# Patient Record
Sex: FEMALE | Race: WHITE | Employment: OTHER | URBAN - NONMETROPOLITAN AREA
[De-identification: names, ages, dates, MRNs, and addresses within clinical notes are randomized per-mention and may not be internally consistent; named-entity substitution may affect disease eponyms.]

---

## 2017-07-11 ENCOUNTER — TELEPHONE (OUTPATIENT)
Dept: INTERNAL MEDICINE | Age: 82
End: 2017-07-11

## 2017-07-13 ENCOUNTER — OFFICE VISIT (OUTPATIENT)
Dept: INTERNAL MEDICINE | Age: 82
End: 2017-07-13
Payer: MEDICARE

## 2017-07-13 VITALS
SYSTOLIC BLOOD PRESSURE: 130 MMHG | DIASTOLIC BLOOD PRESSURE: 80 MMHG | HEART RATE: 90 BPM | OXYGEN SATURATION: 96 % | HEIGHT: 66 IN | TEMPERATURE: 98.1 F | BODY MASS INDEX: 29.89 KG/M2 | WEIGHT: 186 LBS

## 2017-07-13 DIAGNOSIS — R94.31 ABNORMAL ELECTROCARDIOGRAM (ECG) (EKG): ICD-10-CM

## 2017-07-13 DIAGNOSIS — R53.83 FATIGUE, UNSPECIFIED TYPE: Primary | ICD-10-CM

## 2017-07-13 DIAGNOSIS — I49.8 OTHER SPECIFIED CARDIAC ARRHYTHMIAS: ICD-10-CM

## 2017-07-13 DIAGNOSIS — R06.02 SHORTNESS OF BREATH: ICD-10-CM

## 2017-07-13 DIAGNOSIS — I49.8 SINUS ARRHYTHMIA: ICD-10-CM

## 2017-07-13 PROCEDURE — G8427 DOCREV CUR MEDS BY ELIG CLIN: HCPCS | Performed by: PHYSICIAN ASSISTANT

## 2017-07-13 PROCEDURE — 4040F PNEUMOC VAC/ADMIN/RCVD: CPT | Performed by: PHYSICIAN ASSISTANT

## 2017-07-13 PROCEDURE — 99214 OFFICE O/P EST MOD 30 MIN: CPT | Performed by: PHYSICIAN ASSISTANT

## 2017-07-13 PROCEDURE — 1090F PRES/ABSN URINE INCON ASSESS: CPT | Performed by: PHYSICIAN ASSISTANT

## 2017-07-13 PROCEDURE — G8417 CALC BMI ABV UP PARAM F/U: HCPCS | Performed by: PHYSICIAN ASSISTANT

## 2017-07-13 PROCEDURE — 1036F TOBACCO NON-USER: CPT | Performed by: PHYSICIAN ASSISTANT

## 2017-07-13 PROCEDURE — 1123F ACP DISCUSS/DSCN MKR DOCD: CPT | Performed by: PHYSICIAN ASSISTANT

## 2017-07-13 PROCEDURE — G8400 PT W/DXA NO RESULTS DOC: HCPCS | Performed by: PHYSICIAN ASSISTANT

## 2017-07-13 ASSESSMENT — ENCOUNTER SYMPTOMS
NAUSEA: 0
EYE PAIN: 0
CONSTIPATION: 0
ABDOMINAL PAIN: 0
SINUS PRESSURE: 0
SHORTNESS OF BREATH: 1
BACK PAIN: 0
CHEST TIGHTNESS: 0
SORE THROAT: 0
COUGH: 0
WHEEZING: 0
PHOTOPHOBIA: 0
VOMITING: 0
COLOR CHANGE: 0
EYE REDNESS: 0
RHINORRHEA: 0
DIARRHEA: 0

## 2017-07-13 ASSESSMENT — PATIENT HEALTH QUESTIONNAIRE - PHQ9
SUM OF ALL RESPONSES TO PHQ9 QUESTIONS 1 & 2: 2
2. FEELING DOWN, DEPRESSED OR HOPELESS: 1
SUM OF ALL RESPONSES TO PHQ QUESTIONS 1-9: 2
1. LITTLE INTEREST OR PLEASURE IN DOING THINGS: 1

## 2017-07-14 DIAGNOSIS — R53.83 FATIGUE, UNSPECIFIED TYPE: ICD-10-CM

## 2017-07-14 DIAGNOSIS — R06.02 SHORTNESS OF BREATH: ICD-10-CM

## 2017-07-14 LAB
ALBUMIN SERPL-MCNC: 3.9 G/DL (ref 3.5–5.2)
ALP BLD-CCNC: 87 U/L (ref 35–104)
ALT SERPL-CCNC: 11 U/L (ref 5–33)
ANION GAP SERPL CALCULATED.3IONS-SCNC: 13 MMOL/L (ref 7–19)
AST SERPL-CCNC: 20 U/L (ref 5–32)
BASOPHILS ABSOLUTE: 0.1 K/UL (ref 0–0.2)
BASOPHILS RELATIVE PERCENT: 1.4 % (ref 0–1)
BILIRUB SERPL-MCNC: 0.4 MG/DL (ref 0.2–1.2)
BUN BLDV-MCNC: 14 MG/DL (ref 8–23)
CALCIUM SERPL-MCNC: 9.2 MG/DL (ref 8.8–10.2)
CHLORIDE BLD-SCNC: 98 MMOL/L (ref 98–111)
CO2: 28 MMOL/L (ref 22–29)
CREAT SERPL-MCNC: 0.7 MG/DL (ref 0.5–0.9)
EOSINOPHILS ABSOLUTE: 0.3 K/UL (ref 0–0.6)
EOSINOPHILS RELATIVE PERCENT: 5.5 % (ref 0–5)
GFR NON-AFRICAN AMERICAN: >60
GLUCOSE BLD-MCNC: 93 MG/DL (ref 74–109)
HCT VFR BLD CALC: 41.1 % (ref 37–47)
HEMOGLOBIN: 13.1 G/DL (ref 12–16)
LYMPHOCYTES ABSOLUTE: 1.9 K/UL (ref 1.1–4.5)
LYMPHOCYTES RELATIVE PERCENT: 34.3 % (ref 20–40)
MCH RBC QN AUTO: 30.8 PG (ref 27–31)
MCHC RBC AUTO-ENTMCNC: 31.9 G/DL (ref 33–37)
MCV RBC AUTO: 96.5 FL (ref 81–99)
MONOCYTES ABSOLUTE: 0.6 K/UL (ref 0–0.9)
MONOCYTES RELATIVE PERCENT: 10.6 % (ref 0–10)
NEUTROPHILS ABSOLUTE: 2.7 K/UL (ref 1.5–7.5)
NEUTROPHILS RELATIVE PERCENT: 47.8 % (ref 50–65)
PDW BLD-RTO: 13 % (ref 11.5–14.5)
PLATELET # BLD: 256 K/UL (ref 130–400)
PMV BLD AUTO: 9.9 FL (ref 9.4–12.3)
POTASSIUM SERPL-SCNC: 4.8 MMOL/L (ref 3.5–5)
RBC # BLD: 4.26 M/UL (ref 4.2–5.4)
SODIUM BLD-SCNC: 139 MMOL/L (ref 136–145)
TOTAL PROTEIN: 7.5 G/DL (ref 6.6–8.7)
TSH SERPL DL<=0.05 MIU/L-ACNC: 3.35 UIU/ML (ref 0.27–4.2)
VITAMIN B-12: 256 PG/ML (ref 211–946)
WBC # BLD: 5.6 K/UL (ref 4.8–10.8)

## 2017-07-18 DIAGNOSIS — I49.9 IRREGULAR HEART BEAT: Primary | ICD-10-CM

## 2017-07-26 ENCOUNTER — HOSPITAL ENCOUNTER (OUTPATIENT)
Dept: NON INVASIVE DIAGNOSTICS | Age: 82
Discharge: HOME | End: 2017-07-26
Payer: MEDICARE

## 2017-07-26 DIAGNOSIS — R06.02 SHORTNESS OF BREATH: ICD-10-CM

## 2017-07-26 DIAGNOSIS — I49.9 IRREGULAR HEART BEAT: ICD-10-CM

## 2017-07-26 DIAGNOSIS — R94.31 ABNORMAL ELECTROCARDIOGRAM (ECG) (EKG): ICD-10-CM

## 2017-07-26 DIAGNOSIS — I49.8 OTHER SPECIFIED CARDIAC ARRHYTHMIAS: ICD-10-CM

## 2017-07-26 LAB
LV EF: 58 %
LVEF MODALITY: NORMAL

## 2017-07-26 PROCEDURE — 93226 XTRNL ECG REC<48 HR SCAN A/R: CPT

## 2017-07-26 PROCEDURE — 93225 XTRNL ECG REC<48 HRS REC: CPT

## 2017-07-26 PROCEDURE — 93227 XTRNL ECG REC<48 HR R&I: CPT | Performed by: INTERNAL MEDICINE

## 2017-07-26 PROCEDURE — 93306 TTE W/DOPPLER COMPLETE: CPT

## 2017-07-28 ENCOUNTER — OFFICE VISIT (OUTPATIENT)
Dept: INTERNAL MEDICINE | Age: 82
End: 2017-07-28
Payer: MEDICARE

## 2017-07-28 VITALS
TEMPERATURE: 98.3 F | SYSTOLIC BLOOD PRESSURE: 130 MMHG | DIASTOLIC BLOOD PRESSURE: 82 MMHG | BODY MASS INDEX: 29.33 KG/M2 | OXYGEN SATURATION: 96 % | HEIGHT: 66 IN | WEIGHT: 182.5 LBS | HEART RATE: 73 BPM

## 2017-07-28 DIAGNOSIS — R42 DIZZINESS: ICD-10-CM

## 2017-07-28 DIAGNOSIS — F32.89 OTHER DEPRESSION: Primary | ICD-10-CM

## 2017-07-28 DIAGNOSIS — R06.02 SHORTNESS OF BREATH: ICD-10-CM

## 2017-07-28 DIAGNOSIS — F32.A FATIGUE DUE TO DEPRESSION: ICD-10-CM

## 2017-07-28 DIAGNOSIS — R53.83 FATIGUE DUE TO DEPRESSION: ICD-10-CM

## 2017-07-28 DIAGNOSIS — R53.1 WEAKNESS: ICD-10-CM

## 2017-07-28 PROCEDURE — 99214 OFFICE O/P EST MOD 30 MIN: CPT | Performed by: PHYSICIAN ASSISTANT

## 2017-07-28 PROCEDURE — 1090F PRES/ABSN URINE INCON ASSESS: CPT | Performed by: PHYSICIAN ASSISTANT

## 2017-07-28 PROCEDURE — G8427 DOCREV CUR MEDS BY ELIG CLIN: HCPCS | Performed by: PHYSICIAN ASSISTANT

## 2017-07-28 PROCEDURE — G8400 PT W/DXA NO RESULTS DOC: HCPCS | Performed by: PHYSICIAN ASSISTANT

## 2017-07-28 PROCEDURE — 1123F ACP DISCUSS/DSCN MKR DOCD: CPT | Performed by: PHYSICIAN ASSISTANT

## 2017-07-28 PROCEDURE — 4040F PNEUMOC VAC/ADMIN/RCVD: CPT | Performed by: PHYSICIAN ASSISTANT

## 2017-07-28 PROCEDURE — 1036F TOBACCO NON-USER: CPT | Performed by: PHYSICIAN ASSISTANT

## 2017-07-28 PROCEDURE — G8417 CALC BMI ABV UP PARAM F/U: HCPCS | Performed by: PHYSICIAN ASSISTANT

## 2017-07-28 ASSESSMENT — ENCOUNTER SYMPTOMS
WHEEZING: 0
COLOR CHANGE: 0
VOMITING: 0
DIARRHEA: 0
NAUSEA: 0
COUGH: 0
CHEST TIGHTNESS: 0
PHOTOPHOBIA: 0
EYE REDNESS: 0
SHORTNESS OF BREATH: 1
EYE PAIN: 0
SINUS PRESSURE: 0
SORE THROAT: 0
RHINORRHEA: 0
ABDOMINAL PAIN: 0
CONSTIPATION: 0

## 2017-08-25 ENCOUNTER — OFFICE VISIT (OUTPATIENT)
Dept: INTERNAL MEDICINE | Age: 82
End: 2017-08-25
Payer: MEDICARE

## 2017-08-25 VITALS
HEIGHT: 66 IN | OXYGEN SATURATION: 95 % | TEMPERATURE: 98.2 F | SYSTOLIC BLOOD PRESSURE: 132 MMHG | HEART RATE: 75 BPM | WEIGHT: 180.5 LBS | BODY MASS INDEX: 29.01 KG/M2 | DIASTOLIC BLOOD PRESSURE: 80 MMHG

## 2017-08-25 DIAGNOSIS — F32.A DEPRESSION, UNSPECIFIED DEPRESSION TYPE: Primary | ICD-10-CM

## 2017-08-25 DIAGNOSIS — R06.02 SHORTNESS OF BREATH: ICD-10-CM

## 2017-08-25 DIAGNOSIS — K21.9 GASTROESOPHAGEAL REFLUX DISEASE WITHOUT ESOPHAGITIS: ICD-10-CM

## 2017-08-25 DIAGNOSIS — R25.1 TREMORS OF NERVOUS SYSTEM: ICD-10-CM

## 2017-08-25 DIAGNOSIS — E78.5 HYPERLIPIDEMIA, UNSPECIFIED HYPERLIPIDEMIA TYPE: ICD-10-CM

## 2017-08-25 DIAGNOSIS — I10 ESSENTIAL HYPERTENSION: ICD-10-CM

## 2017-08-25 PROCEDURE — 1036F TOBACCO NON-USER: CPT | Performed by: PHYSICIAN ASSISTANT

## 2017-08-25 PROCEDURE — 99213 OFFICE O/P EST LOW 20 MIN: CPT | Performed by: PHYSICIAN ASSISTANT

## 2017-08-25 PROCEDURE — 4040F PNEUMOC VAC/ADMIN/RCVD: CPT | Performed by: PHYSICIAN ASSISTANT

## 2017-08-25 PROCEDURE — 1090F PRES/ABSN URINE INCON ASSESS: CPT | Performed by: PHYSICIAN ASSISTANT

## 2017-08-25 PROCEDURE — G8427 DOCREV CUR MEDS BY ELIG CLIN: HCPCS | Performed by: PHYSICIAN ASSISTANT

## 2017-08-25 PROCEDURE — G8400 PT W/DXA NO RESULTS DOC: HCPCS | Performed by: PHYSICIAN ASSISTANT

## 2017-08-25 PROCEDURE — 1123F ACP DISCUSS/DSCN MKR DOCD: CPT | Performed by: PHYSICIAN ASSISTANT

## 2017-08-25 PROCEDURE — G8417 CALC BMI ABV UP PARAM F/U: HCPCS | Performed by: PHYSICIAN ASSISTANT

## 2017-08-25 RX ORDER — ATENOLOL 100 MG/1
100 TABLET ORAL DAILY
Qty: 90 TABLET | Refills: 3 | Status: SHIPPED | OUTPATIENT
Start: 2017-08-25 | End: 2017-09-29 | Stop reason: SDUPTHER

## 2017-08-25 RX ORDER — LOVASTATIN 40 MG/1
40 TABLET ORAL NIGHTLY
Qty: 90 TABLET | Refills: 3 | Status: SHIPPED | OUTPATIENT
Start: 2017-08-25 | End: 2018-05-18 | Stop reason: SDUPTHER

## 2017-08-25 RX ORDER — PROPRANOLOL HYDROCHLORIDE 60 MG/1
60 TABLET ORAL DAILY
COMMUNITY
End: 2017-08-25 | Stop reason: CLARIF

## 2017-08-25 RX ORDER — PROPRANOLOL HYDROCHLORIDE 60 MG/1
60 TABLET ORAL DAILY
Qty: 30 TABLET | Refills: 0 | Status: CANCELLED | OUTPATIENT
Start: 2017-08-25

## 2017-08-25 RX ORDER — OMEPRAZOLE 40 MG/1
40 CAPSULE, DELAYED RELEASE ORAL DAILY
Qty: 90 CAPSULE | Refills: 3 | Status: SHIPPED | OUTPATIENT
Start: 2017-08-25 | End: 2017-11-07 | Stop reason: SDUPTHER

## 2017-08-25 RX ORDER — VALSARTAN 160 MG/1
80 TABLET ORAL DAILY
Qty: 45 TABLET | Refills: 3 | Status: SHIPPED | OUTPATIENT
Start: 2017-08-25 | End: 2018-05-18 | Stop reason: SDUPTHER

## 2017-08-25 ASSESSMENT — ENCOUNTER SYMPTOMS
VOMITING: 0
PHOTOPHOBIA: 0
COUGH: 0
ABDOMINAL PAIN: 0
DIARRHEA: 0
EYE REDNESS: 0
NAUSEA: 0
CHEST TIGHTNESS: 0
SINUS PRESSURE: 0
SORE THROAT: 0
SHORTNESS OF BREATH: 1
RHINORRHEA: 0
WHEEZING: 0
EYE PAIN: 0
CONSTIPATION: 0
COLOR CHANGE: 0

## 2017-09-29 ENCOUNTER — OFFICE VISIT (OUTPATIENT)
Dept: INTERNAL MEDICINE | Age: 82
End: 2017-09-29
Payer: MEDICARE

## 2017-09-29 VITALS
HEART RATE: 78 BPM | BODY MASS INDEX: 28.93 KG/M2 | TEMPERATURE: 98.3 F | SYSTOLIC BLOOD PRESSURE: 132 MMHG | HEIGHT: 66 IN | WEIGHT: 180 LBS | OXYGEN SATURATION: 96 % | DIASTOLIC BLOOD PRESSURE: 80 MMHG

## 2017-09-29 DIAGNOSIS — F32.A DEPRESSION, UNSPECIFIED DEPRESSION TYPE: Primary | ICD-10-CM

## 2017-09-29 DIAGNOSIS — E78.5 HYPERLIPIDEMIA, UNSPECIFIED HYPERLIPIDEMIA TYPE: ICD-10-CM

## 2017-09-29 DIAGNOSIS — I10 ESSENTIAL HYPERTENSION: ICD-10-CM

## 2017-09-29 DIAGNOSIS — R25.1 TREMORS OF NERVOUS SYSTEM: ICD-10-CM

## 2017-09-29 PROCEDURE — G8427 DOCREV CUR MEDS BY ELIG CLIN: HCPCS | Performed by: PHYSICIAN ASSISTANT

## 2017-09-29 PROCEDURE — G8417 CALC BMI ABV UP PARAM F/U: HCPCS | Performed by: PHYSICIAN ASSISTANT

## 2017-09-29 PROCEDURE — 1123F ACP DISCUSS/DSCN MKR DOCD: CPT | Performed by: PHYSICIAN ASSISTANT

## 2017-09-29 PROCEDURE — 1036F TOBACCO NON-USER: CPT | Performed by: PHYSICIAN ASSISTANT

## 2017-09-29 PROCEDURE — 99213 OFFICE O/P EST LOW 20 MIN: CPT | Performed by: PHYSICIAN ASSISTANT

## 2017-09-29 PROCEDURE — G8400 PT W/DXA NO RESULTS DOC: HCPCS | Performed by: PHYSICIAN ASSISTANT

## 2017-09-29 PROCEDURE — 4040F PNEUMOC VAC/ADMIN/RCVD: CPT | Performed by: PHYSICIAN ASSISTANT

## 2017-09-29 PROCEDURE — 1090F PRES/ABSN URINE INCON ASSESS: CPT | Performed by: PHYSICIAN ASSISTANT

## 2017-09-29 RX ORDER — ATENOLOL 100 MG/1
100 TABLET ORAL DAILY
Qty: 90 TABLET | Refills: 3 | Status: SHIPPED | OUTPATIENT
Start: 2017-09-29 | End: 2018-05-18 | Stop reason: DRUGHIGH

## 2017-09-29 ASSESSMENT — ENCOUNTER SYMPTOMS
SORE THROAT: 0
EYE PAIN: 0
DIARRHEA: 0
NAUSEA: 0
EYE REDNESS: 0
COLOR CHANGE: 0
ABDOMINAL PAIN: 0
SHORTNESS OF BREATH: 1
CONSTIPATION: 0
RHINORRHEA: 0
WHEEZING: 0
CHEST TIGHTNESS: 0
PHOTOPHOBIA: 0
COUGH: 0
SINUS PRESSURE: 0
VOMITING: 0

## 2017-11-07 DIAGNOSIS — K21.9 GASTROESOPHAGEAL REFLUX DISEASE WITHOUT ESOPHAGITIS: ICD-10-CM

## 2017-11-07 RX ORDER — OMEPRAZOLE 40 MG/1
CAPSULE, DELAYED RELEASE ORAL
Qty: 90 CAPSULE | Refills: 1 | Status: SHIPPED | OUTPATIENT
Start: 2017-11-07 | End: 2018-02-09 | Stop reason: SDUPTHER

## 2017-12-22 DIAGNOSIS — E78.5 HYPERLIPIDEMIA, UNSPECIFIED HYPERLIPIDEMIA TYPE: ICD-10-CM

## 2017-12-22 DIAGNOSIS — R25.1 TREMORS OF NERVOUS SYSTEM: ICD-10-CM

## 2017-12-22 DIAGNOSIS — I10 ESSENTIAL HYPERTENSION: ICD-10-CM

## 2017-12-22 LAB
ALBUMIN SERPL-MCNC: 4 G/DL (ref 3.5–5.2)
ALP BLD-CCNC: 87 U/L (ref 35–104)
ALT SERPL-CCNC: 11 U/L (ref 5–33)
ANION GAP SERPL CALCULATED.3IONS-SCNC: 9 MMOL/L (ref 7–19)
AST SERPL-CCNC: 19 U/L (ref 5–32)
BASOPHILS ABSOLUTE: 0.1 K/UL (ref 0–0.2)
BASOPHILS RELATIVE PERCENT: 1.6 % (ref 0–1)
BILIRUB SERPL-MCNC: 0.4 MG/DL (ref 0.2–1.2)
BUN BLDV-MCNC: 16 MG/DL (ref 8–23)
CALCIUM SERPL-MCNC: 9.4 MG/DL (ref 8.8–10.2)
CHLORIDE BLD-SCNC: 102 MMOL/L (ref 98–111)
CHOLESTEROL, TOTAL: 166 MG/DL (ref 160–199)
CO2: 30 MMOL/L (ref 22–29)
CREAT SERPL-MCNC: 0.7 MG/DL (ref 0.5–0.9)
EOSINOPHILS ABSOLUTE: 0.2 K/UL (ref 0–0.6)
EOSINOPHILS RELATIVE PERCENT: 3.9 % (ref 0–5)
GFR NON-AFRICAN AMERICAN: >60
GLUCOSE BLD-MCNC: 99 MG/DL (ref 74–109)
HCT VFR BLD CALC: 43.6 % (ref 37–47)
HDLC SERPL-MCNC: 58 MG/DL (ref 65–121)
HEMOGLOBIN: 13.9 G/DL (ref 12–16)
LDL CHOLESTEROL CALCULATED: 86 MG/DL
LYMPHOCYTES ABSOLUTE: 1.6 K/UL (ref 1.1–4.5)
LYMPHOCYTES RELATIVE PERCENT: 31.8 % (ref 20–40)
MCH RBC QN AUTO: 30.5 PG (ref 27–31)
MCHC RBC AUTO-ENTMCNC: 31.9 G/DL (ref 33–37)
MCV RBC AUTO: 95.8 FL (ref 81–99)
MONOCYTES ABSOLUTE: 0.5 K/UL (ref 0–0.9)
MONOCYTES RELATIVE PERCENT: 10.4 % (ref 0–10)
NEUTROPHILS ABSOLUTE: 2.7 K/UL (ref 1.5–7.5)
NEUTROPHILS RELATIVE PERCENT: 52.1 % (ref 50–65)
PDW BLD-RTO: 12.8 % (ref 11.5–14.5)
PLATELET # BLD: 205 K/UL (ref 130–400)
PMV BLD AUTO: 10 FL (ref 9.4–12.3)
POTASSIUM SERPL-SCNC: 4.5 MMOL/L (ref 3.5–5)
RBC # BLD: 4.55 M/UL (ref 4.2–5.4)
SODIUM BLD-SCNC: 141 MMOL/L (ref 136–145)
TOTAL PROTEIN: 7.2 G/DL (ref 6.6–8.7)
TRIGL SERPL-MCNC: 108 MG/DL (ref 0–149)
WBC # BLD: 5.1 K/UL (ref 4.8–10.8)

## 2018-02-09 ENCOUNTER — OFFICE VISIT (OUTPATIENT)
Dept: INTERNAL MEDICINE | Age: 83
End: 2018-02-09
Payer: MEDICARE

## 2018-02-09 VITALS
OXYGEN SATURATION: 98 % | HEIGHT: 66 IN | DIASTOLIC BLOOD PRESSURE: 84 MMHG | WEIGHT: 178.4 LBS | SYSTOLIC BLOOD PRESSURE: 130 MMHG | BODY MASS INDEX: 28.67 KG/M2 | HEART RATE: 79 BPM | TEMPERATURE: 97.3 F

## 2018-02-09 DIAGNOSIS — F41.9 ANXIETY AND DEPRESSION: ICD-10-CM

## 2018-02-09 DIAGNOSIS — Z00.00 ROUTINE GENERAL MEDICAL EXAMINATION AT A HEALTH CARE FACILITY: Primary | ICD-10-CM

## 2018-02-09 DIAGNOSIS — K21.9 GASTROESOPHAGEAL REFLUX DISEASE WITHOUT ESOPHAGITIS: ICD-10-CM

## 2018-02-09 DIAGNOSIS — F32.A ANXIETY AND DEPRESSION: ICD-10-CM

## 2018-02-09 DIAGNOSIS — G89.29 CHRONIC RIGHT SHOULDER PAIN: ICD-10-CM

## 2018-02-09 DIAGNOSIS — Z23 NEED FOR PROPHYLACTIC VACCINATION AGAINST STREPTOCOCCUS PNEUMONIAE (PNEUMOCOCCUS): ICD-10-CM

## 2018-02-09 DIAGNOSIS — Z78.0 POST-MENOPAUSAL: ICD-10-CM

## 2018-02-09 DIAGNOSIS — M25.511 CHRONIC RIGHT SHOULDER PAIN: ICD-10-CM

## 2018-02-09 DIAGNOSIS — R20.0 NUMBNESS OF FINGER: ICD-10-CM

## 2018-02-09 DIAGNOSIS — E78.2 MIXED HYPERLIPIDEMIA: ICD-10-CM

## 2018-02-09 DIAGNOSIS — I10 ESSENTIAL HYPERTENSION: ICD-10-CM

## 2018-02-09 DIAGNOSIS — G25.0 TREMOR, ESSENTIAL: ICD-10-CM

## 2018-02-09 PROCEDURE — 1090F PRES/ABSN URINE INCON ASSESS: CPT | Performed by: PHYSICIAN ASSISTANT

## 2018-02-09 PROCEDURE — G0009 ADMIN PNEUMOCOCCAL VACCINE: HCPCS | Performed by: PHYSICIAN ASSISTANT

## 2018-02-09 PROCEDURE — G8484 FLU IMMUNIZE NO ADMIN: HCPCS | Performed by: PHYSICIAN ASSISTANT

## 2018-02-09 PROCEDURE — G8417 CALC BMI ABV UP PARAM F/U: HCPCS | Performed by: PHYSICIAN ASSISTANT

## 2018-02-09 PROCEDURE — 90670 PCV13 VACCINE IM: CPT | Performed by: PHYSICIAN ASSISTANT

## 2018-02-09 PROCEDURE — 1123F ACP DISCUSS/DSCN MKR DOCD: CPT | Performed by: PHYSICIAN ASSISTANT

## 2018-02-09 PROCEDURE — 1036F TOBACCO NON-USER: CPT | Performed by: PHYSICIAN ASSISTANT

## 2018-02-09 PROCEDURE — G8400 PT W/DXA NO RESULTS DOC: HCPCS | Performed by: PHYSICIAN ASSISTANT

## 2018-02-09 PROCEDURE — 4040F PNEUMOC VAC/ADMIN/RCVD: CPT | Performed by: PHYSICIAN ASSISTANT

## 2018-02-09 PROCEDURE — G8427 DOCREV CUR MEDS BY ELIG CLIN: HCPCS | Performed by: PHYSICIAN ASSISTANT

## 2018-02-09 PROCEDURE — 99214 OFFICE O/P EST MOD 30 MIN: CPT | Performed by: PHYSICIAN ASSISTANT

## 2018-02-09 RX ORDER — ESCITALOPRAM OXALATE 10 MG/1
10 TABLET ORAL DAILY
Qty: 90 TABLET | Refills: 3 | Status: SHIPPED | OUTPATIENT
Start: 2018-02-09 | End: 2018-03-09 | Stop reason: SDUPTHER

## 2018-02-09 RX ORDER — OMEPRAZOLE 40 MG/1
CAPSULE, DELAYED RELEASE ORAL
Qty: 90 CAPSULE | Refills: 3 | Status: SHIPPED | OUTPATIENT
Start: 2018-02-09 | End: 2018-05-10 | Stop reason: SDUPTHER

## 2018-02-09 ASSESSMENT — ENCOUNTER SYMPTOMS
PHOTOPHOBIA: 0
COUGH: 0
SINUS PRESSURE: 0
VOMITING: 0
EYE PAIN: 0
RHINORRHEA: 0
DIARRHEA: 0
COLOR CHANGE: 0
WHEEZING: 0
SHORTNESS OF BREATH: 1
CONSTIPATION: 0
ABDOMINAL PAIN: 0
EYE REDNESS: 0
NAUSEA: 0
CHEST TIGHTNESS: 0
SORE THROAT: 0

## 2018-02-09 NOTE — PROGRESS NOTES
[facility]  [address] [facility]  [address] [place] [address]  Dept: [phone]  Dept Fax: [phone]: [phone]      [facility] INTERNAL MEDICINE  OFFICE NOTE      Chief Complaint   Patient presents with    Other     3 month f/u , tremors seem to be worse, b/p been running high       Nash Nadir is a [de-identified]y.o. year old female who is seen for 3 month follow-up for hypertension, GERD, hyperlipidemia, anxiety and depression, tremors. Patient states she has a wrist blood pressure cuff and sometimes her blood pressure seems like it may be running a little high at home. Patient blood pressure was today fairly well controlled with current medication. Patient's currently taking losartan 80 mg daily. Patient states she is compliant with taking medication. Patient denies any chest pain, shortness breath, dizziness. Patient states her GERD is fairly well controlled on 40 mg of Prilosec daily. Patient's cholesterol stays further well-controlled on Mevacor 40 mg at night. Patient denies any side effects from lipid-lowering medication. Patient's compliant with taking medication. Patient's anxiety and depression seems to be fairly well controlled on Lexapro 10 mg daily. Patient states has been feeling better. Patient states due to the weather she has not been able to get up and get out much. Patient states she still goes to [employer] every Sunday and goes with her daughter's on Friday and goes to her meetings throughout the week. Patient states she does plan on starting to work with her arms and legs inside. Patient states her tremors seem a little bit worse but she's been able to tolerate it. Patient states overall she's been doing pretty good.   Patient states has some periodic right shoulder pain and some periodic tingling in her right ring finger but she doesn't feel like it anything needs to be done at this point unless it becomes directed. Patient's anxiety and depression fairly well controlled on current medication regimen continue as directed. Patient's right shoulder pain is periodic and not limiting at this time. We will hold off on any intervention at this time if it consistently is getting worse can consider physical therapy. Patient's numbness in the right pinky finger is periodic and resolves with massage we'll hold off on any studies at this time unless it gets worse. Patient's tremor seems stable do not really want to increase patient's medication unless we have to. 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 Patient was given pneumonia shot today. Will schedule patient for a DEXA scan to screen for osteoporosis. Patient will follow back up with me in 3 months with repeat CBC, CMP, lipids. Patient follow-up sooner as needed. Orders Placed This Encounter   Procedures    DEXA Bone Density Axial Skeleton    Pneumococcal conjugate vaccine 13-valent PCV13    CBC Auto Differential    Comprehensive Metabolic Panel    Lipid Panel        Return in about 3 months (around 5/9/2018), or if symptoms worsen or fail to improve, for physical.       Patient given educational handouts and has had all questions answered. Patient voices understanding and agrees to plans along with risks and benefits of plan. Patient is instructed to continue prior medications, diet, and exercise plans as instructed. Patient agrees to follow up as instructions, sooner if needed, or to go to ER if condition becomes emergent. Additional Instructions: As always, patient is advised to bring in medication bottles in order to correctly reconcile with our current list.      Luis Fernando Jo PA-C    EMR Dragon/transcription disclaimer: Much of this encounter note is electronic transcription/translation of spoken language to printed text.  The electronic translation of spoken language may be erroneous, or at times,

## 2018-03-09 DIAGNOSIS — F32.A ANXIETY AND DEPRESSION: ICD-10-CM

## 2018-03-09 DIAGNOSIS — F41.9 ANXIETY AND DEPRESSION: ICD-10-CM

## 2018-03-09 RX ORDER — ESCITALOPRAM OXALATE 10 MG/1
TABLET ORAL
Qty: 90 TABLET | Refills: 3 | Status: SHIPPED | OUTPATIENT
Start: 2018-03-09 | End: 2018-05-18 | Stop reason: SDUPTHER

## 2018-05-03 DIAGNOSIS — E78.2 MIXED HYPERLIPIDEMIA: ICD-10-CM

## 2018-05-03 DIAGNOSIS — I10 ESSENTIAL HYPERTENSION: ICD-10-CM

## 2018-05-03 LAB
ALBUMIN SERPL-MCNC: 4.2 G/DL (ref 3.5–5.2)
ALP BLD-CCNC: 88 U/L (ref 35–104)
ALT SERPL-CCNC: 9 U/L (ref 5–33)
ANION GAP SERPL CALCULATED.3IONS-SCNC: 13 MMOL/L (ref 7–19)
AST SERPL-CCNC: 19 U/L (ref 5–32)
BASOPHILS ABSOLUTE: 0.1 K/UL (ref 0–0.2)
BASOPHILS RELATIVE PERCENT: 1.4 % (ref 0–1)
BILIRUB SERPL-MCNC: 0.5 MG/DL (ref 0.2–1.2)
BUN BLDV-MCNC: 14 MG/DL (ref 8–23)
CALCIUM SERPL-MCNC: 9.1 MG/DL (ref 8.8–10.2)
CHLORIDE BLD-SCNC: 97 MMOL/L (ref 98–111)
CHOLESTEROL, TOTAL: 159 MG/DL (ref 160–199)
CO2: 28 MMOL/L (ref 22–29)
CREAT SERPL-MCNC: 0.6 MG/DL (ref 0.5–0.9)
EOSINOPHILS ABSOLUTE: 0.2 K/UL (ref 0–0.6)
EOSINOPHILS RELATIVE PERCENT: 3.6 % (ref 0–5)
GFR NON-AFRICAN AMERICAN: >60
GLUCOSE BLD-MCNC: 96 MG/DL (ref 74–109)
HCT VFR BLD CALC: 40.4 % (ref 37–47)
HDLC SERPL-MCNC: 60 MG/DL (ref 65–121)
HEMOGLOBIN: 12.7 G/DL (ref 12–16)
LDL CHOLESTEROL CALCULATED: 82 MG/DL
LYMPHOCYTES ABSOLUTE: 1.4 K/UL (ref 1.1–4.5)
LYMPHOCYTES RELATIVE PERCENT: 31.4 % (ref 20–40)
MCH RBC QN AUTO: 29.6 PG (ref 27–31)
MCHC RBC AUTO-ENTMCNC: 31.4 G/DL (ref 33–37)
MCV RBC AUTO: 94.2 FL (ref 81–99)
MONOCYTES ABSOLUTE: 0.5 K/UL (ref 0–0.9)
MONOCYTES RELATIVE PERCENT: 10.2 % (ref 0–10)
NEUTROPHILS ABSOLUTE: 2.4 K/UL (ref 1.5–7.5)
NEUTROPHILS RELATIVE PERCENT: 53.2 % (ref 50–65)
PDW BLD-RTO: 13.1 % (ref 11.5–14.5)
PLATELET # BLD: 183 K/UL (ref 130–400)
PMV BLD AUTO: 9.9 FL (ref 9.4–12.3)
POTASSIUM SERPL-SCNC: 3.9 MMOL/L (ref 3.5–5)
RBC # BLD: 4.29 M/UL (ref 4.2–5.4)
SODIUM BLD-SCNC: 138 MMOL/L (ref 136–145)
TOTAL PROTEIN: 7 G/DL (ref 6.6–8.7)
TRIGL SERPL-MCNC: 84 MG/DL (ref 0–149)
WBC # BLD: 4.4 K/UL (ref 4.8–10.8)

## 2018-05-10 DIAGNOSIS — K21.9 GASTROESOPHAGEAL REFLUX DISEASE WITHOUT ESOPHAGITIS: ICD-10-CM

## 2018-05-11 RX ORDER — OMEPRAZOLE 40 MG/1
CAPSULE, DELAYED RELEASE ORAL
Qty: 90 CAPSULE | Refills: 1 | Status: SHIPPED | OUTPATIENT
Start: 2018-05-11 | End: 2018-10-26 | Stop reason: SDUPTHER

## 2018-05-18 ENCOUNTER — OFFICE VISIT (OUTPATIENT)
Dept: INTERNAL MEDICINE | Age: 83
End: 2018-05-18
Payer: MEDICARE

## 2018-05-18 VITALS
BODY MASS INDEX: 28.66 KG/M2 | DIASTOLIC BLOOD PRESSURE: 74 MMHG | SYSTOLIC BLOOD PRESSURE: 132 MMHG | OXYGEN SATURATION: 97 % | HEIGHT: 65 IN | WEIGHT: 172 LBS | HEART RATE: 68 BPM

## 2018-05-18 DIAGNOSIS — Z00.00 ROUTINE GENERAL MEDICAL EXAMINATION AT A HEALTH CARE FACILITY: Primary | ICD-10-CM

## 2018-05-18 DIAGNOSIS — I10 ESSENTIAL HYPERTENSION: ICD-10-CM

## 2018-05-18 DIAGNOSIS — E78.5 HYPERLIPIDEMIA, UNSPECIFIED HYPERLIPIDEMIA TYPE: ICD-10-CM

## 2018-05-18 DIAGNOSIS — K21.9 GASTROESOPHAGEAL REFLUX DISEASE WITHOUT ESOPHAGITIS: ICD-10-CM

## 2018-05-18 DIAGNOSIS — F41.9 ANXIETY AND DEPRESSION: ICD-10-CM

## 2018-05-18 DIAGNOSIS — F32.A ANXIETY AND DEPRESSION: ICD-10-CM

## 2018-05-18 PROCEDURE — G0439 PPPS, SUBSEQ VISIT: HCPCS | Performed by: PHYSICIAN ASSISTANT

## 2018-05-18 PROCEDURE — 4040F PNEUMOC VAC/ADMIN/RCVD: CPT | Performed by: PHYSICIAN ASSISTANT

## 2018-05-18 RX ORDER — ESCITALOPRAM OXALATE 10 MG/1
TABLET ORAL
Qty: 90 TABLET | Refills: 3 | Status: SHIPPED | OUTPATIENT
Start: 2018-05-18 | End: 2018-06-06 | Stop reason: SDUPTHER

## 2018-05-18 RX ORDER — LATANOPROST 50 UG/ML
SOLUTION/ DROPS OPHTHALMIC
Refills: 3 | COMMUNITY
Start: 2018-04-20 | End: 2021-01-01

## 2018-05-18 RX ORDER — LOVASTATIN 40 MG/1
40 TABLET ORAL NIGHTLY
Qty: 90 TABLET | Refills: 3 | Status: SHIPPED | OUTPATIENT
Start: 2018-05-18 | End: 2018-08-24 | Stop reason: SDUPTHER

## 2018-05-18 RX ORDER — ATENOLOL 50 MG/1
TABLET ORAL
Refills: 3 | COMMUNITY
Start: 2018-02-16 | End: 2018-05-18 | Stop reason: SDUPTHER

## 2018-05-18 RX ORDER — VALSARTAN 160 MG/1
80 TABLET ORAL DAILY
Qty: 45 TABLET | Refills: 3 | Status: SHIPPED | OUTPATIENT
Start: 2018-05-18 | End: 2018-06-19 | Stop reason: SDUPTHER

## 2018-05-18 RX ORDER — DORZOLAMIDE HYDROCHLORIDE AND TIMOLOL MALEATE 20; 5 MG/ML; MG/ML
SOLUTION/ DROPS OPHTHALMIC
Refills: 3 | COMMUNITY
Start: 2018-04-11

## 2018-05-18 RX ORDER — ATENOLOL 50 MG/1
TABLET ORAL
Qty: 180 TABLET | Refills: 3 | Status: SHIPPED | OUTPATIENT
Start: 2018-05-18 | End: 2019-05-21 | Stop reason: SDUPTHER

## 2018-05-18 ASSESSMENT — LIFESTYLE VARIABLES: HOW OFTEN DO YOU HAVE A DRINK CONTAINING ALCOHOL: 0

## 2018-05-18 ASSESSMENT — ANXIETY QUESTIONNAIRES: GAD7 TOTAL SCORE: 0

## 2018-05-18 ASSESSMENT — PATIENT HEALTH QUESTIONNAIRE - PHQ9: SUM OF ALL RESPONSES TO PHQ QUESTIONS 1-9: 0

## 2018-06-06 DIAGNOSIS — F41.9 ANXIETY AND DEPRESSION: ICD-10-CM

## 2018-06-06 DIAGNOSIS — F32.A ANXIETY AND DEPRESSION: ICD-10-CM

## 2018-06-06 RX ORDER — ESCITALOPRAM OXALATE 10 MG/1
TABLET ORAL
Qty: 90 TABLET | Refills: 3 | Status: SHIPPED | OUTPATIENT
Start: 2018-06-06 | End: 2019-04-10

## 2018-06-19 DIAGNOSIS — I10 ESSENTIAL HYPERTENSION: ICD-10-CM

## 2018-06-19 RX ORDER — VALSARTAN 160 MG/1
80 TABLET ORAL DAILY
Qty: 45 TABLET | Refills: 3 | Status: SHIPPED | OUTPATIENT
Start: 2018-06-19 | End: 2018-07-31 | Stop reason: ALTCHOICE

## 2018-06-22 ENCOUNTER — HOSPITAL ENCOUNTER (OUTPATIENT)
Dept: WOMENS IMAGING | Age: 83
Discharge: HOME | End: 2018-06-22
Payer: MEDICARE

## 2018-06-22 DIAGNOSIS — Z78.0 POST-MENOPAUSAL: ICD-10-CM

## 2018-06-22 PROCEDURE — 77080 DXA BONE DENSITY AXIAL: CPT

## 2018-07-31 ENCOUNTER — TELEPHONE (OUTPATIENT)
Dept: INTERNAL MEDICINE | Age: 83
End: 2018-07-31

## 2018-07-31 RX ORDER — LOSARTAN POTASSIUM 50 MG/1
TABLET ORAL
Qty: 30 TABLET | Refills: 3 | Status: SHIPPED | OUTPATIENT
Start: 2018-07-31 | End: 2018-12-06 | Stop reason: SDUPTHER

## 2018-08-17 DIAGNOSIS — E78.5 HYPERLIPIDEMIA, UNSPECIFIED HYPERLIPIDEMIA TYPE: ICD-10-CM

## 2018-08-17 DIAGNOSIS — I10 ESSENTIAL HYPERTENSION: ICD-10-CM

## 2018-08-17 LAB
ALBUMIN SERPL-MCNC: 4.2 G/DL (ref 3.5–5.2)
ALP BLD-CCNC: 82 U/L (ref 35–104)
ALT SERPL-CCNC: 10 U/L (ref 5–33)
ANION GAP SERPL CALCULATED.3IONS-SCNC: 15 MMOL/L (ref 7–19)
AST SERPL-CCNC: 19 U/L (ref 5–32)
BASOPHILS ABSOLUTE: 0.1 K/UL (ref 0–0.2)
BASOPHILS RELATIVE PERCENT: 1.4 % (ref 0–1)
BILIRUB SERPL-MCNC: 0.5 MG/DL (ref 0.2–1.2)
BUN BLDV-MCNC: 13 MG/DL (ref 8–23)
CALCIUM SERPL-MCNC: 9.2 MG/DL (ref 8.8–10.2)
CHLORIDE BLD-SCNC: 100 MMOL/L (ref 98–111)
CHOLESTEROL, TOTAL: 171 MG/DL (ref 160–199)
CO2: 26 MMOL/L (ref 22–29)
CREAT SERPL-MCNC: 0.8 MG/DL (ref 0.5–0.9)
EOSINOPHILS ABSOLUTE: 0.1 K/UL (ref 0–0.6)
EOSINOPHILS RELATIVE PERCENT: 2.8 % (ref 0–5)
GFR NON-AFRICAN AMERICAN: >60
GLUCOSE BLD-MCNC: 94 MG/DL (ref 74–109)
HCT VFR BLD CALC: 40.1 % (ref 37–47)
HDLC SERPL-MCNC: 49 MG/DL (ref 65–121)
HEMOGLOBIN: 12.8 G/DL (ref 12–16)
LDL CHOLESTEROL CALCULATED: 98 MG/DL
LYMPHOCYTES ABSOLUTE: 1.5 K/UL (ref 1.1–4.5)
LYMPHOCYTES RELATIVE PERCENT: 34.4 % (ref 20–40)
MCH RBC QN AUTO: 29.8 PG (ref 27–31)
MCHC RBC AUTO-ENTMCNC: 31.9 G/DL (ref 33–37)
MCV RBC AUTO: 93.3 FL (ref 81–99)
MONOCYTES ABSOLUTE: 0.4 K/UL (ref 0–0.9)
MONOCYTES RELATIVE PERCENT: 9.9 % (ref 0–10)
NEUTROPHILS ABSOLUTE: 2.2 K/UL (ref 1.5–7.5)
NEUTROPHILS RELATIVE PERCENT: 51.3 % (ref 50–65)
PDW BLD-RTO: 13.3 % (ref 11.5–14.5)
PLATELET # BLD: 176 K/UL (ref 130–400)
PMV BLD AUTO: 10.9 FL (ref 9.4–12.3)
POTASSIUM SERPL-SCNC: 4.4 MMOL/L (ref 3.5–5)
RBC # BLD: 4.3 M/UL (ref 4.2–5.4)
SODIUM BLD-SCNC: 141 MMOL/L (ref 136–145)
TOTAL PROTEIN: 7.3 G/DL (ref 6.6–8.7)
TRIGL SERPL-MCNC: 118 MG/DL (ref 0–149)
VITAMIN B-12: 396 PG/ML (ref 211–946)
WBC # BLD: 4.3 K/UL (ref 4.8–10.8)

## 2018-08-24 ENCOUNTER — OFFICE VISIT (OUTPATIENT)
Dept: INTERNAL MEDICINE | Age: 83
End: 2018-08-24
Payer: MEDICARE

## 2018-08-24 VITALS
OXYGEN SATURATION: 96 % | WEIGHT: 170.6 LBS | HEART RATE: 73 BPM | SYSTOLIC BLOOD PRESSURE: 130 MMHG | BODY MASS INDEX: 27.42 KG/M2 | DIASTOLIC BLOOD PRESSURE: 70 MMHG | TEMPERATURE: 98.2 F | HEIGHT: 66 IN

## 2018-08-24 DIAGNOSIS — F41.9 ANXIETY AND DEPRESSION: ICD-10-CM

## 2018-08-24 DIAGNOSIS — R06.02 SHORTNESS OF BREATH: ICD-10-CM

## 2018-08-24 DIAGNOSIS — F32.A ANXIETY AND DEPRESSION: ICD-10-CM

## 2018-08-24 DIAGNOSIS — Z00.00 ROUTINE GENERAL MEDICAL EXAMINATION AT A HEALTH CARE FACILITY: Primary | ICD-10-CM

## 2018-08-24 DIAGNOSIS — E78.5 HYPERLIPIDEMIA, UNSPECIFIED HYPERLIPIDEMIA TYPE: ICD-10-CM

## 2018-08-24 DIAGNOSIS — K21.9 GASTROESOPHAGEAL REFLUX DISEASE WITHOUT ESOPHAGITIS: ICD-10-CM

## 2018-08-24 DIAGNOSIS — I10 ESSENTIAL HYPERTENSION: ICD-10-CM

## 2018-08-24 PROCEDURE — 1101F PT FALLS ASSESS-DOCD LE1/YR: CPT | Performed by: PHYSICIAN ASSISTANT

## 2018-08-24 PROCEDURE — 1090F PRES/ABSN URINE INCON ASSESS: CPT | Performed by: PHYSICIAN ASSISTANT

## 2018-08-24 PROCEDURE — 99214 OFFICE O/P EST MOD 30 MIN: CPT | Performed by: PHYSICIAN ASSISTANT

## 2018-08-24 PROCEDURE — 4040F PNEUMOC VAC/ADMIN/RCVD: CPT | Performed by: PHYSICIAN ASSISTANT

## 2018-08-24 PROCEDURE — G8399 PT W/DXA RESULTS DOCUMENT: HCPCS | Performed by: PHYSICIAN ASSISTANT

## 2018-08-24 PROCEDURE — 1036F TOBACCO NON-USER: CPT | Performed by: PHYSICIAN ASSISTANT

## 2018-08-24 PROCEDURE — 1123F ACP DISCUSS/DSCN MKR DOCD: CPT | Performed by: PHYSICIAN ASSISTANT

## 2018-08-24 PROCEDURE — G8427 DOCREV CUR MEDS BY ELIG CLIN: HCPCS | Performed by: PHYSICIAN ASSISTANT

## 2018-08-24 PROCEDURE — G8417 CALC BMI ABV UP PARAM F/U: HCPCS | Performed by: PHYSICIAN ASSISTANT

## 2018-08-24 RX ORDER — LOVASTATIN 40 MG/1
40 TABLET ORAL NIGHTLY
Qty: 90 TABLET | Refills: 3 | Status: SHIPPED | OUTPATIENT
Start: 2018-08-24 | End: 2019-04-24

## 2018-08-24 ASSESSMENT — ENCOUNTER SYMPTOMS
NAUSEA: 0
PHOTOPHOBIA: 0
VOMITING: 0
COLOR CHANGE: 0
RHINORRHEA: 0
SINUS PRESSURE: 0
EYE PAIN: 0
SORE THROAT: 0
WHEEZING: 0
CHEST TIGHTNESS: 0
SHORTNESS OF BREATH: 1
ABDOMINAL PAIN: 0
CONSTIPATION: 0
COUGH: 0
DIARRHEA: 0
EYE REDNESS: 0

## 2018-08-24 NOTE — PROGRESS NOTES
[facility]  [address] [facility] [address]  Dept: [phone]  Dept Fax: [phone]: [phone]      [facility] INTERNAL MEDICINE  OFFICE NOTE      Chief Complaint   Patient presents with    Other     3 month f/u        Amrik Russell is a [de-identified]y.o. year old female who is seen for 3 month follow up for chronic conditions hypertension, hyperlipidemia, gerd, fatigue, shortness of breath. Blood pressure seems be fairly stable current medication regimen continue as directed. Patient denies any chest pain. Patient states tries to exercise but she doesn't do it as much as she should. Patient has a bike that she can use the pedals to work her up body and lower body but she doesn't use them as she should. Patient states she is currently seeing Dr. Nash Huber for an elevated diaphragm so she does have some shortness of breath and gets tired pretty easy. Patient states not really driving much anymore. Patient states her depression and anxiety seems to be stable on the Lexapro. Patient's hyperlipidemia seem to be stable medication at this time. Patient is compliant medication. Patient denies any side effects from lipid lowering medication. Patient states she does visit with friends at times and on Fridays goes to the grocery. Patient states this doesn't really have much of an appetite. Patient states her GERD seems to be stable on current medication regimen continue as directed.     Active Ambulatory Problems     Diagnosis Date Noted    2-part displaced fracture of surgical neck of left humerus, initial encounter for closed fracture 07/08/2016    Hypertension 07/09/2016    Hyperlipemia 07/09/2016    GERD (gastroesophageal reflux disease) 07/09/2016    2-part displaced fracture of surgical neck of right humerus, initial encounter for closed fracture 07/09/2016    Nausea 07/09/2016    Glaucoma 07/10/2016    Shortness of breath 10:15 AM [facility]   Alb 4.2  3.5 - 5.2 g/dL Final 08/17/2018 10:15 AM [facility]   Total Bilirubin 0.5  0.2 - 1.2 mg/dL Final 08/17/2018 10:15 AM [facility]   Alkaline Phosphatase 82  35 - 104 U/L Final 08/17/2018 10:15 AM [facility]   ALT 10  5 - 33 U/L Final 08/17/2018 10:15 AM [facility]   AST 19  5 - 32 U/L Final 08/17/2018 10:15 AM [address] [facility]     8/17/2018 11:07 AM - [facility] Results From [facility]     Component Results     Component Value Ref Range & Units Status Collected Lab   WBC 4.3   4.8 - 10.8 K/uL Final 08/17/2018 10:15 AM [facility]   RBC 4.30  4.20 - 5.40 M/uL Final 08/17/2018 10:15 AM [facility]   Hemoglobin 12.8  12.0 - 16.0 g/dL Final 08/17/2018 10:15 AM [facility]   Hematocrit 40.1  37.0 - 47.0 % Final 08/17/2018 10:15 AM [facility]   MCV 93.3  81.0 - 99.0 fL Final 08/17/2018 10:15 AM [facility]   MCH 29.8  27.0 - 31.0 pg Final 08/17/2018 10:15 AM [facility]   MCHC 31.9   33.0 - 37.0 g/dL Final 08/17/2018 10:15 AM [facility]   RDW 13.3  11.5 - 14.5 % Final 08/17/2018 10:15 AM [facility]   Platelets 151  322 - 400 K/uL Final 08/17/2018 10:15 AM [facility]   MPV 10.9  9.4 - 12.3 fL Final 08/17/2018 10:15 AM [facility]   Neutrophils % 51.3  50.0 - 65.0 % Final 08/17/2018 10:15 AM [facility]   Lymphocytes % 34.4  20.0 - 40.0 % Final 08/17/2018 10:15 AM [facility]   Monocytes % 9.9  0.0 - 10.0 % Final 08/17/2018 10:15 AM [facility]   Eosinophils % 2.8  0.0 - 5.0 % Final 08/17/2018 10:15 AM [facility]   Basophils % 1.4   0.0 - 1.0 % Final 08/17/2018 10:15 AM [facility]   Neutrophils # 2.2  1.5 - 7.5 K/uL Final 08/17/2018 10:15 AM [facility]   Lymphocytes # 1.5  1.1

## 2018-10-26 DIAGNOSIS — K21.9 GASTROESOPHAGEAL REFLUX DISEASE WITHOUT ESOPHAGITIS: ICD-10-CM

## 2018-10-30 RX ORDER — OMEPRAZOLE 40 MG/1
CAPSULE, DELAYED RELEASE ORAL
Qty: 90 CAPSULE | Refills: 1 | Status: SHIPPED | OUTPATIENT
Start: 2018-10-30 | End: 2018-12-07 | Stop reason: SDUPTHER

## 2018-11-01 ENCOUNTER — TELEPHONE (OUTPATIENT)
Dept: INTERNAL MEDICINE | Age: 83
End: 2018-11-01

## 2018-11-01 DIAGNOSIS — J06.9 UPPER RESPIRATORY TRACT INFECTION, UNSPECIFIED TYPE: Primary | ICD-10-CM

## 2018-11-01 RX ORDER — AZITHROMYCIN 250 MG/1
250 TABLET, FILM COATED ORAL DAILY
Qty: 1 PACKET | Refills: 0 | Status: SHIPPED | OUTPATIENT
Start: 2018-11-01 | End: 2019-04-10

## 2018-11-02 ENCOUNTER — HOSPITAL ENCOUNTER (OUTPATIENT)
Dept: GENERAL RADIOLOGY | Age: 83
Discharge: HOME | End: 2018-11-02
Payer: MEDICARE

## 2018-11-02 ENCOUNTER — OFFICE VISIT (OUTPATIENT)
Dept: INTERNAL MEDICINE | Age: 83
End: 2018-11-02
Payer: MEDICARE

## 2018-11-02 VITALS
OXYGEN SATURATION: 97 % | TEMPERATURE: 100 F | RESPIRATION RATE: 16 BRPM | SYSTOLIC BLOOD PRESSURE: 138 MMHG | HEART RATE: 87 BPM | BODY MASS INDEX: 26.36 KG/M2 | DIASTOLIC BLOOD PRESSURE: 86 MMHG | WEIGHT: 164 LBS | HEIGHT: 66 IN

## 2018-11-02 DIAGNOSIS — R05.9 COUGH: Primary | ICD-10-CM

## 2018-11-02 DIAGNOSIS — R05.9 COUGH: ICD-10-CM

## 2018-11-02 DIAGNOSIS — J01.40 ACUTE NON-RECURRENT PANSINUSITIS: ICD-10-CM

## 2018-11-02 PROCEDURE — G8484 FLU IMMUNIZE NO ADMIN: HCPCS | Performed by: NURSE PRACTITIONER

## 2018-11-02 PROCEDURE — 4040F PNEUMOC VAC/ADMIN/RCVD: CPT | Performed by: NURSE PRACTITIONER

## 2018-11-02 PROCEDURE — G8427 DOCREV CUR MEDS BY ELIG CLIN: HCPCS | Performed by: NURSE PRACTITIONER

## 2018-11-02 PROCEDURE — 99214 OFFICE O/P EST MOD 30 MIN: CPT | Performed by: NURSE PRACTITIONER

## 2018-11-02 PROCEDURE — 1036F TOBACCO NON-USER: CPT | Performed by: NURSE PRACTITIONER

## 2018-11-02 PROCEDURE — 1090F PRES/ABSN URINE INCON ASSESS: CPT | Performed by: NURSE PRACTITIONER

## 2018-11-02 PROCEDURE — G8417 CALC BMI ABV UP PARAM F/U: HCPCS | Performed by: NURSE PRACTITIONER

## 2018-11-02 PROCEDURE — G8399 PT W/DXA RESULTS DOCUMENT: HCPCS | Performed by: NURSE PRACTITIONER

## 2018-11-02 PROCEDURE — 1101F PT FALLS ASSESS-DOCD LE1/YR: CPT | Performed by: NURSE PRACTITIONER

## 2018-11-02 PROCEDURE — 71046 X-RAY EXAM CHEST 2 VIEWS: CPT

## 2018-11-02 PROCEDURE — 1123F ACP DISCUSS/DSCN MKR DOCD: CPT | Performed by: NURSE PRACTITIONER

## 2018-11-02 RX ORDER — CEFDINIR 300 MG/1
300 CAPSULE ORAL 2 TIMES DAILY
Qty: 14 CAPSULE | Refills: 0 | Status: SHIPPED | OUTPATIENT
Start: 2018-11-02 | End: 2018-11-09

## 2018-11-02 RX ORDER — ALBUTEROL SULFATE 90 UG/1
2 AEROSOL, METERED RESPIRATORY (INHALATION) EVERY 6 HOURS PRN
Qty: 1 INHALER | Refills: 3 | Status: SHIPPED | OUTPATIENT
Start: 2018-11-02 | End: 2019-06-14

## 2018-11-02 ASSESSMENT — ENCOUNTER SYMPTOMS
EYE DISCHARGE: 0
SHORTNESS OF BREATH: 1
SINUS PRESSURE: 1
VOMITING: 0
DIARRHEA: 0
CONSTIPATION: 0
COLOR CHANGE: 0
CHOKING: 0
SORE THROAT: 0
COUGH: 1
SINUS PAIN: 1
ABDOMINAL DISTENTION: 0
BLOOD IN STOOL: 0
EYE ITCHING: 0
TROUBLE SWALLOWING: 0
ABDOMINAL PAIN: 0
WHEEZING: 0
STRIDOR: 0
NAUSEA: 0

## 2018-11-02 NOTE — PATIENT INSTRUCTIONS
1.  Acute bronchitis  2 Acute sinusitis   Cefdinir 300 mg twice daily get 2 doses in today   Saline nasal spray 4 times a day both nares for 7 days  Steroid spray, rhinocort, nasocort, nasonex, flonase twice daily about 5 minutes after the saline   mucinex 600 mg twice daily for 7 days with plenty of water  Delsym cough syrup as needed   proventil HFA;  Use 3 times daily fro at least 48 hours, then you can taper it down and off over the next few days;

## 2018-11-02 NOTE — PROGRESS NOTES
puffs into the lungs every 6 hours as needed for Wheezing 1 Inhaler 3    azithromycin (ZITHROMAX) 250 MG tablet Take 1 tablet by mouth daily Take 2 tablets the first day and then take 1 tablet daily til gone. 1 packet 0    omeprazole (PRILOSEC) 40 MG delayed release capsule TAKE 1 CAPSULE DAILY 90 capsule 1    lovastatin (MEVACOR) 40 MG tablet Take 1 tablet by mouth nightly 90 tablet 3    losartan (COZAAR) 50 MG tablet Take 1 tablet by mouth daily, if blood pressure starts to run low cut down to 1/2 tablet. 30 tablet 3    escitalopram (LEXAPRO) 10 MG tablet TAKE 1 TABLET DAILY 90 tablet 3    dorzolamide-timolol (COSOPT) 22.3-6.8 MG/ML ophthalmic solution INSTILL 1 DROP INTO BOTH EYES TWICE A DAY AS DIRECTED  3    latanoprost (XALATAN) 0.005 % ophthalmic solution INSTILL 1 DROP INTO BOTH EYES IN THE EVENING  3    atenolol (TENORMIN) 50 MG tablet TAKE 2 TABLETS BY MOUTH EVERY  tablet 3     No current facility-administered medications for this visit. Allergies   Allergen Reactions    Morphine Nausea And Vomiting       Health Maintenance   Topic Date Due    DTaP/Tdap/Td vaccine (1 - Tdap) 10/15/1954    Shingles Vaccine (1 of 2 - 2 Dose Series) 10/15/1987    Flu vaccine (1) 09/01/2018    Pneumococcal low/med risk (2 of 2 - PPSV23) 02/09/2019    Potassium monitoring  08/17/2019    Creatinine monitoring  08/17/2019    DEXA (modify frequency per FRAX score)  Completed       Subjective:      Review of Systems   Constitutional: Negative for fatigue, fever and unexpected weight change. HENT: Positive for postnasal drip, sinus pain, sinus pressure and sneezing. Negative for ear discharge, ear pain, mouth sores, sore throat and trouble swallowing. Eyes: Negative for discharge, itching and visual disturbance. Respiratory: Positive for cough and shortness of breath. Negative for choking, wheezing and stridor. Cardiovascular: Negative for chest pain, palpitations and leg swelling. exhibit a depressed mood. /86   Pulse 87   Temp 100 °F (37.8 °C)   Resp 16   Ht 5' 6\" (1.676 m)   Wt 164 lb (74.4 kg)   SpO2 97%   BMI 26.47 kg/m²     Assessment:       Diagnosis Orders   1. Cough  XR CHEST STANDARD (2 VW)   2. Acute non-recurrent pansinusitis         Plan:        Patient given educational materials - see patient instructions. Discussed use, benefit, and side effects of prescribed medications. Allpatient questions answered. Pt voiced understanding. Reviewed health maintenance. Instructed to continue current medications, diet and exercise. Patient agreed with treatment plan. Follow up as directed. MEDICATIONS:  Orders Placed This Encounter   Medications    cefdinir (OMNICEF) 300 MG capsule     Sig: Take 1 capsule by mouth 2 times daily for 7 days     Dispense:  14 capsule     Refill:  0    albuterol sulfate HFA (PROVENTIL HFA) 108 (90 Base) MCG/ACT inhaler     Sig: Inhale 2 puffs into the lungs every 6 hours as needed for Wheezing     Dispense:  1 Inhaler     Refill:  3         ORDERS:  Orders Placed This Encounter   Procedures    XR CHEST STANDARD (2 VW)       Follow-up:  No Follow-up on file. PATIENT INSTRUCTIONS:  Patient Instructions   1. Acute bronchitis  2 Acute sinusitis   Cefdinir 300 mg twice daily get 2 doses in today   Saline nasal spray 4 times a day both nares for 7 days  Steroid spray, rhinocort, nasocort, nasonex, flonase twice daily about 5 minutes after the saline   mucinex 600 mg twice daily for 7 days with plenty of water  Delsym cough syrup as needed   proventil HFA;  Use 3 times daily fro at least 48 hours, then you can taper it down and off over the next few days; Electronically signed by NURIS Bearden on 11/2/2018 at 10:21 AM    EMRDragon/transcription disclaimer:  Much of this encounter note is electronic transcription/translation of spoken language to printed texts.   The electronic translation of spoken language may be erroneous,

## 2018-11-16 DIAGNOSIS — E78.5 HYPERLIPIDEMIA, UNSPECIFIED HYPERLIPIDEMIA TYPE: ICD-10-CM

## 2018-11-16 DIAGNOSIS — I10 ESSENTIAL HYPERTENSION: ICD-10-CM

## 2018-11-16 LAB
ALBUMIN SERPL-MCNC: 3.8 G/DL (ref 3.5–5.2)
ALP BLD-CCNC: 92 U/L (ref 35–104)
ALT SERPL-CCNC: 8 U/L (ref 5–33)
ANION GAP SERPL CALCULATED.3IONS-SCNC: 12 MMOL/L (ref 7–19)
AST SERPL-CCNC: 17 U/L (ref 5–32)
BASOPHILS ABSOLUTE: 0.1 K/UL (ref 0–0.2)
BASOPHILS RELATIVE PERCENT: 1.3 % (ref 0–1)
BILIRUB SERPL-MCNC: 0.5 MG/DL (ref 0.2–1.2)
BUN BLDV-MCNC: 12 MG/DL (ref 8–23)
CALCIUM SERPL-MCNC: 9.8 MG/DL (ref 8.8–10.2)
CHLORIDE BLD-SCNC: 104 MMOL/L (ref 98–111)
CHOLESTEROL, TOTAL: 177 MG/DL (ref 160–199)
CO2: 29 MMOL/L (ref 22–29)
CREAT SERPL-MCNC: 0.8 MG/DL (ref 0.5–0.9)
EOSINOPHILS ABSOLUTE: 0.1 K/UL (ref 0–0.6)
EOSINOPHILS RELATIVE PERCENT: 2.6 % (ref 0–5)
GFR NON-AFRICAN AMERICAN: >60
GLUCOSE BLD-MCNC: 101 MG/DL (ref 74–109)
HCT VFR BLD CALC: 40.4 % (ref 37–47)
HDLC SERPL-MCNC: 54 MG/DL (ref 65–121)
HEMOGLOBIN: 12.8 G/DL (ref 12–16)
LDL CHOLESTEROL CALCULATED: 99 MG/DL
LYMPHOCYTES ABSOLUTE: 1.3 K/UL (ref 1.1–4.5)
LYMPHOCYTES RELATIVE PERCENT: 24 % (ref 20–40)
MCH RBC QN AUTO: 30.2 PG (ref 27–31)
MCHC RBC AUTO-ENTMCNC: 31.7 G/DL (ref 33–37)
MCV RBC AUTO: 95.3 FL (ref 81–99)
MONOCYTES ABSOLUTE: 0.6 K/UL (ref 0–0.9)
MONOCYTES RELATIVE PERCENT: 10.9 % (ref 0–10)
NEUTROPHILS ABSOLUTE: 3.3 K/UL (ref 1.5–7.5)
NEUTROPHILS RELATIVE PERCENT: 61 % (ref 50–65)
PDW BLD-RTO: 13.2 % (ref 11.5–14.5)
PLATELET # BLD: 173 K/UL (ref 130–400)
PMV BLD AUTO: 11 FL (ref 9.4–12.3)
POTASSIUM SERPL-SCNC: 4.7 MMOL/L (ref 3.5–5)
RBC # BLD: 4.24 M/UL (ref 4.2–5.4)
SODIUM BLD-SCNC: 145 MMOL/L (ref 136–145)
TOTAL PROTEIN: 7.5 G/DL (ref 6.6–8.7)
TRIGL SERPL-MCNC: 118 MG/DL (ref 0–149)
WBC # BLD: 5.4 K/UL (ref 4.8–10.8)

## 2018-12-06 RX ORDER — LOSARTAN POTASSIUM 50 MG/1
TABLET ORAL
Qty: 90 TABLET | Refills: 3 | Status: SHIPPED | OUTPATIENT
Start: 2018-12-06 | End: 2018-12-07 | Stop reason: SDUPTHER

## 2018-12-07 ENCOUNTER — OFFICE VISIT (OUTPATIENT)
Dept: INTERNAL MEDICINE | Age: 83
End: 2018-12-07
Payer: MEDICARE

## 2018-12-07 VITALS
HEART RATE: 110 BPM | BODY MASS INDEX: 26.43 KG/M2 | OXYGEN SATURATION: 95 % | SYSTOLIC BLOOD PRESSURE: 132 MMHG | TEMPERATURE: 97.7 F | WEIGHT: 168.4 LBS | DIASTOLIC BLOOD PRESSURE: 80 MMHG | HEIGHT: 67 IN

## 2018-12-07 DIAGNOSIS — Z23 NEED FOR VACCINATION: ICD-10-CM

## 2018-12-07 DIAGNOSIS — E78.2 MIXED HYPERLIPIDEMIA: ICD-10-CM

## 2018-12-07 DIAGNOSIS — G89.29 CHRONIC RIGHT SHOULDER PAIN: ICD-10-CM

## 2018-12-07 DIAGNOSIS — M25.511 CHRONIC RIGHT SHOULDER PAIN: ICD-10-CM

## 2018-12-07 DIAGNOSIS — F32.89 OTHER DEPRESSION: ICD-10-CM

## 2018-12-07 DIAGNOSIS — Z00.00 ROUTINE GENERAL MEDICAL EXAMINATION AT A HEALTH CARE FACILITY: Primary | ICD-10-CM

## 2018-12-07 DIAGNOSIS — F41.9 ANXIETY AND DEPRESSION: ICD-10-CM

## 2018-12-07 DIAGNOSIS — I10 ESSENTIAL HYPERTENSION: ICD-10-CM

## 2018-12-07 DIAGNOSIS — F32.A ANXIETY AND DEPRESSION: ICD-10-CM

## 2018-12-07 DIAGNOSIS — G25.0 TREMOR, ESSENTIAL: ICD-10-CM

## 2018-12-07 DIAGNOSIS — K21.9 GASTROESOPHAGEAL REFLUX DISEASE WITHOUT ESOPHAGITIS: ICD-10-CM

## 2018-12-07 PROCEDURE — 1090F PRES/ABSN URINE INCON ASSESS: CPT | Performed by: PHYSICIAN ASSISTANT

## 2018-12-07 PROCEDURE — 99214 OFFICE O/P EST MOD 30 MIN: CPT | Performed by: PHYSICIAN ASSISTANT

## 2018-12-07 PROCEDURE — 4040F PNEUMOC VAC/ADMIN/RCVD: CPT | Performed by: PHYSICIAN ASSISTANT

## 2018-12-07 PROCEDURE — 90662 IIV NO PRSV INCREASED AG IM: CPT | Performed by: PHYSICIAN ASSISTANT

## 2018-12-07 PROCEDURE — 1036F TOBACCO NON-USER: CPT | Performed by: PHYSICIAN ASSISTANT

## 2018-12-07 PROCEDURE — G0008 ADMIN INFLUENZA VIRUS VAC: HCPCS | Performed by: PHYSICIAN ASSISTANT

## 2018-12-07 PROCEDURE — 1123F ACP DISCUSS/DSCN MKR DOCD: CPT | Performed by: PHYSICIAN ASSISTANT

## 2018-12-07 PROCEDURE — G8482 FLU IMMUNIZE ORDER/ADMIN: HCPCS | Performed by: PHYSICIAN ASSISTANT

## 2018-12-07 PROCEDURE — 1101F PT FALLS ASSESS-DOCD LE1/YR: CPT | Performed by: PHYSICIAN ASSISTANT

## 2018-12-07 PROCEDURE — G8417 CALC BMI ABV UP PARAM F/U: HCPCS | Performed by: PHYSICIAN ASSISTANT

## 2018-12-07 PROCEDURE — G8427 DOCREV CUR MEDS BY ELIG CLIN: HCPCS | Performed by: PHYSICIAN ASSISTANT

## 2018-12-07 PROCEDURE — G8399 PT W/DXA RESULTS DOCUMENT: HCPCS | Performed by: PHYSICIAN ASSISTANT

## 2018-12-07 RX ORDER — OMEPRAZOLE 40 MG/1
40 CAPSULE, DELAYED RELEASE ORAL DAILY
Qty: 90 CAPSULE | Refills: 3 | Status: SHIPPED | OUTPATIENT
Start: 2018-12-07 | End: 2020-02-25 | Stop reason: SDUPTHER

## 2018-12-07 RX ORDER — LOSARTAN POTASSIUM 50 MG/1
TABLET ORAL
Qty: 90 TABLET | Refills: 3 | Status: SHIPPED | OUTPATIENT
Start: 2018-12-07 | End: 2020-01-07

## 2018-12-07 RX ORDER — ESCITALOPRAM OXALATE 20 MG/1
20 TABLET ORAL DAILY
Qty: 90 TABLET | Refills: 3 | Status: SHIPPED | OUTPATIENT
Start: 2018-12-07 | End: 2019-05-06 | Stop reason: SDUPTHER

## 2018-12-07 ASSESSMENT — ENCOUNTER SYMPTOMS
NAUSEA: 0
CHEST TIGHTNESS: 0
ABDOMINAL PAIN: 0
WHEEZING: 0
EYE REDNESS: 0
CONSTIPATION: 0
RHINORRHEA: 0
COLOR CHANGE: 0
EYE PAIN: 0
COUGH: 0
SINUS PRESSURE: 0
SHORTNESS OF BREATH: 1
PHOTOPHOBIA: 0
DIARRHEA: 0
VOMITING: 0
SORE THROAT: 0

## 2018-12-07 NOTE — PROGRESS NOTES
when she sitting down but sometimes her feet will, turn bluish but when she elevates her legs seems to improve. Patient denies any specific pain in the feet just does not like the way that turns blue. Patient has good pulses. No decrease in sensation. Patient denies any other issues at this time. Active Ambulatory Problems     Diagnosis Date Noted    2-part displaced fracture of surgical neck of left humerus, initial encounter for closed fracture 07/08/2016    Hypertension 07/09/2016    Hyperlipemia 07/09/2016    GERD (gastroesophageal reflux disease) 07/09/2016    2-part displaced fracture of surgical neck of right humerus, initial encounter for closed fracture 07/09/2016    Nausea 07/09/2016    Glaucoma 07/10/2016    Shortness of breath 08/24/2018    Anxiety and depression 08/24/2018     Resolved Ambulatory Problems     Diagnosis Date Noted    No Resolved Ambulatory Problems     Past Medical History:   Diagnosis Date    Arthritis     GERD (gastroesophageal reflux disease)     Hyperlipidemia     Hypertension     Tremor        Past Medical History:   Diagnosis Date    Arthritis     GERD (gastroesophageal reflux disease)     Hyperlipidemia     Hypertension     Tremor     to right hand       Prior to Visit Medications    Medication Sig Taking? Authorizing Provider   losartan (COZAAR) 50 MG tablet Take 1 tablet by mouth daily, if blood pressure starts to run low cut down to 1/2 tablet.  Yes SOLEDAD Ring   omeprazole (PRILOSEC) 40 MG delayed release capsule Take 1 capsule by mouth daily Yes SOLEDAD Ring   escitalopram (LEXAPRO) 20 MG tablet Take 1 tablet by mouth daily Yes SOLEDAD Ring   albuterol sulfate HFA (PROVENTIL HFA) 108 (90 Base) MCG/ACT inhaler Inhale 2 puffs into the lungs every 6 hours as needed for Wheezing Yes NURIS Garcia   lovastatin (MEVACOR) 40 MG tablet Take 1 tablet by mouth nightly Yes SOLEDAD Ring   escitalopram (LEXAPRO) 10 MG EXAM  Physical Exam   Constitutional: Vital signs are normal. She appears well-developed and well-nourished. HENT:   Head: Normocephalic and atraumatic. Right Ear: External ear normal.   Left Ear: External ear normal.   Nose: Nose normal.   Eyes: Pupils are equal, round, and reactive to light. Right eye exhibits no discharge. Left eye exhibits no discharge. Neck: Normal range of motion. Carotid bruit is not present. No tracheal deviation present. Cardiovascular: Normal rate, regular rhythm and normal heart sounds. Exam reveals no gallop and no friction rub. No murmur heard. Pulmonary/Chest: Effort normal and breath sounds normal. No respiratory distress. She has no wheezes. She has no rales. She exhibits no tenderness. Abdominal: Soft. There is no tenderness. There is no rebound and no guarding. Musculoskeletal: Normal range of motion. She exhibits no tenderness. Right foot: There is normal range of motion and no deformity. Left foot: There is normal range of motion and no deformity. Feet:   Right Foot:   Skin Integrity: Negative for erythema (she does havesome purplish  in both feet. Pulses are good bilaterally and with patient elevates her legs the color improves). Left Foot:   Skin Integrity: Negative for erythema. Lymphadenopathy:     She has no cervical adenopathy. Neurological: She is alert. She has normal reflexes. She does have a little bit of a tremor in right hand. Skin: Skin is warm, dry and intact. No lesion and no rash noted. No erythema. Psychiatric: She has a normal mood and affect. Her mood appears not anxious. She does not exhibit a depressed mood. Nursing note and vitals reviewed.       Lab Results   Component Value Date     11/16/2018    K 4.7 11/16/2018     11/16/2018    CO2 29 11/16/2018    BUN 12 11/16/2018    CREATININE 0.8 11/16/2018    GLUCOSE 101 11/16/2018    CALCIUM 9.8 11/16/2018    PROT 7.5 11/16/2018    LABALBU 3.8 11/16/2018

## 2019-03-08 ENCOUNTER — OFFICE VISIT (OUTPATIENT)
Dept: INTERNAL MEDICINE | Age: 84
End: 2019-03-08
Payer: MEDICARE

## 2019-03-08 VITALS
SYSTOLIC BLOOD PRESSURE: 138 MMHG | DIASTOLIC BLOOD PRESSURE: 86 MMHG | HEART RATE: 80 BPM | OXYGEN SATURATION: 95 % | RESPIRATION RATE: 16 BRPM

## 2019-03-08 DIAGNOSIS — M54.41 ACUTE RIGHT-SIDED LOW BACK PAIN WITH RIGHT-SIDED SCIATICA: Primary | ICD-10-CM

## 2019-03-08 DIAGNOSIS — E55.9 VITAMIN D DEFICIENCY: ICD-10-CM

## 2019-03-08 PROCEDURE — 1101F PT FALLS ASSESS-DOCD LE1/YR: CPT | Performed by: NURSE PRACTITIONER

## 2019-03-08 PROCEDURE — 99213 OFFICE O/P EST LOW 20 MIN: CPT | Performed by: NURSE PRACTITIONER

## 2019-03-08 PROCEDURE — G8427 DOCREV CUR MEDS BY ELIG CLIN: HCPCS | Performed by: NURSE PRACTITIONER

## 2019-03-08 PROCEDURE — G8482 FLU IMMUNIZE ORDER/ADMIN: HCPCS | Performed by: NURSE PRACTITIONER

## 2019-03-08 PROCEDURE — 1123F ACP DISCUSS/DSCN MKR DOCD: CPT | Performed by: NURSE PRACTITIONER

## 2019-03-08 PROCEDURE — 1090F PRES/ABSN URINE INCON ASSESS: CPT | Performed by: NURSE PRACTITIONER

## 2019-03-08 PROCEDURE — G8417 CALC BMI ABV UP PARAM F/U: HCPCS | Performed by: NURSE PRACTITIONER

## 2019-03-08 PROCEDURE — 1036F TOBACCO NON-USER: CPT | Performed by: NURSE PRACTITIONER

## 2019-03-08 PROCEDURE — G8399 PT W/DXA RESULTS DOCUMENT: HCPCS | Performed by: NURSE PRACTITIONER

## 2019-03-08 PROCEDURE — 4040F PNEUMOC VAC/ADMIN/RCVD: CPT | Performed by: NURSE PRACTITIONER

## 2019-03-08 RX ORDER — TIZANIDINE 2 MG/1
2 TABLET ORAL 3 TIMES DAILY PRN
Qty: 30 TABLET | Refills: 0 | Status: SHIPPED | OUTPATIENT
Start: 2019-03-08 | End: 2019-05-16

## 2019-03-08 ASSESSMENT — ENCOUNTER SYMPTOMS
ABDOMINAL DISTENTION: 0
ABDOMINAL PAIN: 0
COUGH: 0
BLOOD IN STOOL: 0
CONSTIPATION: 0
SORE THROAT: 0
EYE DISCHARGE: 0
VOMITING: 0
BACK PAIN: 1
WHEEZING: 0
CHOKING: 0
NAUSEA: 0
SHORTNESS OF BREATH: 0
DIARRHEA: 0
EYE ITCHING: 0
COLOR CHANGE: 0
TROUBLE SWALLOWING: 0
STRIDOR: 0

## 2019-03-11 LAB — VITAMIN D 1,25-DIHYDROXY: 61.3 PG/ML (ref 19.9–79.3)

## 2019-03-22 DIAGNOSIS — I10 ESSENTIAL HYPERTENSION: ICD-10-CM

## 2019-03-22 DIAGNOSIS — E78.2 MIXED HYPERLIPIDEMIA: ICD-10-CM

## 2019-03-22 LAB
ALBUMIN SERPL-MCNC: 4.3 G/DL (ref 3.5–5.2)
ALP BLD-CCNC: 93 U/L (ref 35–104)
ALT SERPL-CCNC: 12 U/L (ref 5–33)
ANION GAP SERPL CALCULATED.3IONS-SCNC: 8 MMOL/L (ref 7–19)
AST SERPL-CCNC: 19 U/L (ref 5–32)
BASOPHILS ABSOLUTE: 0.1 K/UL (ref 0–0.2)
BASOPHILS RELATIVE PERCENT: 1 % (ref 0–1)
BILIRUB SERPL-MCNC: 0.4 MG/DL (ref 0.2–1.2)
BUN BLDV-MCNC: 17 MG/DL (ref 8–23)
CALCIUM SERPL-MCNC: 9.7 MG/DL (ref 8.8–10.2)
CHLORIDE BLD-SCNC: 99 MMOL/L (ref 98–111)
CHOLESTEROL, TOTAL: 180 MG/DL (ref 160–199)
CO2: 31 MMOL/L (ref 22–29)
CREAT SERPL-MCNC: 0.8 MG/DL (ref 0.5–0.9)
EOSINOPHILS ABSOLUTE: 0.2 K/UL (ref 0–0.6)
EOSINOPHILS RELATIVE PERCENT: 2.4 % (ref 0–5)
GFR NON-AFRICAN AMERICAN: >60
GLUCOSE BLD-MCNC: 101 MG/DL (ref 74–109)
HCT VFR BLD CALC: 42.4 % (ref 37–47)
HDLC SERPL-MCNC: 58 MG/DL (ref 65–121)
HEMOGLOBIN: 13.7 G/DL (ref 12–16)
LDL CHOLESTEROL CALCULATED: 94 MG/DL
LYMPHOCYTES ABSOLUTE: 1.7 K/UL (ref 1.1–4.5)
LYMPHOCYTES RELATIVE PERCENT: 26.8 % (ref 20–40)
MCH RBC QN AUTO: 30.6 PG (ref 27–31)
MCHC RBC AUTO-ENTMCNC: 32.3 G/DL (ref 33–37)
MCV RBC AUTO: 94.6 FL (ref 81–99)
MONOCYTES ABSOLUTE: 0.8 K/UL (ref 0–0.9)
MONOCYTES RELATIVE PERCENT: 12 % (ref 0–10)
NEUTROPHILS ABSOLUTE: 3.6 K/UL (ref 1.5–7.5)
NEUTROPHILS RELATIVE PERCENT: 57.5 % (ref 50–65)
PDW BLD-RTO: 13.3 % (ref 11.5–14.5)
PLATELET # BLD: 165 K/UL (ref 130–400)
PMV BLD AUTO: 11.6 FL (ref 9.4–12.3)
POTASSIUM SERPL-SCNC: 5.7 MMOL/L (ref 3.5–5)
RBC # BLD: 4.48 M/UL (ref 4.2–5.4)
SODIUM BLD-SCNC: 138 MMOL/L (ref 136–145)
TOTAL PROTEIN: 7.9 G/DL (ref 6.6–8.7)
TRIGL SERPL-MCNC: 140 MG/DL (ref 0–149)
WBC # BLD: 6.3 K/UL (ref 4.8–10.8)

## 2019-04-10 ENCOUNTER — OFFICE VISIT (OUTPATIENT)
Dept: INTERNAL MEDICINE | Age: 84
End: 2019-04-10
Payer: MEDICARE

## 2019-04-10 VITALS
OXYGEN SATURATION: 97 % | SYSTOLIC BLOOD PRESSURE: 100 MMHG | DIASTOLIC BLOOD PRESSURE: 80 MMHG | WEIGHT: 162.6 LBS | TEMPERATURE: 98.6 F | BODY MASS INDEX: 25.52 KG/M2 | HEART RATE: 109 BPM | HEIGHT: 67 IN

## 2019-04-10 DIAGNOSIS — Z00.00 ROUTINE GENERAL MEDICAL EXAMINATION AT A HEALTH CARE FACILITY: Primary | ICD-10-CM

## 2019-04-10 DIAGNOSIS — G25.0 TREMOR, ESSENTIAL: ICD-10-CM

## 2019-04-10 DIAGNOSIS — F32.A ANXIETY AND DEPRESSION: ICD-10-CM

## 2019-04-10 DIAGNOSIS — I10 ESSENTIAL HYPERTENSION: ICD-10-CM

## 2019-04-10 DIAGNOSIS — E87.5 HYPERKALEMIA: ICD-10-CM

## 2019-04-10 DIAGNOSIS — K21.9 GASTROESOPHAGEAL REFLUX DISEASE WITHOUT ESOPHAGITIS: ICD-10-CM

## 2019-04-10 DIAGNOSIS — R53.1 WEAKNESS: ICD-10-CM

## 2019-04-10 DIAGNOSIS — F32.89 OTHER DEPRESSION: ICD-10-CM

## 2019-04-10 DIAGNOSIS — E78.2 MIXED HYPERLIPIDEMIA: ICD-10-CM

## 2019-04-10 DIAGNOSIS — E55.9 VITAMIN D DEFICIENCY: ICD-10-CM

## 2019-04-10 DIAGNOSIS — F41.9 ANXIETY AND DEPRESSION: ICD-10-CM

## 2019-04-10 DIAGNOSIS — R53.81 PHYSICAL DECONDITIONING: ICD-10-CM

## 2019-04-10 DIAGNOSIS — R26.81 UNSTEADINESS: ICD-10-CM

## 2019-04-10 DIAGNOSIS — R06.02 SHORTNESS OF BREATH: ICD-10-CM

## 2019-04-10 DIAGNOSIS — M54.41 ACUTE RIGHT-SIDED LOW BACK PAIN WITH RIGHT-SIDED SCIATICA: ICD-10-CM

## 2019-04-10 LAB
ALBUMIN SERPL-MCNC: 4.1 G/DL (ref 3.5–5.2)
ALP BLD-CCNC: 91 U/L (ref 35–104)
ALT SERPL-CCNC: 16 U/L (ref 5–33)
ANION GAP SERPL CALCULATED.3IONS-SCNC: 13 MMOL/L (ref 7–19)
AST SERPL-CCNC: 21 U/L (ref 5–32)
BILIRUB SERPL-MCNC: 0.7 MG/DL (ref 0.2–1.2)
BUN BLDV-MCNC: 19 MG/DL (ref 8–23)
CALCIUM SERPL-MCNC: 9.8 MG/DL (ref 8.8–10.2)
CHLORIDE BLD-SCNC: 99 MMOL/L (ref 98–111)
CO2: 28 MMOL/L (ref 22–29)
CREAT SERPL-MCNC: 0.9 MG/DL (ref 0.5–0.9)
GFR NON-AFRICAN AMERICAN: >60
GLUCOSE BLD-MCNC: 109 MG/DL (ref 74–109)
POTASSIUM SERPL-SCNC: 4.4 MMOL/L (ref 3.5–5)
SODIUM BLD-SCNC: 140 MMOL/L (ref 136–145)
TOTAL PROTEIN: 7.5 G/DL (ref 6.6–8.7)

## 2019-04-10 PROCEDURE — 1123F ACP DISCUSS/DSCN MKR DOCD: CPT | Performed by: PHYSICIAN ASSISTANT

## 2019-04-10 PROCEDURE — 99214 OFFICE O/P EST MOD 30 MIN: CPT | Performed by: PHYSICIAN ASSISTANT

## 2019-04-10 PROCEDURE — G8399 PT W/DXA RESULTS DOCUMENT: HCPCS | Performed by: PHYSICIAN ASSISTANT

## 2019-04-10 PROCEDURE — 4040F PNEUMOC VAC/ADMIN/RCVD: CPT | Performed by: PHYSICIAN ASSISTANT

## 2019-04-10 PROCEDURE — G8417 CALC BMI ABV UP PARAM F/U: HCPCS | Performed by: PHYSICIAN ASSISTANT

## 2019-04-10 PROCEDURE — G8427 DOCREV CUR MEDS BY ELIG CLIN: HCPCS | Performed by: PHYSICIAN ASSISTANT

## 2019-04-10 PROCEDURE — 1036F TOBACCO NON-USER: CPT | Performed by: PHYSICIAN ASSISTANT

## 2019-04-10 PROCEDURE — 1090F PRES/ABSN URINE INCON ASSESS: CPT | Performed by: PHYSICIAN ASSISTANT

## 2019-04-10 ASSESSMENT — ENCOUNTER SYMPTOMS
PHOTOPHOBIA: 0
NAUSEA: 0
SORE THROAT: 0
WHEEZING: 0
COUGH: 0
DIARRHEA: 0
VOMITING: 0
COLOR CHANGE: 0
EYE REDNESS: 0
BACK PAIN: 1
SINUS PRESSURE: 0
EYE PAIN: 0
ABDOMINAL PAIN: 0
CONSTIPATION: 0
CHEST TIGHTNESS: 0
SHORTNESS OF BREATH: 1
RHINORRHEA: 0

## 2019-04-10 NOTE — PROGRESS NOTES
[facility]  [address]  [address] [place] [address]  Dept: [phone]  Dept Fax: [phone]  Loc: [phone]      [facility] INTERNAL MEDICINE  OFFICE NOTE      Chief Complaint   Patient presents with    Other     pt states feel weak, shortness of breath , pt also states the tremors in her hands have gotten worse        Merlinda Johnson is a 82y.o. year old female who is seen for four-month follow-up for chronic conditions hypertension, hyperlipidemia, GERD, shortness breath, depression. Patient's blood pressure seems to be stable at this time. Patient's current medication regiment seems to be adequate. Patient denies any chest pain, palpitations. Patient states compliant with taking medication. Patient's currently taking atenolol 100 mg daily. And Cozaar  50 mg daily. Patient's hyperlipidemia seems to be fairly well controlled on current medication regimen continue as directed. Patient denies any side effects from lipid lowering medication. Patient states trying to properly eat and exercise as directed. Taking Mevacor 40 mg nightly. Patient's GERD seems fairly stable on Prilosec 40 mg daily. Patient has a history of shortness of breath. Patient states was seeing Dr. Nathen Mabry. Patient was diagnosed with an elevated diaphragm. Patient was not suggested to have this repaired so patient does have some shortness breath. Thinks some of patient's shortness breath is related to deconditioning also. Patient's daughter states that patient sleeps a lot and doesn't really eat very good food mainly just frozen dinners. Patient denies any chest pain. Patient denies any lower extremity edema. Patient also states she was moving a box and she hurt her back she had some sciatica and she has been taking some Zanaflex 2 mg up to 3 times a day as needed. Patient states to limit better but still bothers her at times.   I think patient would benefit from some physical therapy to help with strengthening and some balance. Patient is using a walker now which has helped some with her balance. Patient continues on Lexapro for depression and it seems like it's doing okay. Active Ambulatory Problems     Diagnosis Date Noted    2-part displaced fracture of surgical neck of left humerus, initial encounter for closed fracture 07/08/2016    Hypertension 07/09/2016    Hyperlipemia 07/09/2016    GERD (gastroesophageal reflux disease) 07/09/2016    2-part displaced fracture of surgical neck of right humerus, initial encounter for closed fracture 07/09/2016    Nausea 07/09/2016    Glaucoma 07/10/2016    Shortness of breath 08/24/2018    Anxiety and depression 08/24/2018     Resolved Ambulatory Problems     Diagnosis Date Noted    No Resolved Ambulatory Problems     Past Medical History:   Diagnosis Date    Arthritis     GERD (gastroesophageal reflux disease)     Hyperlipidemia     Hypertension     Tremor        Past Medical History:   Diagnosis Date    Arthritis     GERD (gastroesophageal reflux disease)     Hyperlipidemia     Hypertension     Tremor     to right hand       Prior to Visit Medications    Medication Sig Taking? Authorizing Provider   tiZANidine (ZANAFLEX) 2 MG tablet Take 1 tablet by mouth 3 times daily as needed (low back pain) Yes NURIS Fernandez   losartan (COZAAR) 50 MG tablet Take 1 tablet by mouth daily, if blood pressure starts to run low cut down to 1/2 tablet.  Yes SOLEDAD Sweeney   omeprazole (PRILOSEC) 40 MG delayed release capsule Take 1 capsule by mouth daily Yes SOLEDAD Sweeney   escitalopram (LEXAPRO) 20 MG tablet Take 1 tablet by mouth daily Yes SOLEDAD Sweeney   albuterol sulfate HFA (PROVENTIL HFA) 108 (90 Base) MCG/ACT inhaler Inhale 2 puffs into the lungs every 6 hours as needed for Wheezing Yes NURIS Fernandez   lovastatin (MEVACOR) 40 MG tablet Take 1 tablet by mouth nightly Yes SOLEDAD Chandler   dorzolamide-timolol (COSOPT) 22.3-6.8 MG/ML ophthalmic solution INSTILL 1 DROP INTO BOTH EYES TWICE A DAY AS DIRECTED Yes Historical Provider, MD   latanoprost (XALATAN) 0.005 % ophthalmic solution INSTILL 1 DROP INTO BOTH EYES IN THE EVENING Yes Historical Provider, MD   atenolol (TENORMIN) 50 MG tablet TAKE 2 TABLETS BY MOUTH EVERY DAY Yes SOLEDAD Chandler       Past Surgical History:   Procedure Laterality Date    CHOLECYSTECTOMY      HUMERUS FRACTURE SURGERY Right 7/9/2016    REVERSE TOTAL SHOULDER ARTHROPLASTY performed by Adriana Frank MD at [employer] [employer]       History reviewed. No pertinent family history. Allergies   Allergen Reactions    Morphine Nausea And Vomiting       Social History     Socioeconomic History    Marital status:       Spouse name: Not on file    Number of children: Not on file    Years of education: Not on file    Highest education level: Not on file   Occupational History    Not on file   Social Needs    Financial resource strain: Not on file    Food insecurity:     Worry: Not on file     Inability: Not on file    Transportation needs:     Medical: Not on file     Non-medical: Not on file   Tobacco Use    Smoking status: Never Smoker    Smokeless tobacco: Never Used   Substance and Sexual Activity    Alcohol use: No    Drug use: No    Sexual activity: Not on file   Lifestyle    Physical activity:     Days per week: Not on file     Minutes per session: Not on file    Stress: Not on file   Relationships    Social connections:     Talks on phone: Not on file     Gets together: Not on file     Attends Sikhism service: Not on file     Active member of club or organization: Not on file     Attends meetings of clubs or organizations: Not on file     Relationship status: Not on file    Intimate partner violence:     Fear of current or ex partner: Not on file     Emotionally abused: Not on file     Physically abused: Not on file     Forced sexual activity: Not on file   Other Topics Concern    Not on file   Social History Narrative    Not on file       Review of Systems  Review of Systems   Constitutional: Positive for fatigue. Negative for activity change, appetite change, chills and fever. HENT: Negative for congestion, ear pain, postnasal drip, rhinorrhea, sinus pressure, sneezing and sore throat. Eyes: Negative for photophobia, pain and redness. Respiratory: Positive for shortness of breath. Negative for cough, chest tightness and wheezing. Cardiovascular: Negative for chest pain, palpitations and leg swelling. Gastrointestinal: Negative for abdominal pain, constipation, diarrhea, nausea and vomiting. Genitourinary: Negative for dysuria, frequency, hematuria and urgency. Musculoskeletal: Positive for back pain and gait problem (uses a walker). Negative for arthralgias, joint swelling and myalgias. Skin: Negative for color change, pallor and wound. Neurological: Positive for tremors and weakness. Negative for dizziness, facial asymmetry, speech difficulty and light-headedness. Hematological: Negative for adenopathy. Does not bruise/bleed easily. Psychiatric/Behavioral: Negative for confusion. The patient is not nervous/anxious. Current Outpatient Medications   Medication Sig Dispense Refill    tiZANidine (ZANAFLEX) 2 MG tablet Take 1 tablet by mouth 3 times daily as needed (low back pain) 30 tablet 0    losartan (COZAAR) 50 MG tablet Take 1 tablet by mouth daily, if blood pressure starts to run low cut down to 1/2 tablet.  90 tablet 3    omeprazole (PRILOSEC) 40 MG delayed release capsule Take 1 capsule by mouth daily 90 capsule 3    escitalopram (LEXAPRO) 20 MG tablet Take 1 tablet by mouth daily 90 tablet 3    albuterol sulfate HFA (PROVENTIL HFA) 108 (90 Base) MCG/ACT inhaler Inhale 2 puffs into the lungs every 6 hours as needed for Wheezing 1 Inhaler 3    lovastatin (MEVACOR) 40 MG tablet Take 1 tablet by mouth nightly 90 tablet 3    dorzolamide-timolol (COSOPT) 22.3-6.8 MG/ML ophthalmic solution INSTILL 1 DROP INTO BOTH EYES TWICE A DAY AS DIRECTED  3    latanoprost (XALATAN) 0.005 % ophthalmic solution INSTILL 1 DROP INTO BOTH EYES IN THE EVENING  3    atenolol (TENORMIN) 50 MG tablet TAKE 2 TABLETS BY MOUTH EVERY  tablet 3     No current facility-administered medications for this visit. /80   Pulse 109   Temp 98.6 °F (37 °C)   Ht 5' 7\" (1.702 m)   Wt 162 lb 9.6 oz (73.8 kg)   SpO2 97%   BMI 25.47 kg/m²     PHYSICAL EXAM  Physical Exam   Constitutional: Vital signs are normal. She appears well-developed and well-nourished. HENT:   Head: Normocephalic and atraumatic. Right Ear: External ear normal.   Left Ear: External ear normal.   Nose: Nose normal.   Mouth/Throat: Oropharynx is clear and moist. No oropharyngeal exudate. Eyes: Pupils are equal, round, and reactive to light. Right eye exhibits no discharge. Left eye exhibits no discharge. Neck: Normal range of motion. No tracheal deviation present. Cardiovascular: Normal rate, regular rhythm and normal heart sounds. Exam reveals no gallop and no friction rub. No murmur heard. Pulmonary/Chest: Effort normal and breath sounds normal. No respiratory distress. She has no wheezes. She has no rales. She exhibits no tenderness. Abdominal: Soft. Bowel sounds are normal. There is no tenderness. There is no rebound and no guarding. Musculoskeletal: Normal range of motion. She exhibits no tenderness or deformity. Lymphadenopathy:     She has no cervical adenopathy. Neurological: She is alert. She has normal reflexes. Skin: Skin is warm, dry and intact. No lesion and no rash noted. No erythema. Psychiatric: She has a normal mood and affect. Her mood appears not anxious. She does not exhibit a depressed mood. Nursing note and vitals reviewed.         Lab Results   Component Value Date     04/10/2019    K 4.4 04/10/2019    CL 99 04/10/2019    CO2 28 04/10/2019    BUN 19 04/10/2019    CREATININE 0.9 04/10/2019    GLUCOSE 109 04/10/2019    CALCIUM 9.8 04/10/2019    PROT 7.5 04/10/2019    LABALBU 4.1 04/10/2019    BILITOT 0.7 04/10/2019    ALKPHOS 91 04/10/2019    AST 21 04/10/2019    ALT 16 04/10/2019    LABGLOM >60 04/10/2019    GLOB 2.8 07/10/2016       Lab Results   Component Value Date    WBC 6.3 03/22/2019    HGB 13.7 03/22/2019    HCT 42.4 03/22/2019    MCV 94.6 03/22/2019     03/22/2019     No results found for: LABA1C  No results found for: EAG  Lab Results   Component Value Date    CHOL 180 03/22/2019    CHOL 177 11/16/2018    CHOL 171 08/17/2018     Lab Results   Component Value Date    TRIG 140 03/22/2019    TRIG 118 11/16/2018    TRIG 118 08/17/2018     Lab Results   Component Value Date    HDL 58 (L) 03/22/2019    HDL 54 (L) 11/16/2018    HDL 49 (L) 08/17/2018     Lab Results   Component Value Date    LDLCALC 94 03/22/2019    LDLCALC 99 11/16/2018    LDLCALC 98 08/17/2018     No results found for: LABVLDL, VLDL  No results found for: [facility]  Lab Results   Component Value Date    TSH 3.35 07/14/2017           ASSESSMENT      ICD-10-CM    1. Routine general medical examination at a health care facility Z00.00    2. Essential hypertension I10 Internal Referral to Home Health     CBC Auto Differential   3. Acute right-sided low back pain with right-sided sciatica M54.41 Internal Referral to Home Health   4. Weakness R53.1 Internal Referral to Home Health   5. Physical deconditioning R53.81 Internal Referral to Home Health   6. Unsteadiness R26.81 Internal Referral to Home Health   7. Hyperkalemia E87.5 Comprehensive Metabolic Panel   8. Vitamin D deficiency E55.9    9. Gastroesophageal reflux disease without esophagitis K21.9    10. Anxiety and depression F41.9     F32.9    11. Tremor, essential G25.0    12. Shortness of breath R06.02    13.  Mixed hyperlipidemia E78.2 Comprehensive Metabolic Panel Lipid Panel   14. Other depression F32.89        PLAN  Patient here for 4 month follow-up. Patient blood pressure seems fairly well controlled on current medication regimen continue as directed. I think we are going to get home health physical therapy to come out and help with patient balance and strength training and safety training. Patient still having some lower back pain she'll continue the Zanaflex as directed. Patient's last lab had an elevated potassium but on recheck it was within normal limits. Patient's vitamin D within normal limits. Patient's GERD seems fairly stable. Patient's anxiety and depression seems to be stable on Lexapro. Patient still having the tremors and they seem to get a little bit worse patient is already on atenolol and primidone can cause some sedation and with her depression. If tremor get any worse may need to send patient to neurology for further workup. I believe some of her shortness of breath is related to deconditioning and the elevated diaphragm. Patient states that last time she saw Dr. Sheridan Reed he said there should not much that they can do for it. Patient's cholesterol seems fairly stable. We did have a discussion with the patient and her daughter about assisted living but patient is very resistant to it. We explained that they would have meals that she could eat more often and better food and she wouldn't have to take care of a house patient is very resistant. I discussed with patient that she's got a half to start eating better and making sure she is drinking plenty of water stand hydrated. And then went to try to get some physical therapy out to hopefully help her with her stamina and balance. Patient will continue using the walker as directed. Patient will follow-up in 4 months with repeat CBC, CMP, lipids. Patient follow-up sooner as needed.       Orders Placed This Encounter   Procedures    Comprehensive Metabolic Panel    CBC Auto Differential   

## 2019-04-12 ENCOUNTER — TELEPHONE (OUTPATIENT)
Dept: INTERNAL MEDICINE CLINIC | Age: 84
End: 2019-04-12

## 2019-04-12 NOTE — TELEPHONE ENCOUNTER
[facility] ([facility]) PT admit  / eval done and requests visits 1w1, 3w2, 2w2 for deep breathing, trunk/core, LE strengthening and muscular endurance; bed mobility, transfer, gait, stair training; home safety instruction  .  Also, please ask for an order for speech therapy for breath support please advise if approved

## 2019-04-15 ENCOUNTER — HOSPITAL ENCOUNTER (OUTPATIENT)
Dept: GENERAL RADIOLOGY | Age: 84
Discharge: HOME | End: 2019-04-15
Payer: MEDICARE

## 2019-04-15 ENCOUNTER — OFFICE VISIT (OUTPATIENT)
Dept: INTERNAL MEDICINE | Age: 84
End: 2019-04-15
Payer: MEDICARE

## 2019-04-15 ENCOUNTER — NURSE TRIAGE (OUTPATIENT)
Dept: OTHER | Facility: CLINIC | Age: 84
End: 2019-04-15

## 2019-04-15 VITALS
WEIGHT: 167 LBS | HEART RATE: 56 BPM | BODY MASS INDEX: 26.21 KG/M2 | DIASTOLIC BLOOD PRESSURE: 62 MMHG | SYSTOLIC BLOOD PRESSURE: 104 MMHG | OXYGEN SATURATION: 98 % | HEIGHT: 67 IN | TEMPERATURE: 98.4 F

## 2019-04-15 DIAGNOSIS — R06.02 SHORTNESS OF BREATH: ICD-10-CM

## 2019-04-15 DIAGNOSIS — R53.83 FATIGUE, UNSPECIFIED TYPE: ICD-10-CM

## 2019-04-15 DIAGNOSIS — R06.02 SHORTNESS OF BREATH: Primary | ICD-10-CM

## 2019-04-15 DIAGNOSIS — J30.9 ALLERGIC RHINITIS, UNSPECIFIED SEASONALITY, UNSPECIFIED TRIGGER: ICD-10-CM

## 2019-04-15 DIAGNOSIS — J98.6 ELEVATED DIAPHRAGM: ICD-10-CM

## 2019-04-15 LAB
ALBUMIN SERPL-MCNC: 4.2 G/DL (ref 3.5–5.2)
ALP BLD-CCNC: 100 U/L (ref 35–104)
ALT SERPL-CCNC: 32 U/L (ref 5–33)
ANION GAP SERPL CALCULATED.3IONS-SCNC: 14 MMOL/L (ref 7–19)
AST SERPL-CCNC: 34 U/L (ref 5–32)
BASOPHILS ABSOLUTE: 0.1 K/UL (ref 0–0.2)
BASOPHILS RELATIVE PERCENT: 0.8 % (ref 0–1)
BILIRUB SERPL-MCNC: 0.4 MG/DL (ref 0.2–1.2)
BUN BLDV-MCNC: 16 MG/DL (ref 8–23)
CALCIUM SERPL-MCNC: 9.9 MG/DL (ref 8.8–10.2)
CHLORIDE BLD-SCNC: 97 MMOL/L (ref 98–111)
CO2: 28 MMOL/L (ref 22–29)
CREAT SERPL-MCNC: 0.7 MG/DL (ref 0.5–0.9)
EOSINOPHILS ABSOLUTE: 0 K/UL (ref 0–0.6)
EOSINOPHILS RELATIVE PERCENT: 0.6 % (ref 0–5)
GFR NON-AFRICAN AMERICAN: >60
GLUCOSE BLD-MCNC: 102 MG/DL (ref 74–109)
HCT VFR BLD CALC: 39.5 % (ref 37–47)
HEMOGLOBIN: 12.3 G/DL (ref 12–16)
LYMPHOCYTES ABSOLUTE: 2 K/UL (ref 1.1–4.5)
LYMPHOCYTES RELATIVE PERCENT: 28 % (ref 20–40)
MCH RBC QN AUTO: 30.4 PG (ref 27–31)
MCHC RBC AUTO-ENTMCNC: 31.1 G/DL (ref 33–37)
MCV RBC AUTO: 97.5 FL (ref 81–99)
MONOCYTES ABSOLUTE: 0.7 K/UL (ref 0–0.9)
MONOCYTES RELATIVE PERCENT: 9.9 % (ref 0–10)
NEUTROPHILS ABSOLUTE: 4.4 K/UL (ref 1.5–7.5)
NEUTROPHILS RELATIVE PERCENT: 60.4 % (ref 50–65)
PDW BLD-RTO: 13.9 % (ref 11.5–14.5)
PLATELET # BLD: 225 K/UL (ref 130–400)
PMV BLD AUTO: 11.8 FL (ref 9.4–12.3)
POTASSIUM SERPL-SCNC: 5 MMOL/L (ref 3.5–5)
RBC # BLD: 4.05 M/UL (ref 4.2–5.4)
SODIUM BLD-SCNC: 139 MMOL/L (ref 136–145)
TOTAL PROTEIN: 7.8 G/DL (ref 6.6–8.7)
TSH SERPL DL<=0.05 MIU/L-ACNC: 4.88 UIU/ML (ref 0.27–4.2)
VITAMIN B-12: 327 PG/ML (ref 211–946)
WBC # BLD: 7.3 K/UL (ref 4.8–10.8)

## 2019-04-15 PROCEDURE — 1090F PRES/ABSN URINE INCON ASSESS: CPT | Performed by: NURSE PRACTITIONER

## 2019-04-15 PROCEDURE — G8427 DOCREV CUR MEDS BY ELIG CLIN: HCPCS | Performed by: NURSE PRACTITIONER

## 2019-04-15 PROCEDURE — 1123F ACP DISCUSS/DSCN MKR DOCD: CPT | Performed by: NURSE PRACTITIONER

## 2019-04-15 PROCEDURE — 93000 ELECTROCARDIOGRAM COMPLETE: CPT | Performed by: NURSE PRACTITIONER

## 2019-04-15 PROCEDURE — 4040F PNEUMOC VAC/ADMIN/RCVD: CPT | Performed by: NURSE PRACTITIONER

## 2019-04-15 PROCEDURE — 71046 X-RAY EXAM CHEST 2 VIEWS: CPT

## 2019-04-15 PROCEDURE — 99214 OFFICE O/P EST MOD 30 MIN: CPT | Performed by: NURSE PRACTITIONER

## 2019-04-15 PROCEDURE — G8399 PT W/DXA RESULTS DOCUMENT: HCPCS | Performed by: NURSE PRACTITIONER

## 2019-04-15 PROCEDURE — G8417 CALC BMI ABV UP PARAM F/U: HCPCS | Performed by: NURSE PRACTITIONER

## 2019-04-15 PROCEDURE — 1036F TOBACCO NON-USER: CPT | Performed by: NURSE PRACTITIONER

## 2019-04-15 ASSESSMENT — ENCOUNTER SYMPTOMS
BACK PAIN: 0
RHINORRHEA: 0
COLOR CHANGE: 0
NAUSEA: 0
VOMITING: 0
VOICE CHANGE: 0
SHORTNESS OF BREATH: 1
PHOTOPHOBIA: 0
COUGH: 0

## 2019-04-15 NOTE — PROGRESS NOTES
[facility]  [address] [facility]  [address] [place] [address]  Dept: [phone]  Dept Fax: [phone]  Loc: [phone]    Peña Oleary is a 82 y.o. female who presents today for her medical conditions/complaints as noted below. Peña Artisident is c/o of Shortness of Breath (SOA for almost a week, weakness is not getting better, not sleeping)        HPI:     HPI   Chief Complaint   Patient presents with    Shortness of Breath     SOA for almost a week, weakness is not getting better, not sleeping     Patient presents today for evaluation of shortness of breath that has gradually been worsening over last week. She often sits hunched over because she feels she can breathe better. She has a rescue inhaler she has been using with little relief. She denies cough, congestion, or fever. She has not had chest pain or dizziness or headache. 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 She states she typically has \"spring allergies\". She has not taken anything for her symptoms. She states she is having difficulty sleeping at night too.      Past Medical History:   Diagnosis Date    Arthritis     GERD (gastroesophageal reflux disease)     Hyperlipidemia     Hypertension     Tremor     to right hand      Past Surgical History:   Procedure Laterality Date    CHOLECYSTECTOMY      HUMERUS FRACTURE SURGERY Right 7/9/2016    REVERSE TOTAL SHOULDER ARTHROPLASTY performed by Gabe Chavez MD at [address] [place] [address] 4/15/2019 4/10/2019 3/8/2019 12/7/2018 11/2/2018 1/44/3836   SYSTOLIC 663 [phone]   DIASTOLIC 62 80 86 80 86 70   Site Left Upper Arm - - - - -   Position Sitting - - - - -   Pulse 56 109 80 110 87 73   Temp 98.4 98.6 - 97.7 100 98.2   Resp - - 16 - 16 -   Weight 167 lb 162 lb 9.6 oz - 168 lb 6.4 oz 164 lb 170 lb 9.6 oz   Height 5' 7\" 5' 7\" - 5' 7\" 5' 6\" 5' 6\"   BMI (wt*703/ht~2) 26.15 kg/m2 25.46 and voice change. Eyes: Negative for photophobia and visual disturbance. Respiratory: Positive for shortness of breath. Negative for cough. Cardiovascular: Negative for chest pain and palpitations. Gastrointestinal: Negative for nausea and vomiting. Endocrine: Negative. Negative for cold intolerance and heat intolerance. Genitourinary: Negative for difficulty urinating and flank pain. Musculoskeletal: Negative for back pain and neck pain. Skin: Negative for color change and rash. Allergic/Immunologic: Negative for environmental allergies and food allergies. Neurological: Positive for weakness. Negative for dizziness, speech difficulty and headaches. Hematological: Does not bruise/bleed easily. Psychiatric/Behavioral: Negative for sleep disturbance and suicidal ideas. Objective:     Physical Exam   Constitutional: She is oriented to person, place, and time. She appears well-developed and well-nourished. HENT:   Head: Atraumatic. Right Ear: External ear normal.   Left Ear: External ear normal.   Nose: Nose normal.   Mouth/Throat: Oropharynx is clear and moist.   Eyes: Pupils are equal, round, and reactive to light. Conjunctivae are normal.   Neck: Normal range of motion. Neck supple. Cardiovascular: Normal rate, regular rhythm, S1 normal, S2 normal and normal heart sounds. Pulmonary/Chest: Effort normal and breath sounds normal.   Clear lungs   Abdominal: Soft. Bowel sounds are normal.   Musculoskeletal: Normal range of motion. Neurological: She is alert and oriented to person, place, and time. Skin: Skin is warm and dry. Psychiatric: She has a normal mood and affect. Her behavior is normal.   Nursing note and vitals reviewed. /62 (Site: Left Upper Arm, Position: Sitting)   Pulse 56   Temp 98.4 °F (36.9 °C)   Ht 5' 7\" (1.702 m)   Wt 167 lb (75.8 kg)   SpO2 98%   BMI 26.16 kg/m²     Assessment:       Diagnosis Orders   1.  Shortness of breath  EKG 12 Lead XR CHEST STANDARD (2 VW)    CBC Auto Differential    Comprehensive Metabolic Panel    Vitamin B12    TSH without Reflex    EKG 12 Lead   2. Fatigue, unspecified type  EKG 12 Lead    XR CHEST STANDARD (2 VW)    CBC Auto Differential    Comprehensive Metabolic Panel    Vitamin B12    TSH without Reflex    EKG 12 Lead   3. Allergic rhinitis, unspecified seasonality, unspecified trigger     4. Elevated diaphragm       EKG: NSR 98 bpm    Plan:     1. EKG today- normal.  2. CXR today. 3. CBC/CMP/b12/TSH  4. Patient will follow-up for worsening symptoms or go to ER. Patient given educational materials -see patient instructions. Discussed use, benefit, and side effects of prescribed medications. All patient questions answered. Pt voiced understanding. Reviewed health maintenance. Instructed to continue currentmedications, diet and exercise. Patient agreed with treatment plan. Follow up as directed. MEDICATIONS:  No orders of the defined types were placed in this encounter. ORDERS:  Orders Placed This Encounter   Procedures    XR CHEST STANDARD (2 VW)    CBC Auto Differential    Comprehensive Metabolic Panel    Vitamin B12    TSH without Reflex    EKG 12 Lead       Follow-up:  Return if symptoms worsen or fail to improve. PATIENT INSTRUCTIONS:  There are no Patient Instructions on file for this visit. Electronically signed by NURIS Momin on 4/16/2019 at 6:04 PM    EMR Dragon/transcription disclaimer:  Much of thisencounter note is electronic transcription/translation of spoken language to printed texts. The electronic translation of spoken language may be erroneous, or at times, nonsensical words or phrases may be inadvertentlytranscribed.   Although I have reviewed the note for such errors, some may still exist.

## 2019-04-15 NOTE — TELEPHONE ENCOUNTER
Reason for Disposition   Longstanding difficulty breathing (e.g., CHF, COPD, emphysema) and worse than normal    Answer Assessment - Initial Assessment Questions  1. RESPIRATORY STATUS: \"Describe your breathing? \" (e.g., wheezing, shortness of breath, unable to speak, severe coughing)       Worsening sob  2. ONSET: \"When did this breathing problem begin? \"       Pt was seen for Sob last week, but it is worse now  3. PATTERN \"Does the difficult breathing come and go, or has it been constant since it started? \"       *No Answer*  4. SEVERITY: \"How bad is your breathing? \" (e.g., mild, moderate, severe)    caller states it is \"not terrible\" but worse than it was. 5. RECURRENT SYMPTOM: \"Have you had difficulty breathing before? \" If so, ask: \"When was the last time? \" and \"What happened that time? \"       Pt had symptom last week and was seen. 6. CARDIAC HISTORY: \"Do you have any history of heart disease? \" (e.g., heart attack, angina, bypass surgery, angioplasty)       High BP, high cholesterol   7. LUNG HISTORY: \"Do you have any history of lung disease? \"  (e.g., pulmonary embolus, asthma, emphysema)      Per caller, pt has left lung that \"does not close correctly \"  8. CAUSE: \"What do you think is causing the breathing problem? \"       unsure  9. OTHER SYMPTOMS: \"Do you have any other symptoms? (e.g., dizziness, runny nose, cough, chest pain, fever)      Weakness present, unable to do her physical therapy. Her O2 sat is 97%. 10. PREGNANCY: \"Is there any chance you are pregnant? \" \"When was your last menstrual period? \"        *No Answer*  11. TRAVEL: \"Have you traveled out of the country in the last month? \" (e.g., travel history, exposures)        *No Answer*    Protocols used: BREATHING DIFFICULTY-ADULT-[place]  Caller reports symptoms as documented above. Soft trx to psc to schedule apt as recommended.

## 2019-04-16 ENCOUNTER — TELEPHONE (OUTPATIENT)
Dept: INTERNAL MEDICINE | Age: 84
End: 2019-04-16

## 2019-04-16 NOTE — TELEPHONE ENCOUNTER
----- Message from NURIS Walker sent at 4/16/2019  2:50 PM CDT -----  Please inform patient that CXR is stable showing elevated/ diaphragm paralysis.

## 2019-04-17 DIAGNOSIS — I49.9 IRREGULAR HEART BEAT: Primary | ICD-10-CM

## 2019-04-17 NOTE — TELEPHONE ENCOUNTER
Received call from daughter Christine asking to call her at [phone] for results or to leave VM with results, this has been completed. LM to return my call with any questions or concerns.

## 2019-04-18 ENCOUNTER — HOSPITAL ENCOUNTER (INPATIENT)
Age: 84
LOS: 3 days | Discharge: HOME HEALTH | DRG: 310 | End: 2019-04-21
Attending: INTERNAL MEDICINE | Admitting: INTERNAL MEDICINE
Payer: MEDICARE

## 2019-04-18 ENCOUNTER — APPOINTMENT (OUTPATIENT)
Dept: CT IMAGING | Age: 84
DRG: 310 | End: 2019-04-18
Payer: MEDICARE

## 2019-04-18 DIAGNOSIS — I48.91 ATRIAL FIBRILLATION, UNSPECIFIED TYPE (HCC): Primary | ICD-10-CM

## 2019-04-18 DIAGNOSIS — R06.02 SHORTNESS OF BREATH: ICD-10-CM

## 2019-04-18 LAB
ALBUMIN SERPL-MCNC: 4.1 G/DL (ref 3.5–5.2)
ALP BLD-CCNC: 101 U/L (ref 35–104)
ALT SERPL-CCNC: 29 U/L (ref 5–33)
ANION GAP SERPL CALCULATED.3IONS-SCNC: 11 MMOL/L (ref 7–19)
AST SERPL-CCNC: 31 U/L (ref 5–32)
BASOPHILS ABSOLUTE: 0.1 K/UL (ref 0–0.2)
BASOPHILS RELATIVE PERCENT: 0.9 % (ref 0–1)
BILIRUB SERPL-MCNC: 0.5 MG/DL (ref 0.2–1.2)
BUN BLDV-MCNC: 14 MG/DL (ref 8–23)
CALCIUM SERPL-MCNC: 9.6 MG/DL (ref 8.8–10.2)
CHLORIDE BLD-SCNC: 98 MMOL/L (ref 98–111)
CO2: 29 MMOL/L (ref 22–29)
CREAT SERPL-MCNC: 0.7 MG/DL (ref 0.5–0.9)
D DIMER: 0.65 UG/ML FEU (ref 0–0.48)
EOSINOPHILS ABSOLUTE: 0.1 K/UL (ref 0–0.6)
EOSINOPHILS RELATIVE PERCENT: 1 % (ref 0–5)
GFR NON-AFRICAN AMERICAN: >60
GLUCOSE BLD-MCNC: 125 MG/DL (ref 74–109)
HCT VFR BLD CALC: 39 % (ref 37–47)
HEMOGLOBIN: 12.2 G/DL (ref 12–16)
LYMPHOCYTES ABSOLUTE: 1.9 K/UL (ref 1.1–4.5)
LYMPHOCYTES RELATIVE PERCENT: 28.6 % (ref 20–40)
MCH RBC QN AUTO: 30.5 PG (ref 27–31)
MCHC RBC AUTO-ENTMCNC: 31.3 G/DL (ref 33–37)
MCV RBC AUTO: 97.5 FL (ref 81–99)
MONOCYTES ABSOLUTE: 0.8 K/UL (ref 0–0.9)
MONOCYTES RELATIVE PERCENT: 11.7 % (ref 0–10)
NEUTROPHILS ABSOLUTE: 3.8 K/UL (ref 1.5–7.5)
NEUTROPHILS RELATIVE PERCENT: 57.5 % (ref 50–65)
PDW BLD-RTO: 14 % (ref 11.5–14.5)
PERFORMED ON: NORMAL
PERFORMED ON: NORMAL
PLATELET # BLD: 228 K/UL (ref 130–400)
PMV BLD AUTO: 11.2 FL (ref 9.4–12.3)
POC TROPONIN I: 0.02 NG/ML (ref 0–0.08)
POC TROPONIN I: 0.03 NG/ML (ref 0–0.08)
POTASSIUM REFLEX MAGNESIUM: 4.6 MMOL/L (ref 3.5–5)
PRO-BNP: ABNORMAL PG/ML (ref 0–1800)
RBC # BLD: 4 M/UL (ref 4.2–5.4)
SODIUM BLD-SCNC: 138 MMOL/L (ref 136–145)
TOTAL PROTEIN: 7.5 G/DL (ref 6.6–8.7)
TSH SERPL DL<=0.05 MIU/L-ACNC: 7.18 UIU/ML (ref 0.27–4.2)
WBC # BLD: 6.7 K/UL (ref 4.8–10.8)

## 2019-04-18 PROCEDURE — 2140000000 HC CCU INTERMEDIATE R&B

## 2019-04-18 PROCEDURE — 80053 COMPREHEN METABOLIC PANEL: CPT

## 2019-04-18 PROCEDURE — 85379 FIBRIN DEGRADATION QUANT: CPT

## 2019-04-18 PROCEDURE — 99285 EMERGENCY DEPT VISIT HI MDM: CPT | Performed by: NURSE PRACTITIONER

## 2019-04-18 PROCEDURE — 71275 CT ANGIOGRAPHY CHEST: CPT

## 2019-04-18 PROCEDURE — 85025 COMPLETE CBC W/AUTO DIFF WBC: CPT

## 2019-04-18 PROCEDURE — 93005 ELECTROCARDIOGRAM TRACING: CPT

## 2019-04-18 PROCEDURE — 2580000003 HC RX 258: Performed by: INTERNAL MEDICINE

## 2019-04-18 PROCEDURE — 84484 ASSAY OF TROPONIN QUANT: CPT

## 2019-04-18 PROCEDURE — 36415 COLL VENOUS BLD VENIPUNCTURE: CPT

## 2019-04-18 PROCEDURE — 99223 1ST HOSP IP/OBS HIGH 75: CPT | Performed by: INTERNAL MEDICINE

## 2019-04-18 PROCEDURE — 6360000004 HC RX CONTRAST MEDICATION: Performed by: NURSE PRACTITIONER

## 2019-04-18 PROCEDURE — 84443 ASSAY THYROID STIM HORMONE: CPT

## 2019-04-18 PROCEDURE — 83880 ASSAY OF NATRIURETIC PEPTIDE: CPT

## 2019-04-18 PROCEDURE — 99285 EMERGENCY DEPT VISIT HI MDM: CPT

## 2019-04-18 PROCEDURE — 2500000003 HC RX 250 WO HCPCS: Performed by: NURSE PRACTITIONER

## 2019-04-18 RX ORDER — DILTIAZEM HYDROCHLORIDE 5 MG/ML
10 INJECTION INTRAVENOUS ONCE
Status: COMPLETED | OUTPATIENT
Start: 2019-04-18 | End: 2019-04-18

## 2019-04-18 RX ORDER — ONDANSETRON 2 MG/ML
4 INJECTION INTRAMUSCULAR; INTRAVENOUS EVERY 6 HOURS PRN
Status: DISCONTINUED | OUTPATIENT
Start: 2019-04-18 | End: 2019-04-21 | Stop reason: HOSPADM

## 2019-04-18 RX ORDER — SODIUM CHLORIDE 0.9 % (FLUSH) 0.9 %
10 SYRINGE (ML) INJECTION EVERY 12 HOURS SCHEDULED
Status: DISCONTINUED | OUTPATIENT
Start: 2019-04-18 | End: 2019-04-21 | Stop reason: HOSPADM

## 2019-04-18 RX ORDER — ACETAMINOPHEN 325 MG/1
650 TABLET ORAL EVERY 8 HOURS PRN
Status: DISCONTINUED | OUTPATIENT
Start: 2019-04-18 | End: 2019-04-21 | Stop reason: HOSPADM

## 2019-04-18 RX ORDER — SODIUM CHLORIDE 0.9 % (FLUSH) 0.9 %
10 SYRINGE (ML) INJECTION PRN
Status: DISCONTINUED | OUTPATIENT
Start: 2019-04-18 | End: 2019-04-21 | Stop reason: HOSPADM

## 2019-04-18 RX ADMIN — DILTIAZEM HYDROCHLORIDE 10 MG: 5 INJECTION INTRAVENOUS at 20:11

## 2019-04-18 RX ADMIN — Medication 10 ML: at 23:14

## 2019-04-18 RX ADMIN — IOPAMIDOL 90 ML: 755 INJECTION, SOLUTION INTRAVENOUS at 19:52

## 2019-04-18 ASSESSMENT — ENCOUNTER SYMPTOMS
NAUSEA: 0
SHORTNESS OF BREATH: 1
ABDOMINAL PAIN: 0
FACIAL SWELLING: 0
VOMITING: 0
COUGH: 0
SPUTUM PRODUCTION: 0

## 2019-04-18 ASSESSMENT — PAIN SCALES - GENERAL: PAINLEVEL_OUTOF10: 0

## 2019-04-18 NOTE — ED PROVIDER NOTES
[facility]  eMERGENCY dEPARTMENT eNCOUnter      Pt Name: Shoshana Priest  MRN: [id number]  [place] 1935  Date of evaluation: 4/18/2019  Provider: NURIS Rondon    CHIEF COMPLAINT       Chief Complaint   Patient presents with    Shortness of Breath     pt states she has seen her doctor 2x this week for same symptoms         HISTORY OF PRESENT ILLNESS   (Location/Symptom, Timing/Onset,Context/Setting, Quality, Duration, Modifying Factors, Severity)  Note limiting factors. Shoshana Priest is a 82 y.o. female who presents to the emergency department with shortness of breath has been present for 2 weeks. No chest pain. The shortness of breath has been intermittent she feels like though it is getting worse. She states she could not lie down to sleep last night. No cardiac history. NO cardiac evals    The history is provided by the patient. Shortness of Breath   Severity:  Moderate  Onset quality:  Sudden  Duration:  2 weeks  Timing:  Intermittent  Progression:  Worsening  Chronicity:  New  Relieved by:  Nothing  Worsened by:  Nothing  Ineffective treatments:  None tried  Associated symptoms: no abdominal pain, no chest pain, no cough, no diaphoresis, no fever, no sputum production and no vomiting        NursingNotes were reviewed. REVIEW OF SYSTEMS    (2-9 systems for level 4, 10 or more for level 5)     Review of Systems   Constitutional: Negative for diaphoresis and fever. HENT: Negative for facial swelling. Respiratory: Positive for shortness of breath. Negative for cough and sputum production. Cardiovascular: Negative for chest pain. Gastrointestinal: Negative for abdominal pain, nausea and vomiting. Genitourinary: Negative for difficulty urinating. Except as noted above the remainder of the review of systems was reviewed and negative.        PAST MEDICAL HISTORY     Past Medical History:   Diagnosis Date    Arthritis     GERD (gastroesophageal reflux disease)     Hyperlipidemia     Hypertension     Tremor     to right hand         SURGICALHISTORY       Past Surgical History:   Procedure Laterality Date    CHOLECYSTECTOMY      HUMERUS FRACTURE SURGERY Right 7/9/2016    REVERSE TOTAL SHOULDER ARTHROPLASTY performed by Surendra Rg MD at [address] [facility]       Previous Medications    ALBUTEROL SULFATE HFA (PROVENTIL HFA) 108 (90 BASE) MCG/ACT INHALER    Inhale 2 puffs into the lungs every 6 hours as needed for Wheezing    ATENOLOL (TENORMIN) 50 MG TABLET    TAKE 2 TABLETS BY MOUTH EVERY DAY    DORZOLAMIDE-TIMOLOL (COSOPT) 22.3-6.8 MG/ML OPHTHALMIC SOLUTION    INSTILL 1 DROP INTO BOTH EYES TWICE A DAY AS DIRECTED    ESCITALOPRAM (LEXAPRO) 20 MG TABLET    Take 1 tablet by mouth daily    LATANOPROST (XALATAN) 0.005 % OPHTHALMIC SOLUTION    INSTILL 1 DROP INTO BOTH EYES IN THE EVENING    LOSARTAN (COZAAR) 50 MG TABLET    Take 1 tablet by mouth daily, if blood pressure starts to run low cut down to 1/2 tablet. LOVASTATIN (MEVACOR) 40 MG TABLET    Take 1 tablet by mouth nightly    OMEPRAZOLE (PRILOSEC) 40 MG DELAYED RELEASE CAPSULE    Take 1 capsule by mouth daily    TIZANIDINE (ZANAFLEX) 2 MG TABLET    Take 1 tablet by mouth 3 times daily as needed (low back pain)       ALLERGIES     Morphine    FAMILY HISTORY     No family history on file. SOCIAL HISTORY       Social History     Socioeconomic History    Marital status:       Spouse name: Not on file    Number of children: Not on file    Years of education: Not on file    Highest education level: Not on file   Occupational History    Not on file   Social Needs    Financial resource strain: Not on file    Food insecurity:     Worry: Not on file     Inability: Not on file    Transportation needs:     Medical: Not on file     Non-medical: Not on file   Tobacco Use    Smoking status: Never Smoker    Smokeless tobacco: Never Used   Substance and Sexual Activity    Alcohol use: No    Drug use: No    Sexual activity: Not on file   Lifestyle    Physical activity:     Days per week: Not on file     Minutes per session: Not on file    Stress: Not on file   Relationships    Social connections:     Talks on phone: Not on file     Gets together: Not on file     Attends [employer] service: Not on file     Active member of club or organization: Not on file     Attends meetings of clubs or organizations: Not on file     Relationship status: Not on file    Intimate partner violence:     Fear of current or ex partner: Not on file     Emotionally abused: Not on file     Physically abused: Not on file     Forced sexual activity: Not on file   Other Topics Concern    Not on file   Social History Narrative    Not on file       SCREENINGS    [place] Coma Scale  Eye Opening: Spontaneous  Best Verbal Response: Oriented  Best Motor Response: Obeys commands  [place] Coma Scale Score: 15 @FLOW(20007504)@      PHYSICAL EXAM    (up to 7 for level 4, 8 or more for level 5)     ED Triage Vitals   BP Temp Temp Source Pulse Resp SpO2 Height Weight   04/18/19 1847 04/18/19 1846 04/18/19 1846 04/18/19 1847 04/18/19 1846 04/18/19 1847 04/18/19 1846 04/18/19 1846   (!) 165/99 97.4 °F (36.3 °C) Temporal 110 16 95 % 5' 6\" (1.676 m) 167 lb (75.8 kg)       Physical Exam   Constitutional: She appears well-developed and well-nourished. Uses walker   HENT:   Head: Normocephalic and atraumatic. Mouth/Throat: Uvula is midline and oropharynx is clear and moist.   Eyes: Right eye exhibits no discharge. Left eye exhibits no discharge. No scleral icterus. Neck: Normal range of motion. Neck supple. Cardiovascular: S1 normal and S2 normal. An irregularly irregular rhythm present. Tachycardia present. Pulmonary/Chest: Effort normal and breath sounds normal. No respiratory distress. Neurological: She is alert. Psychiatric: She has a normal mood and affect.  Her behavior is normal.   Nursing note and vitals reviewed. DIAGNOSTIC RESULTS     EKG: All EKG's are interpreted by the Emergency Department Physician who either signs or Co-signsthis chart in the absence of a cardiologist.    With nonspecific changes read by Dr Francine Stevens. EKG #2  74 SR with prolonged qt at 506 nonspecific changes read by Dr Donya Cornelius:   Makayla Bough such as CT, Ultrasound and MRI are read by the radiologist. Plain radiographic images are visualized and preliminarily interpreted by the emergency physician with the below findings:      Interpretation per the Radiologist below, if available at the time of this note:    CTA PULMONARY W CONTRAST   Final Result   1. No evidence of pulmonary embolus or other acute cardiopulmonary   process. 2. 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 Bibasilar atelectasis is present. 3. Moderate cardiomegaly. There is significant atrial enlargement with   elevated right heart pressures suspected with reflux of contrast into   the intrahepatic IVC and branch vessels. 4. Coronary calcifications in the LAD distribution. 5. Suspected hemorrhagic cyst upper pole left kidney. Follow-up with   ultrasound would be recommended.    Signed by Dr Elina Jaramillo on 4/18/2019 9:04 PM            ED BEDSIDEULTRASOUND:   Performed by ED Physician -none    LABS:  Labs Reviewed   CBC WITH AUTO DIFFERENTIAL - Abnormal; Notable for the following components:       Result Value    RBC 4.00 (*)     MCHC 31.3 (*)     Monocytes % 11.7 (*)     All other components within normal limits   COMPREHENSIVE METABOLIC PANEL W/ REFLEX TO MG FOR LOW K - Abnormal; Notable for the following components:    Glucose 125 (*)     All other components within normal limits   BRAIN NATRIURETIC PEPTIDE - Abnormal; Notable for the following components:    Pro-BNP 11,921 (*)     All other components within normal limits   D-DIMER, QUANTITATIVE - Abnormal; Notable for the following components:    D-Dimer, Quant 0.65 (*)     All other components within normal limits   POCT TROPONIN   POCT VENOUS   POCT TROPONIN       All other labs were within normal range or not returned as of this dictation. EMERGENCY DEPARTMENT COURSE and DIFFERENTIALDIAGNOSIS/MDM:   Vitals:    Vitals:    04/18/19 1933 04/18/19 2013 04/18/19 2032 04/18/19 2102   BP: (!) 143/99 (!) 122/98 (!) 148/91 (!) 149/94   Pulse: 122 113 73 73   Resp: 16 17 16 17   Temp:       TempSrc:       SpO2: 94% 94% 93% 93%   Weight:       Height:               MDM  Pt converted back to SR with 10mg cardizem IV. Called Dr Jose E Hubbard who said to call the hospitalist.  Dr Riki Mcgowan accepted pt for admission      CONSULTS:  None    PROCEDURES:  Unless otherwise noted below, none     Procedures    FINAL IMPRESSION      1. Atrial fibrillation, unspecified type ([address]    2. Shortness of breath        DISPOSITION/PLAN   DISPOSITION Decision To Admit 04/18/2019 09:28:48 PM      PATIENT REFERRED TO:  No follow-up provider specified.     DISCHARGE MEDICATIONS:  New Prescriptions    No medications on file          (Please note that portions of this note were completed with a voice recognitionprogram.  Efforts were made to edit the dictations but occasionally words are mis-transcribed.)    NURIS De Leon (electronically signed)         NURIS De Leon  04/18/19 9664       NURIS De Leon  04/23/19 7739

## 2019-04-19 ENCOUNTER — APPOINTMENT (OUTPATIENT)
Dept: ULTRASOUND IMAGING | Age: 84
DRG: 310 | End: 2019-04-19
Payer: MEDICARE

## 2019-04-19 ENCOUNTER — APPOINTMENT (OUTPATIENT)
Dept: CT IMAGING | Age: 84
DRG: 310 | End: 2019-04-19
Payer: MEDICARE

## 2019-04-19 ENCOUNTER — TELEPHONE (OUTPATIENT)
Dept: INTERNAL MEDICINE CLINIC | Age: 84
End: 2019-04-19

## 2019-04-19 PROBLEM — N28.1 RENAL CYST: Status: ACTIVE | Noted: 2019-04-19

## 2019-04-19 PROBLEM — E03.9 HYPOTHYROIDISM: Status: ACTIVE | Noted: 2019-04-19

## 2019-04-19 PROBLEM — R79.89 ELEVATED TSH: Status: ACTIVE | Noted: 2019-04-19

## 2019-04-19 PROBLEM — R79.89 ELEVATED BRAIN NATRIURETIC PEPTIDE (BNP) LEVEL: Status: ACTIVE | Noted: 2019-04-19

## 2019-04-19 LAB
ANION GAP SERPL CALCULATED.3IONS-SCNC: 12 MMOL/L (ref 7–19)
BASOPHILS ABSOLUTE: 0 K/UL (ref 0–0.2)
BASOPHILS RELATIVE PERCENT: 0.7 % (ref 0–1)
BUN BLDV-MCNC: 12 MG/DL (ref 8–23)
CALCIUM SERPL-MCNC: 9.2 MG/DL (ref 8.8–10.2)
CHLORIDE BLD-SCNC: 101 MMOL/L (ref 98–111)
CO2: 26 MMOL/L (ref 22–29)
CREAT SERPL-MCNC: 0.6 MG/DL (ref 0.5–0.9)
EOSINOPHILS ABSOLUTE: 0.1 K/UL (ref 0–0.6)
EOSINOPHILS RELATIVE PERCENT: 1.1 % (ref 0–5)
GFR NON-AFRICAN AMERICAN: >60
GLUCOSE BLD-MCNC: 89 MG/DL (ref 74–109)
HCT VFR BLD CALC: 35.2 % (ref 37–47)
HEMOGLOBIN: 11 G/DL (ref 12–16)
LV EF: 48 %
LVEF MODALITY: NORMAL
LYMPHOCYTES ABSOLUTE: 1.7 K/UL (ref 1.1–4.5)
LYMPHOCYTES RELATIVE PERCENT: 31.7 % (ref 20–40)
MCH RBC QN AUTO: 30.1 PG (ref 27–31)
MCHC RBC AUTO-ENTMCNC: 31.3 G/DL (ref 33–37)
MCV RBC AUTO: 96.4 FL (ref 81–99)
MONOCYTES ABSOLUTE: 0.6 K/UL (ref 0–0.9)
MONOCYTES RELATIVE PERCENT: 10.6 % (ref 0–10)
NEUTROPHILS ABSOLUTE: 3 K/UL (ref 1.5–7.5)
NEUTROPHILS RELATIVE PERCENT: 55.7 % (ref 50–65)
PDW BLD-RTO: 13.9 % (ref 11.5–14.5)
PLATELET # BLD: 158 K/UL (ref 130–400)
PMV BLD AUTO: 11.2 FL (ref 9.4–12.3)
POTASSIUM REFLEX MAGNESIUM: 4.2 MMOL/L (ref 3.5–5)
RBC # BLD: 3.65 M/UL (ref 4.2–5.4)
SODIUM BLD-SCNC: 139 MMOL/L (ref 136–145)
T4 FREE: 1.4 NG/DL (ref 0.9–1.7)
T4 TOTAL: 7.7 UG/DL (ref 4.5–11.7)
TROPONIN: <0.01 NG/ML (ref 0–0.03)
WBC # BLD: 5.4 K/UL (ref 4.8–10.8)

## 2019-04-19 PROCEDURE — 36415 COLL VENOUS BLD VENIPUNCTURE: CPT

## 2019-04-19 PROCEDURE — 6370000000 HC RX 637 (ALT 250 FOR IP): Performed by: INTERNAL MEDICINE

## 2019-04-19 PROCEDURE — 99233 SBSQ HOSP IP/OBS HIGH 50: CPT | Performed by: INTERNAL MEDICINE

## 2019-04-19 PROCEDURE — 84484 ASSAY OF TROPONIN QUANT: CPT

## 2019-04-19 PROCEDURE — 93306 TTE W/DOPPLER COMPLETE: CPT

## 2019-04-19 PROCEDURE — 80048 BASIC METABOLIC PNL TOTAL CA: CPT

## 2019-04-19 PROCEDURE — 74178 CT ABD&PLV WO CNTR FLWD CNTR: CPT

## 2019-04-19 PROCEDURE — 2580000003 HC RX 258: Performed by: INTERNAL MEDICINE

## 2019-04-19 PROCEDURE — 6360000002 HC RX W HCPCS: Performed by: INTERNAL MEDICINE

## 2019-04-19 PROCEDURE — 84439 ASSAY OF FREE THYROXINE: CPT

## 2019-04-19 PROCEDURE — 85025 COMPLETE CBC W/AUTO DIFF WBC: CPT

## 2019-04-19 PROCEDURE — 99221 1ST HOSP IP/OBS SF/LOW 40: CPT | Performed by: INTERNAL MEDICINE

## 2019-04-19 PROCEDURE — 6360000004 HC RX CONTRAST MEDICATION: Performed by: INTERNAL MEDICINE

## 2019-04-19 PROCEDURE — 2140000000 HC CCU INTERMEDIATE R&B

## 2019-04-19 PROCEDURE — 76770 US EXAM ABDO BACK WALL COMP: CPT

## 2019-04-19 RX ORDER — LOSARTAN POTASSIUM 50 MG/1
50 TABLET ORAL DAILY
Status: DISCONTINUED | OUTPATIENT
Start: 2019-04-19 | End: 2019-04-21 | Stop reason: HOSPADM

## 2019-04-19 RX ORDER — FUROSEMIDE 10 MG/ML
20 INJECTION INTRAMUSCULAR; INTRAVENOUS ONCE
Status: COMPLETED | OUTPATIENT
Start: 2019-04-19 | End: 2019-04-19

## 2019-04-19 RX ORDER — TIMOLOL MALEATE 5 MG/ML
1 SOLUTION/ DROPS OPHTHALMIC 2 TIMES DAILY
Status: DISCONTINUED | OUTPATIENT
Start: 2019-04-19 | End: 2019-04-21 | Stop reason: HOSPADM

## 2019-04-19 RX ORDER — DORZOLAMIDE HYDROCHLORIDE AND TIMOLOL MALEATE 20; 5 MG/ML; MG/ML
1 SOLUTION/ DROPS OPHTHALMIC 2 TIMES DAILY
Status: DISCONTINUED | OUTPATIENT
Start: 2019-04-19 | End: 2019-04-19 | Stop reason: CLARIF

## 2019-04-19 RX ORDER — FUROSEMIDE 10 MG/ML
20 INJECTION INTRAMUSCULAR; INTRAVENOUS 2 TIMES DAILY
Status: DISCONTINUED | OUTPATIENT
Start: 2019-04-19 | End: 2019-04-21 | Stop reason: HOSPADM

## 2019-04-19 RX ORDER — ESCITALOPRAM OXALATE 10 MG/1
10 TABLET ORAL NIGHTLY
Status: DISCONTINUED | OUTPATIENT
Start: 2019-04-19 | End: 2019-04-21 | Stop reason: HOSPADM

## 2019-04-19 RX ORDER — PANTOPRAZOLE SODIUM 40 MG/1
40 TABLET, DELAYED RELEASE ORAL
Status: DISCONTINUED | OUTPATIENT
Start: 2019-04-19 | End: 2019-04-21 | Stop reason: HOSPADM

## 2019-04-19 RX ORDER — DORZOLAMIDE HCL 20 MG/ML
1 SOLUTION/ DROPS OPHTHALMIC 2 TIMES DAILY
Status: DISCONTINUED | OUTPATIENT
Start: 2019-04-19 | End: 2019-04-21 | Stop reason: HOSPADM

## 2019-04-19 RX ORDER — ATENOLOL 50 MG/1
50 TABLET ORAL DAILY
Status: DISCONTINUED | OUTPATIENT
Start: 2019-04-19 | End: 2019-04-21 | Stop reason: HOSPADM

## 2019-04-19 RX ADMIN — DORZOLAMIDE HYDROCHLORIDE 1 DROP: 20 SOLUTION/ DROPS OPHTHALMIC at 20:15

## 2019-04-19 RX ADMIN — ATENOLOL 50 MG: 50 TABLET ORAL at 08:28

## 2019-04-19 RX ADMIN — PANTOPRAZOLE SODIUM 40 MG: 40 TABLET, DELAYED RELEASE ORAL at 05:08

## 2019-04-19 RX ADMIN — TIMOLOL MALEATE 1 DROP: 5 SOLUTION OPHTHALMIC at 20:15

## 2019-04-19 RX ADMIN — ESCITALOPRAM OXALATE 10 MG: 10 TABLET ORAL at 20:14

## 2019-04-19 RX ADMIN — Medication 10 ML: at 20:19

## 2019-04-19 RX ADMIN — LOSARTAN POTASSIUM 50 MG: 50 TABLET ORAL at 05:08

## 2019-04-19 RX ADMIN — IOPAMIDOL 90 ML: 755 INJECTION, SOLUTION INTRAVENOUS at 18:29

## 2019-04-19 RX ADMIN — DORZOLAMIDE HYDROCHLORIDE 1 DROP: 20 SOLUTION/ DROPS OPHTHALMIC at 08:28

## 2019-04-19 RX ADMIN — Medication 10 ML: at 08:28

## 2019-04-19 RX ADMIN — TIMOLOL MALEATE 1 DROP: 5 SOLUTION OPHTHALMIC at 08:28

## 2019-04-19 RX ADMIN — APIXABAN 5 MG: 5 TABLET, FILM COATED ORAL at 20:14

## 2019-04-19 RX ADMIN — FUROSEMIDE 20 MG: 10 INJECTION, SOLUTION INTRAMUSCULAR; INTRAVENOUS at 17:25

## 2019-04-19 RX ADMIN — FUROSEMIDE 20 MG: 10 INJECTION, SOLUTION INTRAVENOUS at 08:28

## 2019-04-19 ASSESSMENT — ENCOUNTER SYMPTOMS
CONSTIPATION: 0
EYE DISCHARGE: 0
ABDOMINAL DISTENTION: 0
WHEEZING: 0
BLOOD IN STOOL: 0
DIARRHEA: 0
BACK PAIN: 0
SHORTNESS OF BREATH: 1
COUGH: 0
VOMITING: 0

## 2019-04-19 ASSESSMENT — PAIN SCALES - GENERAL
PAINLEVEL_OUTOF10: 0

## 2019-04-19 NOTE — H&P
Hospital Medicine    History & Physical      CC: SOB    History Obtained From:  patient, electronic medical record    HISTORY OF PRESENT ILLNESS:    The patient is a 82 y.o. female who presents to er (poor historian) 2 weeks history of progressive sob, no cough, no cp, not relief by any maneuver, worsening on exercise. On arrival to er was found to have a-fib with rvr. After cardizem given hr was controlled. Past Medical History:        Diagnosis Date    Arthritis     GERD (gastroesophageal reflux disease)     Hyperlipidemia     Hypertension     Tremor     to right hand       Past Surgical History:        Procedure Laterality Date    CHOLECYSTECTOMY      HUMERUS FRACTURE SURGERY Right 7/9/2016    REVERSE TOTAL SHOULDER ARTHROPLASTY performed by Fred Saavedra MD at [address] [facility] OR       Medications Prior to Admission:    Medications Prior to Admission: losartan (COZAAR) 50 MG tablet, Take 1 tablet by mouth daily, if blood pressure starts to run low cut down to 1/2 tablet. omeprazole (PRILOSEC) 40 MG delayed release capsule, Take 1 capsule by mouth daily  escitalopram (LEXAPRO) 20 MG tablet, Take 1 tablet by mouth daily (Patient taking differently: Take 10 mg by mouth daily )  lovastatin (MEVACOR) 40 MG tablet, Take 1 tablet by mouth nightly  dorzolamide-timolol (COSOPT) 22.3-6.8 MG/ML ophthalmic solution, INSTILL 1 DROP INTO BOTH EYES TWICE A DAY AS DIRECTED  latanoprost (XALATAN) 0.005 % ophthalmic solution, INSTILL 1 DROP INTO BOTH EYES IN THE EVENING  atenolol (TENORMIN) 50 MG tablet, TAKE 2 TABLETS BY MOUTH EVERY DAY  tiZANidine (ZANAFLEX) 2 MG tablet, Take 1 tablet by mouth 3 times daily as needed (low back pain)  albuterol sulfate HFA (PROVENTIL HFA) 108 (90 Base) MCG/ACT inhaler, Inhale 2 puffs into the lungs every 6 hours as needed for Wheezing    Allergies:  Morphine    Social History:   TOBACCO:   reports that she has never smoked.  She has never used smokeless tobacco.  ETOH:   reports that she does not drink alcohol. Patient currently lives with family     Family History:   No family history on file. I have personally reviewed above histories  REVIEW OF SYSTEMS:  Review of Systems   Unable to perform ROS: Mental acuity     PHYSICAL EXAM:  BP (!) 159/98   Pulse 78   Temp 97.7 °F (36.5 °C) (Temporal)   Resp 18   Ht 5' 6\" (1.676 m)   Wt 166 lb 9.6 oz (75.6 kg)   SpO2 95%   BMI 26.89 kg/m²   Physical Exam   Constitutional: She appears well-developed and well-nourished. Non-toxic appearance. She appears ill. No distress. HENT:   Head: Normocephalic and atraumatic. Mouth/Throat: Oropharynx is clear and moist.   Eyes: Pupils are equal, round, and reactive to light. Conjunctivae, EOM and lids are normal.   Neck: Trachea normal, normal range of motion and full passive range of motion without pain. Neck supple. No JVD present. Carotid bruit is not present. No thyroid mass and no thyromegaly present. Cardiovascular: An irregular rhythm present. Exam reveals no S3. Pulses:       Carotid pulses are 2+ on the right side, and 2+ on the left side. Radial pulses are 2+ on the right side, and 2+ on the left side. Dorsalis pedis pulses are 2+ on the right side, and 2+ on the left side. Pulmonary/Chest: No respiratory distress. She has no decreased breath sounds. She has no wheezes. She has no rhonchi. She has no rales. Abdominal: Soft. Bowel sounds are normal. She exhibits no distension and no ascites. There is no hepatosplenomegaly. There is no tenderness. Musculoskeletal:        Right knee: No tenderness found. Left knee: No tenderness found. Right lower leg: She exhibits no swelling and no edema. Left lower leg: She exhibits no swelling and no edema. Neurological: She is alert. She has normal strength. No cranial nerve deficit or sensory deficit. GCS eye subscore is 4. GCS verbal subscore is 5. GCS motor subscore is 6. Skin: Skin is warm, dry and intact.  No bruising and no rash noted. Psychiatric: She has a normal mood and affect. Her behavior is normal.     DATA:  EKG:  I have reviewed EKG with the following interpretation:  Imaging:    CTA PULMONARY W CONTRAST   Final Result   1. No evidence of pulmonary embolus or other acute cardiopulmonary   process. 2. 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 Bibasilar atelectasis is present. 3. Moderate cardiomegaly. There is significant atrial enlargement with   elevated right heart pressures suspected with reflux of contrast into   the intrahepatic IVC and branch vessels. 4. Coronary calcifications in the LAD distribution. 5. Suspected hemorrhagic cyst upper pole left kidney. Follow-up with   ultrasound would be recommended. Signed by Dr Everton Arellano on 4/18/2019 9:04 PM      US RENAL COMPLETE    (Results Pending)              Labs Reviewed   CBC WITH AUTO DIFFERENTIAL - Abnormal; Notable for the following components:       Result Value    RBC 4.00 (*)     MCHC 31.3 (*)     Monocytes % 11.7 (*)     All other components within normal limits   COMPREHENSIVE METABOLIC PANEL W/ REFLEX TO MG FOR LOW K - Abnormal; Notable for the following components:    Glucose 125 (*)     All other components within normal limits   BRAIN NATRIURETIC PEPTIDE - Abnormal; Notable for the following components:    Pro-BNP 11,921 (*)     All other components within normal limits   D-DIMER, QUANTITATIVE - Abnormal; Notable for the following components:    D-Dimer, Quant 0.65 (*)     All other components within normal limits   TSH WITHOUT REFLEX - Abnormal; Notable for the following components:    TSH 7.180 (*)     All other components within normal limits   CBC WITH AUTO DIFFERENTIAL   BASIC METABOLIC PANEL W/ REFLEX TO MG FOR LOW K   POCT TROPONIN   POCT VENOUS   POCT TROPONIN   POCT VENOUS          IMPRESSION:    1. SOB possible chf vs Pulmonary htn  2.  Atrial Fibrillation; undetermined; undetermined  3. Hypertension  4. Abnormal CT  5. Possible dementia. PLAN:     1. Admit to hospitalist  2. Monitor cvs status, and respiratory status. 3. Check tsh  4. Echocardiogram  5. Review home medications. 6. Serial troponin  7. o2 as needed   8.  Monitor I/o      Rama Conte MD    Internal Medicine Hospitalist

## 2019-04-19 NOTE — PLAN OF CARE
Problem: Falls - Risk of:  Goal: Will remain free from falls  Description  Will remain free from falls  4/19/2019 1056 by Kenisha Peralta RN  Outcome: Ongoing  4/19/2019 0041 by Alex Wagner RN  Outcome: Ongoing  Goal: Absence of physical injury  Description  Absence of physical injury  4/19/2019 1056 by Kenisha Peralta RN  Outcome: Ongoing  4/19/2019 0041 by Alex Wagner RN  Outcome: Ongoing

## 2019-04-19 NOTE — TELEPHONE ENCOUNTER
I think patient is in the hospital right now but when she is discharged she'll probably go home with home health and I think adding  Nursing would be fine.

## 2019-04-19 NOTE — CARE COORDINATION
Patient is current with [facility] for  PT/ST Services. Had order for SN visit today for shortness of breath and fast HR. Will follow. Please advise when patient discharges. [address] [place] [address] [phone]. [phone].   Electronically signed by India Cancino RN on 4/19/19 at 9:11 AM

## 2019-04-19 NOTE — TELEPHONE ENCOUNTER
[facility] PT requesting to add nursing visit / assessment due to pt having increased SOB (at rest, with activity, and with lying down), variation in BP and HR, generally feeling lousy over the last week ? Or would you want pt to come in to see you ?

## 2019-04-19 NOTE — PROGRESS NOTES
[facility] Hospitalists Progress Note    Patient:  Kiana Clement  YOB: 1935  Date of Service: 4/19/2019  MRN: [id number]   Acct: [de-identified]   Primary Care Physician: SOLEDAD Magana  Advance Directive: Full Code  Admit Date: 4/18/2019       Hospital Day: 1      CHIEF COMPLAINT:     Chief Complaint   Patient presents with    Shortness of Breath     pt states she has seen her doctor 2x this week for same symptoms       4/19/2019 3:35 PM  Subjective / Interval History:   No acute overnight event reported. Patient laying comfortably in bed. Family at bedside. Shortness of breath improved. Denies any acute distress or acute complaints at this time. Cumulative Hospital History:    As per Initial admission HPI 4/18/2019, quoted below; \"The patient is a 82 y.o. female who presents to er (poor historian) 2 weeks history of progressive sob, no cough, no cp, not relief by any maneuver, worsening on exercise. On arrival to er was found to have a-fib with rvr. After cardizem given hr was controlled. \"      Review of Systems:   Review of Systems  ROS: 14 point review of systems is negative except as specifically addressed above. DIET CARDIAC;     Intake/Output Summary (Last 24 hours) at 4/19/2019 1535  Last data filed at 4/19/2019 1309  Gross per 24 hour   Intake 360 ml   Output 2650 ml   Net -2290 ml       Medications:  Current Facility-Administered Medications   Medication Dose Route Frequency Provider Last Rate Last Dose    escitalopram (LEXAPRO) tablet 10 mg  10 mg Oral Nightly Blair Jaramillo MD        pantoprazole (PROTONIX) tablet 40 mg  40 mg Oral QAM AC Blair Jaramillo MD   40 mg at 04/19/19 0508    dorzolamide (TRUSOPT) 2 % ophthalmic solution 1 drop  1 drop Both Eyes BID Elise Jaramillo MD   1 drop at 04/19/19 0828    And    timolol (TIMOPTIC) 0.5 % ophthalmic solution 1 drop  1 drop Both Eyes BID Elise Jaramillo MD   1 drop at 04/19/19 0828    losartan (COZAAR) tablet 50 mg  50 04/18/19  2121 04/19/19  0215 04/19/19  1125   TROPONINI 0.03 <0.01 <0.01     Pro-BNP: No results for input(s): BNP in the last 72 hours. INR: No results for input(s): INR in the last 72 hours. ABGs: No results found for: PHART, PO2ART, FDF2MQH  UA:No results for input(s): NITRITE, COLORU, PHUR, LABCAST, WBCUA, RBCUA, MUCUS, TRICHOMONAS, YEAST, BACTERIA, CLARITYU, SPECGRAV, LEUKOCYTESUR, UROBILINOGEN, BILIRUBINUR, BLOODU, GLUCOSEU, AMORPHOUS in the last 72 hours. Invalid input(s): Kelsie Hooks      RAD:   Xr Chest Standard (2 Vw)    Result Date: 4/15/2019  EXAMINATION: Chest 2 views 4/15/2019 HISTORY: Shortness of breath and fatigue. FINDINGS: Today's exam is compared to previous study of 11/2/2018. There is chronic elevation of the left hemidiaphragm suggesting the possibility of diaphragmatic paralysis. Left basilar atelectasis is present. There is marked cardiomegaly. The right lung is fully expanded and clear. . Chronic elevation of the left hemidiaphragm with left basilar atelectasis. Lungs are otherwise clear. Signed by Dr Dayne Hannah on 4/15/2019 8:53 PM    Us Renal Complete  Result Date: 4/19/2019  EXAMINATION:  US RENAL COMPLETE  4/19/2019 7:53 AM HISTORY: Possible hyperdense cyst in the lower pole left kidney on recent CT chest. COMPARISON: No comparison study. TECHNIQUE: Grayscale and color flow imaging were performed. FINDINGS: The right kidney measures 8.7 cm and the left kidney measures 10.4 cm. The cortical thickness and echogenicity are normal. There is no hydronephrosis. There is a 0.7 x 0.8 x 0.6 cm cyst in the mid pole region left kidney. There is an 8 mm hypoechoic area seen on image 24 that is likely a small cyst that accounts for the hyperdense lesion in the lower pole of the left kidney. This was dictated in the CT chest report as an upper pole lesion but is actually in the lower pole.  There is an echogenic area in the mid to lower pole left kidney measuring 1.1 cm that is somewhat in the 10-15 mmHg range   No pericardial effusion and aortic root dimensions are within normal   limits      Signature   ----------------------------------------------------------------   Electronically signed by Martha Dela Cruz MD(Interpreting physician)  Isabel Hearn 04/19/2019 12:46 PM   ----------------------------------------------------------------        Objective:   Vitals: /72   Pulse 79   Temp 98.7 °F (37.1 °C) (Temporal)   Resp 18   Ht 5' 6\" (1.676 m)   Wt 166 lb 9 oz (75.6 kg)   SpO2 90%   BMI 26.88 kg/m²   24HR INTAKE/OUTPUT:      Intake/Output Summary (Last 24 hours) at 4/19/2019 1535  Last data filed at 4/19/2019 1309  Gross per 24 hour   Intake 360 ml   Output 2650 ml   Net -2290 ml       Physical Exam  General appearance: alert and cooperative with exam  HEENT: atraumatic, eyes with clear conjunctiva and normal lids, pupils and irises normal, external ears and nose are normal, lips normal.  Neck without masses, lympadenopathy, bruit, thyroid normal  Lungs: Patient in no acute respiratory distress, no increased work of breathing, \"clear to auscultation bilaterally\" without rales, rhonchi or wheezes  Heart: regular rate and rhythm, S1, S2 normal, no murmur, click, rub or gallop  Abdomen: soft, non-tender; non-distended normal bowel sounds no masses, no organomegaly  Extremities: extremities normal, atraumatic, no cyanosis or edema  Neurologic: No focal neurologic deficits, normal sensation, alert and oriented, affect and mood appropriate. CN II-XII Intact  Skin: Skin color, texture, turgor normal. No rashes or lesions      Assessment/plan:         Principal Problem:    Atrial fibrillation with RVR ([facility])  Active Problems:    Shortness of breath    Hypertension    Hyperlipemia    Elevated brain natriuretic peptide (BNP) level    Elevated TSH    Hypothyroidism    Renal cyst  Resolved Problems:    * No resolved hospital problems.  *    A. fib RVR  Diltiazem 10 mg IV ×1 given on admission  Continue patient's 4.88  Free T4 level pending        Continue management of other chronic medical conditions - see above and orders.         Advance Directive: Full Code    DIET CARDIAC;     DVT prophylaxis: Enoxaparin    Consults Made:   IP CONSULT TO CARDIOLOGY    Discharge planning: tbd       Electronically signed by   Val Cortez MD, MPH,   Internal Medicine Hospitalist   4/19/2019 3:35 PM

## 2019-04-20 PROCEDURE — 93005 ELECTROCARDIOGRAM TRACING: CPT

## 2019-04-20 PROCEDURE — 2580000003 HC RX 258: Performed by: INTERNAL MEDICINE

## 2019-04-20 PROCEDURE — 6370000000 HC RX 637 (ALT 250 FOR IP): Performed by: INTERNAL MEDICINE

## 2019-04-20 PROCEDURE — 2140000000 HC CCU INTERMEDIATE R&B

## 2019-04-20 PROCEDURE — 2500000003 HC RX 250 WO HCPCS: Performed by: INTERNAL MEDICINE

## 2019-04-20 PROCEDURE — G0378 HOSPITAL OBSERVATION PER HR: HCPCS

## 2019-04-20 PROCEDURE — 99233 SBSQ HOSP IP/OBS HIGH 50: CPT | Performed by: INTERNAL MEDICINE

## 2019-04-20 PROCEDURE — 6360000002 HC RX W HCPCS: Performed by: INTERNAL MEDICINE

## 2019-04-20 RX ORDER — DILTIAZEM HYDROCHLORIDE 5 MG/ML
10 INJECTION INTRAVENOUS ONCE
Status: COMPLETED | OUTPATIENT
Start: 2019-04-20 | End: 2019-04-20

## 2019-04-20 RX ORDER — DILTIAZEM HYDROCHLORIDE 120 MG/1
120 CAPSULE, COATED, EXTENDED RELEASE ORAL 2 TIMES DAILY
Status: DISCONTINUED | OUTPATIENT
Start: 2019-04-20 | End: 2019-04-21 | Stop reason: HOSPADM

## 2019-04-20 RX ADMIN — ESCITALOPRAM OXALATE 10 MG: 10 TABLET ORAL at 20:14

## 2019-04-20 RX ADMIN — DILTIAZEM HYDROCHLORIDE 5 MG/HR: 5 INJECTION INTRAVENOUS at 01:46

## 2019-04-20 RX ADMIN — APIXABAN 5 MG: 5 TABLET, FILM COATED ORAL at 20:14

## 2019-04-20 RX ADMIN — DILTIAZEM HYDROCHLORIDE 120 MG: 120 CAPSULE, COATED, EXTENDED RELEASE ORAL at 20:14

## 2019-04-20 RX ADMIN — APIXABAN 5 MG: 5 TABLET, FILM COATED ORAL at 08:29

## 2019-04-20 RX ADMIN — FUROSEMIDE 20 MG: 10 INJECTION, SOLUTION INTRAMUSCULAR; INTRAVENOUS at 09:56

## 2019-04-20 RX ADMIN — ATENOLOL 50 MG: 50 TABLET ORAL at 08:29

## 2019-04-20 RX ADMIN — PANTOPRAZOLE SODIUM 40 MG: 40 TABLET, DELAYED RELEASE ORAL at 05:39

## 2019-04-20 RX ADMIN — Medication 10 ML: at 08:30

## 2019-04-20 RX ADMIN — DILTIAZEM HYDROCHLORIDE 120 MG: 120 CAPSULE, COATED, EXTENDED RELEASE ORAL at 10:51

## 2019-04-20 RX ADMIN — LOSARTAN POTASSIUM 50 MG: 50 TABLET ORAL at 08:29

## 2019-04-20 RX ADMIN — TIMOLOL MALEATE 1 DROP: 5 SOLUTION OPHTHALMIC at 08:29

## 2019-04-20 RX ADMIN — DILTIAZEM HYDROCHLORIDE 10 MG: 5 INJECTION INTRAVENOUS at 01:30

## 2019-04-20 RX ADMIN — FUROSEMIDE 20 MG: 10 INJECTION, SOLUTION INTRAMUSCULAR; INTRAVENOUS at 17:26

## 2019-04-20 RX ADMIN — DILTIAZEM HYDROCHLORIDE 5 MG/HR: 5 INJECTION INTRAVENOUS at 09:55

## 2019-04-20 RX ADMIN — DORZOLAMIDE HYDROCHLORIDE 1 DROP: 20 SOLUTION/ DROPS OPHTHALMIC at 08:29

## 2019-04-20 NOTE — PROGRESS NOTES
Pt went into afib rate 120s-140s around 0020. EKG completed. Vital signs stable. Dr. Kay Dawn notified. Cardizem bolus and gtt ordered. Bolus administered, HR continuing to stay in afib 120s. Cardizem gtt started.

## 2019-04-20 NOTE — PROGRESS NOTES
[facility] Hospitalists Progress Note    Patient:  Hawa Anderson  YOB: 1935  Date of Service: 4/20/2019  MRN: [id number]   Acct: [de-identified]   Primary Care Physician: SOLEDAD Murrieta  Advance Directive: Full Code  Admit Date: 4/18/2019       Hospital Day: 2      CHIEF COMPLAINT:     Chief Complaint   Patient presents with    Shortness of Breath     pt states she has seen her doctor 2x this week for same symptoms       4/20/2019 10:21 AM  Subjective / Interval History:   04/20/2019  Patient reportedly went into A. fib with a rate of 120s to 140s overnight. Diltiazem bolus given, cardiology contacted, diltiazem ggt started. Patient currently laying comfortably in bed, denies any acute complaints or distress at this time. Shortness of breath improved. Family at bedside. 04/19/2019  No acute overnight event reported. Patient laying comfortably in bed. Family at bedside. Shortness of breath improved. Denies any acute distress or acute complaints at this time. Cumulative Hospital History:    As per Initial admission HPI 4/18/2019, quoted below; \"The patient is a 82 y.o. female who presents to er (poor historian) 2 weeks history of progressive sob, no cough, no cp, not relief by any maneuver, worsening on exercise. On arrival to er was found to have a-fib with rvr. After cardizem given hr was controlled. \"      Review of Systems:   Review of Systems  ROS: 14 point review of systems is negative except as specifically addressed above. DIET CARDIAC;     Intake/Output Summary (Last 24 hours) at 4/20/2019 1021  Last data filed at 4/20/2019 0511  Gross per 24 hour   Intake 970 ml   Output 1900 ml   Net -930 ml       Medications:   diltiazem (CARDIZEM) 125 mg in dextrose 5% 125 mL infusion 5 mg/hr (04/20/19 0977)     Current Facility-Administered Medications   Medication Dose Route Frequency Provider Last Rate Last Dose    diltiazem 125 mg in dextrose 5 % 125 mL infusion  5 mg/hr Intravenous Continuous Jarocho Pena MD 5 mL/hr at 04/20/19 0955 5 mg/hr at 04/20/19 0955    diltiazem (CARDIZEM CD) extended release capsule 120 mg  120 mg Oral BID Jarocho Pena MD        escitalopram (LEXAPRO) tablet 10 mg  10 mg Oral Nightly Valerio Mahoney MD   10 mg at 04/19/19 2014    pantoprazole (PROTONIX) tablet 40 mg  40 mg Oral QAM AC Blair Jaramillo MD   40 mg at 04/20/19 0539    dorzolamide (TRUSOPT) 2 % ophthalmic solution 1 drop  1 drop Both Eyes BID Valerio Mahoney MD   1 drop at 04/20/19 0829    And    timolol (TIMOPTIC) 0.5 % ophthalmic solution 1 drop  1 drop Both Eyes BID Valerio Mahoney MD   1 drop at 04/20/19 0829    losartan (COZAAR) tablet 50 mg  50 mg Oral Daily Blair Jaramillo MD   50 mg at 04/20/19 0829    atenolol (TENORMIN) tablet 50 mg  50 mg Oral Daily Blair Jaramillo MD   50 mg at 04/20/19 0829    furosemide (LASIX) injection 20 mg  20 mg Intravenous BID Sunil Verduzco MD   20 mg at 04/20/19 0366    apixaban (ELIQUIS) tablet 5 mg  5 mg Oral BID Jarocho Pena MD   5 mg at 04/20/19 0829    sodium chloride flush 0.9 % injection 10 mL  10 mL Intravenous 2 times per day Valerio Mahoney MD   10 mL at 04/20/19 0830    sodium chloride flush 0.9 % injection 10 mL  10 mL Intravenous PRN Valerio Mahoney MD        ondansetron (ZOFRAN) injection 4 mg  4 mg Intravenous Q6H PRN Valerio Mahoney MD        acetaminophen (TYLENOL) tablet 650 mg  650 mg Oral Q8H PRN Blair Jaramillo MD             diltiazem (CARDIZEM) 125 mg in dextrose 5% 125 mL infusion 5 mg/hr (04/20/19 0955)      diltiazem  120 mg Oral BID    escitalopram  10 mg Oral Nightly    pantoprazole  40 mg Oral QAM AC    dorzolamide  1 drop Both Eyes BID    And    timolol  1 drop Both Eyes BID    losartan  50 mg Oral Daily    atenolol  50 mg Oral Daily    furosemide  20 mg Intravenous BID    apixaban  5 mg Oral BID    sodium chloride flush  10 mL Intravenous 2 times per day     sodium chloride flush, ondansetron, acetaminophen  DIET CARDIAC;       Labs:     Recent Labs     04/18/19 1900 04/19/19 0215   WBC 6.7 5.4   RBC 4.00* 3.65*   HGB 12.2 11.0*   HCT 39.0 35.2*   MCV 97.5 96.4   MCH 30.5 30.1   MCHC 31.3* 31.3*    158     Recent Labs     04/18/19 1900 04/19/19 0215    139   K 4.6 4.2   ANIONGAP 11 12   CL 98 101   CO2 29 26   BUN 14 12   CREATININE 0.7 0.6   GLUCOSE 125* 89   CALCIUM 9.6 9.2     No results for input(s): MG, PHOS in the last 72 hours. Recent Labs     04/18/19 1900   AST 31   ALT 29   BILITOT 0.5   ALKPHOS 101     HgBA1c:  No components found for: HGBA1C  FLP:    Lab Results   Component Value Date    TRIG 140 03/22/2019    HDL 58 03/22/2019    LDLCALC 94 03/22/2019     TSH:    Lab Results   Component Value Date    TSH 7.180 04/18/2019     Troponin T:   Recent Labs     04/19/19  0215 04/19/19  1125 04/19/19  1551   TROPONINI <0.01 <0.01 <0.01     Pro-BNP: No results for input(s): BNP in the last 72 hours. INR: No results for input(s): INR in the last 72 hours. ABGs: No results found for: PHART, PO2ART, CXU6NLJ  UA:No results for input(s): NITRITE, COLORU, PHUR, LABCAST, WBCUA, RBCUA, MUCUS, TRICHOMONAS, YEAST, BACTERIA, CLARITYU, SPECGRAV, LEUKOCYTESUR, UROBILINOGEN, BILIRUBINUR, BLOODU, GLUCOSEU, AMORPHOUS in the last 72 hours. Invalid input(s): Kin Sale      RAD:   Xr Chest Standard (2 Vw)    Result Date: 4/15/2019  EXAMINATION: Chest 2 views 4/15/2019 HISTORY: Shortness of breath and fatigue. FINDINGS: Today's exam is compared to previous study of 11/2/2018. There is chronic elevation of the left hemidiaphragm suggesting the possibility of diaphragmatic paralysis. Left basilar atelectasis is present. There is marked cardiomegaly. The right lung is fully expanded and clear. . Chronic elevation of the left hemidiaphragm with left basilar atelectasis. Lungs are otherwise clear.  Signed by Dr Donavan Bae on 4/15/2019 8:53 PM    Us Renal Complete  Result Date: Additionally, 3D MIP reconstructions in the coronal and sagittal planes were provided. FINDINGS:  Pulmonary arteries: There is adequate enhancement of the pulmonary arteries to evaluate for central and segmental pulmonary emboli. There are no filling defects within the main, lobar, segmental or visualized subsegmental pulmonary arteries. The pulmonary vessels are within normal limits. Aorta and great vessels: The aorta is well opacified and demonstrates no aneurysm, stenosis or dissection. There is tortuosity of the proximal great vessels. No evidence of focal steno-occlusive disease. . Coronary calcifications are noted in the LAD distribution. . Neck base: The imaged portion of the base of the neck appears unremarkable. Lungs: There is marked elevation of the left hemidiaphragm. Bibasilar atelectasis is present. . The trachea and bronchial tree are patent. Heart: There is marked cardiomegaly. There is significant right atrial enlargement. Reflux of contrast into the intrahepatic IVC and branch vessels suggest elevated right heart pressure. . There is no pericardial effusion. Lymph nodes: No pathologically enlarged mediastinal, hilar, or axillary lymph nodes are present. Bones and soft tissues: The patient is status post right reverse total shoulder arthroplasty. There is an accentuated thoracic kyphosis. No acute bony abnormality are present. Jesusita Harinder Upper abdomen: There is a 8 mm hyperdense lesion involving the upper pole of the left kidney likely representing a hemorrhagic cyst. The patient is status post cholecystectomy. .     1. No evidence of pulmonary embolus or other acute cardiopulmonary process. 2. 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 Bibasilar atelectasis is present. 3. Moderate cardiomegaly. There is significant atrial enlargement with elevated right heart pressures suspected with reflux of contrast into the intrahepatic IVC and branch vessels.    4. Coronary calcifications in the LAD distribution. 5. Suspected hemorrhagic cyst upper pole left kidney. Follow-up with ultrasound would be recommended.    Signed by Dr Justin Gilman on 4/18/2019 9:04 PM      Echo:   Summary   Normal left ventricular size with mildly reduced systolic function EF   26-26%   Mild concentric left ventricular hypertrophy with grade 1 diastolic   dysfunction   Mildly dilated left atrium   Mildly thickened tricuspid aortic valve with adequate cusp separation and   no significant stenosis or insufficiency   Mildly thickened but mobile mitral leaflets with mild-to-moderate   regurgitation   Poor visualization of a right-sided chambers   Moderate tricuspid regurgitation with moderate pulmonary hypertension RVSP   estimate 64 mmHg   IVC is mildly dilated consistent with elevated right atrial filling   pressures in the 10-15 mmHg range   No pericardial effusion and aortic root dimensions are within normal   limits      Signature   ----------------------------------------------------------------   Electronically signed by Sean Collins MD(Interpreting physician)  Bruce Ramirez 04/19/2019 12:46 PM   ----------------------------------------------------------------        Objective:   Vitals: BP (!) 122/91   Pulse 115   Temp 98 °F (36.7 °C) (Temporal)   Resp 16   Ht 5' 6\" (1.676 m)   Wt 158 lb 3.2 oz (71.8 kg)   SpO2 92%   BMI 25.53 kg/m²   24HR INTAKE/OUTPUT:      Intake/Output Summary (Last 24 hours) at 4/20/2019 1021  Last data filed at 4/20/2019 0511  Gross per 24 hour   Intake 970 ml   Output 1900 ml   Net -930 ml       Physical Exam  General appearance: alert and cooperative with exam  HEENT: atraumatic, eyes with clear conjunctiva and normal lids, pupils and irises normal, external ears and nose are normal, lips normal.  Neck without masses, lympadenopathy, bruit, thyroid normal  Lungs: Patient in no acute respiratory distress, no increased work of breathing, \"clear to auscultation bilaterally\" without rales, rhonchi or wheezes  Heart: regular rate and rhythm, S1, S2 normal, no murmur, click, rub or gallop  Abdomen: soft, non-tender; non-distended normal bowel sounds no masses, no organomegaly  Extremities: extremities normal, atraumatic, no cyanosis or edema  Neurologic: No focal neurologic deficits, normal sensation, alert and oriented, affect and mood appropriate. CN II-XII Intact  Skin: Skin color, texture, turgor normal. No rashes or lesions      Assessment/plan:         Principal Problem:    Atrial fibrillation (HCC)  Active Problems:    Shortness of breath    Hypertension    Hyperlipemia    Elevated brain natriuretic peptide (BNP) level    Elevated TSH    Hypothyroidism    Renal cyst  Resolved Problems:    * No resolved hospital problems. *    A. fib RVR  · Continue diltiazem ggt as needed  · Continue patient's home dose atenolol  · Cardiology following - appreciate recommendations  · Continue Apixaban 5 mg by mouth twice a day,         HFrEF  And HFpEF,  with acute exacerbation (Acute on chronic)  - 2D Echo:  Normal left ventricular size with mildly reduced systolic function EF   13-25% and  grade 1 diastolic dysfunction  - Presented with acute shortness of breath  - Elevated ProBNP level 11,921   - Troponin level 0.02 --> 0.03  - Furosemide 20 mg IV ×1 dose given in the ED  - Continue diureses;     --> Continue furosemide 20 mg IV twice a day  - Fluid goal of -1.5-2 L  - Consider rapid fluid redistribution with nitroglycerin, and BiPAP if worsening shortness of breath  - Strict I's and O  - Fluid restriction  - Continue optimized medical management. - Elevated d-dimer    CTA  1. No evidence of pulmonary embolus or other acute cardiopulmonary process. 2. 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 Bibasilar atelectasis is present. 3. Moderate cardiomegaly.  There is significant atrial enlargement with elevated right heart pressures suspected with reflux of contrast into the intrahepatic IVC and branch vessels. 4. Coronary calcifications in the LAD distribution. 5. Suspected hemorrhagic cyst upper pole left kidney. Follow-up with ultrasound would be recommended. 1. There is an 8 mm hypoechoic area in the lower pole left kidney on image 24 that is probably a small cyst and is in the approximate location of the hyperdense lesion seen on CT. This was dictated on the CT as being in the upper pole but it is actually in the lower pole on CT. 2. There is another simple appearing cyst in the left mid kidney. 3. There is an echogenic irregular area in the mid to lower pole left kidney that is indeterminate. This area would likely be better evaluated with renal mass protocol CT or MRI. 4. The right kidney is sonographically normal.       Subsequent Follow-up CT abdomen pelvis, with and without contrast renal mass protocol (04/19/2019)  Impression:  1. Bilateral renal cysts. No solid enhancing renal mass identified. 2. Atherosclerotic disease. 3. Elevation of the left hemidiaphragm. 4. Hypodense lesions in the pancreatic tail for which nonemergent MRI  of the abdomen with and without contrast including MRCP is recommended. 5. Possible small right ovarian cyst for which nonemergent follow-up  pelvic ultrasound is recommended. Hypothyroidism, likely subclinical  · Elevated TSH: 04/18/2019 --> 7.18  · Of note: Mildly elevated TSH level on 04/15/19 - 4.88  · Total and Free T4 level within reference range        Continue management of other chronic medical conditions - see above and orders.         Advance Directive: Full Code    DIET CARDIAC;     DVT prophylaxis: Enoxaparin    Consults Made:   IP CONSULT TO CARDIOLOGY    Discharge planning: tbd       Electronically signed by   Awa Kim MD, MPH,   Internal Medicine Hospitalist   4/20/2019 10:21 AM

## 2019-04-20 NOTE — FLOWSHEET NOTE
04/20/19 1705   Encounter Summary   Services provided to: Patient and family together   Referral/Consult From: [facility] Family members   Complexity of Encounter Low   Length of Encounter 15 minutes   Spiritual/Jainism   Type Spiritual support   Assessment Approachable   Intervention Active listening;Explored coping resources; Discussed belief system/Christian practices/aaliyah   Outcome Expressed gratitude;Engaged in conversation;Receptive

## 2019-04-20 NOTE — CONSULTS
[facility]  Cardiology Consult      Requesting MD:  Luz Marina Tubbs MD   Admit Status:  Inpatient [101]       History obtained from:   ? Patient  ? Other (specify):     PROBLEM LIST:    Patient Active Problem List    Diagnosis Date Noted    Elevated brain natriuretic peptide (BNP) level 04/19/2019     Priority: Low    Elevated TSH 04/19/2019     Priority: Low    Hypothyroidism 04/19/2019     Priority: Low    Renal cyst 04/19/2019     Priority: Low    Atrial fibrillation with RVR ([address] 04/18/2019     Priority: Low    Shortness of breath 08/24/2018     Priority: Low    Anxiety and depression 08/24/2018     Priority: Low    Glaucoma 07/10/2016     Priority: Low    Hypertension 07/09/2016     Priority: Low    Hyperlipemia 07/09/2016     Priority: Low    GERD (gastroesophageal reflux disease) 07/09/2016     Priority: Low    2-part displaced fracture of surgical neck of right humerus, initial encounter for closed fracture 07/09/2016     Priority: Low    Nausea 07/09/2016     Priority: Low    2-part displaced fracture of surgical neck of left humerus, initial encounter for closed fracture 07/08/2016     Priority: Low     1. New onset atrial fibrillation, currently in sinus rhythm. Ejection fraction 45-50% mildly reduced (was normal in 2017), moderate to severe pulmonary hypertension, mild left atrial enlargement. 2. Possible early dementia with memory loss. PRESENTATION: Yaa Little is a 82y.o. year old female who presents with 2 weeks of increasing shortness of breath. She was noted in the emergency room to be in atrial fibrillation with rapid ventricular response. Her rate was well controlled with Cardizem single dose. Her troponins have been negative. Her BNP was elevated at 11,900. She was diuresed with improvement in symptoms. REVIEW OF SYSTEMS:  Review of Systems   Constitutional: Positive for activity change. Negative for fatigue and fever.    HENT: Negative for ear pain, hearing loss and tinnitus. Eyes: Negative for discharge and visual disturbance. Respiratory: Positive for shortness of breath. Negative for cough and wheezing. Cardiovascular: Negative for chest pain, palpitations and leg swelling. Gastrointestinal: Negative for abdominal distention, blood in stool, constipation, diarrhea and vomiting. Endocrine: Negative for cold intolerance, heat intolerance, polydipsia and polyuria. Genitourinary: Negative for dysuria and hematuria. Musculoskeletal: Negative for arthralgias, back pain and myalgias. Skin: Negative for pallor and rash. Neurological: Negative for seizures, syncope, weakness and headaches. Psychiatric/Behavioral: Negative for behavioral problems and dysphoric mood. Past Medical History:      Diagnosis Date    Arthritis     GERD (gastroesophageal reflux disease)     Hyperlipidemia     Hypertension     Tremor     to right hand       Past Surgical History:      Procedure Laterality Date    CHOLECYSTECTOMY      HUMERUS FRACTURE SURGERY Right 7/9/2016    REVERSE TOTAL SHOULDER ARTHROPLASTY performed by Walker Stephenson MD at [address] [place] [address] OR       Allergies:  Morphine    Past Social History:  Social History     Socioeconomic History    Marital status:       Spouse name: Not on file    Number of children: Not on file    Years of education: Not on file    Highest education level: Not on file   Occupational History    Not on file   Social Needs    Financial resource strain: Not on file    Food insecurity:     Worry: Not on file     Inability: Not on file    Transportation needs:     Medical: Not on file     Non-medical: Not on file   Tobacco Use    Smoking status: Never Smoker    Smokeless tobacco: Never Used   Substance and Sexual Activity    Alcohol use: No    Drug use: No    Sexual activity: Not on file   Lifestyle    Physical activity:     Days per week: Not on file     Minutes per session: Not on file  Stress: Not on file   Relationships    Social connections:     Talks on phone: Not on file     Gets together: Not on file     Attends Judaism service: Not on file     Active member of club or organization: Not on file     Attends meetings of clubs or organizations: Not on file     Relationship status: Not on file    Intimate partner violence:     Fear of current or ex partner: Not on file     Emotionally abused: Not on file     Physically abused: Not on file     Forced sexual activity: Not on file   Other Topics Concern    Not on file   Social History Narrative    Not on file       Family History:   No family history on file. Home Meds:  Prior to Admission medications    Medication Sig Start Date End Date Taking? Authorizing Provider   losartan (COZAAR) 50 MG tablet Take 1 tablet by mouth daily, if blood pressure starts to run low cut down to 1/2 tablet.  12/7/18  Yes SOLEDAD Billingsley   omeprazole (PRILOSEC) 40 MG delayed release capsule Take 1 capsule by mouth daily 12/7/18  Yes SOLEDAD Billingsley   escitalopram (LEXAPRO) 20 MG tablet Take 1 tablet by mouth daily  Patient taking differently: Take 10 mg by mouth daily  12/7/18  Yes SOLEDAD Billingsley   lovastatin (MEVACOR) 40 MG tablet Take 1 tablet by mouth nightly 8/24/18  Yes SOLEDAD Billingsley   dorzolamide-timolol (COSOPT) 22.3-6.8 MG/ML ophthalmic solution INSTILL 1 DROP INTO BOTH EYES TWICE A DAY AS DIRECTED 4/11/18  Yes Historical Provider, MD   latanoprost (XALATAN) 0.005 % ophthalmic solution INSTILL 1 DROP INTO BOTH EYES IN THE EVENING 4/20/18  Yes Historical Provider, MD   atenolol (TENORMIN) 50 MG tablet TAKE 2 TABLETS BY MOUTH EVERY DAY 5/18/18  Yes SOLEDAD Billingsley   tiZANidine (ZANAFLEX) 2 MG tablet Take 1 tablet by mouth 3 times daily as needed (low back pain) 3/8/19   NURIS Alvarez   albuterol sulfate HFA (PROVENTIL HFA) 108 (90 Base) MCG/ACT inhaler Inhale 2 puffs into the lungs every 6 hours as needed for Wheezing basilar atelectasis is present. There is marked cardiomegaly. The right lung is fully expanded and clear. . Chronic elevation of the left hemidiaphragm with left basilar atelectasis. Lungs are otherwise clear. Signed by Dr Vilma Adair on 4/15/2019 8:53 PM    Us Renal Complete    Result Date: 4/19/2019  EXAMINATION:  US RENAL COMPLETE  4/19/2019 7:53 AM HISTORY: Possible hyperdense cyst in the lower pole left kidney on recent CT chest. COMPARISON: No comparison study. TECHNIQUE: Grayscale and color flow imaging were performed. FINDINGS: The right kidney measures 8.7 cm and the left kidney measures 10.4 cm. The cortical thickness and echogenicity are normal. There is no hydronephrosis. There is a 0.7 x 0.8 x 0.6 cm cyst in the mid pole region left kidney. There is an 8 mm hypoechoic area seen on image 24 that is likely a small cyst that accounts for the hyperdense lesion in the lower pole of the left kidney. This was dictated in the CT chest report as an upper pole lesion but is actually in the lower pole. There is an echogenic area in the mid to lower pole left kidney measuring 1.1 cm that is somewhat linear and does not have the appearance of a typical mass. The urinary bladder is not very well distended. 1. There is an 8 mm hypoechoic area in the lower pole left kidney on image 24 that is probably a small cyst and is in the approximate location of the hyperdense lesion seen on CT. This was dictated on the CT as being in the upper pole but it is actually in the lower pole on CT. 2. There is another simple appearing cyst in the left mid kidney. 3. There is an echogenic irregular area in the mid to lower pole left kidney that is indeterminate. This area would likely be better evaluated with renal mass protocol CT or MRI.  4. The right kidney is sonographically normal. Signed by Dr Margarette Jenkins on 4/19/2019 7:58 AM    Cta Pulmonary W Contrast    Result Date: 4/18/2019  CTA PULMONARY W CONTRAST 4/18/2019 6:45 PM HISTORY: Shortness of breath COMPARISON: None. DLP: 672 mGy cm. Automated exposure control was utilized to diminish patient radiation dose. TECHNIQUE: Helical tomographic images of the chest were obtained after the administration of intravenous contrast following angiogram protocol. Additionally, 3D MIP reconstructions in the coronal and sagittal planes were provided. FINDINGS:  Pulmonary arteries: There is adequate enhancement of the pulmonary arteries to evaluate for central and segmental pulmonary emboli. There are no filling defects within the main, lobar, segmental or visualized subsegmental pulmonary arteries. The pulmonary vessels are within normal limits. Aorta and great vessels: The aorta is well opacified and demonstrates no aneurysm, stenosis or dissection. There is tortuosity of the proximal great vessels. No evidence of focal steno-occlusive disease. . Coronary calcifications are noted in the LAD distribution. . Neck base: The imaged portion of the base of the neck appears unremarkable. Lungs: There is marked elevation of the left hemidiaphragm. Bibasilar atelectasis is present. . The trachea and bronchial tree are patent. Heart: There is marked cardiomegaly. There is significant right atrial enlargement. Reflux of contrast into the intrahepatic IVC and branch vessels suggest elevated right heart pressure. . There is no pericardial effusion. Lymph nodes: No pathologically enlarged mediastinal, hilar, or axillary lymph nodes are present. Bones and soft tissues: The patient is status post right reverse total shoulder arthroplasty. There is an accentuated thoracic kyphosis. No acute bony abnormality are present. Su Challenger Upper abdomen: There is a 8 mm hyperdense lesion involving the upper pole of the left kidney likely representing a hemorrhagic cyst. The patient is status post cholecystectomy. .     1. No evidence of pulmonary embolus or other acute cardiopulmonary process.  2. 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 Bibasilar atelectasis is present. 3. Moderate cardiomegaly. There is significant atrial enlargement with elevated right heart pressures suspected with reflux of contrast into the intrahepatic IVC and branch vessels. 4. Coronary calcifications in the LAD distribution. 5. Suspected hemorrhagic cyst upper pole left kidney. Follow-up with ultrasound would be recommended. Signed by Dr Dalia Brasher on 4/18/2019 9:04 PM          Assessment and Plan: This is a 82y.o. year old female with past medical history of possible early dementia with memory deficits presenting with shortness of breath and found to be in atrial fibrillation with rapid ventricular response, symptoms of congestive heart failure with elevated DNP and mildly reduced ejection fraction 45-50% (previously normal), moderate to severe pulmonary hypertension. 1. She has reverted back to sinus rhythm. There is no evidence of injury based on troponins. At this time she is on atenolol 50 mg and Cozaar 50 mg. Would initiate anticoagulation with Eliquis 5 mg by mouth twice a day. Can reevaluate echo in 3 months. 2. Have had extensive discussion with patient and family.     Electronically signed by Leonardo Tiwari MD on 4/19/2019 at 9:01 PM

## 2019-04-21 VITALS
HEART RATE: 70 BPM | RESPIRATION RATE: 12 BRPM | BODY MASS INDEX: 26.69 KG/M2 | SYSTOLIC BLOOD PRESSURE: 97 MMHG | TEMPERATURE: 98 F | OXYGEN SATURATION: 96 % | HEIGHT: 66 IN | DIASTOLIC BLOOD PRESSURE: 62 MMHG | WEIGHT: 166.1 LBS

## 2019-04-21 LAB
ANION GAP SERPL CALCULATED.3IONS-SCNC: 12 MMOL/L (ref 7–19)
BASOPHILS ABSOLUTE: 0.1 K/UL (ref 0–0.2)
BASOPHILS RELATIVE PERCENT: 1 % (ref 0–1)
BUN BLDV-MCNC: 20 MG/DL (ref 8–23)
CALCIUM SERPL-MCNC: 9.3 MG/DL (ref 8.8–10.2)
CHLORIDE BLD-SCNC: 99 MMOL/L (ref 98–111)
CO2: 26 MMOL/L (ref 22–29)
CREAT SERPL-MCNC: 0.9 MG/DL (ref 0.5–0.9)
EKG P AXIS: 46 DEGREES
EKG P AXIS: NORMAL DEGREES
EKG P-R INTERVAL: 210 MS
EKG P-R INTERVAL: NORMAL MS
EKG Q-T INTERVAL: 334 MS
EKG Q-T INTERVAL: 446 MS
EKG QRS DURATION: 70 MS
EKG QRS DURATION: 72 MS
EKG QTC CALCULATION (BAZETT): 437 MS
EKG QTC CALCULATION (BAZETT): 470 MS
EKG T AXIS: 47 DEGREES
EKG T AXIS: 66 DEGREES
EOSINOPHILS ABSOLUTE: 0.1 K/UL (ref 0–0.6)
EOSINOPHILS RELATIVE PERCENT: 1.3 % (ref 0–5)
GFR NON-AFRICAN AMERICAN: >60
GLUCOSE BLD-MCNC: 152 MG/DL (ref 74–109)
HCT VFR BLD CALC: 40.1 % (ref 37–47)
HEMOGLOBIN: 12.7 G/DL (ref 12–16)
LYMPHOCYTES ABSOLUTE: 1.5 K/UL (ref 1.1–4.5)
LYMPHOCYTES RELATIVE PERCENT: 24.9 % (ref 20–40)
MAGNESIUM: 1.9 MG/DL (ref 1.6–2.4)
MCH RBC QN AUTO: 29.8 PG (ref 27–31)
MCHC RBC AUTO-ENTMCNC: 31.7 G/DL (ref 33–37)
MCV RBC AUTO: 94.1 FL (ref 81–99)
MONOCYTES ABSOLUTE: 0.6 K/UL (ref 0–0.9)
MONOCYTES RELATIVE PERCENT: 9.7 % (ref 0–10)
NEUTROPHILS ABSOLUTE: 3.9 K/UL (ref 1.5–7.5)
NEUTROPHILS RELATIVE PERCENT: 62.9 % (ref 50–65)
PDW BLD-RTO: 13.9 % (ref 11.5–14.5)
PHOSPHORUS: 3.5 MG/DL (ref 2.5–4.5)
PLATELET # BLD: 256 K/UL (ref 130–400)
PMV BLD AUTO: 10.9 FL (ref 9.4–12.3)
POTASSIUM SERPL-SCNC: 3.6 MMOL/L (ref 3.5–5)
RBC # BLD: 4.26 M/UL (ref 4.2–5.4)
SODIUM BLD-SCNC: 137 MMOL/L (ref 136–145)
WBC # BLD: 6.1 K/UL (ref 4.8–10.8)

## 2019-04-21 PROCEDURE — 84100 ASSAY OF PHOSPHORUS: CPT

## 2019-04-21 PROCEDURE — G0378 HOSPITAL OBSERVATION PER HR: HCPCS

## 2019-04-21 PROCEDURE — 6360000002 HC RX W HCPCS: Performed by: INTERNAL MEDICINE

## 2019-04-21 PROCEDURE — 2580000003 HC RX 258: Performed by: INTERNAL MEDICINE

## 2019-04-21 PROCEDURE — 6370000000 HC RX 637 (ALT 250 FOR IP): Performed by: INTERNAL MEDICINE

## 2019-04-21 PROCEDURE — 99232 SBSQ HOSP IP/OBS MODERATE 35: CPT | Performed by: INTERNAL MEDICINE

## 2019-04-21 PROCEDURE — 85025 COMPLETE CBC W/AUTO DIFF WBC: CPT

## 2019-04-21 PROCEDURE — 83735 ASSAY OF MAGNESIUM: CPT

## 2019-04-21 PROCEDURE — 36415 COLL VENOUS BLD VENIPUNCTURE: CPT

## 2019-04-21 PROCEDURE — 99239 HOSP IP/OBS DSCHRG MGMT >30: CPT | Performed by: INTERNAL MEDICINE

## 2019-04-21 PROCEDURE — 80048 BASIC METABOLIC PNL TOTAL CA: CPT

## 2019-04-21 RX ORDER — DILTIAZEM HYDROCHLORIDE 120 MG/1
120 CAPSULE, COATED, EXTENDED RELEASE ORAL 2 TIMES DAILY
Qty: 30 CAPSULE | Refills: 3 | Status: SHIPPED | OUTPATIENT
Start: 2019-04-21 | End: 2019-04-24

## 2019-04-21 RX ORDER — AMOXICILLIN AND CLAVULANATE POTASSIUM 875; 125 MG/1; MG/1
1 TABLET, FILM COATED ORAL EVERY 12 HOURS SCHEDULED
Qty: 10 TABLET | Refills: 0 | Status: SHIPPED | OUTPATIENT
Start: 2019-04-21 | End: 2019-04-26

## 2019-04-21 RX ORDER — AMOXICILLIN AND CLAVULANATE POTASSIUM 875; 125 MG/1; MG/1
1 TABLET, FILM COATED ORAL EVERY 12 HOURS SCHEDULED
Status: DISCONTINUED | OUTPATIENT
Start: 2019-04-21 | End: 2019-04-21 | Stop reason: HOSPADM

## 2019-04-21 RX ORDER — AMOXICILLIN AND CLAVULANATE POTASSIUM 875; 125 MG/1; MG/1
1 TABLET, FILM COATED ORAL EVERY 12 HOURS SCHEDULED
Status: DISCONTINUED | OUTPATIENT
Start: 2019-04-21 | End: 2019-04-21

## 2019-04-21 RX ADMIN — DILTIAZEM HYDROCHLORIDE 120 MG: 120 CAPSULE, COATED, EXTENDED RELEASE ORAL at 08:45

## 2019-04-21 RX ADMIN — DORZOLAMIDE HYDROCHLORIDE 1 DROP: 20 SOLUTION/ DROPS OPHTHALMIC at 08:45

## 2019-04-21 RX ADMIN — LOSARTAN POTASSIUM 50 MG: 50 TABLET ORAL at 08:45

## 2019-04-21 RX ADMIN — AMOXICILLIN AND CLAVULANATE POTASSIUM 1 TABLET: 875; 125 TABLET, FILM COATED ORAL at 13:20

## 2019-04-21 RX ADMIN — Medication 10 ML: at 08:45

## 2019-04-21 RX ADMIN — APIXABAN 5 MG: 5 TABLET, FILM COATED ORAL at 08:45

## 2019-04-21 RX ADMIN — PANTOPRAZOLE SODIUM 40 MG: 40 TABLET, DELAYED RELEASE ORAL at 06:05

## 2019-04-21 RX ADMIN — TIMOLOL MALEATE 1 DROP: 5 SOLUTION OPHTHALMIC at 08:45

## 2019-04-21 RX ADMIN — ATENOLOL 50 MG: 50 TABLET ORAL at 08:45

## 2019-04-21 RX ADMIN — FUROSEMIDE 20 MG: 10 INJECTION, SOLUTION INTRAMUSCULAR; INTRAVENOUS at 08:44

## 2019-04-21 NOTE — PROGRESS NOTES
Cardiology Progress Note Jose Miles MD      Patient:  Lashon Tobias  [id number]    Patient Active Problem List    Diagnosis Date Noted    Elevated brain natriuretic peptide (BNP) level 04/19/2019     Priority: Low    Elevated TSH 04/19/2019     Priority: Low    Hypothyroidism 04/19/2019     Priority: Low    Renal cyst 04/19/2019     Priority: Low    Atrial fibrillation ([facility] [address] 04/18/2019     Priority: Low    Shortness of breath 08/24/2018     Priority: Low    Anxiety and depression 08/24/2018     Priority: Low    Glaucoma 07/10/2016     Priority: Low    Hypertension 07/09/2016     Priority: Low    Hyperlipemia 07/09/2016     Priority: Low    GERD (gastroesophageal reflux disease) 07/09/2016     Priority: Low    2-part displaced fracture of surgical neck of right humerus, initial encounter for closed fracture 07/09/2016     Priority: Low    Nausea 07/09/2016     Priority: Low    2-part displaced fracture of surgical neck of left humerus, initial encounter for closed fracture 07/08/2016     Priority: Low       Admit Date:  4/18/2019    Admission Problem List: Present on Admission:   Atrial fibrillation (HCC)   Hypertension   Hyperlipemia   Shortness of breath   Elevated brain natriuretic peptide (BNP) level   Elevated TSH   Hypothyroidism   Renal cyst      Cardiac Specific Data:  Specialty Problems        Cardiology Problems    Hypertension        * (Principal) Atrial fibrillation ([facility] [address]              1. New onset atrial fibrillation, currently in sinus rhythm. Ejection fraction 45-50% mildly reduced (was normal in 2017), moderate to severe pulmonary hypertension, mild left atrial enlargement. 2. Possible early dementia with memory loss. Subjective:  Ms. Cecilia Rhodes reports no chest pain or shortness of breath. She is back in sinus rhythm.     Objective:   /72   Pulse 83   Temp 97.5 °F (36.4 °C) (Temporal)   Resp 18   Ht 5' 6\" (1.676 m)   Wt 166 lb 1.6 oz (75.3 kg)   SpO2 91%   BMI 26.81 kg/m²       Intake/Output Summary (Last 24 hours) at 4/21/2019 1059  Last data filed at 4/21/2019 0446  Gross per 24 hour   Intake 576.9 ml   Output 900 ml   Net -323.1 ml       Prior to Admission medications    Medication Sig Start Date End Date Taking? Authorizing Provider   losartan (COZAAR) 50 MG tablet Take 1 tablet by mouth daily, if blood pressure starts to run low cut down to 1/2 tablet.  12/7/18  Yes SOLEDAD Waters   omeprazole (PRILOSEC) 40 MG delayed release capsule Take 1 capsule by mouth daily 12/7/18  Yes SOLEDAD Waters   escitalopram (LEXAPRO) 20 MG tablet Take 1 tablet by mouth daily  Patient taking differently: Take 10 mg by mouth daily  12/7/18  Yes SOLEDAD Waters   lovastatin (MEVACOR) 40 MG tablet Take 1 tablet by mouth nightly 8/24/18  Yes SOLEDAD Waters   dorzolamide-timolol (COSOPT) 22.3-6.8 MG/ML ophthalmic solution INSTILL 1 DROP INTO BOTH EYES TWICE A DAY AS DIRECTED 4/11/18  Yes Historical Provider, MD   latanoprost (XALATAN) 0.005 % ophthalmic solution INSTILL 1 DROP INTO BOTH EYES IN THE EVENING 4/20/18  Yes Historical Provider, MD   atenolol (TENORMIN) 50 MG tablet TAKE 2 TABLETS BY MOUTH EVERY DAY 5/18/18  Yes SOLEDAD Waters   tiZANidine (ZANAFLEX) 2 MG tablet Take 1 tablet by mouth 3 times daily as needed (low back pain) 3/8/19   NURIS Hernandez   albuterol sulfate HFA (PROVENTIL HFA) 108 (90 Base) MCG/ACT inhaler Inhale 2 puffs into the lungs every 6 hours as needed for Wheezing 11/2/18   NURIS Hernandez        diltiazem  120 mg Oral BID    escitalopram  10 mg Oral Nightly    pantoprazole  40 mg Oral QAM AC    dorzolamide  1 drop Both Eyes BID    And    timolol  1 drop Both Eyes BID    losartan  50 mg Oral Daily    atenolol  50 mg Oral Daily    furosemide  20 mg Intravenous BID    apixaban  5 mg Oral BID    sodium chloride flush  10 mL Intravenous 2 times per day       TELEMETRY: Sinus and Atrial fibrillation     Physical Exam:      Physical Exam      General:  Awake, alert, NAD  Skin:  Warm and dry  Neck:  no jvd , no carotid bruits  Chest:  Clear to auscultation, no wheezing or rales  Cardiovascular:  RRR W2M2 no murmurs, clicks, gallups, or rubs  Abdomen:  Soft nontender, nondistended, bowel sounds present  Extremities:  Edema:   Neuro: Intact neurovascular function   Right-handed IV site indurated red at prior IV insertion site consistent with cellulitis    Lab Data:  CBC:   Recent Labs     04/18/19 1900 04/19/19 0215   WBC 6.7 5.4   HGB 12.2 11.0*   HCT 39.0 35.2*   MCV 97.5 96.4    158     BMP:   Recent Labs     04/18/19 1900 04/19/19 0215    139   K 4.6 4.2   CL 98 101   CO2 29 26   BUN 14 12   CREATININE 0.7 0.6     LIVER PROFILE:   Recent Labs     04/18/19 1900   AST 31   ALT 29   BILITOT 0.5   ALKPHOS 101     PT/INR: No results for input(s): PROTIME, INR in the last 72 hours. APTT: No results for input(s): APTT in the last 72 hours. BNP:  No results for input(s): BNP in the last 72 hours. CK, CKMB, Troponin: @LABRCNT (CKTOTAL:3, CKMB:3, TROPONINI:3)@    IMAGING:  Ct Abdomen Pelvis W Wo Contrast Additional Contrast? Radiologist Recommendation    Result Date: 4/19/2019  EXAMINATION: CT ABDOMEN PELVIS W WO CONTRAST 4/19/2019 8:57 PM HISTORY: Renal mass Comparison: Renal ultrasound 4/19/2019 Technique: Sequential imaging was performed from the lung bases through the pubic symphysis after the administration of IV contrast. Sagittal and coronal reformations were made from the original source data and reviewed. Automated exposure control was utilized for radiation dose reduction. Radiation dose: DLP 4140 mGy-cm Findings: The visualized heart appears normal in size. Coronary artery calcifications are evident. There is elevation of the left hemidiaphragm. The visualized lung bases appear clear. Precontrast images of the kidneys demonstrate hyperdense lesion extending from the lower pole the left kidney.  Multiple additional hypodense lesions are noted. On the postcontrast images, a cyst is seen in the mid left kidney and likely anterior to this is a second cyst. A cyst is also noted in the inferior left kidney. A small cyst is seen in the inferior right kidney. Additional tiny hypodensities are seen on the right which are too small to characterize. No solid enhancing renal mass is identified. The ureters appear decompressed bilaterally. The liver, spleen, and adrenal glands are unremarkable. The gallbladder is surgically absent. Tiny hypodensities are seen in the pancreatic tail measuring 5-6 mm in size. The pancreas is otherwise unremarkable. The main portal and splenic veins and IVC appear grossly normal. The abdominal aorta appears normal in caliber. There are scattered atherosclerotic calcifications of the aorta and its branch vessels. The origins of the celiac axis, superior mesenteric artery, and inferior mesenteric artery enhance normally. No pathologically enlarged central mesenteric or retroperitoneal lymph nodes are appreciated. The stomach is distended with food and appears grossly normal. The small bowel appears normal in caliber. There are scattered colonic diverticula without evidence of acute diverticulitis. The appendix is not identified with certainty, but there is no right lower quadrant inflammatory stranding to suggest acute appendicitis. The urinary bladder is grossly normal in appearance. The uterus and ovaries are visualized. There is a questionable small cyst in the right ovary. No free fluid is seen in the pelvis. No pelvic or inguinal lymphadenopathy is identified. Review of the visualized osseous structures demonstrates no acute or aggressive lesions. Degenerative spurring is noted in the spine. Impression: 1. Bilateral renal cysts. No solid enhancing renal mass identified. 2. Atherosclerotic disease. 3. Elevation of the left hemidiaphragm.  4. Hypodense lesions in the pancreatic tail for which nonemergent MRI of the abdomen with and without contrast including MRCP is recommended. 5. Possible small right ovarian cyst for which nonemergent follow-up pelvic ultrasound is recommended. Signed by Dr Sofya Umaña on 4/19/2019 9:04 PM    Xr Chest Standard (2 Vw)    Result Date: 4/15/2019  EXAMINATION: Chest 2 views 4/15/2019 HISTORY: Shortness of breath and fatigue. FINDINGS: Today's exam is compared to previous study of 11/2/2018. There is chronic elevation of the left hemidiaphragm suggesting the possibility of diaphragmatic paralysis. Left basilar atelectasis is present. There is marked cardiomegaly. The right lung is fully expanded and clear. . Chronic elevation of the left hemidiaphragm with left basilar atelectasis. Lungs are otherwise clear. Signed by Dr Sen Meredith on 4/15/2019 8:53 PM    Us Renal Complete    Result Date: 4/19/2019  EXAMINATION:  US RENAL COMPLETE  4/19/2019 7:53 AM HISTORY: Possible hyperdense cyst in the lower pole left kidney on recent CT chest. COMPARISON: No comparison study. TECHNIQUE: Grayscale and color flow imaging were performed. FINDINGS: The right kidney measures 8.7 cm and the left kidney measures 10.4 cm. The cortical thickness and echogenicity are normal. There is no hydronephrosis. There is a 0.7 x 0.8 x 0.6 cm cyst in the mid pole region left kidney. There is an 8 mm hypoechoic area seen on image 24 that is likely a small cyst that accounts for the hyperdense lesion in the lower pole of the left kidney. This was dictated in the CT chest report as an upper pole lesion but is actually in the lower pole. There is an echogenic area in the mid to lower pole left kidney measuring 1.1 cm that is somewhat linear and does not have the appearance of a typical mass. The urinary bladder is not very well distended.     1. There is an 8 mm hypoechoic area in the lower pole left kidney on image 24 that is probably a small cyst and is in the approximate location of the hyperdense lesion seen on CT. This was dictated on the CT as being in the upper pole but it is actually in the lower pole on CT. 2. There is another simple appearing cyst in the left mid kidney. 3. There is an echogenic irregular area in the mid to lower pole left kidney that is indeterminate. This area would likely be better evaluated with renal mass protocol CT or MRI. 4. The right kidney is sonographically normal. Signed by Dr Ángel Nicholas on 4/19/2019 7:58 AM    Cta Pulmonary W Contrast    Result Date: 4/18/2019  CTA PULMONARY W CONTRAST 4/18/2019 6:45 PM HISTORY: Shortness of breath COMPARISON: None. DLP: 672 mGy cm. Automated exposure control was utilized to diminish patient radiation dose. TECHNIQUE: Helical tomographic images of the chest were obtained after the administration of intravenous contrast following angiogram protocol. Additionally, 3D MIP reconstructions in the coronal and sagittal planes were provided. FINDINGS:  Pulmonary arteries: There is adequate enhancement of the pulmonary arteries to evaluate for central and segmental pulmonary emboli. There are no filling defects within the main, lobar, segmental or visualized subsegmental pulmonary arteries. The pulmonary vessels are within normal limits. Aorta and great vessels: The aorta is well opacified and demonstrates no aneurysm, stenosis or dissection. There is tortuosity of the proximal great vessels. No evidence of focal steno-occlusive disease. . Coronary calcifications are noted in the LAD distribution. . Neck base: The imaged portion of the base of the neck appears unremarkable. Lungs: There is marked elevation of the left hemidiaphragm. Bibasilar atelectasis is present. . The trachea and bronchial tree are patent. Heart: There is marked cardiomegaly. There is significant right atrial enlargement. Reflux of contrast into the intrahepatic IVC and branch vessels suggest elevated right heart pressure. . There is no pericardial effusion.  Lymph nodes: No pathologically enlarged mediastinal, hilar, or axillary lymph nodes are present. Bones and soft tissues: The patient is status post right reverse total shoulder arthroplasty. There is an accentuated thoracic kyphosis. No acute bony abnormality are present. Magali Gill Upper abdomen: There is a 8 mm hyperdense lesion involving the upper pole of the left kidney likely representing a hemorrhagic cyst. The patient is status post cholecystectomy. .     1. No evidence of pulmonary embolus or other acute cardiopulmonary process. 2. 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 Bibasilar atelectasis is present. 3. Moderate cardiomegaly. There is significant atrial enlargement with elevated right heart pressures suspected with reflux of contrast into the intrahepatic IVC and branch vessels. 4. Coronary calcifications in the LAD distribution. 5. Suspected hemorrhagic cyst upper pole left kidney. Follow-up with ultrasound would be recommended. Signed by Dr Renea Tucker on 4/18/2019 9:04 PM        Assessment and Plan: This is a 82y.o. year old female with past medical history of possible early dementia with memory deficits presenting with shortness of breath and found to be in atrial fibrillation with rapid ventricular response, symptoms of congestive heart failure with elevated DNP and mildly reduced ejection fraction 45-50% (previously normal), moderate to severe pulmonary hypertension. 1. She is in sinus rhythm but is oscillating between A. fib and sinus in the hospital. She is on anticoagulation. She is fairly well rate controlled on current regimen. Patient may be discharged with follow-up. 2. IV site evidence for cellulitis with induration and redness.  Start Augmentin 875/125 mg twice a day      Brad Langley MD 4/21/2019 10:59 AM

## 2019-04-21 NOTE — PLAN OF CARE
Problem: Falls - Risk of:  Goal: Will remain free from falls  Description  Will remain free from falls  Outcome: Ongoing  Goal: Absence of physical injury  Description  Absence of physical injury  Outcome: Ongoing     Problem:  Activity:  Goal: Ability to tolerate increased activity will improve  Description  Ability to tolerate increased activity will improve  Outcome: Ongoing  Goal: Expression of feelings of increased energy will increase  Description  Expression of feelings of increased energy will increase  Outcome: Ongoing     Problem: Cardiac:  Goal: Ability to maintain an adequate cardiac output will improve  Description  Ability to maintain an adequate cardiac output will improve  Outcome: Ongoing  Goal: Complications related to the disease process, condition or treatment will be avoided or minimized  Description  Complications related to the disease process, condition or treatment will be avoided or minimized  Outcome: Ongoing

## 2019-04-21 NOTE — DISCHARGE SUMMARY
Machelle Vazquez  :  1935  MRN:  [id number]    Admit date:  2019  Discharge date:   2019       Admitting Physician:  Radha Cornelius MD    Advance Directive: Full Code    Consults Made:   IP CONSULT TO CARDIOLOGY      Primary Care Physician:  SOLEDAD Hitchcock    Discharge Diagnoses:  Principal Problem:    Atrial fibrillation [facility])  Active Problems:    Shortness of breath    Hypertension    Hyperlipemia    Elevated brain natriuretic peptide (BNP) level    Elevated TSH    Hypothyroidism    Renal cyst  Resolved Problems:    * No resolved hospital problems. *      Significant Diagnostic Studies:   Ct Abdomen Pelvis W Wo Contrast Additional Contrast? Radiologist Recommendation    Result Date: 2019  EXAMINATION: CT ABDOMEN PELVIS W WO CONTRAST 2019 8:57 PM HISTORY: Renal mass Comparison: Renal ultrasound 2019 Technique: Sequential imaging was performed from the lung bases through the pubic symphysis after the administration of IV contrast. Sagittal and coronal reformations were made from the original source data and reviewed. Automated exposure control was utilized for radiation dose reduction. Radiation dose: DLP 4140 mGy-cm Findings: The visualized heart appears normal in size. Coronary artery calcifications are evident. There is elevation of the left hemidiaphragm. The visualized lung bases appear clear. Precontrast images of the kidneys demonstrate hyperdense lesion extending from the lower pole the left kidney. Multiple additional hypodense lesions are noted. On the postcontrast images, a cyst is seen in the mid left kidney and likely anterior to this is a second cyst. A cyst is also noted in the inferior left kidney. A small cyst is seen in the inferior right kidney. Additional tiny hypodensities are seen on the right which are too small to characterize. No solid enhancing renal mass is identified. The ureters appear decompressed bilaterally.  The liver, spleen, and adrenal glands are unremarkable. The gallbladder is surgically absent. Tiny hypodensities are seen in the pancreatic tail measuring 5-6 mm in size. The pancreas is otherwise unremarkable. The main portal and splenic veins and IVC appear grossly normal. The abdominal aorta appears normal in caliber. There are scattered atherosclerotic calcifications of the aorta and its branch vessels. The origins of the celiac axis, superior mesenteric artery, and inferior mesenteric artery enhance normally. No pathologically enlarged central mesenteric or retroperitoneal lymph nodes are appreciated. The stomach is distended with food and appears grossly normal. The small bowel appears normal in caliber. There are scattered colonic diverticula without evidence of acute diverticulitis. The appendix is not identified with certainty, but there is no right lower quadrant inflammatory stranding to suggest acute appendicitis. The urinary bladder is grossly normal in appearance. The uterus and ovaries are visualized. There is a questionable small cyst in the right ovary. No free fluid is seen in the pelvis. No pelvic or inguinal lymphadenopathy is identified. Review of the visualized osseous structures demonstrates no acute or aggressive lesions. Degenerative spurring is noted in the spine. Impression: 1. Bilateral renal cysts. No solid enhancing renal mass identified. 2. Atherosclerotic disease. 3. Elevation of the left hemidiaphragm. 4. Hypodense lesions in the pancreatic tail for which nonemergent MRI of the abdomen with and without contrast including MRCP is recommended. 5. Possible small right ovarian cyst for which nonemergent follow-up pelvic ultrasound is recommended. Signed by Dr Severa Oz on 4/19/2019 9:04 PM    Us Renal Complete    Result Date: 4/19/2019  EXAMINATION:  US RENAL COMPLETE  4/19/2019 7:53 AM HISTORY: Possible hyperdense cyst in the lower pole left kidney on recent CT chest. COMPARISON: No comparison study.  TECHNIQUE: Grayscale and color flow imaging were performed. FINDINGS: The right kidney measures 8.7 cm and the left kidney measures 10.4 cm. The cortical thickness and echogenicity are normal. There is no hydronephrosis. There is a 0.7 x 0.8 x 0.6 cm cyst in the mid pole region left kidney. There is an 8 mm hypoechoic area seen on image 24 that is likely a small cyst that accounts for the hyperdense lesion in the lower pole of the left kidney. This was dictated in the CT chest report as an upper pole lesion but is actually in the lower pole. There is an echogenic area in the mid to lower pole left kidney measuring 1.1 cm that is somewhat linear and does not have the appearance of a typical mass. The urinary bladder is not very well distended. 1. There is an 8 mm hypoechoic area in the lower pole left kidney on image 24 that is probably a small cyst and is in the approximate location of the hyperdense lesion seen on CT. This was dictated on the CT as being in the upper pole but it is actually in the lower pole on CT. 2. There is another simple appearing cyst in the left mid kidney. 3. There is an echogenic irregular area in the mid to lower pole left kidney that is indeterminate. This area would likely be better evaluated with renal mass protocol CT or MRI. 4. The right kidney is sonographically normal. Signed by Dr Becky Lujan on 4/19/2019 7:58 AM    Cta Pulmonary W Contrast    Result Date: 4/18/2019  CTA PULMONARY W CONTRAST 4/18/2019 6:45 PM HISTORY: Shortness of breath COMPARISON: None. DLP: 672 mGy cm. Automated exposure control was utilized to diminish patient radiation dose. TECHNIQUE: Helical tomographic images of the chest were obtained after the administration of intravenous contrast following angiogram protocol. Additionally, 3D MIP reconstructions in the coronal and sagittal planes were provided. FINDINGS:  Pulmonary arteries:  There is adequate enhancement of the pulmonary arteries to evaluate for central and segmental pulmonary emboli. There are no filling defects within the main, lobar, segmental or visualized subsegmental pulmonary arteries. The pulmonary vessels are within normal limits. Aorta and great vessels: The aorta is well opacified and demonstrates no aneurysm, stenosis or dissection. There is tortuosity of the proximal great vessels. No evidence of focal steno-occlusive disease. . Coronary calcifications are noted in the LAD distribution. . Neck base: The imaged portion of the base of the neck appears unremarkable. Lungs: There is marked elevation of the left hemidiaphragm. Bibasilar atelectasis is present. . The trachea and bronchial tree are patent. Heart: There is marked cardiomegaly. There is significant right atrial enlargement. Reflux of contrast into the intrahepatic IVC and branch vessels suggest elevated right heart pressure. . There is no pericardial effusion. Lymph nodes: No pathologically enlarged mediastinal, hilar, or axillary lymph nodes are present. Bones and soft tissues: The patient is status post right reverse total shoulder arthroplasty. There is an accentuated thoracic kyphosis. No acute bony abnormality are present. Tequila Salgado Upper abdomen: There is a 8 mm hyperdense lesion involving the upper pole of the left kidney likely representing a hemorrhagic cyst. The patient is status post cholecystectomy. .     1. No evidence of pulmonary embolus or other acute cardiopulmonary process. 2. 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 Bibasilar atelectasis is present. 3. Moderate cardiomegaly. There is significant atrial enlargement with elevated right heart pressures suspected with reflux of contrast into the intrahepatic IVC and branch vessels. 4. Coronary calcifications in the LAD distribution. 5. Suspected hemorrhagic cyst upper pole left kidney. Follow-up with ultrasound would be recommended.  Signed by Dr Johnson Dobbins on 4/18/2019 9:04 PM      Pertinent Labs:   CBC: Recent Labs     04/18/19  1900 04/19/19  0215 04/21/19  1022   WBC 6.7 5.4 6.1   HGB 12.2 11.0* 12.7    158 256     BMP:    Recent Labs     04/18/19  1900 04/19/19  0215 04/21/19  1022    139 137   K 4.6 4.2 3.6   CL 98 101 99   CO2 29 26 26   BUN 14 12 20   CREATININE 0.7 0.6 0.9   GLUCOSE 125* 89 152*     INR: No results for input(s): INR in the last 72 hours. Lipids: No results for input(s): CHOL, HDL in the last 72 hours. Invalid input(s): LDLCALCU  ABGs:No results for input(s): PHART, CKP3QBI, PO2ART, IDI6MYQ, BEART, HGBAE, A0IHQCWN, CARBOXHGBART, 02THERAPY in the last 72 hours. HgBA1c:  No components found for: HEMOGLOBIN A1C      Hospital Course: As per Initial admission HPI 4/18/2019, quoted below; \"The patient is a 82 y.o. female who presents to er (poor historian) 2 weeks history of progressive sob, no cough, no cp, not relief by any maneuver, worsening on exercise. On arrival to er was found to have a-fib with rvr. After cardizem given hr was controlled\"    A. fib RVR  · Initially started on diltiazem ggt as needed  · Continued patient's home dose atenolol  · Cardiology following - appreciate recommendations  · Continue Apixaban 5 mg by mouth twice a day,   · Diltiazem extended release 120 mg by mouth twice a day, added by cardiology           HFrEF  And HFpEF,  with acute exacerbation (Acute on chronic)  - 2D Echo:  Normal left ventricular size with mildly reduced systolic function EF   31-33% and  grade 1 diastolic dysfunction  - Presented with acute shortness of breath  - Elevated ProBNP level 11,921   - Troponin level 0.02 --> 0.03  - Furosemide 20 mg IV ×1 dose given in the ED  - Continued diureses;      -->furosemide 20 mg IV twice a day  - Fluid goal of -1.5-2 L  - Fluid restriction  - Continued optimized medical management. Elevated d-dimer     CTA  1. No evidence of pulmonary embolus or other acute cardiopulmonary process.    2. 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 Bibasilar atelectasis is present. 3. Moderate cardiomegaly. There is significant atrial enlargement with elevated right heart pressures suspected with reflux of contrast into the intrahepatic IVC and branch vessels. 4. Coronary calcifications in the LAD distribution. 5. Suspected hemorrhagic cyst upper pole left kidney. Follow-up with ultrasound would be recommended.            1. There is an 8 mm hypoechoic area in the lower pole left kidney on image 24 that is probably a small cyst and is in the approximate location of the hyperdense lesion seen on CT. This was dictated on the CT as being in the upper pole but it is actually in the lower pole on CT. 2. There is another simple appearing cyst in the left mid kidney. 3. There is an echogenic irregular area in the mid to lower pole left kidney that is indeterminate. This area would likely be better evaluated with renal mass protocol CT or MRI. 4. The right kidney is sonographically normal.         Subsequent Follow-up CT abdomen pelvis, with and without contrast renal mass protocol (04/19/2019)  Impression:  1. Bilateral renal cysts. No solid enhancing renal mass identified. 2. Atherosclerotic disease. 3. Elevation of the left hemidiaphragm. 4. Hypodense lesions in the pancreatic tail for which nonemergent MRI of the abdomen with and without contrast including MRCP is recommended.   5. Possible small right ovarian cyst for which nonemergent follow-up  pelvic ultrasound is recommended.              Hypothyroidism, likely subclinical  · Elevated TSH: 04/18/2019 --> 7.18  · Of note: Mildly elevated TSH level on 04/15/19 - 4.88  · Total and Free T4 level within reference range  · Follow-up TSH, as per PCP, upon discharge.           Continue management of other chronic medical conditions - see above and orders.                   Physical Exam:  Vital Signs: /72   Pulse 68   Temp 97.5 °F (36.4 °C) (Temporal)   Resp 18   Ht 5' 6\" (1.676 m)   Wt 166 lb 1.6 oz (75.3 kg)   SpO2 91%   BMI 26.81 kg/m²   Physical Exam  General appearance: alert, appears stated age and cooperative  HEENT: Head: Normocephalic, no lesions, without obvious abnormality. Neck: no carotid bruit, no JVD and supple, symmetrical, trachea midline  Lungs: Patient in no acute respiratory distress, No increased work of breathing clear to auscultation bilaterally  Heart: regular rate and rhythm, S1, S2 normal, no murmur, click, rub or gallop  Abdomen: soft, non-tender; bowel sounds normal; no masses,  no organomegaly  Extremities: extremities normal, atraumatic, no cyanosis or edema   2+ pulses in bilateral lower extremities  Neurologic: Mental status: Alert, oriented, thought content appropriate  Skin: Skin Warm, dry with , color, texture, turgor normal. No rashes or lesions or nodules.      Discharge Medications:       [facility] Medication Instructions PIK:[id number]    Printed on:04/21/19 1202   Medication Information                      albuterol sulfate HFA (PROVENTIL HFA) 108 (90 Base) MCG/ACT inhaler  Inhale 2 puffs into the lungs every 6 hours as needed for Wheezing             amoxicillin-clavulanate (AUGMENTIN) 875-125 MG per tablet  Take 1 tablet by mouth every 12 hours for 5 days             apixaban (ELIQUIS) 5 MG TABS tablet  Take 1 tablet by mouth 2 times daily             atenolol (TENORMIN) 50 MG tablet  TAKE 2 TABLETS BY MOUTH EVERY DAY             diltiazem (CARDIZEM CD) 120 MG extended release capsule  Take 1 capsule by mouth 2 times daily             dorzolamide-timolol (COSOPT) 22.3-6.8 MG/ML ophthalmic solution  INSTILL 1 DROP INTO BOTH EYES TWICE A DAY AS DIRECTED             escitalopram (LEXAPRO) 20 MG tablet  Take 1 tablet by mouth daily             latanoprost (XALATAN) 0.005 % ophthalmic solution  INSTILL 1 DROP INTO BOTH EYES IN THE EVENING             losartan (COZAAR) 50 MG tablet  Take 1

## 2019-04-22 ENCOUNTER — TELEPHONE (OUTPATIENT)
Dept: INTERNAL MEDICINE CLINIC | Age: 84
End: 2019-04-22

## 2019-04-22 NOTE — TELEPHONE ENCOUNTER
Pt was DC from [facility] yesterday with no resumption orders for [place]   Is it ok to resume [address] and   ?      Please advise

## 2019-04-22 NOTE — CARE COORDINATION
[address] [id number] notified of patient discharge yesterday. JUAN ALBERTO orders for [facility] received from Dr Danielito Boyle today. DC Summary and DC med list faxed.   Electronically signed by Angeline Balderas RN on 4/22/19 at 11:25 AM

## 2019-04-23 ENCOUNTER — TELEPHONE (OUTPATIENT)
Dept: INTERNAL MEDICINE | Age: 84
End: 2019-04-23

## 2019-04-24 ENCOUNTER — TELEPHONE (OUTPATIENT)
Dept: INTERNAL MEDICINE CLINIC | Age: 84
End: 2019-04-24

## 2019-04-24 ENCOUNTER — TELEPHONE (OUTPATIENT)
Dept: CARDIOLOGY | Age: 84
End: 2019-04-24

## 2019-04-24 ENCOUNTER — OFFICE VISIT (OUTPATIENT)
Dept: INTERNAL MEDICINE | Age: 84
End: 2019-04-24
Payer: MEDICARE

## 2019-04-24 VITALS
SYSTOLIC BLOOD PRESSURE: 124 MMHG | HEART RATE: 72 BPM | WEIGHT: 164.3 LBS | BODY MASS INDEX: 26.41 KG/M2 | TEMPERATURE: 98.6 F | DIASTOLIC BLOOD PRESSURE: 80 MMHG | OXYGEN SATURATION: 97 % | HEIGHT: 66 IN

## 2019-04-24 DIAGNOSIS — F32.89 OTHER DEPRESSION: ICD-10-CM

## 2019-04-24 DIAGNOSIS — Q45.3 PANCREATIC ABNORMALITY: ICD-10-CM

## 2019-04-24 DIAGNOSIS — N83.201 RIGHT OVARIAN CYST: ICD-10-CM

## 2019-04-24 DIAGNOSIS — I49.9 IRREGULAR HEART BEAT: ICD-10-CM

## 2019-04-24 DIAGNOSIS — R06.02 SHORTNESS OF BREATH: ICD-10-CM

## 2019-04-24 DIAGNOSIS — R79.89 ELEVATED TSH: ICD-10-CM

## 2019-04-24 DIAGNOSIS — J98.6 ELEVATED DIAPHRAGM: ICD-10-CM

## 2019-04-24 DIAGNOSIS — E78.2 MIXED HYPERLIPIDEMIA: ICD-10-CM

## 2019-04-24 DIAGNOSIS — I10 ESSENTIAL HYPERTENSION: ICD-10-CM

## 2019-04-24 DIAGNOSIS — I48.0 PAROXYSMAL ATRIAL FIBRILLATION (HCC): Primary | ICD-10-CM

## 2019-04-24 PROCEDURE — 99495 TRANSJ CARE MGMT MOD F2F 14D: CPT | Performed by: PHYSICIAN ASSISTANT

## 2019-04-24 PROCEDURE — 1111F DSCHRG MED/CURRENT MED MERGE: CPT | Performed by: PHYSICIAN ASSISTANT

## 2019-04-24 RX ORDER — ATORVASTATIN CALCIUM 40 MG/1
40 TABLET, FILM COATED ORAL DAILY
COMMUNITY
End: 2019-04-24 | Stop reason: SDUPTHER

## 2019-04-24 RX ORDER — DILTIAZEM HYDROCHLORIDE 120 MG/1
120 CAPSULE, COATED, EXTENDED RELEASE ORAL 2 TIMES DAILY
Qty: 180 CAPSULE | Refills: 3 | Status: SHIPPED | OUTPATIENT
Start: 2019-04-24 | End: 2019-05-08

## 2019-04-24 RX ORDER — ATORVASTATIN CALCIUM 40 MG/1
40 TABLET, FILM COATED ORAL DAILY
Qty: 90 TABLET | Refills: 3 | Status: SHIPPED | OUTPATIENT
Start: 2019-04-24 | End: 2020-06-02

## 2019-04-24 NOTE — TELEPHONE ENCOUNTER
Pt daughter calling regarding HFU with Dr Joni Trinidad. Nothing in the chart regarding a HFU. Please call Yvonne @ [phone] regarding this matter.

## 2019-04-24 NOTE — TELEPHONE ENCOUNTER
Appointment made with Dr Cole Gaviria on May 3, 2019. Patient daughter notified. Voiced understanding to all.

## 2019-04-24 NOTE — PROGRESS NOTES
Post-Discharge Transitional Care Management Services or Hospital Follow Up      Hilario Kc   YOB: 1935    Date of Office Visit:  4/24/2019  Date of Hospital Admission: 4/18/19  Date of Hospital Discharge: 4/21/19  Risk of hospital readmission (high >=14%.  Medium >=10%) :Readmission Risk Score: 10      Care management risk score Rising risk (score 2-5) and Complex Care (Scores >=6): 1     Non face to face  following discharge, date last encounter closed (first attempt may have been earlier): 4/23/2019 10:37 AM    Call initiated 2 business days of discharge: Yes    Patient Active Problem List   Diagnosis    2-part displaced fracture of surgical neck of left humerus, initial encounter for closed fracture    Hypertension    Hyperlipemia    GERD (gastroesophageal reflux disease)    2-part displaced fracture of surgical neck of right humerus, initial encounter for closed fracture    Nausea    Glaucoma    Shortness of breath    Anxiety and depression    Atrial fibrillation with RVR (HCC)    Elevated brain natriuretic peptide (BNP) level    Elevated TSH    Hypothyroidism    Renal cyst       Allergies   Allergen Reactions    Morphine Nausea And Vomiting       Medications listed as ordered at the time of discharge from hospital   Lupe Tamayo Medication Instructions NILAY:    Printed on:04/25/19 4379   Medication Information                      albuterol sulfate HFA (PROVENTIL HFA) 108 (90 Base) MCG/ACT inhaler  Inhale 2 puffs into the lungs every 6 hours as needed for Wheezing             amoxicillin-clavulanate (AUGMENTIN) 875-125 MG per tablet  Take 1 tablet by mouth every 12 hours for 5 days             apixaban (ELIQUIS) 5 MG TABS tablet  Take 1 tablet by mouth 2 times daily             atenolol (TENORMIN) 50 MG tablet  TAKE 2 TABLETS BY MOUTH EVERY DAY             atorvastatin (LIPITOR) 40 MG tablet  Take 1 tablet by mouth daily             diltiazem (CARDIZEM CD) 120 Wheezing 1 Inhaler 3    dorzolamide-timolol (COSOPT) 22.3-6.8 MG/ML ophthalmic solution INSTILL 1 DROP INTO BOTH EYES TWICE A DAY AS DIRECTED  3    latanoprost (XALATAN) 0.005 % ophthalmic solution INSTILL 1 DROP INTO BOTH EYES IN THE EVENING  3    atenolol (TENORMIN) 50 MG tablet TAKE 2 TABLETS BY MOUTH EVERY  tablet 3        Medications patient taking as of now reconciled against medications ordered at time of hospital discharge: Yes    Chief Complaint   Patient presents with    Follow-Up from Hospital       History of Present illness - Follow up of Hospital diagnosis(es): Atrial fibrillation, shortness of breath, hypertension, hyperlipidemia, elevated BNP, elevated TSH, renal cyst, subclinical hypothyroidism. Inpatient course: Discharge summary reviewed- see chart. Interval history/Current status: Patient was admitted to [facility] on 18 April 2019 and discharged on 21 April 2019. Patient presented to the emergency room at [facility] for worsening shortness of breath which had been progressing for the last 2 weeks. Patient reported that she was having more problems with exertion. Patient had home health for physical therapy and they had noticed that patient was getting shorter of breath. Patient denied any specific chest pain . While patient was in the emergency room it was noted that patient had A. fib with RVR and was started on a Cardizem and heart rate was controlled. Patient was admitted to the hospital and was noted to have elevated BNP with slightly reduced ejection fraction 62-90% grade 1 diastolic dysfunction. Patient was diuresed while in the hospital and continued to improve during hospital stay. Patient's A. fib was treated with diltiazem on 20 mg twice a day and patient would continue the atenolol and was started on Eliquis 5 mg twice a day for clot prevention.   On hospital patient had CTA of the noted that patient had a elevation of the left hemidiaphragm which was known by the patient and has been seeing Dr. Denise Peña but nothing could be done. Patient had a CT of the abdomen and pelvis that was noted the patient had some hypodense lesions in the pancreatic tail and a MRI was suggested for that was ordered. He was also noted that patient had a probable ovarian cyst and was recommended ultrasound which was today. It was also noted the patient had an elevated TSH but free and total T4 was within normal limits. We will continue to monitor this. Patient states feeling much better since being out of the hospital.  Patient denies any significant shortness of breath currently she is back to baseline patient is now getting Meals on Wheels and patient has home health with physical therapy and occupational therapy and speech therapy. Patient overall fairly stable. A comprehensive review of systems was negative except for what was noted in the HPI. Vitals:    04/24/19 1529   BP: 124/80   Pulse: 72   Temp: 98.6 °F (37 °C)   SpO2: 97%   Weight: 164 lb 4.8 oz (74.5 kg)   Height: 5' 6\" (1.676 m)     Body mass index is 26.52 kg/m².    Wt Readings from Last 3 Encounters:   04/24/19 164 lb 4.8 oz (74.5 kg)   04/21/19 166 lb 1.6 oz (75.3 kg)   04/15/19 167 lb (75.8 kg)     BP Readings from Last 3 Encounters:   04/24/19 124/80   04/21/19 97/62   04/15/19 104/62        Physical Exam:  General Appearance: alert and oriented to person, place and time, well developed and well- nourished, in no acute distress  Skin: warm and dry, no rash or erythema  Head: normocephalic and atraumatic  Eyes: pupils equal, round, and reactive to light, extraocular eye movements intact, conjunctivae normal  ENT: tympanic membrane, external ear and ear canal normal bilaterally, nose without deformity, nasal mucosa and turbinates normal without polyps  Neck: supple and non-tender without mass, no thyromegaly or thyroid nodules, no cervical lymphadenopathy  Pulmonary/Chest: clear to auscultation bilaterally- no the chest were obtained after   the administration of intravenous contrast following angiogram   protocol. Additionally, 3D MIP reconstructions in the coronal and   sagittal planes were provided.      FINDINGS:     Pulmonary arteries: There is adequate enhancement of the pulmonary   arteries to evaluate for central and segmental pulmonary emboli. There   are no filling defects within the main, lobar, segmental or visualized   subsegmental pulmonary arteries. The pulmonary vessels are within   normal limits. Aorta and great vessels: The aorta is well opacified and demonstrates   no aneurysm, stenosis or dissection. There is tortuosity of the   proximal great vessels. No evidence of focal steno-occlusive disease. .   Coronary calcifications are noted in the LAD distribution. .   Neck base: The imaged portion of the base of the neck appears   unremarkable. Lungs: There is marked elevation of the left hemidiaphragm. Bibasilar   atelectasis is present. . The trachea and bronchial tree are patent. Heart: There is marked cardiomegaly. There is significant right atrial   enlargement. Reflux of contrast into the intrahepatic IVC and branch   vessels suggest elevated right heart pressure. . There is no   pericardial effusion. Lymph nodes: No pathologically enlarged mediastinal, hilar, or   axillary lymph nodes are present. Bones and soft tissues: The patient is status post right reverse total   shoulder arthroplasty. There is an accentuated thoracic kyphosis. No   acute bony abnormality are present. Sarah Cedeno Upper abdomen: There is a 8 mm hyperdense lesion involving the upper   pole of the left kidney likely representing a hemorrhagic cyst. The   patient is status post cholecystectomy. .        Impression   1. No evidence of pulmonary embolus or other acute cardiopulmonary   process. 2. 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 Bibasilar atelectasis is present.    3. Moderate sonographically normal.   Signed by Dr Vasquez Valentin on 4/19/2019 7:58 AM     Narrative   EXAMINATION: CT ABDOMEN PELVIS W WO CONTRAST 4/19/2019 8:57 PM   HISTORY: Renal mass   Comparison: Renal ultrasound 4/19/2019   Technique: Sequential imaging was performed from the lung bases   through the pubic symphysis after the administration of IV contrast.   Sagittal and coronal reformations were made from the original source   data and reviewed. Automated exposure control was utilized for   radiation dose reduction. Radiation dose: DLP 4140 mGy-cm   Findings:   The visualized heart appears normal in size. Coronary artery   calcifications are evident. There is elevation of the left   hemidiaphragm. The visualized lung bases appear clear. Precontrast images of the kidneys demonstrate hyperdense lesion   extending from the lower pole the left kidney. Multiple additional   hypodense lesions are noted. On the postcontrast images, a cyst is   seen in the mid left kidney and likely anterior to this is a second   cyst. A cyst is also noted in the inferior left kidney. A small cyst   is seen in the inferior right kidney. Additional tiny hypodensities   are seen on the right which are too small to characterize. No solid   enhancing renal mass is identified. The ureters appear decompressed   bilaterally. The liver, spleen, and adrenal glands are unremarkable. The   gallbladder is surgically absent. Tiny hypodensities are seen in the   pancreatic tail measuring 5-6 mm in size. The pancreas is otherwise   unremarkable. The main portal and splenic veins and IVC appear grossly normal. The   abdominal aorta appears normal in caliber. There are scattered   atherosclerotic calcifications of the aorta and its branch vessels. The origins of the celiac axis, superior mesenteric artery, and   inferior mesenteric artery enhance normally.    No pathologically enlarged central mesenteric or retroperitoneal lymph   nodes are appreciated. The stomach is distended with food and appears grossly normal. The   small bowel appears normal in caliber. There are scattered colonic   diverticula without evidence of acute diverticulitis. The appendix is   not identified with certainty, but there is no right lower quadrant   inflammatory stranding to suggest acute appendicitis. The urinary bladder is grossly normal in appearance. The uterus and   ovaries are visualized. There is a questionable small cyst in the   right ovary. No free fluid is seen in the pelvis. No pelvic or   inguinal lymphadenopathy is identified. Review of the visualized osseous structures demonstrates no acute or   aggressive lesions. Degenerative spurring is noted in the spine.       Impression   Impression:   1. Bilateral renal cysts. No solid enhancing renal mass identified. 2. Atherosclerotic disease. 3. Elevation of the left hemidiaphragm. 4. Hypodense lesions in the pancreatic tail for which nonemergent MRI   of the abdomen with and without contrast including MRCP is   recommended. 5. Possible small right ovarian cyst for which nonemergent follow-up   pelvic ultrasound is recommended. Signed by Dr Nas Fabian on 4/19/2019 9:04 PM           Assessment/Plan:  1. Paroxysmal atrial fibrillation (HCC)  Seems to be stable on medication. Patient has follow-up with Dr. Verner Muskrat on 3 May. Patient will continue the Cardizem and Eliquis as directed. - [place] DISCHARGE MEDS RECONCILED W/ CURRENT OUTPATIENT MED LIST  - apixaban (ELIQUIS) 5 MG TABS tablet; Take 1 tablet by mouth 2 times daily  Dispense: 180 tablet; Refill: 3  - diltiazem (CARDIZEM CD) 120 MG extended release capsule; Take 1 capsule by mouth 2 times daily  Dispense: 180 capsule; Refill: 3    2. Right ovarian cyst  Will get ultrasound for further characterization.  - US PELVIS LIMITED; Future    3. Mixed hyperlipidemia  Seems to be stable on medication.  - atorvastatin (LIPITOR) 40 MG tablet;  Take 1 tablet by

## 2019-04-26 ENCOUNTER — HOSPITAL ENCOUNTER (OUTPATIENT)
Dept: NON INVASIVE DIAGNOSTICS | Age: 84
Discharge: HOME | End: 2019-04-26
Payer: MEDICARE

## 2019-04-26 DIAGNOSIS — I49.9 IRREGULAR HEART BEAT: ICD-10-CM

## 2019-04-26 PROCEDURE — 93226 XTRNL ECG REC<48 HR SCAN A/R: CPT

## 2019-04-26 PROCEDURE — 93225 XTRNL ECG REC<48 HRS REC: CPT

## 2019-04-30 ENCOUNTER — TELEPHONE (OUTPATIENT)
Dept: INTERNAL MEDICINE CLINIC | Age: 84
End: 2019-04-30

## 2019-04-30 NOTE — TELEPHONE ENCOUNTER
[address] re eval done post hospital stay and requests visits for pt to receive ST for breath support tx 1x/week for 2 weeks  Please advise if approved

## 2019-05-01 ENCOUNTER — APPOINTMENT (OUTPATIENT)
Dept: CT IMAGING | Age: 84
End: 2019-05-01
Payer: MEDICARE

## 2019-05-01 ENCOUNTER — APPOINTMENT (OUTPATIENT)
Dept: GENERAL RADIOLOGY | Age: 84
End: 2019-05-01
Payer: MEDICARE

## 2019-05-01 ENCOUNTER — HOSPITAL ENCOUNTER (OUTPATIENT)
Age: 84
Setting detail: OBSERVATION
Discharge: HOME | End: 2019-05-03
Attending: EMERGENCY MEDICINE | Admitting: INTERNAL MEDICINE
Payer: MEDICARE

## 2019-05-01 DIAGNOSIS — I48.91 ATRIAL FIBRILLATION, UNSPECIFIED TYPE (HCC): ICD-10-CM

## 2019-05-01 DIAGNOSIS — R07.89 CHEST DISCOMFORT: Primary | ICD-10-CM

## 2019-05-01 PROBLEM — R07.9 CHEST PAIN: Status: ACTIVE | Noted: 2019-05-01

## 2019-05-01 PROBLEM — I48.0 PAROXYSMAL A-FIB (HCC): Status: ACTIVE | Noted: 2019-05-01

## 2019-05-01 LAB
ALBUMIN SERPL-MCNC: 3.8 G/DL (ref 3.5–5.2)
ALP BLD-CCNC: 85 U/L (ref 35–104)
ALT SERPL-CCNC: 11 U/L (ref 5–33)
ANION GAP SERPL CALCULATED.3IONS-SCNC: 14 MMOL/L (ref 7–19)
APTT: 30.2 SEC (ref 26–36.2)
AST SERPL-CCNC: 18 U/L (ref 5–32)
BASOPHILS ABSOLUTE: 0.1 K/UL (ref 0–0.2)
BASOPHILS RELATIVE PERCENT: 1.6 % (ref 0–1)
BILIRUB SERPL-MCNC: 0.6 MG/DL (ref 0.2–1.2)
BILIRUBIN URINE: ABNORMAL
BLOOD, URINE: NEGATIVE
BUN BLDV-MCNC: 18 MG/DL (ref 8–23)
CALCIUM SERPL-MCNC: 9.2 MG/DL (ref 8.8–10.2)
CHLORIDE BLD-SCNC: 101 MMOL/L (ref 98–111)
CLARITY: CLEAR
CO2: 20 MMOL/L (ref 22–29)
COLOR: YELLOW
CREAT SERPL-MCNC: 0.8 MG/DL (ref 0.5–0.9)
EOSINOPHILS ABSOLUTE: 0.1 K/UL (ref 0–0.6)
EOSINOPHILS RELATIVE PERCENT: 1.4 % (ref 0–5)
GFR NON-AFRICAN AMERICAN: >60
GLUCOSE BLD-MCNC: 189 MG/DL (ref 74–109)
GLUCOSE URINE: NEGATIVE MG/DL
HCT VFR BLD CALC: 41.3 % (ref 37–47)
HEMOGLOBIN: 13.7 G/DL (ref 12–16)
INR BLD: 1.4 (ref 0.88–1.18)
KETONES, URINE: NEGATIVE MG/DL
LEUKOCYTE ESTERASE, URINE: NEGATIVE
LYMPHOCYTES ABSOLUTE: 1.4 K/UL (ref 1.1–4.5)
LYMPHOCYTES RELATIVE PERCENT: 19 % (ref 20–40)
MCH RBC QN AUTO: 31.1 PG (ref 27–31)
MCHC RBC AUTO-ENTMCNC: 33.2 G/DL (ref 33–37)
MCV RBC AUTO: 93.9 FL (ref 81–99)
MONOCYTES ABSOLUTE: 0.5 K/UL (ref 0–0.9)
MONOCYTES RELATIVE PERCENT: 6.6 % (ref 0–10)
NEUTROPHILS ABSOLUTE: 5.3 K/UL (ref 1.5–7.5)
NEUTROPHILS RELATIVE PERCENT: 71.3 % (ref 50–65)
NITRITE, URINE: NEGATIVE
PDW BLD-RTO: 13.8 % (ref 11.5–14.5)
PH UA: 6 (ref 5–8)
PLATELET # BLD: 203 K/UL (ref 130–400)
PMV BLD AUTO: 11.8 FL (ref 9.4–12.3)
POTASSIUM SERPL-SCNC: 4.4 MMOL/L (ref 3.5–5)
PRO-BNP: 3503 PG/ML (ref 0–1800)
PROTEIN UA: NEGATIVE MG/DL
PROTHROMBIN TIME: 16.5 SEC (ref 12–14.6)
RBC # BLD: 4.4 M/UL (ref 4.2–5.4)
SODIUM BLD-SCNC: 135 MMOL/L (ref 136–145)
SPECIFIC GRAVITY UA: 1.02 (ref 1–1.03)
TOTAL PROTEIN: 7.3 G/DL (ref 6.6–8.7)
TROPONIN: <0.01 NG/ML (ref 0–0.03)
URINE REFLEX TO CULTURE: ABNORMAL
UROBILINOGEN, URINE: 0.2 E.U./DL
WBC # BLD: 7.4 K/UL (ref 4.8–10.8)

## 2019-05-01 PROCEDURE — 36415 COLL VENOUS BLD VENIPUNCTURE: CPT

## 2019-05-01 PROCEDURE — 71045 X-RAY EXAM CHEST 1 VIEW: CPT

## 2019-05-01 PROCEDURE — 93005 ELECTROCARDIOGRAM TRACING: CPT

## 2019-05-01 PROCEDURE — 84484 ASSAY OF TROPONIN QUANT: CPT

## 2019-05-01 PROCEDURE — 2580000003 HC RX 258: Performed by: INTERNAL MEDICINE

## 2019-05-01 PROCEDURE — 6370000000 HC RX 637 (ALT 250 FOR IP): Performed by: EMERGENCY MEDICINE

## 2019-05-01 PROCEDURE — 80053 COMPREHEN METABOLIC PANEL: CPT

## 2019-05-01 PROCEDURE — 99285 EMERGENCY DEPT VISIT HI MDM: CPT | Performed by: EMERGENCY MEDICINE

## 2019-05-01 PROCEDURE — 6370000000 HC RX 637 (ALT 250 FOR IP): Performed by: INTERNAL MEDICINE

## 2019-05-01 PROCEDURE — G0378 HOSPITAL OBSERVATION PER HR: HCPCS

## 2019-05-01 PROCEDURE — 83880 ASSAY OF NATRIURETIC PEPTIDE: CPT

## 2019-05-01 PROCEDURE — 99285 EMERGENCY DEPT VISIT HI MDM: CPT

## 2019-05-01 PROCEDURE — 85025 COMPLETE CBC W/AUTO DIFF WBC: CPT

## 2019-05-01 PROCEDURE — 70450 CT HEAD/BRAIN W/O DYE: CPT

## 2019-05-01 PROCEDURE — 85610 PROTHROMBIN TIME: CPT

## 2019-05-01 PROCEDURE — 81003 URINALYSIS AUTO W/O SCOPE: CPT

## 2019-05-01 PROCEDURE — 99219 PR INITIAL OBSERVATION CARE/DAY 50 MINUTES: CPT | Performed by: INTERNAL MEDICINE

## 2019-05-01 PROCEDURE — 85730 THROMBOPLASTIN TIME PARTIAL: CPT

## 2019-05-01 RX ORDER — ESCITALOPRAM OXALATE 10 MG/1
20 TABLET ORAL DAILY
Status: DISCONTINUED | OUTPATIENT
Start: 2019-05-01 | End: 2019-05-03 | Stop reason: HOSPADM

## 2019-05-01 RX ORDER — PANTOPRAZOLE SODIUM 40 MG/1
40 TABLET, DELAYED RELEASE ORAL
Status: DISCONTINUED | OUTPATIENT
Start: 2019-05-02 | End: 2019-05-03 | Stop reason: HOSPADM

## 2019-05-01 RX ORDER — ATORVASTATIN CALCIUM 40 MG/1
40 TABLET, FILM COATED ORAL NIGHTLY
Status: DISCONTINUED | OUTPATIENT
Start: 2019-05-01 | End: 2019-05-03 | Stop reason: HOSPADM

## 2019-05-01 RX ORDER — DORZOLAMIDE HYDROCHLORIDE AND TIMOLOL MALEATE 20; 5 MG/ML; MG/ML
1 SOLUTION/ DROPS OPHTHALMIC 2 TIMES DAILY
Status: DISCONTINUED | OUTPATIENT
Start: 2019-05-01 | End: 2019-05-01 | Stop reason: CLARIF

## 2019-05-01 RX ORDER — ASPIRIN 81 MG/1
81 TABLET, CHEWABLE ORAL DAILY
Status: DISCONTINUED | OUTPATIENT
Start: 2019-05-02 | End: 2019-05-03 | Stop reason: HOSPADM

## 2019-05-01 RX ORDER — SODIUM CHLORIDE 0.9 % (FLUSH) 0.9 %
10 SYRINGE (ML) INJECTION PRN
Status: DISCONTINUED | OUTPATIENT
Start: 2019-05-01 | End: 2019-05-03 | Stop reason: HOSPADM

## 2019-05-01 RX ORDER — DORZOLAMIDE HCL 20 MG/ML
1 SOLUTION/ DROPS OPHTHALMIC 2 TIMES DAILY
Status: DISCONTINUED | OUTPATIENT
Start: 2019-05-01 | End: 2019-05-03 | Stop reason: HOSPADM

## 2019-05-01 RX ORDER — LATANOPROST 50 UG/ML
1 SOLUTION/ DROPS OPHTHALMIC NIGHTLY
Status: DISCONTINUED | OUTPATIENT
Start: 2019-05-01 | End: 2019-05-03 | Stop reason: HOSPADM

## 2019-05-01 RX ORDER — SODIUM CHLORIDE 0.9 % (FLUSH) 0.9 %
10 SYRINGE (ML) INJECTION EVERY 12 HOURS SCHEDULED
Status: DISCONTINUED | OUTPATIENT
Start: 2019-05-01 | End: 2019-05-03 | Stop reason: HOSPADM

## 2019-05-01 RX ORDER — ATENOLOL 50 MG/1
50 TABLET ORAL DAILY
Status: DISCONTINUED | OUTPATIENT
Start: 2019-05-01 | End: 2019-05-03 | Stop reason: HOSPADM

## 2019-05-01 RX ORDER — ASPIRIN 325 MG
325 TABLET ORAL ONCE
Status: COMPLETED | OUTPATIENT
Start: 2019-05-01 | End: 2019-05-01

## 2019-05-01 RX ORDER — DILTIAZEM HYDROCHLORIDE 120 MG/1
120 CAPSULE, COATED, EXTENDED RELEASE ORAL 2 TIMES DAILY
Status: DISCONTINUED | OUTPATIENT
Start: 2019-05-01 | End: 2019-05-03 | Stop reason: HOSPADM

## 2019-05-01 RX ORDER — TIMOLOL MALEATE 5 MG/ML
1 SOLUTION/ DROPS OPHTHALMIC 2 TIMES DAILY
Status: DISCONTINUED | OUTPATIENT
Start: 2019-05-01 | End: 2019-05-03 | Stop reason: HOSPADM

## 2019-05-01 RX ORDER — LOSARTAN POTASSIUM 50 MG/1
50 TABLET ORAL DAILY
Status: DISCONTINUED | OUTPATIENT
Start: 2019-05-01 | End: 2019-05-03 | Stop reason: HOSPADM

## 2019-05-01 RX ORDER — TIZANIDINE 2 MG/1
2 TABLET ORAL 3 TIMES DAILY PRN
Status: DISCONTINUED | OUTPATIENT
Start: 2019-05-01 | End: 2019-05-03 | Stop reason: HOSPADM

## 2019-05-01 RX ADMIN — TIMOLOL MALEATE 1 DROP: 5 SOLUTION OPHTHALMIC at 20:13

## 2019-05-01 RX ADMIN — ATORVASTATIN CALCIUM 40 MG: 40 TABLET, FILM COATED ORAL at 20:11

## 2019-05-01 RX ADMIN — LOSARTAN POTASSIUM 50 MG: 50 TABLET ORAL at 17:26

## 2019-05-01 RX ADMIN — DORZOLAMIDE HYDROCHLORIDE 1 DROP: 20 SOLUTION/ DROPS OPHTHALMIC at 20:13

## 2019-05-01 RX ADMIN — DILTIAZEM HYDROCHLORIDE 120 MG: 120 CAPSULE, COATED, EXTENDED RELEASE ORAL at 17:26

## 2019-05-01 RX ADMIN — Medication 10 ML: at 20:11

## 2019-05-01 RX ADMIN — ASPIRIN 325 MG ORAL TABLET 325 MG: 325 PILL ORAL at 10:58

## 2019-05-01 RX ADMIN — ATENOLOL 50 MG: 50 TABLET ORAL at 17:26

## 2019-05-01 RX ADMIN — ESCITALOPRAM OXALATE 20 MG: 10 TABLET ORAL at 17:26

## 2019-05-01 RX ADMIN — LATANOPROST 1 DROP: 50 SOLUTION OPHTHALMIC at 20:13

## 2019-05-01 RX ADMIN — APIXABAN 5 MG: 5 TABLET, FILM COATED ORAL at 20:10

## 2019-05-01 ASSESSMENT — ENCOUNTER SYMPTOMS
WHEEZING: 0
CHEST TIGHTNESS: 1
COUGH: 0
BACK PAIN: 0
VOMITING: 0
ABDOMINAL PAIN: 0
RHINORRHEA: 0
NAUSEA: 1
COLOR CHANGE: 0
SORE THROAT: 0
SHORTNESS OF BREATH: 1
DIARRHEA: 0

## 2019-05-01 ASSESSMENT — PAIN SCALES - GENERAL: PAINLEVEL_OUTOF10: 0

## 2019-05-01 NOTE — ED NOTES
ASSESSMENT:  Pt presents with SOB and fatigue/weakness throughout her arms, onset this am. Pt denies CP. SKIN:  Warm, dry, pink. Cap refill < 2 sec  CARDIAC:  S1 S2 noted. A fib 56  LUNGS: clear upper and lower lobes. Respirations even and unlabored. ABDOMEN: bowel sounds noted upper and lower quadrants. Soft and tender. EXTREMITIES: bilateral DP and PT. No edema noted. Pt alert and oriented x4. Pupils equal and reactive. No distress noted. Side rails up and call light within reach.        Juvencio Marion RN  05/01/19 1026

## 2019-05-01 NOTE — ED NOTES
Call placed to Leigh Ann Serra with Cardiology @ [phone].  returned call to [address] [place] [address] Str..  Electronically signed by Shelia Smith on 5/1/2019 at 1:26 PM       Shelia Smith  05/01/19 4175

## 2019-05-01 NOTE — ED PROVIDER NOTES
[facility] EMERGENCY DEPT  eMERGENCY dEPARTMENT eNCOUnter      Pt Name: Lul Lwa  MRN: [id number]  [place] 1935  Date of evaluation: 5/1/2019  Provider: Maddy Alcantar MD    [address] [place] [address]       Chief Complaint   Patient presents with    Fatigue     onset this am, throughout arms. HISTORY OF PRESENT ILLNESS   (Location/Symptom, Timing/Onset,Context/Setting, Quality, Duration, Modifying Factors, Severity)  Note limiting factors. Lul Law is a 82 y.o. female who presents to the emergency department with fatigue. Patient had the sudden onset of weakness that was located all the way across her chest and shoulders bilaterally and associated with shortness of breath and nausea. The patient had some slight blurry vision which is now resolved. She denied any focal numbness or weakness, the  weakness was all away across her chest. She denied any real chest pain. Started around 7:30 this AM. Patient was admitted to hospital on April 18 with new onset atrial fibrillation. She has never had a stress test or cath in the past. anticoagulated on eliquis    HPI    NursingNotes were reviewed. REVIEW OF SYSTEMS    (2-9 systems for level 4, 10 or more for level 5)     Review of Systems   Constitutional: Negative for chills and fever. HENT: Negative for rhinorrhea and sore throat. Respiratory: Positive for shortness of breath. Cardiovascular: Negative for chest pain and leg swelling. Gastrointestinal: Positive for nausea. Negative for abdominal pain, diarrhea and vomiting. Genitourinary: Negative for difficulty urinating. Musculoskeletal: Negative for back pain and neck pain. Skin: Negative for rash. Neurological: Positive for weakness. Negative for headaches. Psychiatric/Behavioral: Negative for confusion. A complete review of systems was performed and is negative except as noted above in the HPI.        PAST MEDICAL HISTORY     Past Medical History:   Diagnosis Date    Arthritis  GERD (gastroesophageal reflux disease)     Hyperlipidemia     Hypertension     Tremor     to right hand         SURGICAL HISTORY       Past Surgical History:   Procedure Laterality Date    CHOLECYSTECTOMY      HUMERUS FRACTURE SURGERY Right 7/9/2016    REVERSE TOTAL SHOULDER ARTHROPLASTY performed by Shawn Arias MD at [facility]       Previous Medications    ALBUTEROL SULFATE HFA (PROVENTIL HFA) 108 (90 BASE) MCG/ACT INHALER    Inhale 2 puffs into the lungs every 6 hours as needed for Wheezing    APIXABAN (ELIQUIS) 5 MG TABS TABLET    Take 1 tablet by mouth 2 times daily    ATENOLOL (TENORMIN) 50 MG TABLET    TAKE 2 TABLETS BY MOUTH EVERY DAY    ATORVASTATIN (LIPITOR) 40 MG TABLET    Take 1 tablet by mouth daily    DILTIAZEM (CARDIZEM CD) 120 MG EXTENDED RELEASE CAPSULE    Take 1 capsule by mouth 2 times daily    DORZOLAMIDE-TIMOLOL (COSOPT) 22.3-6.8 MG/ML OPHTHALMIC SOLUTION    INSTILL 1 DROP INTO BOTH EYES TWICE A DAY AS DIRECTED    ESCITALOPRAM (LEXAPRO) 20 MG TABLET    Take 1 tablet by mouth daily    LATANOPROST (XALATAN) 0.005 % OPHTHALMIC SOLUTION    INSTILL 1 DROP INTO BOTH EYES IN THE EVENING    LOSARTAN (COZAAR) 50 MG TABLET    Take 1 tablet by mouth daily, if blood pressure starts to run low cut down to 1/2 tablet. OMEPRAZOLE (PRILOSEC) 40 MG DELAYED RELEASE CAPSULE    Take 1 capsule by mouth daily    TIZANIDINE (ZANAFLEX) 2 MG TABLET    Take 1 tablet by mouth 3 times daily as needed (low back pain)       ALLERGIES     Morphine    FAMILY HISTORY     No family history on file. SOCIAL HISTORY       Social History     Socioeconomic History    Marital status:       Spouse name: Not on file    Number of children: Not on file    Years of education: Not on file    Highest education level: Not on file   Occupational History    Not on file   Social Needs    Financial resource strain: Not on file    Food insecurity:     Worry: Not on file     Inability: Not on file    Transportation needs:     Medical: Not on file     Non-medical: Not on file   Tobacco Use    Smoking status: Never Smoker    Smokeless tobacco: Never Used   Substance and Sexual Activity    Alcohol use: No    Drug use: No    Sexual activity: Not on file   Lifestyle    Physical activity:     Days per week: Not on file     Minutes per session: Not on file    Stress: Not on file   Relationships    Social connections:     Talks on phone: Not on file     Gets together: Not on file     Attends Congregational service: Not on file     Active member of club or organization: Not on file     Attends meetings of clubs or organizations: Not on file     Relationship status: Not on file    Intimate partner violence:     Fear of current or ex partner: Not on file     Emotionally abused: Not on file     Physically abused: Not on file     Forced sexual activity: Not on file   Other Topics Concern    Not on file   Social History Narrative    Not on file       SCREENINGS             PHYSICAL EXAM    (up to 7 for level 4, 8 or more for level 5)     ED Triage Vitals [05/01/19 0959]   BP Temp Temp Source Pulse Resp SpO2 Height Weight   118/68 98.9 °F (37.2 °C) Oral 57 20 96 % -- 165 lb (74.8 kg)       Physical Exam   Constitutional: She is oriented to person, place, and time. She appears well-developed and well-nourished. No distress. HENT:   Head: Normocephalic and atraumatic. Eyes: Pupils are equal, round, and reactive to light. Neck: Normal range of motion. Neck supple. Cardiovascular: Normal rate, normal heart sounds and intact distal pulses. An irregularly irregular rhythm present. Pulmonary/Chest: Effort normal and breath sounds normal. No respiratory distress. Abdominal: Soft. Bowel sounds are normal. She exhibits no distension. There is no tenderness. Musculoskeletal: Normal range of motion. She exhibits no edema. Neurological: She is alert and oriented to person, place, and time.  No cranial nerve deficit. She exhibits normal muscle tone. Coordination normal.   Skin: Skin is warm and dry. No rash noted. She is not diaphoretic. Psychiatric: She has a normal mood and affect. Her behavior is normal.   Nursing note and vitals reviewed. DIAGNOSTIC RESULTS     EKG: All EKG's are interpreted by the Emergency Department Physician who either signs or Co-signs this chart in the absence of a cardiologist.    A. fib rate 74 with up to 1 second pauses  Repeat EKG afib, nonspecific  RADIOLOGY:   Non-plain film images such as CT, Ultrasound and MRI are read by the radiologist. Plainradiographic images are visualized and preliminarily interpreted by the emergency physician with the below findings:      Interpretation per the Radiologist below, if available at the time of this note:    XR CHEST PORTABLE   Final Result   1. Stable chronic change. Signed by Dr Mike Guerrero on 5/1/2019 10:54 AM      CT Head WO Contrast   Final Result   Impression:    1. No CT evidence of an acute intracranial process. 2. Atrophy and chronic ischemic changes.                     Signed by Dr Shanice Chavez on 5/1/2019 10:51 AM            ED BEDSIDE ULTRASOUND:   Performed by ED Physician - none    LABS:  Labs Reviewed   BRAIN NATRIURETIC PEPTIDE - Abnormal; Notable for the following components:       Result Value    Pro-BNP 3,503 (*)     All other components within normal limits   CBC WITH AUTO DIFFERENTIAL - Abnormal; Notable for the following components:    MCH 31.1 (*)     Neutrophils % 71.3 (*)     Lymphocytes % 19.0 (*)     Basophils % 1.6 (*)     All other components within normal limits   COMPREHENSIVE METABOLIC PANEL - Abnormal; Notable for the following components:    Sodium 135 (*)     CO2 20 (*)     Glucose 189 (*)     All other components within normal limits   PROTIME-INR - Abnormal; Notable for the following components:    Protime 16.5 (*)     INR 1.40 (*)     All other components within normal limits   APTT TROPONIN   TROPONIN   URINE RT REFLEX TO CULTURE       All other labs were within normal range or not returned as of this dictation. EMERGENCY DEPARTMENT COURSE and DIFFERENTIALDIAGNOSIS/MDM:   Vitals:    Vitals:    05/01/19 0959 05/01/19 1214 05/01/19 1336   BP: 118/68 120/76 121/76   Pulse: 57 65 66   Resp: 20 20 20   Temp: 98.9 °F (37.2 °C)     TempSrc: Oral     SpO2: 96% 98% 97%   Weight: 165 lb (74.8 kg)         MDM      CONSULTS:  [place]  D/w Dr Last Mail with cardiology and recommended admission to hospitalist and stress test  D/w Dr Lizette Oseguera for admission  PROCEDURES:  Unless otherwise notedbelow, none     Procedures    FINAL IMPRESSION     1. Chest discomfort    2.  Atrial fibrillation, unspecified type [facility])          DISPOSITION/PLAN   DISPOSITION Decision To Admit 05/01/2019 01:32:42 PM      PATIENT REFERRED TO:  @FUP@    DISCHARGE MEDICATIONS:  New Prescriptions    No medications on file          (Please note that portions of this note were completed with a voice recognition program.  Efforts were made to edit the dictations butoccasionally words are mis-transcribed.)    Gino Laureano MD (electronically signed)  AttendingEmergency Physician         Gino Laureano MD  05/01/19 8023

## 2019-05-01 NOTE — ED NOTES
Pt aware urine sample is needed, but unable to void at this time. Pt refusing cath.         Ozzie Bloch, RN  05/01/19 0017

## 2019-05-01 NOTE — PROGRESS NOTES
4 Eyes Skin Assessment    Hawa Anderson is being assessed upon: Admission    I agree that Teressa Tovar, along with Vel RN (either 2 RN's or 1 LPN and 1 RN) have performed a thorough Head to Toe Skin Assessment on the patient. ALL assessment sites listed below have been assessed. Areas assessed by both nurses:     [x]   Head, Face, and Ears   [x]   Shoulders, Back, and Chest  [x]   Arms, Elbows, and Hands   [x]   Coccyx, Sacrum, and Ischium  [x]   Legs, Feet, and Heels    Does the Patient have Skin Breakdown?  No    Jef Prevention initiated: NA  Wound Care Orders initiated: NA    [facility] nurse consulted for Pressure Injury (Stage 3,4, Unstageable, DTI, NWPT, and Complex wounds) and New or Established Ostomies: NA        Primary Nurse eSignature: Nelida Kenyon RN on 5/1/2019 at 4:29 PM      Co-Signer eSignature: Electronically signed by Gris Catalan RN on 5/1/19 at 4:47 PM

## 2019-05-01 NOTE — H&P
[facility]      Hospitalist - History & Physical    [id number]  PCP: SOLEDAD Marin  Date of Admission: 5/1/2019   Date of Service: Pt seen/examined on5/1/2019 and  Placed in Observation. Chief Complaint:  Chest tightness    History Of Present Illness: The patient is a 82 y.o. female who presented complaining of chest tightness and b/l upper ext weakness. Symptoms started this am reported as sudden weakness across chest and both arms. Felt as if she could barely move. Associated with generalized feeling of unwell, dizziness and SOB. Denied feeling palpitations. Symptoms lasted for several hours. Unable to identify any alleviating or aggravating factors. Currently symptoms are improved. Review of Systems   Constitutional: Positive for activity change and fatigue. Negative for chills, diaphoresis and fever. HENT: Negative for congestion. Eyes: Negative for visual disturbance. Respiratory: Positive for chest tightness and shortness of breath. Negative for cough and wheezing. Cardiovascular: Negative for palpitations. Gastrointestinal: Negative for abdominal pain. Genitourinary: Negative for difficulty urinating. Musculoskeletal: Negative for neck stiffness. Skin: Negative for color change. Neurological: Positive for dizziness and weakness. Negative for tremors, seizures, facial asymmetry, light-headedness, numbness and headaches. Psychiatric/Behavioral: Negative for confusion. Past Medical History:        Diagnosis Date    Arthritis     GERD (gastroesophageal reflux disease)     Hyperlipidemia     Hypertension     Tremor     to right hand       Past Surgical History:        Procedure Laterality Date    CHOLECYSTECTOMY      HUMERUS FRACTURE SURGERY Right 7/9/2016    REVERSE TOTAL SHOULDER ARTHROPLASTY performed by Clif Templeton MD at [address] [place] [address] Medications:  Prior to Admission medications    Medication Sig Start Date End Date Taking?  Authorizing 81   Temp 97.2 °F (36.2 °C) (Temporal)   Resp 16   Ht 5' 6\" (1.676 m)   Wt 158 lb 6 oz (71.8 kg)   LMP  (Approximate)   SpO2 96%   Breastfeeding? No   BMI 25.56 kg/m²   General appearance: alert, cooperative and no distress  Head: Normocephalic, without obvious abnormality, atraumatic  Eyes: conjunctivae/corneas clear. PERRL, EOM's intact. Ears:normal external ears  Neck:  supple, symmetrical, trachea midline  Lungs:  clear to auscultation bilaterally  Heart: regular rate and rhythm, S1, S2   Abdomen:soft, non-tender; non-distended normal bowel sounds   Extremities:Pedal edema Trace    Skin: Skin color, texture, turgor normal. Norashes or lesions  Lymphatic: No palpable lymph node enlargment  Neurologic: Awake and alert, some generalized weakness, but no focal deficits appreciated   Psychiatric:  Normal mood      Diagnostic Data:    CBC:  Recent Labs     05/01/19  1006   WBC 7.4   HGB 13.7   HCT 41.3        BMP:  Recent Labs     05/01/19  1006   *   K 4.4      CO2 20*   BUN 18   CREATININE 0.8   CALCIUM 9.2     Recent Labs     05/01/19  1006   AST 18   ALT 11   BILITOT 0.6   ALKPHOS 85     Coag Panel:   Recent Labs     05/01/19  1006   INR 1.40*   PROTIME 16.5*   APTT 30.2     Cardiac Enzymes:   Recent Labs     05/01/19  1006 05/01/19  1250   TROPONINI <0.01 <0.01     ABGs:No results found for: PHART, PO2ART, DSY4BFS  Urinalysis:No results found for: NITRU, WBCUA, BACTERIA, RBCUA, BLOODU, SPECGRAV, GLUCOSEU  CXR  Impression:     1. Stable chronic change. Signed by Dr Leelee Mac on 5/1/2019 10:54 AM       CT head  Impression:     Impression:   1. No CT evidence of an acute intracranial process.    2.  Atrophy and chronic ischemic changes.                   Signed by Dr Daria Tay on 5/1/2019 10:51 AM         Active Hospital Problems    Diagnosis Date Noted    Chest pain [R07.9] 05/01/2019    Paroxysmal A-fib ([address] [I48.0] 05/01/2019    Elevated brain natriuretic peptide (BNP) level [R79.89] 04/19/2019    Shortness of breath [R06.02] 08/24/2018    Hypertension [I10] 07/09/2016    Hyperlipemia [E78.5] 07/09/2016    GERD (gastroesophageal reflux disease) [K21.9] 07/09/2016       IMPRESSION   Principal Problem:    Chest pain  Active Problems:    Hypertension    Hyperlipemia    GERD (gastroesophageal reflux disease)    Shortness of breath    Elevated brain natriuretic peptide (BNP) level    Paroxysmal A-fib (HCC)  Resolved Problems:    * No resolved hospital problems. *      PLAN:  1) Unclear etiology, Cardiology consulted in ED and recommended stress test for further evaluation. Cont trending trops. Telemetry monitoring. ASA, statin  2) Currently in NSR, rate controlled, cont home AC  3) Elevated BNP, lungs clear, trace LE edema. Cont diuresis.      Gena Goss MD  5/1/2019

## 2019-05-02 ENCOUNTER — APPOINTMENT (OUTPATIENT)
Dept: NUCLEAR MEDICINE | Age: 84
End: 2019-05-02
Payer: MEDICARE

## 2019-05-02 LAB
ANION GAP SERPL CALCULATED.3IONS-SCNC: 10 MMOL/L (ref 7–19)
BUN BLDV-MCNC: 22 MG/DL (ref 8–23)
CALCIUM SERPL-MCNC: 9.1 MG/DL (ref 8.8–10.2)
CHLORIDE BLD-SCNC: 103 MMOL/L (ref 98–111)
CO2: 26 MMOL/L (ref 22–29)
CREAT SERPL-MCNC: 0.7 MG/DL (ref 0.5–0.9)
EKG P AXIS: 75 DEGREES
EKG P AXIS: NORMAL DEGREES
EKG P AXIS: NORMAL DEGREES
EKG P-R INTERVAL: 220 MS
EKG P-R INTERVAL: NORMAL MS
EKG P-R INTERVAL: NORMAL MS
EKG Q-T INTERVAL: 382 MS
EKG Q-T INTERVAL: 430 MS
EKG Q-T INTERVAL: 434 MS
EKG QRS DURATION: 70 MS
EKG QRS DURATION: 72 MS
EKG QRS DURATION: 76 MS
EKG QTC CALCULATION (BAZETT): 436 MS
EKG QTC CALCULATION (BAZETT): 437 MS
EKG QTC CALCULATION (BAZETT): 453 MS
EKG T AXIS: 28 DEGREES
EKG T AXIS: 34 DEGREES
EKG T AXIS: 46 DEGREES
GFR NON-AFRICAN AMERICAN: >60
GLUCOSE BLD-MCNC: 114 MG/DL (ref 74–109)
HCT VFR BLD CALC: 39.1 % (ref 37–47)
HEMOGLOBIN: 12.4 G/DL (ref 12–16)
MCH RBC QN AUTO: 30.6 PG (ref 27–31)
MCHC RBC AUTO-ENTMCNC: 31.7 G/DL (ref 33–37)
MCV RBC AUTO: 96.5 FL (ref 81–99)
PDW BLD-RTO: 13.8 % (ref 11.5–14.5)
PLATELET # BLD: 157 K/UL (ref 130–400)
PMV BLD AUTO: 11.5 FL (ref 9.4–12.3)
POTASSIUM REFLEX MAGNESIUM: 3.8 MMOL/L (ref 3.5–5)
RBC # BLD: 4.05 M/UL (ref 4.2–5.4)
SODIUM BLD-SCNC: 139 MMOL/L (ref 136–145)
TROPONIN: <0.01 NG/ML (ref 0–0.03)
TSH REFLEX FT4: 3.13 UIU/ML (ref 0.35–5.5)
WBC # BLD: 6.3 K/UL (ref 4.8–10.8)

## 2019-05-02 PROCEDURE — A9500 TC99M SESTAMIBI: HCPCS | Performed by: INTERNAL MEDICINE

## 2019-05-02 PROCEDURE — 99221 1ST HOSP IP/OBS SF/LOW 40: CPT | Performed by: INTERNAL MEDICINE

## 2019-05-02 PROCEDURE — 84443 ASSAY THYROID STIM HORMONE: CPT

## 2019-05-02 PROCEDURE — 2580000003 HC RX 258: Performed by: INTERNAL MEDICINE

## 2019-05-02 PROCEDURE — 84484 ASSAY OF TROPONIN QUANT: CPT

## 2019-05-02 PROCEDURE — 78452 HT MUSCLE IMAGE SPECT MULT: CPT

## 2019-05-02 PROCEDURE — 93005 ELECTROCARDIOGRAM TRACING: CPT

## 2019-05-02 PROCEDURE — G0378 HOSPITAL OBSERVATION PER HR: HCPCS

## 2019-05-02 PROCEDURE — 93017 CV STRESS TEST TRACING ONLY: CPT

## 2019-05-02 PROCEDURE — 3430000000 HC RX DIAGNOSTIC RADIOPHARMACEUTICAL: Performed by: INTERNAL MEDICINE

## 2019-05-02 PROCEDURE — 80048 BASIC METABOLIC PNL TOTAL CA: CPT

## 2019-05-02 PROCEDURE — 6360000002 HC RX W HCPCS: Performed by: INTERNAL MEDICINE

## 2019-05-02 PROCEDURE — 99225 PR SBSQ OBSERVATION CARE/DAY 25 MINUTES: CPT | Performed by: INTERNAL MEDICINE

## 2019-05-02 PROCEDURE — 85027 COMPLETE CBC AUTOMATED: CPT

## 2019-05-02 PROCEDURE — 6370000000 HC RX 637 (ALT 250 FOR IP): Performed by: INTERNAL MEDICINE

## 2019-05-02 PROCEDURE — 36415 COLL VENOUS BLD VENIPUNCTURE: CPT

## 2019-05-02 RX ADMIN — DILTIAZEM HYDROCHLORIDE 120 MG: 120 CAPSULE, COATED, EXTENDED RELEASE ORAL at 16:26

## 2019-05-02 RX ADMIN — LATANOPROST 1 DROP: 50 SOLUTION OPHTHALMIC at 22:12

## 2019-05-02 RX ADMIN — LOSARTAN POTASSIUM 50 MG: 50 TABLET ORAL at 08:29

## 2019-05-02 RX ADMIN — ATENOLOL 50 MG: 50 TABLET ORAL at 08:29

## 2019-05-02 RX ADMIN — TETRAKIS(2-METHOXYISOBUTYLISOCYANIDE)COPPER(I) TETRAFLUOROBORATE 10 MILLICURIE: 1 INJECTION, POWDER, LYOPHILIZED, FOR SOLUTION INTRAVENOUS at 11:11

## 2019-05-02 RX ADMIN — TIMOLOL MALEATE 1 DROP: 5 SOLUTION OPHTHALMIC at 08:30

## 2019-05-02 RX ADMIN — Medication 10 ML: at 08:31

## 2019-05-02 RX ADMIN — APIXABAN 5 MG: 5 TABLET, FILM COATED ORAL at 22:12

## 2019-05-02 RX ADMIN — DORZOLAMIDE HYDROCHLORIDE 1 DROP: 20 SOLUTION/ DROPS OPHTHALMIC at 22:12

## 2019-05-02 RX ADMIN — ASPIRIN 81 MG 81 MG: 81 TABLET ORAL at 08:29

## 2019-05-02 RX ADMIN — TIMOLOL MALEATE 1 DROP: 5 SOLUTION OPHTHALMIC at 22:12

## 2019-05-02 RX ADMIN — REGADENOSON 0.4 MG: 0.08 INJECTION, SOLUTION INTRAVENOUS at 10:40

## 2019-05-02 RX ADMIN — Medication 10 ML: at 22:12

## 2019-05-02 RX ADMIN — TETRAKIS(2-METHOXYISOBUTYLISOCYANIDE)COPPER(I) TETRAFLUOROBORATE 30 MILLICURIE: 1 INJECTION, POWDER, LYOPHILIZED, FOR SOLUTION INTRAVENOUS at 11:11

## 2019-05-02 RX ADMIN — APIXABAN 5 MG: 5 TABLET, FILM COATED ORAL at 08:29

## 2019-05-02 RX ADMIN — DORZOLAMIDE HYDROCHLORIDE 1 DROP: 20 SOLUTION/ DROPS OPHTHALMIC at 08:30

## 2019-05-02 RX ADMIN — ESCITALOPRAM OXALATE 20 MG: 10 TABLET ORAL at 08:29

## 2019-05-02 RX ADMIN — DILTIAZEM HYDROCHLORIDE 120 MG: 120 CAPSULE, COATED, EXTENDED RELEASE ORAL at 08:29

## 2019-05-02 RX ADMIN — ATORVASTATIN CALCIUM 40 MG: 40 TABLET, FILM COATED ORAL at 22:12

## 2019-05-02 ASSESSMENT — ENCOUNTER SYMPTOMS
COUGH: 0
CHEST TIGHTNESS: 1
BLOOD IN STOOL: 0
SHORTNESS OF BREATH: 0
VOMITING: 0
WHEEZING: 0
ABDOMINAL DISTENTION: 0
CONSTIPATION: 0
DIARRHEA: 0
EYE DISCHARGE: 0
BACK PAIN: 0

## 2019-05-02 ASSESSMENT — PAIN SCALES - GENERAL
PAINLEVEL_OUTOF10: 0
PAINLEVEL_OUTOF10: 0

## 2019-05-02 NOTE — CONSULTS
troponins were negative. She remains in sinus rhythm. REVIEW OF SYSTEMS:  Review of Systems   Constitutional: Negative for activity change, fatigue and fever. HENT: Negative for ear pain, hearing loss and tinnitus. Eyes: Negative for discharge and visual disturbance. Respiratory: Positive for chest tightness. Negative for cough, shortness of breath and wheezing. Cardiovascular: Negative for chest pain, palpitations and leg swelling. Gastrointestinal: Negative for abdominal distention, blood in stool, constipation, diarrhea and vomiting. Endocrine: Negative for cold intolerance, heat intolerance, polydipsia and polyuria. Genitourinary: Negative for dysuria and hematuria. Musculoskeletal: Negative for arthralgias, back pain and myalgias. Skin: Negative for pallor and rash. Neurological: Negative for seizures, syncope, weakness and headaches. Psychiatric/Behavioral: Negative for behavioral problems and dysphoric mood. Past Medical History:      Diagnosis Date    Arthritis     GERD (gastroesophageal reflux disease)     Hyperlipidemia     Hypertension     Tremor     to right hand       Past Surgical History:      Procedure Laterality Date    CHOLECYSTECTOMY      HUMERUS FRACTURE SURGERY Right 7/9/2016    REVERSE TOTAL SHOULDER ARTHROPLASTY performed by Cristin Donato MD at [facility]       Allergies:  Morphine    Past Social History:  Social History     Socioeconomic History    Marital status:       Spouse name: Not on file    Number of children: Not on file    Years of education: Not on file    Highest education level: Not on file   Occupational History    Not on file   Social Needs    Financial resource strain: Not on file    Food insecurity:     Worry: Not on file     Inability: Not on file    Transportation needs:     Medical: Not on file     Non-medical: Not on file   Tobacco Use    Smoking status: Never Smoker    Smokeless tobacco: Never Used   Substance and 12/7/18  Yes SOLEDAD Gr   albuterol sulfate HFA (PROVENTIL HFA) 108 (90 Base) MCG/ACT inhaler Inhale 2 puffs into the lungs every 6 hours as needed for Wheezing 11/2/18  Yes NURIS Johnson   dorzolamide-timolol (COSOPT) 22.3-6.8 MG/ML ophthalmic solution INSTILL 1 DROP INTO BOTH EYES TWICE A DAY AS DIRECTED 4/11/18  Yes Historical Provider, MD   latanoprost (XALATAN) 0.005 % ophthalmic solution INSTILL 1 DROP INTO BOTH EYES IN THE EVENING 4/20/18  Yes Historical Provider, MD   atenolol (TENORMIN) 50 MG tablet TAKE 2 TABLETS BY MOUTH EVERY DAY 5/18/18  Yes SOLEDAD Gr       Current Meds:   apixaban  5 mg Oral BID    latanoprost  1 drop Both Eyes Nightly    atenolol  50 mg Oral Daily    losartan  50 mg Oral Daily    pantoprazole  40 mg Oral QAM AC    escitalopram  20 mg Oral Daily    diltiazem  120 mg Oral BID    atorvastatin  40 mg Oral Nightly    sodium chloride flush  10 mL Intravenous 2 times per day    aspirin  81 mg Oral Daily    dorzolamide  1 drop Both Eyes BID    timolol  1 drop Both Eyes BID       Current Infused Meds:      Physical Exam:  Vitals:    05/02/19 1420   BP: 123/72   Pulse: 76   Resp: 18   Temp: 97.5 °F (36.4 °C)   SpO2: 94%       Intake/Output Summary (Last 24 hours) at 5/2/2019 1506  Last data filed at 5/2/2019 1420  Gross per 24 hour   Intake 360 ml   Output 200 ml   Net 160 ml     Estimated body mass index is 25.86 kg/m² as calculated from the following:    Height as of this encounter: 5' 6\" (1.676 m). Weight as of this encounter: 160 lb 3.2 oz (72.7 kg).         Physical Exam    Blood pressure right arm sitting 120/70 mmHg, pulse 76 bpm regular  No JVD  No edema  No carotid bruits  S1 and S2 are of normal intensity, no significant systolic or diastolic murmurs appreciated  Lungs are clear bilaterally  No significant chest wall tenderness  Abdomen soft, nontender, no significant organomegaly  Bowel sounds are normally appreciated  Neurologically intact except for memory deficit  No deformities    Labs:  Recent Labs     05/01/19  1006 05/02/19  0114   WBC 7.4 6.3   HGB 13.7 12.4    157       Recent Labs     05/01/19  1006 05/02/19  0114   * 139   K 4.4 3.8    103   CO2 20* 26   BUN 18 22   CREATININE 0.8 0.7   LABGLOM >60 >60   CALCIUM 9.2 9.1       CK, CKMB, Troponin: @LABRCNT (CKTOTAL:3, CKMB:3, TROPONINI:3)@    Last 3 BNP:          IMAGING:  Ct Abdomen Pelvis W Wo Contrast Additional Contrast? Radiologist Recommendation    Result Date: 4/19/2019  EXAMINATION: CT ABDOMEN PELVIS W WO CONTRAST 4/19/2019 8:57 PM HISTORY: Renal mass Comparison: Renal ultrasound 4/19/2019 Technique: Sequential imaging was performed from the lung bases through the pubic symphysis after the administration of IV contrast. Sagittal and coronal reformations were made from the original source data and reviewed. Automated exposure control was utilized for radiation dose reduction. Radiation dose: DLP 4140 mGy-cm Findings: The visualized heart appears normal in size. Coronary artery calcifications are evident. There is elevation of the left hemidiaphragm. The visualized lung bases appear clear. Precontrast images of the kidneys demonstrate hyperdense lesion extending from the lower pole the left kidney. Multiple additional hypodense lesions are noted. On the postcontrast images, a cyst is seen in the mid left kidney and likely anterior to this is a second cyst. A cyst is also noted in the inferior left kidney. A small cyst is seen in the inferior right kidney. Additional tiny hypodensities are seen on the right which are too small to characterize. No solid enhancing renal mass is identified. The ureters appear decompressed bilaterally. The liver, spleen, and adrenal glands are unremarkable. The gallbladder is surgically absent. Tiny hypodensities are seen in the pancreatic tail measuring 5-6 mm in size. The pancreas is otherwise unremarkable.  The main portal and splenic veins and IVC appear grossly normal. The abdominal aorta appears normal in caliber. There are scattered atherosclerotic calcifications of the aorta and its branch vessels. The origins of the celiac axis, superior mesenteric artery, and inferior mesenteric artery enhance normally. No pathologically enlarged central mesenteric or retroperitoneal lymph nodes are appreciated. The stomach is distended with food and appears grossly normal. The small bowel appears normal in caliber. There are scattered colonic diverticula without evidence of acute diverticulitis. The appendix is not identified with certainty, but there is no right lower quadrant inflammatory stranding to suggest acute appendicitis. The urinary bladder is grossly normal in appearance. The uterus and ovaries are visualized. There is a questionable small cyst in the right ovary. No free fluid is seen in the pelvis. No pelvic or inguinal lymphadenopathy is identified. Review of the visualized osseous structures demonstrates no acute or aggressive lesions. Degenerative spurring is noted in the spine. Impression: 1. Bilateral renal cysts. No solid enhancing renal mass identified. 2. Atherosclerotic disease. 3. Elevation of the left hemidiaphragm. 4. Hypodense lesions in the pancreatic tail for which nonemergent MRI of the abdomen with and without contrast including MRCP is recommended. 5. Possible small right ovarian cyst for which nonemergent follow-up pelvic ultrasound is recommended. Signed by Dr Severa Oz on 4/19/2019 9:04 PM    Xr Chest Standard (2 Vw)    Result Date: 4/15/2019  EXAMINATION: Chest 2 views 4/15/2019 HISTORY: Shortness of breath and fatigue. FINDINGS: Today's exam is compared to previous study of 11/2/2018. There is chronic elevation of the left hemidiaphragm suggesting the possibility of diaphragmatic paralysis. Left basilar atelectasis is present. There is marked cardiomegaly. The right lung is fully expanded and clear.     . Chronic for the hyperdense lesion in the lower pole of the left kidney. This was dictated in the CT chest report as an upper pole lesion but is actually in the lower pole. There is an echogenic area in the mid to lower pole left kidney measuring 1.1 cm that is somewhat linear and does not have the appearance of a typical mass. The urinary bladder is not very well distended. 1. There is an 8 mm hypoechoic area in the lower pole left kidney on image 24 that is probably a small cyst and is in the approximate location of the hyperdense lesion seen on CT. This was dictated on the CT as being in the upper pole but it is actually in the lower pole on CT. 2. There is another simple appearing cyst in the left mid kidney. 3. There is an echogenic irregular area in the mid to lower pole left kidney that is indeterminate. This area would likely be better evaluated with renal mass protocol CT or MRI. 4. The right kidney is sonographically normal. Signed by Dr Chinchilla Seen on 4/19/2019 7:58 AM    Xr Chest Portable    Result Date: 5/1/2019  XR CHEST PORTABLE 5/1/2019 10:53 AM History: Chest pain. One view chest x-ray compared with 4/15/2019. Chronic left diaphragm elevation with rightward displacement of the heart and mediastinum. Interstitial lung disease with no pneumothorax, heart failure, or focal infiltrate. 1. Stable chronic change. Signed by Dr Susan Paniagua on 5/1/2019 10:54 AM    Cta Pulmonary W Contrast    Result Date: 4/18/2019  CTA PULMONARY W CONTRAST 4/18/2019 6:45 PM HISTORY: Shortness of breath COMPARISON: None. DLP: 672 mGy cm. Automated exposure control was utilized to diminish patient radiation dose. TECHNIQUE: Helical tomographic images of the chest were obtained after the administration of intravenous contrast following angiogram protocol. Additionally, 3D MIP reconstructions in the coronal and sagittal planes were provided. FINDINGS:  Pulmonary arteries:  There is adequate enhancement of the pulmonary arteries to evaluate for central and segmental pulmonary emboli. There are no filling defects within the main, lobar, segmental or visualized subsegmental pulmonary arteries. The pulmonary vessels are within normal limits. Aorta and great vessels: The aorta is well opacified and demonstrates no aneurysm, stenosis or dissection. There is tortuosity of the proximal great vessels. No evidence of focal steno-occlusive disease. . Coronary calcifications are noted in the LAD distribution. . Neck base: The imaged portion of the base of the neck appears unremarkable. Lungs: There is marked elevation of the left hemidiaphragm. Bibasilar atelectasis is present. . The trachea and bronchial tree are patent. Heart: There is marked cardiomegaly. There is significant right atrial enlargement. Reflux of contrast into the intrahepatic IVC and branch vessels suggest elevated right heart pressure. . There is no pericardial effusion. Lymph nodes: No pathologically enlarged mediastinal, hilar, or axillary lymph nodes are present. Bones and soft tissues: The patient is status post right reverse total shoulder arthroplasty. There is an accentuated thoracic kyphosis. No acute bony abnormality are present. [facility] abdomen: There is a 8 mm hyperdense lesion involving the upper pole of the left kidney likely representing a hemorrhagic cyst. The patient is status post cholecystectomy. .     1. No evidence of pulmonary embolus or other acute cardiopulmonary process. 2. 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 Bibasilar atelectasis is present. 3. Moderate cardiomegaly. There is significant atrial enlargement with elevated right heart pressures suspected with reflux of contrast into the intrahepatic IVC and branch vessels. 4. Coronary calcifications in the LAD distribution. 5. Suspected hemorrhagic cyst upper pole left kidney. Follow-up with ultrasound would be recommended.  Signed by Dr Cristy Riley on 4/18/2019 EKG changes. 1. Her Giacomo Inoue study was otherwise unremarkable with a normal LV function. Given her age she does have a high risk of CAD. In the absence of injury, negative Lexiscan study, atypical nature of her pain and a normal LV function I would continue to manage him medically at this time. If there are recurrent symptoms are concerning findings this will need to be reevaluated. 2. Patient may be discharged with follow-up.       Electronically signed by Damaris Villagomez MD on 5/2/2019 at 3:06 PM

## 2019-05-02 NOTE — PROGRESS NOTES
[facility] Hospitalists Progress Note    Patient:  Bayron Moreno  YOB: 1935  Date of Service: 5/2/2019  MRN: [id number]   Acct: [de-identified]   Primary Care Physician: SOLEDAD Aldana  Advance Directive: Full Code  Admit Date: 5/1/2019       Hospital Day: 0      CHIEF COMPLAINT:  Chest Tightness and upper extremity weakness   Chief Complaint   Patient presents with    Fatigue     onset this am, throughout arms. 5/2/2019 7:50 AM  Subjective / Interval History:   Denies any active chest pain or tightness. No acute overnight events reported. Family at bedside. Patient denies any other acute complaints or distress at this time. Cumulative Hospital History:    As per Initial admission HPI 5/1/2019, quoted below; \"The patient is a 82 y.o. female who presented complaining of chest tightness and b/l upper ext weakness. Symptoms started this am reported as sudden weakness across chest and both arms. Felt as if she could barely move. Associated with generalized feeling of unwell, dizziness and SOB. Denied feeling palpitations. Symptoms lasted for several hours. Unable to identify any alleviating or aggravating factors. Currently symptoms are improved. \"        Review of Systems:   Review of Systems  ROS: 14 point review of systems is negative except as specifically addressed above.     Diet NPO Time Specified  No intake or output data in the 24 hours ending 05/02/19 0750    Medications:  Current Facility-Administered Medications   Medication Dose Route Frequency Provider Last Rate Last Dose    apixaban (ELIQUIS) tablet 5 mg  5 mg Oral BID Janice Radford MD   5 mg at 05/01/19 2010    latanoprost (XALATAN) 0.005 % ophthalmic solution 1 drop  1 drop Both Eyes Nightly Janice Radford MD   1 drop at 05/01/19 2013    atenolol (TENORMIN) tablet 50 mg  50 mg Oral Daily Janice Radford MD   50 mg at 05/01/19 1726    losartan (COZAAR) tablet 50 mg  50 mg Oral Daily Janice Radford MD   50 mg at 05/01/19 1726    pantoprazole (PROTONIX) tablet 40 mg  40 mg Oral QAM AC Thiago Somers MD        escitalopram (LEXAPRO) tablet 20 mg  20 mg Oral Daily Thiago Somers MD   20 mg at 05/01/19 1726    tiZANidine (ZANAFLEX) tablet 2 mg  2 mg Oral TID PRN Thiago Somers MD        diltiazem (CARDIZEM CD) extended release capsule 120 mg  120 mg Oral BID Thiago Somers MD   120 mg at 05/01/19 1726    atorvastatin (LIPITOR) tablet 40 mg  40 mg Oral Nightly Thiago Somers MD   40 mg at 05/01/19 2011    sodium chloride flush 0.9 % injection 10 mL  10 mL Intravenous 2 times per day Thiago Somers MD   10 mL at 05/01/19 2011    sodium chloride flush 0.9 % injection 10 mL  10 mL Intravenous PRN Thiago Somers MD        magnesium hydroxide (MILK OF MAGNESIA) 400 MG/5ML suspension 30 mL  30 mL Oral Daily PRN Thiago Somers MD        aspirin chewable tablet 81 mg  81 mg Oral Daily Thiago Somers MD        dorzolamide (TRUSOPT) 2 % ophthalmic solution 1 drop  1 drop Both Eyes BID Thiago Somers MD   1 drop at 05/01/19 2013    timolol (TIMOPTIC) 0.5 % ophthalmic solution 1 drop  1 drop Both Eyes BID Thiago Somers MD   1 drop at 05/01/19 2013           apixaban  5 mg Oral BID    latanoprost  1 drop Both Eyes Nightly    atenolol  50 mg Oral Daily    losartan  50 mg Oral Daily    pantoprazole  40 mg Oral QAM AC    escitalopram  20 mg Oral Daily    diltiazem  120 mg Oral BID    atorvastatin  40 mg Oral Nightly    sodium chloride flush  10 mL Intravenous 2 times per day    aspirin  81 mg Oral Daily    dorzolamide  1 drop Both Eyes BID    timolol  1 drop Both Eyes BID     tiZANidine, sodium chloride flush, magnesium hydroxide  Diet NPO Time Specified       Labs:     Recent Labs     05/01/19  1006 05/02/19  0114   WBC 7.4 6.3   RBC 4.40 4.05*   HGB 13.7 12.4   HCT 41.3 39.1   MCV 93.9 96.5   MCH 31.1* 30.6   MCHC 33.2 31.7*    157     Recent Labs 05/01/19  1006 05/02/19  0114   * 139   K 4.4 3.8   ANIONGAP 14 10    103   CO2 20* 26   BUN 18 22   CREATININE 0.8 0.7   GLUCOSE 189* 114*   CALCIUM 9.2 9.1     No results for input(s): MG, PHOS in the last 72 hours. Recent Labs     05/01/19  1006   AST 18   ALT 11   BILITOT 0.6   ALKPHOS 85     HgBA1c:  No components found for: HGBA1C  FLP:    Lab Results   Component Value Date    TRIG 140 03/22/2019    HDL 58 03/22/2019    LDLCALC 94 03/22/2019     TSH:    Lab Results   Component Value Date    TSH 7.180 04/18/2019     Troponin T:   Recent Labs     05/01/19  1250 05/01/19  1701 05/01/19  2343   TROPONINI <0.01 <0.01 <0.01     Pro-BNP: No results for input(s): BNP in the last 72 hours. INR:   Recent Labs     05/01/19  1006   INR 1.40*     ABGs: No results found for: PHART, PO2ART, HPK4WOM  UA:  Recent Labs     05/01/19  2200   COLORU YELLOW   PHUR 6.0   CLARITYU Clear   SPECGRAV 1.024   LEUKOCYTESUR Negative   UROBILINOGEN 0.2   BILIRUBINUR SMALL*   BLOODU Negative   GLUCOSEU Negative         RAD:   Ct Abdomen Pelvis W Wo Contrast Additional Contrast? Radiologist Recommendation    Result Date: 4/19/2019  EXAMINATION: CT ABDOMEN PELVIS W WO CONTRAST 4/19/2019 8:57 PM HISTORY: Renal mass Comparison: Renal ultrasound 4/19/2019 Technique: Sequential imaging was performed from the lung bases through the pubic symphysis after the administration of IV contrast. Sagittal and coronal reformations were made from the original source data and reviewed. Automated exposure control was utilized for radiation dose reduction. Radiation dose: DLP 4140 mGy-cm Findings: The visualized heart appears normal in size. Coronary artery calcifications are evident. There is elevation of the left hemidiaphragm. The visualized lung bases appear clear. Precontrast images of the kidneys demonstrate hyperdense lesion extending from the lower pole the left kidney. Multiple additional hypodense lesions are noted.  On the postcontrast including MRCP is recommended. 5. Possible small right ovarian cyst for which nonemergent follow-up pelvic ultrasound is recommended. Signed by Dr Yolanda Fernández on 4/19/2019 9:04 PM    Xr Chest Standard (2 Vw)    Result Date: 4/15/2019  EXAMINATION: Chest 2 views 4/15/2019 HISTORY: Shortness of breath and fatigue. FINDINGS: Today's exam is compared to previous study of 11/2/2018. There is chronic elevation of the left hemidiaphragm suggesting the possibility of diaphragmatic paralysis. Left basilar atelectasis is present. There is marked cardiomegaly. The right lung is fully expanded and clear. . Chronic elevation of the left hemidiaphragm with left basilar atelectasis. Lungs are otherwise clear. Signed by Dr Vilma Adair on 4/15/2019 8:53 PM    Ct Head Wo Contrast    Result Date: 5/1/2019  CT HEAD WO CONTRAST 5/1/2019 9:30 AM History: Blurred vision Comparisons: None Technique: Radiation dose equals DLP 1008 mGy cm. AUTOMATED EXPOSURE CONTROL dose reduction technique was implemented CT evaluation of the head without intravenous contrast. 5 mm transaxial images were obtained. 2-D sagittal and coronal reconstruction images were generated. Findings: There is central and cortical atrophy. There is low attenuation changes in the periventricular and subcortical white matter as well as in the basal ganglia, internal and external capsule regions compatible with chronic ischemic change. There is no intra or extra-axial hemorrhage. There is no mass effect or midline shift. There is no hydrocephalus. A retrocerebellar CSF collection identified, superiorly near the falx. Small arachnoid cyst versus beryl cisterna magna considered. Hyperostosis frontalis interna appreciated. No acute calvarial abnormality observed. Mastoid and paranasal sinuses are aerated and clear. Impression: 1. No CT evidence of an acute intracranial process. 2. Atrophy and chronic ischemic changes.                  Signed by Dr Mary Phillips on 5/1/2019 10:51 AM    Us Renal Complete    Result Date: 4/19/2019  EXAMINATION:  US RENAL COMPLETE  4/19/2019 7:53 AM HISTORY: Possible hyperdense cyst in the lower pole left kidney on recent CT chest. COMPARISON: No comparison study. TECHNIQUE: Grayscale and color flow imaging were performed. FINDINGS: The right kidney measures 8.7 cm and the left kidney measures 10.4 cm. The cortical thickness and echogenicity are normal. There is no hydronephrosis. There is a 0.7 x 0.8 x 0.6 cm cyst in the mid pole region left kidney. There is an 8 mm hypoechoic area seen on image 24 that is likely a small cyst that accounts for the hyperdense lesion in the lower pole of the left kidney. This was dictated in the CT chest report as an upper pole lesion but is actually in the lower pole. There is an echogenic area in the mid to lower pole left kidney measuring 1.1 cm that is somewhat linear and does not have the appearance of a typical mass. The urinary bladder is not very well distended. 1. There is an 8 mm hypoechoic area in the lower pole left kidney on image 24 that is probably a small cyst and is in the approximate location of the hyperdense lesion seen on CT. This was dictated on the CT as being in the upper pole but it is actually in the lower pole on CT. 2. There is another simple appearing cyst in the left mid kidney. 3. There is an echogenic irregular area in the mid to lower pole left kidney that is indeterminate. This area would likely be better evaluated with renal mass protocol CT or MRI. 4. The right kidney is sonographically normal. Signed by Dr Casandra Price on 4/19/2019 7:58 AM    Xr Chest Portable    Result Date: 5/1/2019  XR CHEST PORTABLE 5/1/2019 10:53 AM History: Chest pain. One view chest x-ray compared with 4/15/2019. Chronic left diaphragm elevation with rightward displacement of the heart and mediastinum. Interstitial lung disease with no pneumothorax, heart failure, or focal infiltrate.     1. Stable chronic change. Signed by Dr Vashti Phillips on 5/1/2019 10:54 AM    Cta Pulmonary W Contrast    Result Date: 4/18/2019  CTA PULMONARY W CONTRAST 4/18/2019 6:45 PM HISTORY: Shortness of breath COMPARISON: None. DLP: 672 mGy cm. Automated exposure control was utilized to diminish patient radiation dose. TECHNIQUE: Helical tomographic images of the chest were obtained after the administration of intravenous contrast following angiogram protocol. Additionally, 3D MIP reconstructions in the coronal and sagittal planes were provided. FINDINGS:  Pulmonary arteries: There is adequate enhancement of the pulmonary arteries to evaluate for central and segmental pulmonary emboli. There are no filling defects within the main, lobar, segmental or visualized subsegmental pulmonary arteries. The pulmonary vessels are within normal limits. Aorta and great vessels: The aorta is well opacified and demonstrates no aneurysm, stenosis or dissection. There is tortuosity of the proximal great vessels. No evidence of focal steno-occlusive disease. . Coronary calcifications are noted in the LAD distribution. . Neck base: The imaged portion of the base of the neck appears unremarkable. Lungs: There is marked elevation of the left hemidiaphragm. Bibasilar atelectasis is present. . The trachea and bronchial tree are patent. Heart: There is marked cardiomegaly. There is significant right atrial enlargement. Reflux of contrast into the intrahepatic IVC and branch vessels suggest elevated right heart pressure. . There is no pericardial effusion. Lymph nodes: No pathologically enlarged mediastinal, hilar, or axillary lymph nodes are present. Bones and soft tissues: The patient is status post right reverse total shoulder arthroplasty. There is an accentuated thoracic kyphosis. No acute bony abnormality are present. Simone Bell Upper abdomen:  There is a 8 mm hyperdense lesion involving the upper pole of the left kidney likely representing a hemorrhagic 04/18/2019 --> 7.18  · Of note: Mildly elevated TSH level on 04/15/19 - 4.88  · Total and Free T4 level within reference range    · Repeat TSH (05/02/2019): 3.13 [0.35 - 5.50 uIU/mL]            CT abdomen pelvis, with and without contrast renal mass protocol (04/19/2019)  Impression:  1. Bilateral renal cysts. No solid enhancing renal mass identified. 2. Atherosclerotic disease. 3. Elevation of the left hemidiaphragm. 4. Hypodense lesions in the pancreatic tail for which nonemergent MRI of the abdomen with and without contrast including MRCP is recommended. 5. Possible small right ovarian cyst for which nonemergent follow-up  pelvic ultrasound is recommended. Continue management of other chronic medical conditions - see above and orders.             Advance Directive: Full Code    Diet NPO Time Specified     DVT prophylaxis: Apixaban    Consults Made:   IP CONSULT TO CARDIOLOGY    Discharge planning: tbd       Electronically signed by   Nitin Mayer MD, MPH,   Internal Medicine Hospitalist   5/2/2019 7:50 AM

## 2019-05-02 NOTE — CARE COORDINATION
Patient is current with [address] [address] for SN/PT/ST  Services. Will follow. Please advise when patient discharges. [address] [place] [address] [phone]. [phone].   Electronically signed by Jose Stewart RN on 5/2/19 at 9:09 AM

## 2019-05-03 VITALS
HEIGHT: 66 IN | BODY MASS INDEX: 25.62 KG/M2 | WEIGHT: 159.4 LBS | TEMPERATURE: 97.2 F | OXYGEN SATURATION: 97 % | SYSTOLIC BLOOD PRESSURE: 109 MMHG | HEART RATE: 65 BPM | RESPIRATION RATE: 16 BRPM | DIASTOLIC BLOOD PRESSURE: 70 MMHG

## 2019-05-03 LAB
LV EF: 60 %
LVEF MODALITY: NORMAL

## 2019-05-03 PROCEDURE — 6370000000 HC RX 637 (ALT 250 FOR IP): Performed by: INTERNAL MEDICINE

## 2019-05-03 PROCEDURE — 2580000003 HC RX 258: Performed by: INTERNAL MEDICINE

## 2019-05-03 PROCEDURE — G0378 HOSPITAL OBSERVATION PER HR: HCPCS

## 2019-05-03 PROCEDURE — 99217 PR OBSERVATION CARE DISCHARGE MANAGEMENT: CPT | Performed by: INTERNAL MEDICINE

## 2019-05-03 RX ORDER — ASPIRIN 81 MG/1
81 TABLET, CHEWABLE ORAL DAILY
Qty: 30 TABLET | Refills: 3 | Status: SHIPPED | OUTPATIENT
Start: 2019-05-04

## 2019-05-03 RX ADMIN — ESCITALOPRAM OXALATE 20 MG: 10 TABLET ORAL at 08:08

## 2019-05-03 RX ADMIN — DILTIAZEM HYDROCHLORIDE 120 MG: 120 CAPSULE, COATED, EXTENDED RELEASE ORAL at 08:09

## 2019-05-03 RX ADMIN — APIXABAN 5 MG: 5 TABLET, FILM COATED ORAL at 08:08

## 2019-05-03 RX ADMIN — Medication 10 ML: at 08:08

## 2019-05-03 RX ADMIN — DORZOLAMIDE HYDROCHLORIDE 1 DROP: 20 SOLUTION/ DROPS OPHTHALMIC at 08:09

## 2019-05-03 RX ADMIN — ASPIRIN 81 MG 81 MG: 81 TABLET ORAL at 08:09

## 2019-05-03 RX ADMIN — LOSARTAN POTASSIUM 50 MG: 50 TABLET ORAL at 08:09

## 2019-05-03 RX ADMIN — TIMOLOL MALEATE 1 DROP: 5 SOLUTION OPHTHALMIC at 08:09

## 2019-05-03 RX ADMIN — ATENOLOL 50 MG: 50 TABLET ORAL at 08:09

## 2019-05-03 RX ADMIN — PANTOPRAZOLE SODIUM 40 MG: 40 TABLET, DELAYED RELEASE ORAL at 07:36

## 2019-05-03 ASSESSMENT — PAIN SCALES - GENERAL: PAINLEVEL_OUTOF10: 0

## 2019-05-03 NOTE — DISCHARGE SUMMARY
Kong Montelongo  :  1935  MRN:  [id number]    Admit date:  2019  Discharge date:   5/3/2019       Admitting Physician:  Lady Clayton MD    Advance Directive: Full Code    Consults Made:   IP CONSULT TO CARDIOLOGY      Primary Care Physician:  SOLEDAD Wilks    Discharge Diagnoses:  Principal Problem:    Chest pain  Active Problems:    Shortness of breath    Atrial fibrillation (HCC)    Hypertension    Hyperlipemia    GERD (gastroesophageal reflux disease)    Elevated brain natriuretic peptide (BNP) level    Paroxysmal A-fib (HCC)    Chest discomfort  Resolved Problems:    * No resolved hospital problems. *      Significant Diagnostic Studies:   Ct Head Wo Contrast    Result Date: 2019  CT HEAD WO CONTRAST 2019 9:30 AM History: Blurred vision Comparisons: None Technique: Radiation dose equals DLP 1008 mGy cm. AUTOMATED EXPOSURE CONTROL dose reduction technique was implemented CT evaluation of the head without intravenous contrast. 5 mm transaxial images were obtained. 2-D sagittal and coronal reconstruction images were generated. Findings: There is central and cortical atrophy. There is low attenuation changes in the periventricular and subcortical white matter as well as in the basal ganglia, internal and external capsule regions compatible with chronic ischemic change. There is no intra or extra-axial hemorrhage. There is no mass effect or midline shift. There is no hydrocephalus. A retrocerebellar CSF collection identified, superiorly near the falx. Small arachnoid cyst versus beryl cisterna magna considered. Hyperostosis frontalis interna appreciated. No acute calvarial abnormality observed. Mastoid and paranasal sinuses are aerated and clear. Impression: 1. No CT evidence of an acute intracranial process. 2. Atrophy and chronic ischemic changes.                  Signed by Dr Shanice Chavez on 2019 10:51 AM    Xr Chest Portable    Result Date: 2019  XR CHEST PORTABLE Nallely Manzo on 5/2/2019 2:55 PM      Pertinent Labs:   CBC:   Recent Labs     05/01/19  1006 05/02/19  0114   WBC 7.4 6.3   HGB 13.7 12.4    157     BMP:    Recent Labs     05/01/19  1006 05/02/19  0114   * 139   K 4.4 3.8    103   CO2 20* 26   BUN 18 22   CREATININE 0.8 0.7   GLUCOSE 189* 114*     INR:   Recent Labs     05/01/19  1006   INR 1.40*     Lipids: No results for input(s): CHOL, HDL in the last 72 hours. Invalid input(s): LDLCALCU  ABGs:No results for input(s): PHART, IEQ4GBG, PO2ART, OQK8EDT, BEART, HGBAE, M1BZKQHG, CARBOXHGBART, 02THERAPY in the last 72 hours. HgBA1c:  No components found for: HEMOGLOBIN A1C      Hospital Course: As per Initial admission HPI 5/1/2019, quoted below; \"The patient is E 88 y.o. female who presented complaining of chest tightness and b/l upper ext weakness. Symptoms started this am reported as sudden weakness across chest and both arms. Felt as if she could barely move. Associated with generalized feeling of unwell, dizziness and SOB. Denied feeling palpitations. Symptoms lasted for several hours. Unable to identify any alleviating or aggravating factors. Currently symptoms are improved\"     # Chest pain / tightness  - Initial troponin negative X4 sets  - Serial EKGs for chest pain  - Optimized medical management  -->  Aspirin, Statin and Beta Blocker. --> Nitro glycerin sublingual when necessary for chest pain  - Continue to monitor on telemetry  - Cardiology consulted on admission    - S/p  Stress test for better risk stratification - 05/02/2019   Findings:  1. Analysis of the stress and rest images reveals mild basal septal defect on rest and stress images which is likely artifactual.  2. Analysis of the gated images reveals grossly normal left ventricular function with a calculated ejection fraction of 60 %.   Conclusions:  Grossly negative Cardiolyte for the presence of ischemia or infarction with normal wall motion and ejection fraction at rest.          History of HFrEF  and HFpEF,  not in acute exacerbation   - 2D Echo:  Normal left ventricular size with mildly reduced systolic function EF   29-26% and  grade 1 diastolic dysfunction  - Mildly Elevated ProBNP level 3,503  - Compared to Elevated ProBNP level 11,921 previous admission (04/18/2019)  Continue diuresis     History of A. Fib  Continue diltiazem  Continue apixaban     Hypothyroidism, likely subclinical  · Elevated TSH: 04/18/2019 --> 7.18  · Of note: Mildly elevated TSH level on 04/15/19 - 4.88  · Total and Free T4 level within reference range     · Repeat TSH (05/02/2019): 3.13 [0.35 - 5.50 uIU/mL]                 CT abdomen pelvis, with and without contrast renal mass protocol (04/19/2019)  Impression:  1. Bilateral renal cysts. No solid enhancing renal mass identified. 2. Atherosclerotic disease. 3. Elevation of the left hemidiaphragm. 4. Hypodense lesions in the pancreatic tail for which nonemergent MRI of the abdomen with and without contrast including MRCP is recommended. 5. Possible small right ovarian cyst for which nonemergent follow-up  pelvic ultrasound is recommended.           Continue management of other chronic medical conditions - see above and orders.                 Physical Exam:  Vital Signs: /70   Pulse 65   Temp 97.2 °F (36.2 °C) (Temporal)   Resp 16   Ht 5' 6\" (1.676 m)   Wt 159 lb 6.4 oz (72.3 kg)   LMP  (Approximate)   SpO2 97%   Breastfeeding? No   BMI 25.73 kg/m²   Physical Exam  General appearance: alert, appears stated age and cooperative    HEENT: Head: Normocephalic, no lesions, without obvious abnormality.   Neck: no carotid bruit, no JVD and supple, symmetrical, trachea midline  Lungs: Patient in no acute respiratory distress, No increased work of breathing clear to auscultation bilaterally  Heart: regular rate and rhythm, S1, S2 normal, no murmur, click, rub or gallop  Abdomen: soft, non-tender; bowel sounds normal; no masses,  no organomegaly  Extremities: extremities normal, atraumatic, no cyanosis or edema   2+ pulses in bilateral lower extremities  Neurologic: Mental status: Alert, oriented, thought content appropriate  Skin: Skin Warm, dry with , color, texture, turgor normal. No rashes or lesions or nodules. Discharge Medications:       Iline Else   Home Medication Instructions [id number]    Printed on:05/03/19 9243   Medication Information                      albuterol sulfate HFA (PROVENTIL HFA) 108 (90 Base) MCG/ACT inhaler  Inhale 2 puffs into the lungs every 6 hours as needed for Wheezing             apixaban (ELIQUIS) 5 MG TABS tablet  Take 1 tablet by mouth 2 times daily             aspirin 81 MG chewable tablet  Take 1 tablet by mouth daily             atenolol (TENORMIN) 50 MG tablet  TAKE 2 TABLETS BY MOUTH EVERY DAY             atorvastatin (LIPITOR) 40 MG tablet  Take 1 tablet by mouth daily             diltiazem (CARDIZEM CD) 120 MG extended release capsule  Take 1 capsule by mouth 2 times daily             dorzolamide-timolol (COSOPT) 22.3-6.8 MG/ML ophthalmic solution  INSTILL 1 DROP INTO BOTH EYES TWICE A DAY AS DIRECTED             escitalopram (LEXAPRO) 20 MG tablet  Take 1 tablet by mouth daily             latanoprost (XALATAN) 0.005 % ophthalmic solution  INSTILL 1 DROP INTO BOTH EYES IN THE EVENING             losartan (COZAAR) 50 MG tablet  Take 1 tablet by mouth daily, if blood pressure starts to run low cut down to 1/2 tablet. omeprazole (PRILOSEC) 40 MG delayed release capsule  Take 1 capsule by mouth daily             tiZANidine (ZANAFLEX) 2 MG tablet  Take 1 tablet by mouth 3 times daily as needed (low back pain)                 Discharge Instructions: Follow up with SOLEDAD Galaviz in 7 days. Take medications as directed. Resume activity as tolerated.     Diet: DIET CARDIAC;        DISCHARGE STATUS:    Condition: Good  Disposition: Patient is medically stable and will be discharged Home    Extended Emergency Contact Information  Primary Emergency Contact: [address] [place] [address] of [address] Phone: [phone]  Relation: Child       Time Spent on discharge is more than 32 in the examination, evaluation, counseling and review of medications and discharge plan. Electronically signed by   Radha Tapia MD, MPH,   Internal Medicine Hospitalist   5/3/2019 11:46 AM      Thank you SOLEDAD Zhang for the opportunity to be involved in this patient's care. If you have any questions or concerns please feel free to contact me at [phone]        EMR Dragon/Transcription disclaimer:   Much of this encounter note is an electronic transcription/translation of spoken language to printed text.  The electronic translation of spoken language may permit erroneous, or at times, nonsensical words or phrases to be inadvertently transcribed; although attempts have made to review the note for such errors, some may still exist.

## 2019-05-03 NOTE — CARE COORDINATION
[address] [address] notified of patient discharge today. DC Summary and DC med list faxed.   Electronically signed by Sarbjit Madrid RN on 5/3/19 at 3:18 PM

## 2019-05-06 ENCOUNTER — TELEPHONE (OUTPATIENT)
Dept: CARDIOLOGY | Age: 84
End: 2019-05-06

## 2019-05-06 DIAGNOSIS — F32.A ANXIETY AND DEPRESSION: ICD-10-CM

## 2019-05-06 DIAGNOSIS — F41.9 ANXIETY AND DEPRESSION: ICD-10-CM

## 2019-05-06 RX ORDER — DABIGATRAN ETEXILATE 150 MG/1
150 CAPSULE, COATED PELLETS ORAL 2 TIMES DAILY
COMMUNITY
End: 2019-05-16

## 2019-05-06 NOTE — TELEPHONE ENCOUNTER
Called and spoke with patient's daughter, advised to take allergy medication in AM and Benadryl at night. Advised to give a few days for the rash and itching to subside and then we will try pradaxa 150 mg BID. Advised she could  some samples to see if she will be able to tolerate this. She will stop by later this week.

## 2019-05-06 NOTE — TELEPHONE ENCOUNTER
Received a call from patient's daughter, she advised she was d/c'd from hospital on Friday and on Saturday started a horrible rash from head to toe, she is having fatigue and extreme itching. She states her mom just started Eliquis and on reading the insert this could be a symptom of a reaction. Advised her to stop taking the Eliquis and to take Benadryl. She is not having any swelling just rash and itching. Patient was in NSR when she was discharged from hospital on Friday and patient does not know when she goes in and out of rhythm.

## 2019-05-06 NOTE — TELEPHONE ENCOUNTER
[place],  Is Cozaar new? Could take a Zyrtec in AM and Benadry at bedtime for itching. Could try switching to Pradaxa 150 mg BID - start on Thursday morning to allow time for rash and itching to subside. I would recommend giving her samples first to make sure can tolerate.   Thanks, [employer]

## 2019-05-07 ENCOUNTER — TELEPHONE (OUTPATIENT)
Dept: INTERNAL MEDICINE | Age: 84
End: 2019-05-07

## 2019-05-07 RX ORDER — ESCITALOPRAM OXALATE 20 MG/1
20 TABLET ORAL DAILY
Qty: 90 TABLET | Refills: 3 | Status: SHIPPED | OUTPATIENT
Start: 2019-05-07 | End: 2020-05-05

## 2019-05-07 NOTE — TELEPHONE ENCOUNTER
Marco Antonio [facility] Transitions Initial Follow Up Call    Outreach made within 2 business days of discharge: Yes    Patient: Machelle Vazquez Patient : 1935   MRN: [id number]  Reason for Admission: Patient was admitted on May 1, 2019 due to chest pain. Discharge Diagnoses:  Principal Problem:    Chest pain  Active Problems:    Shortness of breath    Atrial fibrillation (HCC)    Hypertension    Hyperlipemia    GERD (gastroesophageal reflux disease)    Elevated brain natriuretic peptide (BNP) level    Paroxysmal A-fib (HCC)    Chest discomfort  Resolved Problems:    * No resolved hospital problems. *       Discharge Date: 5/3/19       Spoke with: patient. Discharge department/facility: [facility], [place], [address] [place] [address] Interactive Patient Contact:  Was patient able to fill all prescriptions: No:  No new rx given. Was patient instructed to bring all medications to the follow-up visit: Yes  Is patient taking all medications as directed in the discharge summary? No-  Pt has developed rash. Cardiology has instructed patient to discontinue Eliquis. Does patient understand their discharge instructions: Yes  Does patient have questions or concerns that need addressed prior to 7-14 day follow up office visit: yes - patient states that she feels a little weaker today. She states her legs just aren't stable. Therapy has been in home today. Patient has also developed rash all over her body. She has been advised to discontinue Eliquis, take antihistimine during day and Benadryl at night time. Patient is to  replacement medication later this week. Bowels and bladder working OK. Pt gets Meals on Wheels and is eating OK. Slept well. Patient to bring all medication to HFU and will contact office with any concerns.     Scheduled appointment with PCP within 7-14 days    Follow Up  Future Appointments   Date Time Provider Mary Burk   5/10/2019  8:30 AM [facility] RM 2 [facility]   5/10/2019 1:00 PM SOLEDAD Aponte [facility]   6/14/2019 11:00 AM MD GWENDOLYN Kennedy [facility]   8/16/2019  1:00 PM SOLEDAD [facility]onte [facility] Arana LPN

## 2019-05-08 ENCOUNTER — HOSPITAL ENCOUNTER (EMERGENCY)
Age: 84
Discharge: HOME | End: 2019-05-08
Attending: EMERGENCY MEDICINE
Payer: MEDICARE

## 2019-05-08 ENCOUNTER — APPOINTMENT (OUTPATIENT)
Dept: GENERAL RADIOLOGY | Age: 84
End: 2019-05-08
Payer: MEDICARE

## 2019-05-08 VITALS
TEMPERATURE: 98 F | OXYGEN SATURATION: 91 % | DIASTOLIC BLOOD PRESSURE: 68 MMHG | HEART RATE: 79 BPM | RESPIRATION RATE: 17 BRPM | SYSTOLIC BLOOD PRESSURE: 123 MMHG

## 2019-05-08 DIAGNOSIS — R21 RASH AND OTHER NONSPECIFIC SKIN ERUPTION: Primary | ICD-10-CM

## 2019-05-08 DIAGNOSIS — T78.40XA ALLERGIC REACTION, INITIAL ENCOUNTER: ICD-10-CM

## 2019-05-08 LAB
ALBUMIN SERPL-MCNC: 3.9 G/DL (ref 3.5–5.2)
ALP BLD-CCNC: 109 U/L (ref 35–104)
ALT SERPL-CCNC: 13 U/L (ref 5–33)
ANION GAP SERPL CALCULATED.3IONS-SCNC: 13 MMOL/L (ref 7–19)
AST SERPL-CCNC: 22 U/L (ref 5–32)
BILIRUB SERPL-MCNC: 0.4 MG/DL (ref 0.2–1.2)
BUN BLDV-MCNC: 13 MG/DL (ref 8–23)
CALCIUM SERPL-MCNC: 9.5 MG/DL (ref 8.8–10.2)
CHLORIDE BLD-SCNC: 98 MMOL/L (ref 98–111)
CO2: 26 MMOL/L (ref 22–29)
CREAT SERPL-MCNC: 0.7 MG/DL (ref 0.5–0.9)
GFR NON-AFRICAN AMERICAN: >60
GLUCOSE BLD-MCNC: 119 MG/DL (ref 74–109)
HCT VFR BLD CALC: 40 % (ref 37–47)
HEMOGLOBIN: 12.8 G/DL (ref 12–16)
INR BLD: 1.08 (ref 0.88–1.18)
MCH RBC QN AUTO: 30.6 PG (ref 27–31)
MCHC RBC AUTO-ENTMCNC: 32 G/DL (ref 33–37)
MCV RBC AUTO: 95.7 FL (ref 81–99)
PDW BLD-RTO: 14.1 % (ref 11.5–14.5)
PLATELET # BLD: 182 K/UL (ref 130–400)
PMV BLD AUTO: 11.8 FL (ref 9.4–12.3)
POTASSIUM SERPL-SCNC: 4.6 MMOL/L (ref 3.5–5)
PRO-BNP: 616 PG/ML (ref 0–1800)
PROTHROMBIN TIME: 13.4 SEC (ref 12–14.6)
RBC # BLD: 4.18 M/UL (ref 4.2–5.4)
SODIUM BLD-SCNC: 137 MMOL/L (ref 136–145)
TOTAL PROTEIN: 7.4 G/DL (ref 6.6–8.7)
TROPONIN: <0.01 NG/ML (ref 0–0.03)
WBC # BLD: 6.1 K/UL (ref 4.8–10.8)

## 2019-05-08 PROCEDURE — 99284 EMERGENCY DEPT VISIT MOD MDM: CPT | Performed by: EMERGENCY MEDICINE

## 2019-05-08 PROCEDURE — 80053 COMPREHEN METABOLIC PANEL: CPT

## 2019-05-08 PROCEDURE — 99283 EMERGENCY DEPT VISIT LOW MDM: CPT

## 2019-05-08 PROCEDURE — 2500000003 HC RX 250 WO HCPCS: Performed by: EMERGENCY MEDICINE

## 2019-05-08 PROCEDURE — 6360000002 HC RX W HCPCS: Performed by: EMERGENCY MEDICINE

## 2019-05-08 PROCEDURE — 71046 X-RAY EXAM CHEST 2 VIEWS: CPT

## 2019-05-08 PROCEDURE — 84484 ASSAY OF TROPONIN QUANT: CPT

## 2019-05-08 PROCEDURE — 85027 COMPLETE CBC AUTOMATED: CPT

## 2019-05-08 PROCEDURE — 96375 TX/PRO/DX INJ NEW DRUG ADDON: CPT

## 2019-05-08 PROCEDURE — 85610 PROTHROMBIN TIME: CPT

## 2019-05-08 PROCEDURE — 93005 ELECTROCARDIOGRAM TRACING: CPT

## 2019-05-08 PROCEDURE — 36415 COLL VENOUS BLD VENIPUNCTURE: CPT

## 2019-05-08 PROCEDURE — 83880 ASSAY OF NATRIURETIC PEPTIDE: CPT

## 2019-05-08 PROCEDURE — 96374 THER/PROPH/DIAG INJ IV PUSH: CPT

## 2019-05-08 RX ORDER — METHYLPREDNISOLONE SODIUM SUCCINATE 125 MG/2ML
125 INJECTION, POWDER, LYOPHILIZED, FOR SOLUTION INTRAMUSCULAR; INTRAVENOUS ONCE
Status: COMPLETED | OUTPATIENT
Start: 2019-05-08 | End: 2019-05-08

## 2019-05-08 RX ORDER — DIPHENHYDRAMINE HYDROCHLORIDE 50 MG/ML
25 INJECTION INTRAMUSCULAR; INTRAVENOUS ONCE
Status: COMPLETED | OUTPATIENT
Start: 2019-05-08 | End: 2019-05-08

## 2019-05-08 RX ORDER — PREDNISONE 20 MG/1
40 TABLET ORAL DAILY
Qty: 10 TABLET | Refills: 0 | Status: SHIPPED | OUTPATIENT
Start: 2019-05-08 | End: 2019-05-13

## 2019-05-08 RX ORDER — FAMOTIDINE 20 MG/1
20 TABLET, FILM COATED ORAL 2 TIMES DAILY
Qty: 20 TABLET | Refills: 0 | Status: SHIPPED | OUTPATIENT
Start: 2019-05-08 | End: 2019-05-16

## 2019-05-08 RX ADMIN — METHYLPREDNISOLONE SODIUM SUCCINATE 125 MG: 125 INJECTION, POWDER, FOR SOLUTION INTRAMUSCULAR; INTRAVENOUS at 22:15

## 2019-05-08 RX ADMIN — FAMOTIDINE 20 MG: 10 INJECTION, SOLUTION INTRAVENOUS at 22:15

## 2019-05-08 RX ADMIN — DIPHENHYDRAMINE HYDROCHLORIDE 25 MG: 50 INJECTION, SOLUTION INTRAMUSCULAR; INTRAVENOUS at 22:15

## 2019-05-08 ASSESSMENT — ENCOUNTER SYMPTOMS
DIARRHEA: 0
SHORTNESS OF BREATH: 1
EYE PAIN: 0
VOMITING: 0
ABDOMINAL PAIN: 0

## 2019-05-09 NOTE — ED PROVIDER NOTES
[address] [facility] [facility] EMERGENCY DEPT  eMERGENCY dEPARTMENT eNCOUnter      Pt Name: Enedelia Parham  MRN: [id number]  [place] 1935  Date of evaluation: 5/8/2019  Provider: Ebony Doe MD    [address] [place] [address]       Chief Complaint   Patient presents with    Allergic Reaction     Pt started taking ELiquis and Cardizem at the same time she started to break out in rash Friday. Pt was told to stop taking Eliquis but rash continues to spread and is over entire body pt states it eaches as well. HISTORY OF PRESENT ILLNESS   (Location/Symptom, Timing/Onset,Context/Setting, Quality, Duration, Modifying Factors, Severity)  Note limiting factors. Enedelia Parham is a 82 y.o. female who presents to the emergency department due to generalized rash. She was recently hospitalized for chest pain and generalized weakness. Had negative stress test. Diagnosed with A. fib. Discharged on Cardizem and Eliquis 5 days ago. Developed itchy rash the day she was discharged. Called cardiology clinic on Monday and they had her discontinue the Eliquis. Rash continues. Has generalized rash that she describes as itchy. No blistering. Doesn't involve the palms or soles. No mucous membrane involvement. Has been taking Benadryl without much relief. Still on Cardizem which is a new medication for her. No other new meds. No chest pain. No unilateral leg swelling or pain. No history of DVT or PE. Has had some mild dyspnea but she tells me this is similar to her chronic dyspnea. Very clear that she's not more short of breath than usual. No new detergent soaps exposures etc. No other new medications other than Cardizem and Eliquis. HPI    NursingNotes were reviewed. REVIEW OF SYSTEMS    (2-9 systems for level 4, 10 or more for level 5)     Review of Systems   Constitutional: Negative for fever. Eyes: Negative for pain. Respiratory: Positive for shortness of breath (chronic and similar to baseline).     Cardiovascular: Negative for chest pain and palpitations. Gastrointestinal: Negative for abdominal pain, diarrhea and vomiting. Genitourinary: Negative for dysuria. Skin: Positive for rash. Neurological: Negative for weakness and headaches. All other systems reviewed and are negative. A complete review of systems was performed and is negative except as noted above in the HPI. PAST MEDICAL HISTORY     Past Medical History:   Diagnosis Date    Arthritis     Atrial fibrillation ([address]     GERD (gastroesophageal reflux disease)     Hyperlipidemia     Hypertension     Tremor     to right hand         SURGICAL HISTORY       Past Surgical History:   Procedure Laterality Date    CHOLECYSTECTOMY      HUMERUS FRACTURE SURGERY Right 7/9/2016    REVERSE TOTAL SHOULDER ARTHROPLASTY performed by Herlinda Stewart MD at [address] [facility]       Previous Medications    ALBUTEROL SULFATE HFA (PROVENTIL HFA) 108 (90 BASE) MCG/ACT INHALER    Inhale 2 puffs into the lungs every 6 hours as needed for Wheezing    ASPIRIN 81 MG CHEWABLE TABLET    Take 1 tablet by mouth daily    ATENOLOL (TENORMIN) 50 MG TABLET    TAKE 2 TABLETS BY MOUTH EVERY DAY    ATORVASTATIN (LIPITOR) 40 MG TABLET    Take 1 tablet by mouth daily    DABIGATRAN (PRADAXA) 150 MG CAPSULE    Take 150 mg by mouth 2 times daily    DORZOLAMIDE-TIMOLOL (COSOPT) 22.3-6.8 MG/ML OPHTHALMIC SOLUTION    INSTILL 1 DROP INTO BOTH EYES TWICE A DAY AS DIRECTED    ESCITALOPRAM (LEXAPRO) 20 MG TABLET    Take 1 tablet by mouth daily    LATANOPROST (XALATAN) 0.005 % OPHTHALMIC SOLUTION    INSTILL 1 DROP INTO BOTH EYES IN THE EVENING    LOSARTAN (COZAAR) 50 MG TABLET    Take 1 tablet by mouth daily, if blood pressure starts to run low cut down to 1/2 tablet.     OMEPRAZOLE (PRILOSEC) 40 MG DELAYED RELEASE CAPSULE    Take 1 capsule by mouth daily    TIZANIDINE (ZANAFLEX) 2 MG TABLET    Take 1 tablet by mouth 3 times daily as needed (low back pain)       ALLERGIES     Eliquis [apixaban] and Morphine    FAMILY HISTORY       Family History   Problem Relation Age of Onset    Heart Attack Mother     Stroke Father     Heart Attack Father     Heart Disease Sister     Atrial Fibrillation Sister           SOCIAL HISTORY       Social History     Socioeconomic History    Marital status:      Spouse name: None    Number of children: None    Years of education: None    Highest education level: None   Occupational History    None   Social Needs    Financial resource strain: None    Food insecurity:     Worry: None     Inability: None    Transportation needs:     Medical: None     Non-medical: None   Tobacco Use    Smoking status: Never Smoker    Smokeless tobacco: Never Used   Substance and Sexual Activity    Alcohol use: No    Drug use: No    Sexual activity: Not Currently   Lifestyle    Physical activity:     Days per week: None     Minutes per session: None    Stress: None   Relationships    Social connections:     Talks on phone: None     Gets together: None     Attends [employer] service: None     Active member of club or organization: None     Attends meetings of clubs or organizations: None     Relationship status: None    Intimate partner violence:     Fear of current or ex partner: None     Emotionally abused: None     Physically abused: None     Forced sexual activity: None   Other Topics Concern    None   Social History Narrative    None       SCREENINGS    Drew Coma Scale  Eye Opening: Spontaneous  Best Verbal Response: Oriented  Best Motor Response: Obeys commands  Drew Coma Scale Score: 15        PHYSICAL EXAM    (up to 7 for level 4, 8 or more for level 5)     ED Triage Vitals [05/08/19 2157]   BP Temp Temp Source Pulse Resp SpO2 Height Weight   123/68 98 °F (36.7 °C) Oral 79 17 91 % -- --       Physical Exam   Constitutional: She is oriented to person, place, and time. She appears well-developed. No distress.    HENT:   Head: Normocephalic and atraumatic. Mouth/Throat: No oropharyngeal exudate. Eyes: Pupils are equal, round, and reactive to light. Conjunctivae are normal. No scleral icterus. Neck: Normal range of motion. Neck supple. No JVD present. Cardiovascular: Normal rate, regular rhythm, normal heart sounds and intact distal pulses. Pulmonary/Chest: Effort normal and breath sounds normal. No respiratory distress. Abdominal: Soft. She exhibits no distension. There is no tenderness. Musculoskeletal: She exhibits no edema or tenderness. Neurological: She is alert and oriented to person, place, and time. Skin: Skin is warm and dry. Rash (no mucous membrane involvement) noted. Rash is urticarial (generalized). Psychiatric: She has a normal mood and affect. Her behavior is normal.   Vitals reviewed. DIAGNOSTIC RESULTS     EKG: All EKG's are interpreted by the Emergency Department Physician who either signs or Co-signs this chart in the absence of a cardiologist.    Sinus rhythm. Normal QT interval. Prolonged CA. RADIOLOGY:   Non-plain film images such as CT, Ultrasound and MRI are read by the radiologist. [place] Never images are visualized and preliminarily interpreted by the emergency physician with the below findings:    Chest: Stable chronic change. Interpretation per the Radiologist below, if available at the time of this note:    XR CHEST STANDARD (2 VW)    (Results Pending)         LABS:  Labs Reviewed   CBC - Abnormal; Notable for the following components:       Result Value    RBC 4.18 (*)     MCHC 32.0 (*)     All other components within normal limits   COMPREHENSIVE METABOLIC PANEL - Abnormal; Notable for the following components:    Glucose 119 (*)     Alkaline Phosphatase 109 (*)     All other components within normal limits   PROTIME-INR   TROPONIN   BRAIN NATRIURETIC PEPTIDE       All other labs were within normal range or not returned as of this dictation.     EMERGENCY DEPARTMENT COURSE and

## 2019-05-10 ENCOUNTER — OFFICE VISIT (OUTPATIENT)
Dept: INTERNAL MEDICINE | Age: 84
End: 2019-05-10
Payer: MEDICARE

## 2019-05-10 ENCOUNTER — HOSPITAL ENCOUNTER (OUTPATIENT)
Dept: MRI IMAGING | Age: 84
Discharge: HOME | End: 2019-05-10
Payer: MEDICARE

## 2019-05-10 ENCOUNTER — TELEPHONE (OUTPATIENT)
Dept: INTERNAL MEDICINE | Age: 84
End: 2019-05-10

## 2019-05-10 VITALS
OXYGEN SATURATION: 97 % | BODY MASS INDEX: 26.45 KG/M2 | SYSTOLIC BLOOD PRESSURE: 130 MMHG | HEART RATE: 76 BPM | WEIGHT: 164.6 LBS | HEIGHT: 66 IN | DIASTOLIC BLOOD PRESSURE: 80 MMHG | TEMPERATURE: 97.6 F

## 2019-05-10 DIAGNOSIS — Q45.3 PANCREATIC ABNORMALITY: ICD-10-CM

## 2019-05-10 DIAGNOSIS — I10 ESSENTIAL HYPERTENSION: ICD-10-CM

## 2019-05-10 DIAGNOSIS — J98.6 ELEVATED DIAPHRAGM: ICD-10-CM

## 2019-05-10 DIAGNOSIS — I48.0 PAROXYSMAL A-FIB (HCC): ICD-10-CM

## 2019-05-10 DIAGNOSIS — K21.9 GASTROESOPHAGEAL REFLUX DISEASE WITHOUT ESOPHAGITIS: ICD-10-CM

## 2019-05-10 DIAGNOSIS — R07.9 CHEST PAIN, UNSPECIFIED TYPE: Primary | ICD-10-CM

## 2019-05-10 DIAGNOSIS — T78.40XD ALLERGIC REACTION TO DRUG, SUBSEQUENT ENCOUNTER: ICD-10-CM

## 2019-05-10 DIAGNOSIS — R07.89 CHEST DISCOMFORT: ICD-10-CM

## 2019-05-10 DIAGNOSIS — E78.2 MIXED HYPERLIPIDEMIA: ICD-10-CM

## 2019-05-10 DIAGNOSIS — R06.02 SHORTNESS OF BREATH: ICD-10-CM

## 2019-05-10 LAB
EKG P AXIS: 55 DEGREES
EKG P-R INTERVAL: 222 MS
EKG Q-T INTERVAL: 418 MS
EKG QRS DURATION: 74 MS
EKG QTC CALCULATION (BAZETT): 444 MS
EKG T AXIS: 33 DEGREES

## 2019-05-10 PROCEDURE — 99214 OFFICE O/P EST MOD 30 MIN: CPT | Performed by: PHYSICIAN ASSISTANT

## 2019-05-10 PROCEDURE — 1111F DSCHRG MED/CURRENT MED MERGE: CPT | Performed by: PHYSICIAN ASSISTANT

## 2019-05-10 PROCEDURE — G8399 PT W/DXA RESULTS DOCUMENT: HCPCS | Performed by: PHYSICIAN ASSISTANT

## 2019-05-10 PROCEDURE — 1036F TOBACCO NON-USER: CPT | Performed by: PHYSICIAN ASSISTANT

## 2019-05-10 PROCEDURE — G8427 DOCREV CUR MEDS BY ELIG CLIN: HCPCS | Performed by: PHYSICIAN ASSISTANT

## 2019-05-10 PROCEDURE — 1123F ACP DISCUSS/DSCN MKR DOCD: CPT | Performed by: PHYSICIAN ASSISTANT

## 2019-05-10 PROCEDURE — 1090F PRES/ABSN URINE INCON ASSESS: CPT | Performed by: PHYSICIAN ASSISTANT

## 2019-05-10 PROCEDURE — 4040F PNEUMOC VAC/ADMIN/RCVD: CPT | Performed by: PHYSICIAN ASSISTANT

## 2019-05-10 PROCEDURE — G8417 CALC BMI ABV UP PARAM F/U: HCPCS | Performed by: PHYSICIAN ASSISTANT

## 2019-05-10 NOTE — PROGRESS NOTES
Agree with PA assessement and plan of care    Electronically signed by Flores Ybarra MD on 5/10/2019 at 4:42 PM

## 2019-05-10 NOTE — PROGRESS NOTES
Post-Discharge Transitional Care Management Services or Hospital Follow Up      Mando Boothe   YOB: 1935    Date of Office Visit:  5/10/2019  Date of Hospital Admission: 05/01/19  Date of Hospital Discharge: 05/03/19    Care management risk score Rising risk (score 2-5) and Complex Care (Scores >=6): 1     Non face to face  following discharge, date last encounter closed (first attempt may have been earlier): 5/7/2019  2:36 PM 5/7/2019  2:36 PM    Call initiated 2 business days of discharge: Yes    Patient Active Problem List   Diagnosis    2-part displaced fracture of surgical neck of left humerus, initial encounter for closed fracture    Hypertension    Hyperlipemia    GERD (gastroesophageal reflux disease)    2-part displaced fracture of surgical neck of right humerus, initial encounter for closed fracture    Nausea    Glaucoma    Shortness of breath    Anxiety and depression    Atrial fibrillation (HCC)    Elevated brain natriuretic peptide (BNP) level    Elevated TSH    Hypothyroidism    Renal cyst    Chest pain    Paroxysmal A-fib (HCC)    Chest discomfort       Allergies   Allergen Reactions    Eliquis [Apixaban] Rash    Morphine Nausea And Vomiting       Medications listed as ordered at the time of discharge from hospital  yes    Medications marked \"taking\" at this time  Outpatient Medications Marked as Taking for the 5/10/19 encounter (Office Visit) with SOLEDAD [facility]   Medication Sig Dispense Refill    predniSONE (DELTASONE) 20 MG tablet Take 2 tablets by mouth daily for 5 days 10 tablet 0    famotidine (PEPCID) 20 MG tablet Take 1 tablet by mouth 2 times daily for 10 days 20 tablet 0    escitalopram (LEXAPRO) 20 MG tablet Take 1 tablet by mouth daily 90 tablet 3    dabigatran (PRADAXA) 150 MG capsule Take 150 mg by mouth 2 times daily      aspirin 81 MG chewable tablet Take 1 tablet by mouth daily 30 tablet 3    atorvastatin (LIPITOR) 40 MG tablet Take 1 tablet by mouth daily 90 tablet 3    tiZANidine (ZANAFLEX) 2 MG tablet Take 1 tablet by mouth 3 times daily as needed (low back pain) 30 tablet 0    losartan (COZAAR) 50 MG tablet Take 1 tablet by mouth daily, if blood pressure starts to run low cut down to 1/2 tablet. 90 tablet 3    omeprazole (PRILOSEC) 40 MG delayed release capsule Take 1 capsule by mouth daily 90 capsule 3    albuterol sulfate HFA (PROVENTIL HFA) 108 (90 Base) MCG/ACT inhaler Inhale 2 puffs into the lungs every 6 hours as needed for Wheezing 1 Inhaler 3    dorzolamide-timolol (COSOPT) 22.3-6.8 MG/ML ophthalmic solution INSTILL 1 DROP INTO BOTH EYES TWICE A DAY AS DIRECTED  3    latanoprost (XALATAN) 0.005 % ophthalmic solution INSTILL 1 DROP INTO BOTH EYES IN THE EVENING  3    atenolol (TENORMIN) 50 MG tablet TAKE 2 TABLETS BY MOUTH EVERY  tablet 3        Medications patient taking as of now reconciled against medications ordered at time of hospital discharge: Yes    Chief Complaint   Patient presents with    Follow-Up from Hospital     pt went back to ER wed. night fro allergic reaction        History of Present illness - Follow up of Hospital diagnosis(es): Chest pain, shortness breath, nature fibrillations, hypertension, hyperlipidemia, GERD, CHF, weakness. Inpatient course: Discharge summary reviewed- see chart. Interval history/Current status: Patient presented to [facility] emergency room with a complaint of weakness in the upper extremities and tightness across the chest.  Patient states she felt like she could barely move her upper extremities due to weakness. Patient was also has shortness of breath. Patient underwent a stress test which was fairly unremarkable and did not show any ischemia or infarction patient's ejection fraction on echo was 45-50% with some grade 1 diastolic dysfunction. Patient was started on diltiazem and Eliquis.   Her last admission it was noted that patient may have a lesion in the pancreas. An MRI has been ordered. Patient was discharged in stable condition. Patient then had to return to the emergency room on 8 May 2018 for a generalized itchy rash all over her body. It was felt that either Cardizem or the Eliquis was causing the allergic reaction and it was discussed with cardiology that she could discontinue both medications and was going to follow-up in cardiology in a week. She was given IV steroids and Benadryl and Pepcid and was discharged with Pepcid for 5 days and steroids for 5 days. Patient states the itching and rash has improved. Patient states seems like she's got a lot more strength nail and she was feeling better. Patient has Meals on Wheels, by and she is eating normally. Patient's daughter comes by and checks on the patient and make sure she is eating. Patient has home health coming by and physical therapy seems to be helping. Patient also had speech therapy to help with her breathing and it seems to be helping patient still uses the spirometer that was given to her and it seems to be helping. Speech therapy thought that she had maximized therapy so they're no longer going to be coming out. Patient denies any chest pain or shortness breaths were some baseline. Patient has elevated diaphragm so she always has some shortness of breath. Patient denies any swelling in lower extremities. Patient states seems to be doing fairly well overall. Patient denies any other complaints at this time. Vitals:    05/10/19 1245   BP: 130/80   Pulse: 76   Temp: 97.6 °F (36.4 °C)   SpO2: 97%   Weight: 164 lb 9.6 oz (74.7 kg)   Height: 5' 6\" (1.676 m)     Body mass index is 26.57 kg/m².    Wt Readings from Last 3 Encounters:   05/10/19 164 lb 9.6 oz (74.7 kg)   05/03/19 159 lb 6.4 oz (72.3 kg)   04/24/19 164 lb 4.8 oz (74.5 kg)     BP Readings from Last 3 Encounters:   05/10/19 130/80   05/08/19 123/68   05/03/19 109/70       A comprehensive review of systems was negative except for what was noted in the HPI. Physical Exam:  General Appearance: alert and oriented to person, place and time, well developed and well- nourished, in no acute distress  Skin: warm and dry, no rash or erythema  Head: normocephalic and atraumatic  Eyes: pupils equal, round, and reactive to light, extraocular eye movements intact, conjunctivae normal  ENT: tympanic membrane, external ear and ear canal normal bilaterally, nose without deformity, nasal mucosa and turbinates normal without polyps  Neck: supple and non-tender without mass, no thyromegaly or thyroid nodules, no cervical lymphadenopathy  Pulmonary/Chest: clear to auscultation bilaterally- no wheezes, rales or rhonchi, normal air movement, no respiratory distress  Cardiovascular: normal rate, regular rhythm, normal S1 and S2, no murmurs, rubs, clicks, or gallops, distal pulses intact, no carotid bruits  Abdomen: soft, non-tender, non-distended, normal bowel sounds, no masses or organomegaly  Extremities: no cyanosis, clubbing or edema  Musculoskeletal: normal range of motion, no joint swelling, deformity or tenderness  Neurologic:gait, coordination and speech normal.  Patient does walk with a walker. Narrative   Lexiscan Nuclear Stress Test Report   Procedure date: 5/2/2019      Indications: Chest pain   Procedure: Stress was performed with injection of 0.4 mg Lexiscan. Vital signs and EKG were monitored. Technetium-99 sestamibi was   injected in divided doses, approximately 10 mCi and 30 mCi   respectively for rest and stress imaging. The patient was discharged   in stable condition. Results: Patient had symptoms of dyspnea and nausea during infusion   that resolved in recovery. Baseline EKG showed normal sinus rhythm. During stress there were no significant EKG changes or rhythm changes.    Baseline and peak blood pressures were 110/64, and 118/72   respectively.  Baseline and peak heart rates were 73 and  94   respectively.   [facility] [address] [facility] Report   Date of Procedure: 5/2/2019   Date of Report: 5/2/2019   The patient was injected with 68.6 millicuries (mCi) of Technesium   (Tc99m). After an appropriate level of stress the patient was   re-injected with 31 millicuries (mCi) of Technesium (Tc99m). Repeat   gated images were then performed per standard protocol. Findings:   1. Analysis of the stress and rest images reveals mild basal septal   defect on rest and stress images which is likely artifactual.   2. Analysis of the gated images reveals grossly normal left   ventricular function with a calculated ejection fraction of 60 %. Conclusions:   Grossly negative Cardiolyte for the presence of ischemia or infarction   with normal wall motion and ejection fraction at rest.   Signed by Dr Patsy Fowler on 5/2/2019 2:55 PM     Narrative   Examination. XR CHEST (2 VW)   History: The patient complains of shortness of breath. The frontal and lateral views of the chest are compared with the   previous study dated 5/1/2019. There is persistent marked elevation of the left diaphragm with   underlying distended stomach with air-fluid level. There is   displacement of the cardiomediastinal structures towards the right. There are scarring and granulomas in the lungs bilaterally which are   unchanged in the. There is no pleural effusion, pulmonary congestion or pneumothorax. The heart size is in the normal range. The atheromatous changes of   thoracic aorta are noted. No significant bony abnormality. The right shoulder arthroplasty is incompletely visualized and   evaluated.       Impression   No active cardiopulmonary disease. The stable chronic changes. The above findings are recorded on a digital voice clip in PACS.    Signed by Dr Clifton Robbins on 5/9/2019 6:48 AM         Narrative   CT HEAD WO CONTRAST 5/1/2019 9:30 AM   History: Blurred vision   Comparisons: None 12.3 fL Final 05/08/2019 10:09 PM [facility]   Testing Performed By       Assessment/Plan:  1. Chest pain, unspecified type  Patient states she really never did have chest pain it was just tightness across her chest and a feeling of weakness where she couldn't lift her arms up. Patient has follow-up with cardiology on 17 May 2019.  - TX DISCHARGE MEDS RECONCILED W/ CURRENT OUTPATIENT MED LIST    2. Paroxysmal A-fib [facility])  Patient had Holter monitor and it looked like she was in A. fib about 2.1% of the time so that is very little. The longest run was 3 minutes and 39 seconds. Patient continue to follow-up cardiology as directed. 3. Shortness of breath  Shortness of breath back to baseline. Patient not really having any lower extremity swelling. Patient states feeling pretty good. Physical therapy and speech therapy has helped with her breathing. 4. Essential hypertension  Patient's blood pressure seems fairly well controlled on current medication regimen continue as directed. 5. Mixed hyperlipidemia  Patient's cholesterol stable on medication continue as directed. 6. Gastroesophageal reflux disease without esophagitis  Stable. 7. Chest discomfort  Stable. 8. Elevated diaphragm  Stable. 9. Pancreatic abnormality  Patient was supposed to have an MRI this morning of the abdomen and pelvis the patient had that allergic reaction so they put it off to 24 May. 10. Allergic reaction to drug, subsequent encounter  Patient had an allergic reaction to either Cardizem or Eliquis so patient was discontinued off the medication. Patient will continue the Pepcid and prednisone as directed. Patient is getting better. Patient follow-up as needed if rash does not resolve. Patient has follow-up appointment with me in August.  Patient follow-up sooner as needed. Medical Decision Making: Hospital follow-up/ER follow-up.

## 2019-05-16 ENCOUNTER — OFFICE VISIT (OUTPATIENT)
Dept: CARDIOLOGY | Age: 84
End: 2019-05-16
Payer: MEDICARE

## 2019-05-16 VITALS
HEART RATE: 85 BPM | SYSTOLIC BLOOD PRESSURE: 112 MMHG | HEIGHT: 65 IN | DIASTOLIC BLOOD PRESSURE: 80 MMHG | BODY MASS INDEX: 26.82 KG/M2 | WEIGHT: 161 LBS

## 2019-05-16 DIAGNOSIS — I10 ESSENTIAL HYPERTENSION: ICD-10-CM

## 2019-05-16 DIAGNOSIS — I48.0 PAROXYSMAL A-FIB (HCC): Primary | ICD-10-CM

## 2019-05-16 DIAGNOSIS — E78.2 MIXED HYPERLIPIDEMIA: ICD-10-CM

## 2019-05-16 DIAGNOSIS — I49.9 IRREGULAR HEART RATE: ICD-10-CM

## 2019-05-16 PROCEDURE — 1123F ACP DISCUSS/DSCN MKR DOCD: CPT | Performed by: NURSE PRACTITIONER

## 2019-05-16 PROCEDURE — G8427 DOCREV CUR MEDS BY ELIG CLIN: HCPCS | Performed by: NURSE PRACTITIONER

## 2019-05-16 PROCEDURE — 99214 OFFICE O/P EST MOD 30 MIN: CPT | Performed by: NURSE PRACTITIONER

## 2019-05-16 PROCEDURE — 1090F PRES/ABSN URINE INCON ASSESS: CPT | Performed by: NURSE PRACTITIONER

## 2019-05-16 PROCEDURE — 4040F PNEUMOC VAC/ADMIN/RCVD: CPT | Performed by: NURSE PRACTITIONER

## 2019-05-16 PROCEDURE — G8417 CALC BMI ABV UP PARAM F/U: HCPCS | Performed by: NURSE PRACTITIONER

## 2019-05-16 PROCEDURE — G8399 PT W/DXA RESULTS DOCUMENT: HCPCS | Performed by: NURSE PRACTITIONER

## 2019-05-16 PROCEDURE — 93000 ELECTROCARDIOGRAM COMPLETE: CPT | Performed by: NURSE PRACTITIONER

## 2019-05-16 PROCEDURE — 1111F DSCHRG MED/CURRENT MED MERGE: CPT | Performed by: NURSE PRACTITIONER

## 2019-05-16 PROCEDURE — 1036F TOBACCO NON-USER: CPT | Performed by: NURSE PRACTITIONER

## 2019-05-16 PROCEDURE — 0296T PR EXT ECG > 48HR TO 21 DAY RCRD W/CONECT INTL RCRD: CPT | Performed by: NURSE PRACTITIONER

## 2019-05-16 NOTE — PATIENT INSTRUCTIONS
Resume Eliquis twice daily. If rash worsens, stop the medication and notify the office. Place Lipitor (atorvastatin) on hold for now. Continue other current medications as prescribed. Continue to follow up with primary care provider for non cardiac medical problems. Call the office with any problems, questions or concerns at [phone]. Follow up as scheduled with your cardiologist. Follow up in 3 weeks. The following educational material has been included in this after visit summary for your review: Life simple 7. Atrial fibrillation. Eliquis.     Additional instructions:  Eliquis can increase your risk of bleeding. If you notice blood in urine or stool, bleeding gums, excessive bruising or cough productive of bloody sputum, notify the office. Information on this blood thinner has been included in your after visit summary. The adhesive cardiac monitor will need to stay on for one week. Use the symptom marker as instructed. Mail back monitor in box provided. Coronary artery disease risk factors you can control: Smoking, high blood pressure, high cholesterol, diabetes, being overweight, lack of exercise and stress. Continue heart healthy diet. Take medications as directed. Exercise as tolerated. Strive for 15 minutes of exercise most days of the week. If asked to keep a blood pressure log, do so for 2 weeks. Call the office to report readings at [phone]. Blood pressure goal  is less than 120/70. Elevated blood pressure at 120-129/80 or less. High blood pressure at 130-139/80-89. If you are taking cholesterol lowering medications, it is recommended that lab work be checked annually. Always keep a current medication list. Bring your medications to every office visit. Life simple 7  1) Manage blood pressure - high blood pressure is a major risk factor for heart disease and stroke. Keeping blood pressure in health range reduces strain on your heart, arteries and kidneys.   2) Control cholesterol - contributes to plaque, which can clog arteries and lead to heart disease and stroke. When you control your cholesterol you are giving your arteries their best chance to remain clear. 3) Reduce blood sugar - most of the food we eat is turning into glucose or blood sugar that our body uses for energy. Over time, high levels of blood sugar can damage your heart, kidneys, eyes and nerves. 4) Get active - living an active life is one of the most rewarding gifts you can give yourself and those you love. Simply put, daily physical activity increases your length and quality of life. 5)  Eat better - A healthy diet is one of your best weapons for fighting cardiovascular disease. When you eat a heart healthy diet, you improve your chances for feeling good and staying healthy for life. 6)  Lose weight - when you shed extra fat an unnecessary pounds, you reduce the burden on your hear, lungs, blood vessels and skeleton. You give yourself the gift of active living, you lower your blood pressure and help yourself feel better. 7) Stop smoking - cigarette smokers have a higher risk of developing cardiovascular disease. If  You smoke, quitting is the best thing you can do for your health. Check American Heart Association on line for more information on Life's Simple 7 and tips for healthy living.       Patient Education        Learning About Atrial Fibrillation  What is atrial fibrillation? Atrial fibrillation (say \"AY-tree-seferino lgy-rene-ZUT-shun\") is the most common type of irregular heartbeat (arrhythmia). Normally, the heart beats in a strong, steady rhythm. In atrial fibrillation, a problem with the heart's electrical system causes the two upper parts of the heart (the atria) to quiver, or fibrillate. Your heart rate also may be faster than normal.  Atrial fibrillation can be dangerous because if the heartbeat isn't strong and steady, blood can collect, or pool, in the atria.  And pooled blood is more likely to form clots. Clots can travel to the brain, block blood flow, and cause a stroke. Atrial fibrillation can also lead to heart failure. Treatment for atrial fibrillation helps prevent stroke and heart failure. It also helps relieve symptoms. Atrial fibrillation is often caused by another heart problem. It may happen after heart surgery. It may also be caused by other problems, such as an overactive thyroid gland or lung disease. Many people with atrial fibrillation are able to live full and active lives. What are the symptoms? Some people feel symptoms when they have episodes of atrial fibrillation. But other people don't notice any symptoms. If you have symptoms, you may feel:  · A fluttering, racing, or pounding feeling in your chest called palpitations. · Weak or tired. · Dizzy or lightheaded. · Short of breath. · Chest pain. · Confused. You may notice signs of atrial fibrillation when you check your pulse. Your pulse may seem uneven or fast.  What can you expect when you have atrial fibrillation? At first, spells of atrial fibrillation may come on suddenly and last a short time. It may go away on its own or it goes away after treatment. This is called paroxysmal atrial fibrillation. Over time, the spells may last longer and occur more often. They often don't go away on their own. How is it treated? Treatments can help you feel better and prevent future problems, especially stroke and heart failure. The main types of treatment slow the heart rate, control the heart rhythm, and help prevent stroke. Your treatment will depend on the cause of your atrial fibrillation, your symptoms, and your risk for stroke. · Heart rate treatment. Medicine may be used to slow your heart rate. Your heartbeat may still be irregular. But these medicines keep your heart from beating too fast. They may also help relieve your symptoms. · Heart rhythm treatment.  Different treatments may be used to try to stop atrial fibrillation and keep it from returning. They can also relieve symptoms. These treatments include medicine, electrical cardioversion to shock the heart back to a normal rhythm, a procedure called catheter ablation, and heart surgery. · Stroke prevention. You and your doctor can decide how to lower your risk. You may decide to take a blood-thinning medicine, such as aspirin or an anticoagulant. How can you live well with it? You can live well and help manage atrial fibrillation by having a heart-healthy lifestyle. This lifestyle may help reduce how often you have episodes of atrial fibrillation. If you are overweight, losing weight can help relieve symptoms. To have a heart-healthy lifestyle:  · Don't smoke. · Eat heart-healthy foods. · Be active. Talk to your doctor about what type and level of exercise is safe for you. · Stay at a healthy weight. Lose weight if you need to. · Avoid alcohol if it triggers symptoms. · Manage other health problems such as high blood pressure, high cholesterol, and diabetes. · Avoid getting sick from the flu. Get a flu shot every year. · Manage stress. Where can you learn more? Go to https://doUdeal.[employer]. org and sign in to your [employer] account. Enter P259 in the DyMynd box to learn more about \"Learning About Atrial Fibrillation. \"     If you do not have an account, please click on the \"Sign Up Now\" link. Current as of: July 22, 2018  Content Version: 12.0  © 9316-9990 [employer]. Care instructions adapted under license by [facility] ([facility]). If you have questions about a medical condition or this instruction, always ask your healthcare professional. [place] [address] any warranty or liability for your use of this information. Patient Education        apixaban  Pronunciation:  a PIX a ban  Brand:  Eliquis  What is the most important information I should know about apixaban?   You should not take apixaban if you have an artificial heart valve, or if you have any active bleeding from a surgery, injury, or other cause. Apixaban can cause a very serious blood clot around your spinal cord if you undergo a spinal tap or receive spinal anesthesia (epidural), especially if you have a genetic spinal defect, if you have a spinal catheter in place, if you have a history of spinal surgery or repeated spinal taps, or if you are also using other drugs that can affect blood clotting. This type of blood clot can lead to long-term or permanent paralysis. Get emergency medical help if you have symptoms of a spinal cord blood clot such as back pain, numbness or muscle weakness in your lower body, or loss of bladder or bowel control. Do not stop taking apixaban unless your doctor tells you to. Stopping suddenly can increase your risk of blood clot or stroke. What is apixaban? Apixaban blocks the activity of certain clotting substances in the blood. Apixaban is used to lower the risk of stroke caused by a blood clot in people with a heart rhythm disorder called atrial fibrillation. Apixaban is also used after hip or knee replacement surgery to prevent a type of blood clot called deep vein thrombosis (DVT), which can lead to blood clots in the lungs (pulmonary embolism). Apixaban is also used to treat DVT or pulmonary embolism (PE), and to lower your risk of having a repeat DVT or PE. Apixaban may also be used for purposes not listed in this medication guide. What should I discuss with my healthcare provider before taking apixaban? You should not take apixaban if you are allergic to it, or if you have:  · an artificial heart valve; or  · active bleeding from a surgery, injury, or other cause. Apixaban may cause you to bleed more easily, especially if you have a bleeding disorder that is inherited or caused by disease.   Tell your doctor if you have ever had:  · kidney disease (or if you are on dialysis);  · liver You may need to stop taking apixaban for a short time. Do not stop taking apixaban unless your doctor tells you to. Stopping suddenly can increase your risk of blood clot or stroke. If you stop taking apixaban for any reason, your doctor may prescribe another medication to prevent blood clots until you start taking apixaban again. Store at room temperature away from moisture and heat. What happens if I miss a dose? Take the missed dose on the same day you remember it. Take your next dose at the regular time and stay on your twice-daily schedule. Do not take two doses at one time. Get your prescription refilled before you run out of medicine completely. What happens if I overdose? Seek emergency medical attention or call the Poison Help line at 1-647.708.3721. What should I avoid while taking apixaban? Avoid activities that may increase your risk of bleeding or injury. Use extra care to prevent bleeding while shaving or brushing your teeth. What are the possible side effects of apixaban? Get emergency medical help if you have signs of an allergic reaction: hives; difficult breathing; swelling of your face, lips, tongue, or throat. Also seek emergency medical attention if you have symptoms of a spinal blood clot: back pain, numbness or muscle weakness in your lower body, or loss of bladder or bowel control. Call your doctor at once if you have:  · easy bruising, unusual bleeding (nose, mouth, vagina, or rectum), bleeding from wounds or needle injections, any bleeding that will not stop;  · heavy menstrual periods;  · headache, dizziness, weakness, feeling like you might pass out;  · urine that looks red, pink, or brown; or  · black or bloody stools, coughing up blood or vomit that looks like coffee grounds. This is not a complete list of side effects and others may occur. Call your doctor for medical advice about side effects. You may report side effects to FDA at 0-473-YJU-1019.   What other drugs will affect apixaban? Sometimes it is not safe to use certain medications at the same time. Some drugs can affect your blood levels of other drugs you take, which may increase side effects or make the medications less effective. Many other drugs (including some over-the-counter medicines) can increase your risk of bleeding or blood clots, or your risk of developing blood clots around the brain or spinal cord during a spinal tap or epidural. It is very important to tell your doctor about all medicines you have recently used, especially:  · any other medicines to treat or prevent blood clots;  · a blood thinner such as heparin or warfarin (Coumadin, Jantoven);  · an antidepressant; or  · an NSAID (nonsteroidal anti-inflammatory drug) used long term. This list is not complete and many other drugs may affect apixaban. This includes prescription and over-the-counter medicines, vitamins, and herbal products. Not all possible drug interactions are listed here. Where can I get more information? Your pharmacist can provide more information about apixaban. Remember, keep this and all other medicines out of the reach of children, never share your medicines with others, and use this medication only for the indication prescribed. Every effort has been made to ensure that the information provided by [address]ri [address]ele[address] is accurate, up-to-date, and complete, but no guarantee is made to that effect. Drug information contained herein may be time sensitive. [facility] information has been compiled for use by healthcare practitioners and consumers in the [place] and therefore [facility] does not warrant that uses outside of the [place] are appropriate, unless specifically indicated otherwise. [facility]'s drug information does not endorse drugs, diagnose patients or recommend therapy.  [facility]'s drug information is an informational resource designed to assist licensed healthcare practitioners in caring for their patients

## 2019-05-16 NOTE — PROGRESS NOTES
[facility], [place]. [address], [facility], [address] [facility]  [phone] office  [phone] fax      OFFICE VISIT:  2019    Peña President - : 1935    Reason For Visit:  Maria Elena Driver is a 82 y.o. female who is here for Follow-up (pt states she is out of rythym, weakness in chest and upper arms) and Atrial Fibrillation    The patient presents today for cardiology follow up. The patient has a hx of PAF diagnosed in April. She was started on Cardizem CD, Eliquis and Lipitor. Two weeks later, the patient developed a pruritic rash. Initially, Eliquis was stopped followed by Cardizem CD. The rash is now resolving. Patient has a follow up appointment scheduled with her dermatologist.  The patient came in today as during home PT, the therapist felt an irregular pulse. The patient cannot sense an arrhythmia just feels kind of week in upper body. No chest pain or SOA. BP is well controlled on current regimen. The patient's PCP monitors cholesterol. Subjective  Maria Elena Driver denies exertional chest pain, shortness of breath, orthopnea, paroxysmal nocturnal dyspnea, syncope, presyncope, sustained arrythmia, edema and fatigue. The patient denies numbness or weakness to suggest cerebrovascular accident or transient ischemic attack. + weakness today. + resolving skin rash.     Peña Oleary has the following history as recorded in [facility]:    Patient Active Problem List   Diagnosis Code    2-part displaced fracture of surgical neck of left humerus, initial encounter for closed fracture S42.222A    Hypertension I10    Hyperlipemia E78.5    GERD (gastroesophageal reflux disease) K21.9    2-part displaced fracture of surgical neck of right humerus, initial encounter for closed fracture S42.221A    Nausea R11.0    Glaucoma H40.9    Shortness of breath R06.02    Anxiety and depression F41.9, F32.9    Elevated brain natriuretic peptide (BNP) level R79.89    Elevated TSH R79.89    Hypothyroidism E03.9    Renal cyst N28.1    Chest pain R07.9    Paroxysmal A-fib (HCC) I48.0    Chest discomfort R07.89     Past Medical History:   Diagnosis Date    Arthritis     Atrial fibrillation (HCC)     GERD (gastroesophageal reflux disease)     Hyperlipidemia     Hypertension     Tremor     to right hand     Past Surgical History:   Procedure Laterality Date    CHOLECYSTECTOMY      HUMERUS FRACTURE SURGERY Right 7/9/2016    REVERSE TOTAL SHOULDER ARTHROPLASTY performed by Herlinda Stewart MD at [facility] OR     Family History   Problem Relation Age of Onset    Heart Attack Mother     Stroke Father     Heart Attack Father     Heart Disease Sister     Atrial Fibrillation Sister      Social History     Tobacco Use    Smoking status: Never Smoker    Smokeless tobacco: Never Used   Substance Use Topics    Alcohol use: No      Current Outpatient Medications   Medication Sig Dispense Refill    escitalopram (LEXAPRO) 20 MG tablet Take 1 tablet by mouth daily 90 tablet 3    aspirin 81 MG chewable tablet Take 1 tablet by mouth daily 30 tablet 3    atorvastatin (LIPITOR) 40 MG tablet Take 1 tablet by mouth daily 90 tablet 3    losartan (COZAAR) 50 MG tablet Take 1 tablet by mouth daily, if blood pressure starts to run low cut down to 1/2 tablet.  90 tablet 3    omeprazole (PRILOSEC) 40 MG delayed release capsule Take 1 capsule by mouth daily 90 capsule 3    albuterol sulfate HFA (PROVENTIL HFA) 108 (90 Base) MCG/ACT inhaler Inhale 2 puffs into the lungs every 6 hours as needed for Wheezing 1 Inhaler 3    dorzolamide-timolol (COSOPT) 22.3-6.8 MG/ML ophthalmic solution INSTILL 1 DROP INTO BOTH EYES TWICE A DAY AS DIRECTED  3    latanoprost (XALATAN) 0.005 % ophthalmic solution INSTILL 1 DROP INTO BOTH EYES IN THE EVENING  3    atenolol (TENORMIN) 50 MG tablet TAKE 2 TABLETS BY MOUTH EVERY  tablet 3     No current facility-administered medications for this visit. Allergies: Eliquis [apixaban] and Morphine    Review of Systems  Constitutional - no appetite change, or unexpected weight change. No fever, chills or diaphoresis. + fatigue and upper body weakness today. HEENT - no significant rhinorrhea or epistaxis. No tinnitus or significant hearing loss. Eyes - no sudden vision change or amaurosis. No corneal arcus, xantholasma, subconjunctival hemorrhage or discharge. Respiratory - no significant wheezing, stridor, apnea or cough. No dyspnea on exertion or shortness of air. Cardiovascular - no exertional chest pain to suggest myocardial ischemia. No orthopnea or PND. No sensation of sustained arrythmia. No occurrence of slow heart rate. No palpitations. No claudication. No leg edema. Gastrointestinal - no abdominal swelling or pain. No blood in stool. No severe constipation, diarrhea, nausea, or vomiting. Genitourinary - no dysuria, frequency, or urgency. No flank pain or hematuria. Musculoskeletal - no back pain or myalgia. No problems with gait. Extremities - no clubbing, cyanosis or edema. Skin - no color change or rash. No pallor. No new surgical incision. Neurologic - no speech difficulty, facial asymmetry or lateralizing weakness. No seizures, presyncope or syncope. No significant dizziness. Hematologic - no easy bruising or excessive bleeding. Psychiatric - no severe anxiety or insomnia. No confusion. All other review of systems are negative. Objective  Vital Signs - /80   Pulse 85   Ht 5' 5\" (1.651 m)   Wt 161 lb (73 kg)   LMP  (Approximate)   BMI 26.79 kg/m²   General - Nayana Castro is alert, cooperative, and pleasant. Well groomed. No acute distress. Body habitus - Body mass index is 26.79 kg/m². HEENT - Head is normocephalic. No circumoral cyanosis. Dentition is normal.  EYES -   Lids normal without ptosis. No discharge, edema or subconjunctival hemorrhage.    Neck - Symmetrical without apparent mass or lymphadenopathy. Respiratory - Normal respiratory effort without use of accessory muscles. Ausculatation reveals vesicular breath sounds without crackles, wheezes, rub or rhonchi. Cardiovascular - No jugular venous distention. Auscultation reveals regular rate and rhythm. No audible clicks, gallop or rub. No murmur. No lower extremity varicosities. No carotid bruits. Abdominal -  No visible distention, mass or pulsations. Extremities - No clubbing or cyanosis. No statis dermatitis or ulcers. No edema. Musculoskeletal -   No Osler's nodes. No kyphosis or scoliosis. Gait is even and regular without limp or shuffle. Ambulates with assistance of walker. Skin -  Warm and dry; no pallor. No new surgical wound. Skin very dry with fading flat macular rash noted on arms and chest.  Neurological - No focal neurological deficits. Thought processes coherent. No apparent tremor. Oriented to person, place and time. Psychiatric -  Appropriate affect and mood. Assessment:     Diagnosis Orders   1. Paroxysmal A-fib ([address]     2. Essential hypertension     3. Mixed hyperlipidemia       EKG reviewed:  NSR 85 BPM; no acute ischemic changes; QTc .429. Stable CV status without symptoms of overt heart failure, arrhythmia or angina. One week Zio cardiac monitor placed today. Will try restarting Eliquis which patient still has at home. If rash worsens, she is to stop and notify the office. Place Lipitor on hold for now. BP well controlled on current regimen. Continues on Atenolol. Patient is compliant with medication regimen.     BP Readings from Last 3 Encounters:   05/16/19 112/80   05/10/19 130/80   05/08/19 123/68    Pulse Readings from Last 3 Encounters:   05/16/19 85   05/10/19 76   05/08/19 79        Wt Readings from Last 3 Encounters:   05/16/19 161 lb (73 kg)   05/10/19 164 lb 9.6 oz (74.7 kg)   05/03/19 159 lb 6.4 oz (72.3 kg)     Plan  Previous cardiac history and records reviewed. Resume Eliquis twice daily. If rash worsens, stop the medication and notify the office. Place Lipitor (atorvastatin) on hold for now. Continue other current medications as prescribed. One week Zio cardiac monitor applied today. Continue to follow up with primary care provider for non cardiac medical problems. Call the office with any problems, questions or concerns at [phone]. Follow up as scheduled with your cardiologist. Follow up in 3 weeks. The following educational material has been included in this after visit summary for your review: Life simple 7. Atrial fibrillation. Eliquis.     Additional instructions:  Eliquis can increase your risk of bleeding. If you notice blood in urine or stool, bleeding gums, excessive bruising or cough productive of bloody sputum, notify the office. Information on this blood thinner has been included in your after visit summary. The adhesive cardiac monitor will need to stay on for one week. Use the symptom marker as instructed. Mail back monitor in box provided. Coronary artery disease risk factors you can control: Smoking, high blood pressure, high cholesterol, diabetes, being overweight, lack of exercise and stress. Continue heart healthy diet. Take medications as directed. Exercise as tolerated. Strive for 15 minutes of exercise most days of the week. If asked to keep a blood pressure log, do so for 2 weeks. Call the office to report readings at [phone]. Blood pressure goal  is less than 120/70. Elevated blood pressure at 120-129/80 or less. High blood pressure at 130-139/80-89. If you are taking cholesterol lowering medications, it is recommended that lab work be checked annually. Always keep a current medication list. Bring your medications to every office visit. Life simple 7  1) Manage blood pressure - high blood pressure is a major risk factor for heart disease and stroke.  Keeping blood pressure in health range reduces strain on your heart, arteries and kidneys. 2) Control cholesterol - contributes to plaque, which can clog arteries and lead to heart disease and stroke. When you control your cholesterol you are giving your arteries their best chance to remain clear. 3) Reduce blood sugar - most of the food we eat is turning into glucose or blood sugar that our body uses for energy. Over time, high levels of blood sugar can damage your heart, kidneys, eyes and nerves. 4) Get active - living an active life is one of the most rewarding gifts you can give yourself and those you love. Simply put, daily physical activity increases your length and quality of life. 5)  Eat better - A healthy diet is one of your best weapons for fighting cardiovascular disease. When you eat a heart healthy diet, you improve your chances for feeling good and staying healthy for life. 6)  Lose weight - when you shed extra fat an unnecessary pounds, you reduce the burden on your hear, lungs, blood vessels and skeleton. You give yourself the gift of active living, you lower your blood pressure and help yourself feel better. 7) Stop smoking - cigarette smokers have a higher risk of developing cardiovascular disease. If  You smoke, quitting is the best thing you can do for your health.   Check American Heart Association on line for more information on Life's Simple 7 and tips for healthy living.      NURIS Zuñiga

## 2019-05-21 RX ORDER — ATENOLOL 50 MG/1
TABLET ORAL
Qty: 180 TABLET | Refills: 3 | Status: SHIPPED | OUTPATIENT
Start: 2019-05-21 | End: 2019-06-14

## 2019-05-24 ENCOUNTER — TELEPHONE (OUTPATIENT)
Dept: INTERNAL MEDICINE | Age: 84
End: 2019-05-24

## 2019-05-24 ENCOUNTER — HOSPITAL ENCOUNTER (OUTPATIENT)
Dept: MRI IMAGING | Age: 84
Discharge: HOME | End: 2019-05-24
Payer: MEDICARE

## 2019-05-24 DIAGNOSIS — Q45.3 PANCREATIC ABNORMALITY: ICD-10-CM

## 2019-05-24 DIAGNOSIS — Q45.3 PANCREATIC ABNORMALITY: Primary | ICD-10-CM

## 2019-05-24 PROCEDURE — 6360000004 HC RX CONTRAST MEDICATION: Performed by: PHYSICIAN ASSISTANT

## 2019-05-24 PROCEDURE — 74183 MRI ABD W/O CNTR FLWD CNTR: CPT

## 2019-05-24 PROCEDURE — A9577 INJ MULTIHANCE: HCPCS | Performed by: PHYSICIAN ASSISTANT

## 2019-05-24 RX ADMIN — GADOBENATE DIMEGLUMINE 15 ML: 529 INJECTION, SOLUTION INTRAVENOUS at 09:42

## 2019-05-24 NOTE — TELEPHONE ENCOUNTER
Patients daughter called back, let her know the results she voice understood.  Order placed for repeat exam.

## 2019-05-24 NOTE — TELEPHONE ENCOUNTER
----- Message from [place], [place] sent at 5/24/2019 11:25 AM CDT -----  MRI of the pancreas showed a cystic lesion which measured about 9 mm. They suggest a repeat MRI in 1 year to make sure this is not growing. He should follow-up as needed.

## 2019-05-30 ENCOUNTER — TELEPHONE (OUTPATIENT)
Dept: CARDIOLOGY | Age: 84
End: 2019-05-30

## 2019-05-30 NOTE — TELEPHONE ENCOUNTER
Note      Zio reviewed. Analysis time 7 days and 3 hours  Underlying rhythm normal sinus rhythm with first-degree AV block. Average heart rate 78 bpm  Minimum heart rate 52 bpm  5 SVT runs longest being 18 beats in duration Yoly AF burden was 13%  No VT, heart block or pauses,  Rare PAC and PVC  No patient triggers her diary entries. Currently taking Atenolol 50 mg (2) tabs daily and Eliquis.   Last OV 5/16/19:  /80, HR 85      Has follow up with Dr. [facility] in June.   matt

## 2019-06-14 ENCOUNTER — OFFICE VISIT (OUTPATIENT)
Dept: CARDIOLOGY | Age: 84
End: 2019-06-14
Payer: MEDICARE

## 2019-06-14 VITALS
BODY MASS INDEX: 27.16 KG/M2 | WEIGHT: 163 LBS | SYSTOLIC BLOOD PRESSURE: 142 MMHG | HEIGHT: 65 IN | DIASTOLIC BLOOD PRESSURE: 82 MMHG | HEART RATE: 82 BPM

## 2019-06-14 DIAGNOSIS — I48.0 PAROXYSMAL ATRIAL FIBRILLATION (HCC): Primary | ICD-10-CM

## 2019-06-14 PROCEDURE — 99213 OFFICE O/P EST LOW 20 MIN: CPT | Performed by: INTERNAL MEDICINE

## 2019-06-14 RX ORDER — ATENOLOL 50 MG/1
75 TABLET ORAL 2 TIMES DAILY
Qty: 270 TABLET | Refills: 3 | Status: SHIPPED | OUTPATIENT
Start: 2019-06-14 | End: 2020-07-01

## 2019-06-14 ASSESSMENT — ENCOUNTER SYMPTOMS
BLOOD IN STOOL: 0
EYE DISCHARGE: 0
VOMITING: 0
WHEEZING: 0
BACK PAIN: 0
SHORTNESS OF BREATH: 0
COUGH: 0
CONSTIPATION: 0
DIARRHEA: 0
ABDOMINAL DISTENTION: 0

## 2019-06-14 NOTE — PROGRESS NOTES
[facility]  Cardiology Office Note  [place]  [id number]  Phone: [phone]  Fax: [phone]                            Date:  6/14/2019  Patient: Gilles Shafer  Age:  82 y. o., 1935    Referral: No ref. provider found      PROBLEM LIST:    Patient Active Problem List    Diagnosis Date Noted    Chest discomfort      Priority: Low    Chest pain 05/01/2019     Priority: Low    Paroxysmal A-fib ([address] 05/01/2019     Priority: Low    Elevated brain natriuretic peptide (BNP) level 04/19/2019     Priority: Low    Elevated TSH 04/19/2019     Priority: Low    Hypothyroidism 04/19/2019     Priority: Low    Renal cyst 04/19/2019     Priority: Low    Shortness of breath 08/24/2018     Priority: Low    Anxiety and depression 08/24/2018     Priority: Low    Glaucoma 07/10/2016     Priority: Low    Hypertension 07/09/2016     Priority: Low    Hyperlipemia 07/09/2016     Priority: Low    GERD (gastroesophageal reflux disease) 07/09/2016     Priority: Low    2-part displaced fracture of surgical neck of right humerus, initial encounter for closed fracture 07/09/2016     Priority: Low    Nausea 07/09/2016     Priority: Low    2-part displaced fracture of surgical neck of left humerus, initial encounter for closed fracture 07/08/2016     Priority: Low     1. New onset atrial fibrillation (4/18/2019), currently in sinus rhythm. Ejection fraction 45-50% mildly reduced (was normal in 2017), moderate to severe pulmonary hypertension, mild left atrial enlargement. 2. Possible early dementia with memory loss. 3. Chest discomfort with negative troponins        PRESENTATION: Gilles Shafer is a 82y.o. year old female reports not feeling any palpitations. She has been doing well. A recent event recorder from 5/30/2019 showed 13% A. fib burden the longest lasting about 21 hours. She had an allergic reaction to Cardizem with rash.  This was re-trialed with mouth daily, if blood pressure starts to run low cut down to 1/2 tablet. 90 tablet 3    omeprazole (PRILOSEC) 40 MG delayed release capsule Take 1 capsule by mouth daily 90 capsule 3    dorzolamide-timolol (COSOPT) 22.3-6.8 MG/ML ophthalmic solution INSTILL 1 DROP INTO BOTH EYES TWICE A DAY AS DIRECTED  3    latanoprost (XALATAN) 0.005 % ophthalmic solution INSTILL 1 DROP INTO BOTH EYES IN THE EVENING  3     No current facility-administered medications for this visit. Allergies:  Cardizem [diltiazem hcl] and Morphine    Past Social History:  Social History     Socioeconomic History    Marital status:       Spouse name: Not on file    Number of children: Not on file    Years of education: Not on file    Highest education level: Not on file   Occupational History    Not on file   Social Needs    Financial resource strain: Not on file    Food insecurity:     Worry: Not on file     Inability: Not on file    Transportation needs:     Medical: Not on file     Non-medical: Not on file   Tobacco Use    Smoking status: Never Smoker    Smokeless tobacco: Never Used   Substance and Sexual Activity    Alcohol use: No    Drug use: No    Sexual activity: Not Currently   Lifestyle    Physical activity:     Days per week: Not on file     Minutes per session: Not on file    Stress: Not on file   Relationships    Social connections:     Talks on phone: Not on file     Gets together: Not on file     Attends Zoroastrian service: Not on file     Active member of club or organization: Not on file     Attends meetings of clubs or organizations: Not on file     Relationship status: Not on file    Intimate partner violence:     Fear of current or ex partner: Not on file     Emotionally abused: Not on file     Physically abused: Not on file     Forced sexual activity: Not on file   Other Topics Concern    Not on file   Social History Narrative    Not on file       Family History:       Problem Relation Age of stress there were no significant EKG changes or rhythm changes. Baseline and peak blood pressures were 110/64, and 118/72   respectively.  Baseline and peak heart rates were 73 and  94   respectively.   [facility] [address] [facility] Report   Date of Procedure: 5/2/2019   Date of Report: 5/2/2019   The patient was injected with 49.8 millicuries (mCi) of Technesium   (Tc99m). After an appropriate level of stress the patient was   re-injected with 31 millicuries (mCi) of Technesium (Tc99m). Repeat   gated images were then performed per standard protocol. Findings:   1. Analysis of the stress and rest images reveals mild basal septal   defect on rest and stress images which is likely artifactual.   2. Analysis of the gated images reveals grossly normal left   ventricular function with a calculated ejection fraction of 60 %.    Conclusions:   Grossly negative Cardiolyte for the presence of ischemia or infarction   with normal wall motion and ejection fraction at rest.   Signed by Dr Brad Langley on 5/2/2019 2:55 PM         Conclusions      Summary   Normal left ventricular size with mildly reduced systolic function EF   08-09%   Mild concentric left ventricular hypertrophy with grade 1 diastolic   dysfunction   Mildly dilated left atrium   Mildly thickened tricuspid aortic valve with adequate cusp separation and   no significant stenosis or insufficiency   Mildly thickened but mobile mitral leaflets with mild-to-moderate   regurgitation   Poor visualization of a right-sided chambers   Moderate tricuspid regurgitation with moderate pulmonary hypertension RVSP   estimate 64 mmHg   IVC is mildly dilated consistent with elevated right atrial filling   pressures in the 10-15 mmHg range   No pericardial effusion and aortic root dimensions are within normal   limits      ASSESSMENT and PLAN:    This is a 82y.o. year old female with past medical history of recent admission with new onset atrial fibrillation currently in sinus rhythm on anticoagulation, atypical chest pain with negative Lexiscan study, EF 45-50% by echo, 60% by Lexiscan, allergy to Cardizem here for follow-up evaluation. 1. She does have a 13% A. fib burden. She however does not feel her A. fib bouts. As long as she is taking her anticoagulation for stroke risk is addressed. I would trial increasing her atenolol to 75 mg twice daily and reassess if this reduces her atrial fibrillation bouts. A repeat echo can be done in 3-6 months. 2. She will follow-up with me in 5 months. Orders:  No orders of the defined types were placed in this encounter. Orders Placed This Encounter   Medications    atenolol (TENORMIN) 50 MG tablet     Sig: Take 1.5 tablets by mouth 2 times daily TAKE 1.5 TABLETS BY MOUTH twice daily     Dispense:  270 tablet     Refill:  3             Return in about 5 months (around 11/14/2019). Electronically signed by Fernando Venegas MD on 6/14/2019 at 1:50 PM    [id number] [facility]      Thisdictation was generated by voice recognition computer software. Although all attempts are made to edit the dictation for accuracy, there may be errors in the transcription that are not intended.

## 2019-08-16 ENCOUNTER — OFFICE VISIT (OUTPATIENT)
Dept: INTERNAL MEDICINE | Age: 84
End: 2019-08-16
Payer: MEDICARE

## 2019-08-16 VITALS
DIASTOLIC BLOOD PRESSURE: 70 MMHG | WEIGHT: 161 LBS | BODY MASS INDEX: 26.82 KG/M2 | HEIGHT: 65 IN | SYSTOLIC BLOOD PRESSURE: 122 MMHG | HEART RATE: 77 BPM | TEMPERATURE: 96.6 F | OXYGEN SATURATION: 95 %

## 2019-08-16 DIAGNOSIS — R74.8 ELEVATED ALKALINE PHOSPHATASE LEVEL: ICD-10-CM

## 2019-08-16 DIAGNOSIS — F32.A ANXIETY AND DEPRESSION: ICD-10-CM

## 2019-08-16 DIAGNOSIS — Z00.00 ROUTINE GENERAL MEDICAL EXAMINATION AT A HEALTH CARE FACILITY: Primary | ICD-10-CM

## 2019-08-16 DIAGNOSIS — K21.9 GASTROESOPHAGEAL REFLUX DISEASE WITHOUT ESOPHAGITIS: ICD-10-CM

## 2019-08-16 DIAGNOSIS — F32.89 OTHER DEPRESSION: ICD-10-CM

## 2019-08-16 DIAGNOSIS — G25.0 TREMOR, ESSENTIAL: ICD-10-CM

## 2019-08-16 DIAGNOSIS — I48.0 PAROXYSMAL A-FIB (HCC): ICD-10-CM

## 2019-08-16 DIAGNOSIS — I10 ESSENTIAL HYPERTENSION: ICD-10-CM

## 2019-08-16 DIAGNOSIS — J98.6 ELEVATED DIAPHRAGM: ICD-10-CM

## 2019-08-16 DIAGNOSIS — E55.9 VITAMIN D DEFICIENCY: ICD-10-CM

## 2019-08-16 DIAGNOSIS — R06.02 SHORTNESS OF BREATH: ICD-10-CM

## 2019-08-16 DIAGNOSIS — F41.9 ANXIETY AND DEPRESSION: ICD-10-CM

## 2019-08-16 DIAGNOSIS — E78.2 MIXED HYPERLIPIDEMIA: ICD-10-CM

## 2019-08-16 DIAGNOSIS — R79.89 ELEVATED TSH: ICD-10-CM

## 2019-08-16 LAB
ALBUMIN SERPL-MCNC: 3.9 G/DL (ref 3.5–5.2)
ALP BLD-CCNC: 230 U/L (ref 35–104)
ALT SERPL-CCNC: 14 U/L (ref 5–33)
ANION GAP SERPL CALCULATED.3IONS-SCNC: 13 MMOL/L (ref 7–19)
AST SERPL-CCNC: 24 U/L (ref 5–32)
BASOPHILS ABSOLUTE: 0.1 K/UL (ref 0–0.2)
BASOPHILS RELATIVE PERCENT: 1.3 % (ref 0–1)
BILIRUB SERPL-MCNC: 0.5 MG/DL (ref 0.2–1.2)
BUN BLDV-MCNC: 12 MG/DL (ref 8–23)
CALCIUM SERPL-MCNC: 9.6 MG/DL (ref 8.8–10.2)
CHLORIDE BLD-SCNC: 102 MMOL/L (ref 98–111)
CHOLESTEROL, TOTAL: 147 MG/DL (ref 160–199)
CO2: 27 MMOL/L (ref 22–29)
CREAT SERPL-MCNC: 0.7 MG/DL (ref 0.5–0.9)
EOSINOPHILS ABSOLUTE: 0.3 K/UL (ref 0–0.6)
EOSINOPHILS RELATIVE PERCENT: 3.6 % (ref 0–5)
GFR NON-AFRICAN AMERICAN: >60
GLUCOSE BLD-MCNC: 108 MG/DL (ref 74–109)
HCT VFR BLD CALC: 38.6 % (ref 37–47)
HDLC SERPL-MCNC: 48 MG/DL (ref 65–121)
HEMOGLOBIN: 12 G/DL (ref 12–16)
IMMATURE GRANULOCYTES #: 0 K/UL
LDL CHOLESTEROL CALCULATED: 77 MG/DL
LYMPHOCYTES ABSOLUTE: 1.3 K/UL (ref 1.1–4.5)
LYMPHOCYTES RELATIVE PERCENT: 18.5 % (ref 20–40)
MCH RBC QN AUTO: 29.4 PG (ref 27–31)
MCHC RBC AUTO-ENTMCNC: 31.1 G/DL (ref 33–37)
MCV RBC AUTO: 94.6 FL (ref 81–99)
MONOCYTES ABSOLUTE: 0.7 K/UL (ref 0–0.9)
MONOCYTES RELATIVE PERCENT: 9.3 % (ref 0–10)
NEUTROPHILS ABSOLUTE: 4.7 K/UL (ref 1.5–7.5)
NEUTROPHILS RELATIVE PERCENT: 67 % (ref 50–65)
PDW BLD-RTO: 13.9 % (ref 11.5–14.5)
PLATELET # BLD: 195 K/UL (ref 130–400)
PMV BLD AUTO: 11.1 FL (ref 9.4–12.3)
POTASSIUM SERPL-SCNC: 4.5 MMOL/L (ref 3.5–5)
RBC # BLD: 4.08 M/UL (ref 4.2–5.4)
SODIUM BLD-SCNC: 142 MMOL/L (ref 136–145)
TOTAL PROTEIN: 7.9 G/DL (ref 6.6–8.7)
TRIGL SERPL-MCNC: 109 MG/DL (ref 0–149)
WBC # BLD: 7 K/UL (ref 4.8–10.8)

## 2019-08-16 PROCEDURE — G8427 DOCREV CUR MEDS BY ELIG CLIN: HCPCS | Performed by: PHYSICIAN ASSISTANT

## 2019-08-16 PROCEDURE — 1123F ACP DISCUSS/DSCN MKR DOCD: CPT | Performed by: PHYSICIAN ASSISTANT

## 2019-08-16 PROCEDURE — G8399 PT W/DXA RESULTS DOCUMENT: HCPCS | Performed by: PHYSICIAN ASSISTANT

## 2019-08-16 PROCEDURE — 1090F PRES/ABSN URINE INCON ASSESS: CPT | Performed by: PHYSICIAN ASSISTANT

## 2019-08-16 PROCEDURE — G8417 CALC BMI ABV UP PARAM F/U: HCPCS | Performed by: PHYSICIAN ASSISTANT

## 2019-08-16 PROCEDURE — 4040F PNEUMOC VAC/ADMIN/RCVD: CPT | Performed by: PHYSICIAN ASSISTANT

## 2019-08-16 PROCEDURE — 99214 OFFICE O/P EST MOD 30 MIN: CPT | Performed by: PHYSICIAN ASSISTANT

## 2019-08-16 PROCEDURE — 1036F TOBACCO NON-USER: CPT | Performed by: PHYSICIAN ASSISTANT

## 2019-08-16 ASSESSMENT — ENCOUNTER SYMPTOMS
PHOTOPHOBIA: 0
VOMITING: 0
EYE PAIN: 0
SORE THROAT: 0
DIARRHEA: 0
ABDOMINAL PAIN: 0
COUGH: 0
SINUS PRESSURE: 0
RHINORRHEA: 0
SHORTNESS OF BREATH: 1
WHEEZING: 0
COLOR CHANGE: 0
CHEST TIGHTNESS: 0
NAUSEA: 0
EYE REDNESS: 0
CONSTIPATION: 0

## 2019-08-16 NOTE — PROGRESS NOTES
affect. Her mood appears not anxious. She does not exhibit a depressed mood. Nursing note and vitals reviewed. ASSESSMENT      ICD-10-CM    1. Routine general medical examination at a health care facility Z00.00    2. Essential hypertension I10    3. Mixed hyperlipidemia E78.2    4. Elevated alkaline phosphatase level R74.8 PTH, Intact     Comprehensive Metabolic Panel   5. Vitamin D deficiency E55.9 Vitamin D 25 Hydroxy   6. Paroxysmal A-fib (HCC) I48.0    7. 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 Gastroesophageal reflux disease without esophagitis K21.9    10. Anxiety and depression F41.9     F32.9    11. Tremor, essential G25.0        PLAN  Patient seems to be doing okay right now. Patient blood pressure seems fairly well controlled on current medication regimen continue as directed. Patient's cholesterol within the desirable range. Patient does have some elevated alkaline phosphatase levels. Patient's calcium looks like it is doing okay patient had her gallbladder removed. Patient does not really have any abdominal pain we will check a PTH recheck alk phos in a month also check a vitamin D level. Patient was supposed to be taking vitamin D but has not been taking it. Patient will continue to follow-up with Dr. Balwinder Phan for the A. fib. Patient continues to have an elevated diaphragm which caused some shortness of breath. Patient will continue the Prilosec for GERD. Patient will continue the Lexapro for anxiety and depression. Patient is currently taking atenolol for the tremors. Patient will follow up get the labs above in a month and then follow-up in 4 months with repeat CBC, CMP, lipids, TSH. Orders Placed This Encounter   Procedures    PTH, Intact    Comprehensive Metabolic Panel    Vitamin D 25 Hydroxy        Return in about 4 months (around 12/16/2019), or if symptoms worsen or fail to improve, for Follow up with labs.       There are no Patient

## 2019-09-08 ENCOUNTER — APPOINTMENT (OUTPATIENT)
Dept: GENERAL RADIOLOGY | Age: 84
End: 2019-09-08
Payer: MEDICARE

## 2019-09-08 ENCOUNTER — HOSPITAL ENCOUNTER (EMERGENCY)
Age: 84
Discharge: HOME | End: 2019-09-08
Attending: EMERGENCY MEDICINE
Payer: MEDICARE

## 2019-09-08 VITALS
WEIGHT: 158 LBS | HEART RATE: 76 BPM | BODY MASS INDEX: 26.33 KG/M2 | RESPIRATION RATE: 18 BRPM | TEMPERATURE: 98.4 F | OXYGEN SATURATION: 93 % | SYSTOLIC BLOOD PRESSURE: 124 MMHG | HEIGHT: 65 IN | DIASTOLIC BLOOD PRESSURE: 77 MMHG

## 2019-09-08 DIAGNOSIS — R06.89 DYSPNEA AND RESPIRATORY ABNORMALITIES: Primary | ICD-10-CM

## 2019-09-08 DIAGNOSIS — I27.20 PULMONARY HYPERTENSION (HCC): ICD-10-CM

## 2019-09-08 DIAGNOSIS — J98.6 HEMIDIAPHRAGM PARALYSIS: ICD-10-CM

## 2019-09-08 DIAGNOSIS — R06.00 DYSPNEA AND RESPIRATORY ABNORMALITIES: Primary | ICD-10-CM

## 2019-09-08 LAB
ALBUMIN SERPL-MCNC: 3.8 G/DL (ref 3.5–5.2)
ALP BLD-CCNC: 123 U/L (ref 35–104)
ALT SERPL-CCNC: 10 U/L (ref 5–33)
ANION GAP SERPL CALCULATED.3IONS-SCNC: 9 MMOL/L (ref 7–19)
APTT: 36.5 SEC (ref 26–36.2)
AST SERPL-CCNC: 17 U/L (ref 5–32)
BASOPHILS ABSOLUTE: 0.1 K/UL (ref 0–0.2)
BASOPHILS RELATIVE PERCENT: 0.8 % (ref 0–1)
BILIRUB SERPL-MCNC: 0.8 MG/DL (ref 0.2–1.2)
BUN BLDV-MCNC: 14 MG/DL (ref 8–23)
CALCIUM SERPL-MCNC: 9.6 MG/DL (ref 8.8–10.2)
CHLORIDE BLD-SCNC: 100 MMOL/L (ref 98–111)
CO2: 29 MMOL/L (ref 22–29)
CREAT SERPL-MCNC: 0.6 MG/DL (ref 0.5–0.9)
D DIMER: 0.42 UG/ML FEU (ref 0–0.48)
EKG P AXIS: 56 DEGREES
EKG P-R INTERVAL: 192 MS
EKG Q-T INTERVAL: 424 MS
EKG QRS DURATION: 72 MS
EKG QTC CALCULATION (BAZETT): 453 MS
EKG T AXIS: 26 DEGREES
EOSINOPHILS ABSOLUTE: 0.1 K/UL (ref 0–0.6)
EOSINOPHILS RELATIVE PERCENT: 1 % (ref 0–5)
GFR NON-AFRICAN AMERICAN: >60
GLUCOSE BLD-MCNC: 98 MG/DL (ref 74–109)
HCT VFR BLD CALC: 37.7 % (ref 37–47)
HEMOGLOBIN: 12 G/DL (ref 12–16)
IMMATURE GRANULOCYTES #: 0 K/UL
INR BLD: 1.92 (ref 0.88–1.18)
LIPASE: 31 U/L (ref 13–60)
LYMPHOCYTES ABSOLUTE: 1.6 K/UL (ref 1.1–4.5)
LYMPHOCYTES RELATIVE PERCENT: 21.6 % (ref 20–40)
MCH RBC QN AUTO: 30 PG (ref 27–31)
MCHC RBC AUTO-ENTMCNC: 31.8 G/DL (ref 33–37)
MCV RBC AUTO: 94.3 FL (ref 81–99)
MONOCYTES ABSOLUTE: 0.9 K/UL (ref 0–0.9)
MONOCYTES RELATIVE PERCENT: 11.8 % (ref 0–10)
NEUTROPHILS ABSOLUTE: 4.7 K/UL (ref 1.5–7.5)
NEUTROPHILS RELATIVE PERCENT: 64.5 % (ref 50–65)
PDW BLD-RTO: 14 % (ref 11.5–14.5)
PLATELET # BLD: 125 K/UL (ref 130–400)
PMV BLD AUTO: 12.1 FL (ref 9.4–12.3)
POTASSIUM SERPL-SCNC: 4.7 MMOL/L (ref 3.5–5)
PRO-BNP: 2127 PG/ML (ref 0–1800)
PROTHROMBIN TIME: 21.2 SEC (ref 12–14.6)
RBC # BLD: 4 M/UL (ref 4.2–5.4)
SODIUM BLD-SCNC: 138 MMOL/L (ref 136–145)
TOTAL PROTEIN: 7.6 G/DL (ref 6.6–8.7)
TROPONIN: <0.01 NG/ML (ref 0–0.03)
TROPONIN: <0.01 NG/ML (ref 0–0.03)
WBC # BLD: 7.2 K/UL (ref 4.8–10.8)

## 2019-09-08 PROCEDURE — 84484 ASSAY OF TROPONIN QUANT: CPT

## 2019-09-08 PROCEDURE — 85025 COMPLETE CBC W/AUTO DIFF WBC: CPT

## 2019-09-08 PROCEDURE — 83880 ASSAY OF NATRIURETIC PEPTIDE: CPT

## 2019-09-08 PROCEDURE — 93005 ELECTROCARDIOGRAM TRACING: CPT

## 2019-09-08 PROCEDURE — 71045 X-RAY EXAM CHEST 1 VIEW: CPT

## 2019-09-08 PROCEDURE — 85730 THROMBOPLASTIN TIME PARTIAL: CPT

## 2019-09-08 PROCEDURE — 85610 PROTHROMBIN TIME: CPT

## 2019-09-08 PROCEDURE — 85379 FIBRIN DEGRADATION QUANT: CPT

## 2019-09-08 PROCEDURE — 83690 ASSAY OF LIPASE: CPT

## 2019-09-08 PROCEDURE — 99285 EMERGENCY DEPT VISIT HI MDM: CPT

## 2019-09-08 PROCEDURE — 80053 COMPREHEN METABOLIC PANEL: CPT

## 2019-09-08 PROCEDURE — 36415 COLL VENOUS BLD VENIPUNCTURE: CPT

## 2019-09-08 ASSESSMENT — ENCOUNTER SYMPTOMS
BACK PAIN: 0
SHORTNESS OF BREATH: 1
VOMITING: 0
SORE THROAT: 0
NAUSEA: 0
DIARRHEA: 0
RHINORRHEA: 0
ABDOMINAL PAIN: 0
COUGH: 1

## 2019-09-08 NOTE — ED PROVIDER NOTES
[facility] EMERGENCY DEPT  eMERGENCY dEPARTMENT eNCOUnter      Pt Name: Dung Lawson  MRN: [id number]  [place] 1935  Date of evaluation: 9/8/2019  Provider: Sabina Mena MD    [address] [place] [address]       Chief Complaint   Patient presents with    Shortness of Breath     Pt to ED with c/o SOB x4 days Pt reports Hx of lung issues          HISTORY OF PRESENT ILLNESS   (Location/Symptom, Timing/Onset,Context/Setting, Quality, Duration, Modifying Factors, Severity)  Note limiting factors. Dung Lawson is a 82 y.o. female who presents to the emergency department for shortness of breath. Patient has had progressive shortness of breath since this past Wednesday. She does not endorse any specific chest pain but she will admit to a \"weakness\" across her chest.  She had a negative stress echo in May of this year. She has no prior cardiac stents. Denies any history of CHF. She does admit that she has been using an additional pillow to sleep propped up to try to help her breathe easier. She has a history of a chronically elevated left diaphragm unknown exact etiology however she is seen pulmonology and has been told there is nothing to do for this. She has no prior history of PE or DVT. She does states it is difficult to take a deep breath and when she tries she does have a nonproductive cough. Denies any fever or chills. In addition the family at bedside tells me that she has a chronically elevated alkaline phosphatase level which is being followed admits to some mild intermittent epigastric discomfort she has had a prior cholecystectomy. She is also known to have a pancreatic cyst.  Denies any associated GI symptoms today. HPI    NursingNotes were reviewed. REVIEW OF SYSTEMS    (2-9 systems for level 4, 10 or more for level 5)     Review of Systems   Constitutional: Positive for fatigue. Negative for chills and fever. HENT: Negative for rhinorrhea and sore throat.     Respiratory: Positive for cough and ophthalmic solution INSTILL 1 DROP INTO BOTH EYES TWICE A DAY AS DIRECTED, R-3Historical Med      latanoprost (XALATAN) 0.005 % ophthalmic solution INSTILL 1 DROP INTO BOTH EYES IN THE EVENING, R-3Historical Med             ALLERGIES     Cardizem [diltiazem hcl] and Morphine    FAMILY HISTORY       Family History   Problem Relation Age of Onset    Heart Attack Mother     Stroke Father     Heart Attack Father     Heart Disease Sister     Atrial Fibrillation Sister           SOCIAL HISTORY       Social History     Socioeconomic History    Marital status:       Spouse name: None    Number of children: None    Years of education: None    Highest education level: None   Occupational History    None   Social Needs    Financial resource strain: None    Food insecurity:     Worry: None     Inability: None    Transportation needs:     Medical: None     Non-medical: None   Tobacco Use    Smoking status: Never Smoker    Smokeless tobacco: Never Used   Substance and Sexual Activity    Alcohol use: No    Drug use: No    Sexual activity: Not Currently   Lifestyle    Physical activity:     Days per week: None     Minutes per session: None    Stress: None   Relationships    Social connections:     Talks on phone: None     Gets together: None     Attends Tenriism service: None     Active member of club or organization: None     Attends meetings of clubs or organizations: None     Relationship status: None    Intimate partner violence:     Fear of current or ex partner: None     Emotionally abused: None     Physically abused: None     Forced sexual activity: None   Other Topics Concern    None   Social History Narrative    None       SCREENINGS             PHYSICAL EXAM    (up to 7 for level 4, 8 or more for level 5)     ED Triage Vitals [09/08/19 1120]   BP Temp Temp src Pulse Resp SpO2 Height Weight   124/72 98.4 °F (36.9 °C) -- 78 20 96 % 5' 5\" (1.651 m) 158 lb (71.7 kg)       Physical Exam Constitutional: She is oriented to person, place, and time. She appears well-developed and well-nourished. No distress. HENT:   Head: Normocephalic and atraumatic. Right Ear: External ear normal.   Left Ear: External ear normal.   Eyes: Conjunctivae and EOM are normal.   Neck: Normal range of motion. No tracheal deviation present. Cardiovascular: Normal rate, regular rhythm, normal heart sounds and intact distal pulses. No murmur heard. Pulmonary/Chest: No accessory muscle usage. No respiratory distress. She has decreased breath sounds in the left lower field. She has no wheezes. She has no rhonchi. She has no rales. Abdominal: Soft. She exhibits no mass. There is no tenderness. Musculoskeletal: Normal range of motion. She exhibits no edema. No palpable cords or calf pain   Neurological: She is alert and oriented to person, place, and time. GCS eye subscore is 4. GCS verbal subscore is 5. GCS motor subscore is 6. Skin: Skin is warm and dry. She is not diaphoretic. Vitals reviewed. DIAGNOSTIC RESULTS     EKG: All EKG's areinterpreted by the Emergency Department Physician who either signs or Co-signs this chart in the absence of a cardiologist.    77, normal sinus rhythm, no obvious ST changes, occasional PVC, nondiagnostic EKG    RADIOLOGY:  Non-plain film images such as CT, Ultrasound and MRI are read by the radiologist. Plain radiographic images are visualized and preliminarily interpreted bythe emergency physician with the below findings:      XR CHEST PORTABLE   Final Result   No acute finding or significant change.    Signed by Dr Eliane Francois on 9/8/2019 12:03 PM              LABS:  Labs Reviewed   APTT - Abnormal; Notable for the following components:       Result Value    aPTT 36.5 (*)     All other components within normal limits   CBC WITH AUTO DIFFERENTIAL - Abnormal; Notable for the following components:    RBC 4.00 (*)     MCHC 31.8 (*)     Platelets 511 (*)     Monocytes % 11.8 (*)     All other components within normal limits   COMPREHENSIVE METABOLIC PANEL - Abnormal; Notable for the following components:    Alkaline Phosphatase 123 (*)     All other components within normal limits   PROTIME-INR - Abnormal; Notable for the following components:    Protime 21.2 (*)     INR 1.92 (*)     All other components within normal limits   BRAIN NATRIURETIC PEPTIDE - Abnormal; Notable for the following components:    Pro-BNP 2,127 (*)     All other components within normal limits   TROPONIN   LIPASE   D-DIMER, QUANTITATIVE   TROPONIN       All other labs were within normal range or not returned as of this dictation.     EMERGENCY DEPARTMENT COURSE and DIFFERENTIAL DIAGNOSIS/MDM:   Vitals:    Vitals:    09/08/19 1200 09/08/19 1312 09/08/19 1332 09/08/19 1402   BP: 126/74 (!) 146/83 100/67 124/77   Pulse: 74 72 81 76   Resp: 18 18 18 18   Temp:       SpO2: 95% 95% 92% 93%   Weight:       Height:           MDM  Number of Diagnoses or Management Options     Amount and/or Complexity of Data Reviewed  Clinical lab tests: ordered and reviewed  Tests in the radiology section of CPT®: ordered and reviewed  Independent visualization of images, tracings, or specimens: yes      April 2019 had echo 40-50% EF, grade 1 diastolic dysfxn, pulm htn, overall presenation today seems similar to prior issues and somewhat chronic, does admit to some PND however doesn't appear volume overloaded, seems atypical for CAD had neg stress in May, admits to pleuritic component today however vitals and hx not suggestive of PE, had neg cta in April, will check ddimer today, cont to monitor 1201    Trop neg x2, BNP stable, likely suspect her dyspnea related to 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

## 2019-11-01 ENCOUNTER — NURSE ONLY (OUTPATIENT)
Dept: INTERNAL MEDICINE | Age: 84
End: 2019-11-01
Payer: MEDICARE

## 2019-11-01 DIAGNOSIS — Z23 NEED FOR IMMUNIZATION AGAINST INFLUENZA: Primary | ICD-10-CM

## 2019-11-01 PROCEDURE — G0008 ADMIN INFLUENZA VIRUS VAC: HCPCS | Performed by: PHYSICIAN ASSISTANT

## 2019-11-01 PROCEDURE — 90653 IIV ADJUVANT VACCINE IM: CPT | Performed by: PHYSICIAN ASSISTANT

## 2019-11-22 ENCOUNTER — OFFICE VISIT (OUTPATIENT)
Dept: OTOLARYNGOLOGY | Facility: CLINIC | Age: 84
End: 2019-11-22

## 2019-11-22 VITALS
HEART RATE: 77 BPM | SYSTOLIC BLOOD PRESSURE: 112 MMHG | TEMPERATURE: 97.3 F | DIASTOLIC BLOOD PRESSURE: 71 MMHG | WEIGHT: 164.8 LBS | HEIGHT: 65 IN | BODY MASS INDEX: 27.46 KG/M2

## 2019-11-22 DIAGNOSIS — B00.1 FEVER BLISTER: ICD-10-CM

## 2019-11-22 DIAGNOSIS — Z79.01 ANTICOAGULATED: ICD-10-CM

## 2019-11-22 DIAGNOSIS — C44.329: Primary | ICD-10-CM

## 2019-11-22 PROCEDURE — 99203 OFFICE O/P NEW LOW 30 MIN: CPT | Performed by: OTOLARYNGOLOGY

## 2019-11-22 RX ORDER — DORZOLAMIDE HYDROCHLORIDE AND TIMOLOL MALEATE 20; 5 MG/ML; MG/ML
1 SOLUTION/ DROPS OPHTHALMIC 2 TIMES DAILY
COMMUNITY
Start: 2018-04-11

## 2019-11-22 RX ORDER — OMEPRAZOLE 40 MG/1
40 CAPSULE, DELAYED RELEASE ORAL DAILY
COMMUNITY
Start: 2018-12-07

## 2019-11-22 RX ORDER — BIMATOPROST 0.01 %
1 DROPS OPHTHALMIC (EYE) NIGHTLY
COMMUNITY
Start: 2019-11-01

## 2019-11-22 RX ORDER — ESCITALOPRAM OXALATE 20 MG/1
20 TABLET ORAL NIGHTLY
COMMUNITY
Start: 2019-05-07

## 2019-11-22 RX ORDER — ATORVASTATIN CALCIUM 40 MG/1
40 TABLET, FILM COATED ORAL NIGHTLY
COMMUNITY
Start: 2019-04-24

## 2019-11-22 RX ORDER — ASPIRIN 81 MG/1
81 TABLET, CHEWABLE ORAL DAILY
COMMUNITY
Start: 2019-05-04

## 2019-11-22 RX ORDER — ATENOLOL 50 MG/1
75 TABLET ORAL 2 TIMES DAILY
COMMUNITY
Start: 2019-06-14 | End: 2021-01-01 | Stop reason: HOSPADM

## 2019-11-22 RX ORDER — APIXABAN 5 MG/1
5 TABLET, FILM COATED ORAL EVERY 12 HOURS SCHEDULED
COMMUNITY
Start: 2019-08-26 | End: 2020-07-31

## 2019-11-22 RX ORDER — LOSARTAN POTASSIUM 50 MG/1
50 TABLET ORAL NIGHTLY
COMMUNITY
Start: 2018-12-07 | End: 2021-01-01 | Stop reason: HOSPADM

## 2019-11-22 NOTE — PROGRESS NOTES
Obdulia Vickers LPN   Patient Intake Note    Review of Systems  Review of Systems   Constitutional: Negative for diaphoresis, fatigue and fever.   Respiratory: Negative for cough, choking and shortness of breath.    Gastrointestinal: Negative for diarrhea, nausea and vomiting.   Neurological: Negative for dizziness, light-headedness and headaches.   Psychiatric/Behavioral: Negative for sleep disturbance.       QUALITY MEASURES    Tobacco Use: Screening and Cessation Intervention  Social History    Tobacco Use      Smoking status: Never Smoker      Smokeless tobacco: Never Used        Obdulia Vickers LPN  11/22/2019  9:21 AM

## 2019-11-22 NOTE — PROGRESS NOTES
Harlan Jaquez Jr, MD     Chief Complaint   Patient presents with   • Left Cheek lesion and left wrist lesion        HISTORY OF PRESENT ILLNESS:   Accompanied by:   Daughter  Liana Zaragoza is a  84 y.o. female with SCCa L face. She has had bx.  Done Oct 16th.  No masses in neck.  Blood thinners- Elaquis, Afib  Smoke- none  Drinker- none    Review of Systems  Reviewed per patient intake note and confirmed with patient    Past History:  Past Medical History:   Diagnosis Date   • A-fib (CMS/HCC)    • Depression    • GERD (gastroesophageal reflux disease)    • Hypertension      History reviewed. No pertinent surgical history.  Family History   Problem Relation Age of Onset   • Heart failure Mother    • Heart failure Father    • Heart failure Sister    • Heart failure Brother    • Heart failure Maternal Aunt    • Heart failure Maternal Uncle    • Heart failure Paternal Aunt    • Heart failure Paternal Uncle    • Heart failure Maternal Grandmother    • Heart failure Maternal Grandfather    • Heart failure Paternal Grandmother    • Heart failure Paternal Grandfather      Social History     Tobacco Use   • Smoking status: Never Smoker   • Smokeless tobacco: Never Used   Substance Use Topics   • Alcohol use: No     Frequency: Never   • Drug use: Defer     Outpatient Medications Marked as Taking for the 11/22/19 encounter (Office Visit) with Harlan Jaquez Jr., MD   Medication Sig Dispense Refill   • aspirin 81 MG chewable tablet Chew 81 mg.     • atenolol (TENORMIN) 50 MG tablet Take 75 mg by mouth.     • atorvastatin (LIPITOR) 40 MG tablet Take 40 mg by mouth.     • ciclopirox (LOPROX) 0.77 % cream 0.077 application.     • dorzolamide-timolol (COSOPT) 22.3-6.8 MG/ML ophthalmic solution 22 drops.     • escitalopram (LEXAPRO) 20 MG tablet Take 20 mg by mouth.     • losartan (COZAAR) 50 MG tablet 50 tablets.     • omeprazole (priLOSEC) 40 MG capsule Take 40 mg by mouth.       Allergies:  Morphine        Vital Signs:    Temp:  [97.3 °F (36.3 °C)] 97.3 °F (36.3 °C)  Heart Rate:  [77] 77  BP: (112)/(71) 112/71    EXAMINATION:   CONSTITUTIONAL:    well nourished, well-developed, alert, oriented, in no acute distress     BODY HABITUS:    Normal body habitus    COMMUNICATION:    able to communicate normally, normal voice quality    HEAD:     Normocephalic, without obvious abnormality, atraumatic    FACE:    structure normal  Bilateral  jawline-         LEFT: 6 mm lesion just lateral to ML fold at jawline  lip-        lower and mid lip inf to vermillion fever blister, drying       SALIVARY GLANDS:    parotid glands with no tenderness, no swelling, no masses, submandibular glands with normal size, nontender     EYE:    ocular motility normal, eyelids normal, orbits normal, no proptosis, conjunctiva clear, sclera non-icteric, pupils equal, round, reactive to light and accomodation  Color:   blue    HEARING:      response to conversational voice decreased  Bilateral    EARS:    Otoscopic exam   normal pinna with no lesions, Canals normal size and shape, Tympanic membranes normal, Ossicular chain intact, Middle ear clear     NOSE EXTERNAL:    APPEARANCE: normal, straight, with good projection, no tenderness, no lesions, no tenderness, good nasal support, patent nares    NOSE INTERNAL:    Anterior rhinoscopy  intact mucosa, normal inferior turbinates, septum midline without perforation, secretions clear and normal amount, nares patent, normal nasal airway without obstruction, no lesions    ORAL CAVITY:      LIPS:      lower lip- fever blister inf to vermillion, healing     TEETH:       dentition within normal limits for age    GUMS:      gingivae healthy, no lesions    ORAL MUCOSA:       oral mucosa normal, no mucosal lesions    FLOOR OF MOUTH:       Warthin's duct patent, mucosa normal  TONGUE:       lingual mucosa normal without lesions, normal tongue mobility    PALATE:       hard palate with normal mucosa and structure      soft palate  with normal mucosa and structure, normal mobility uvula intact    OROPHARYNX:    Direct examination  oropharyngeal mucosa normal, tonsil(s) with normal appearance      NECK:    normal appearance, no masses, no lesions, larynx normal mobility, trachea midline    LYMPH NODES :    no adenopathy    THYROID:    no overt thyromegaly, no tenderness, nodules or mass present on palpation, position midline    CHEST/RESPIRATORY:    respiratory effort normal, no rales, rubs or wheezing, no stridor, normal appearance to chest    CARDIOVASCULAR:      irregularly irregular    NEURO/PSYCHIATRIC :    oriented appropriately for age, mood normal, affect appropriate, cranial nerves intact grossly (unless specifically described), gait normal for age    RESULTS REVIEW:    I have reviewed the patients old records in the chart.        ASSESSMENT:      Diagnosis Plan   1. Squamous cell skin cancer, chin      L ML fold, 1 cm   2. Anticoagulated     3. Fever blister             PLAN:     Medical and surgical options were discussed including observation and surgical management. Risks, benefits and alternatives were discussed and questions were answered. After considering the options, the patient decided to proceed with surgical management.  Patient has SCCa L lower face. She can be treated with IOP. She will need to stop anticoagulant prior to procedure. Discussed with wife and daughter.  I will see if she can be cleared for this.  Plan IOP L face  Refer to [facility] for wrist lesion  MY CHART:  Patient is Encouraged to enroll in My Chart  Encouraged to review data and findings in My Chart          Medical and surgical options were discussed including medical and surgical options. Risks, benefits and alternatives were discussed and questions were answered. After considering the options, the patient decided to proceed with surgery.     -----SURGERY SCHEDULING:-----  Schedule * Surgery not found *     ---INFORMED CONSENT DISCUSSION:---  SKIN  LESION EXCISION: The risks, benefits, alternatives, and possible complications of excision of the skin lesion were discussed  Including possible reconstruction utilizing primary closure, local tissue rearrangement, full-thickness skin grafting, or local interpolated flaps as indicated. Risks include, but are not limited too: bleeding, infection, hematoma, recurrence, need for additional procedures, flap failure, cosmetic deformity. The patient understands risks and would like to proceed with surgery.     No guarantees of outcome given or implied  Patient, Daughter demonstrate understanding     ---PREOPERATIVE WORKUP:---  none    Patient, Daughter understand(s) and agree(s) with the treatment plan as described.  Return 2 weeks after surgery, for Recheck L face.       Harlan Jaquez Jr, MD  11/22/19  9:56 AM

## 2019-11-22 NOTE — PATIENT INSTRUCTIONS
PREOPERATIVE SURGERY/PROCEDURE INSTRUCTIONS:  Do not eat or drink ANYTHING after midnight, unless instructed     Clean the operative site by showering with an antibacterial soap (like Dial, Dove, Ivory, etc) and shampooing hair    Preoperative scrub for Surgery:  Skin: Antibacterial soap (Dial, Ivory, Dove) shower daily, including hair.  Be careful not to get into eyes  Do this daily for 5 days    Mouth: Oral rinse with Peroxide/Mouthwash solution mixed 50:50, OR use Listerine (this is antibacterial) solution 2 times daily for 5 days      Do NOT pluck, shave hair on skin the night prior to operation    If you are diabetic, take your blood sugar the night before and in the morning prior to coming to hospital and give results to nurse and the anesthesiologist    Remove any metallic piercings prior to surgery. You may wear plastic spacers if needed.    Do NOT apply eye makeup Morning of surgery    Please remove fingernail polish prior to surgery    STOP:  -   All natural/homeopathic medications 2 weeks prior to surgery, Ask about over the counter medications  -   Smoking 2 weeks prior to surgery  -   Blood thinners- 3-5 days prior to surgery (or as instructed by doctor)  Bring with you the morning of surgery:  -   Preoperative paperwork  -   Insurance card  -   Identification with photo  -   Home medications or up to date list    Harlan Jaquez Jr, MD has explained the risks, benefits and alternatives to the patient/patient’s representative, in clear and simple language.  Time was allowed for questions.  Risks of procedure include but are not limited to:    As a result of this procedure being performed, the material risks generally recognized are INFECTION, ALLERGIC REACTION, SEVERE LOSS OF BLOOD, LOSS OR LOSS OF FUNCTION OF ANY LIMB OR ORGAN, PARALYSIS OR PARTIAL PARALYSIS, PARAPLEGIA OR QUADRIPLEGIA, DISFIGURING SCAR, BRAIN DAMAGE, CARDIAC ARREST OR DEATH, BLOOD LOSS NECESSITATING TRANSFUSION WHICH CARRIES THE RISK  OF EXPOSURE TO AIDS, HEPATITIS OR OTHER INFECTIOUS DISEASES.      Procedure:  Excision skin lesion L lower face    Risks specific for procedure:   The risks, benefits, alternatives, and possible complications of excision of the skin lesion were discussed  Including possible reconstruction utilizing primary closure, local tissue rearrangement, full-thickness skin grafting, or local interpolated flaps as indicated. Risks include, but are not limited too: bleeding, infection, hematoma, recurrence, need for additional procedures, flap failure, cosmetic deformity, injury to adjacent structures (eyes, mouth, nerves, muscles, blood vessels)    No guarantees of outcome given or implied  Patient, Daughter demonstrate understanding    Patient, Daughter do wish to proceed with proposed procedure    Thank you for enrolling in [employer]. Please follow the instructions below to securely access your online medical record. [employer] allows you to send messages to your doctor, view your test results, renew your prescriptions, schedule appointments, and more.    How Do I Sign Up?  1. In your Internet browser, go to [employer]  2. Click on the Sign Up Now link in the New User? box.   3. Enter your [employer] Activation Code exactly as it appears below. You will not need to use this code after you have completed the sign-up process. If you do not sign up before the expiration date, you must request a new code.  [employer] Activation Code: [email or web address]  Expires: 12/22/2019  9:56 AM    4. Enter the last four digits of your Social Security Number and your Date of Birth as indicated and click Next. You will be taken to the next sign-up page.  5. Create a [employer] username. Think of one that is secure and easy to remember.  6. Create a [employer] password. You can change your password at any time.  7. Choose a security question, enter your answer, and click Next. This can be used to access [employer] if you forget your password.    8. Select your communication preference. Enter a valid e-mail address if you would like to receive e-mail notifications when new information is available in [employer].  9. Click Sign In. You can now view your medical record.     Additional Information  If you have questions, you can e-mail [email or web address], or call [phone] to talk to our [employer] staff. Remember, [employer] is NOT to be used for urgent needs. For medical emergencies, dial 911.

## 2019-11-25 ENCOUNTER — TELEPHONE (OUTPATIENT)
Dept: CARDIOLOGY | Age: 84
End: 2019-11-25

## 2019-12-20 ENCOUNTER — OFFICE VISIT (OUTPATIENT)
Dept: CARDIOLOGY | Age: 84
End: 2019-12-20
Payer: MEDICARE

## 2019-12-20 VITALS
OXYGEN SATURATION: 98 % | HEIGHT: 65 IN | SYSTOLIC BLOOD PRESSURE: 130 MMHG | BODY MASS INDEX: 27.49 KG/M2 | DIASTOLIC BLOOD PRESSURE: 80 MMHG | HEART RATE: 92 BPM | WEIGHT: 165 LBS

## 2019-12-20 DIAGNOSIS — E78.5 HYPERLIPIDEMIA, UNSPECIFIED HYPERLIPIDEMIA TYPE: ICD-10-CM

## 2019-12-20 DIAGNOSIS — I48.0 PAROXYSMAL ATRIAL FIBRILLATION (HCC): Primary | ICD-10-CM

## 2019-12-20 DIAGNOSIS — I10 ESSENTIAL HYPERTENSION: ICD-10-CM

## 2019-12-20 PROCEDURE — G8399 PT W/DXA RESULTS DOCUMENT: HCPCS | Performed by: NURSE PRACTITIONER

## 2019-12-20 PROCEDURE — G8417 CALC BMI ABV UP PARAM F/U: HCPCS | Performed by: NURSE PRACTITIONER

## 2019-12-20 PROCEDURE — 99214 OFFICE O/P EST MOD 30 MIN: CPT | Performed by: NURSE PRACTITIONER

## 2019-12-20 PROCEDURE — 93000 ELECTROCARDIOGRAM COMPLETE: CPT | Performed by: NURSE PRACTITIONER

## 2019-12-20 PROCEDURE — G8427 DOCREV CUR MEDS BY ELIG CLIN: HCPCS | Performed by: NURSE PRACTITIONER

## 2019-12-20 PROCEDURE — 1090F PRES/ABSN URINE INCON ASSESS: CPT | Performed by: NURSE PRACTITIONER

## 2019-12-20 PROCEDURE — 1036F TOBACCO NON-USER: CPT | Performed by: NURSE PRACTITIONER

## 2019-12-20 PROCEDURE — 1123F ACP DISCUSS/DSCN MKR DOCD: CPT | Performed by: NURSE PRACTITIONER

## 2019-12-20 PROCEDURE — G8482 FLU IMMUNIZE ORDER/ADMIN: HCPCS | Performed by: NURSE PRACTITIONER

## 2019-12-20 PROCEDURE — 4040F PNEUMOC VAC/ADMIN/RCVD: CPT | Performed by: NURSE PRACTITIONER

## 2019-12-27 ENCOUNTER — HOSPITAL ENCOUNTER (OUTPATIENT)
Dept: NON INVASIVE DIAGNOSTICS | Age: 84
Discharge: HOME | End: 2019-12-27
Payer: MEDICARE

## 2019-12-27 DIAGNOSIS — I10 ESSENTIAL HYPERTENSION: ICD-10-CM

## 2019-12-27 DIAGNOSIS — I48.0 PAROXYSMAL ATRIAL FIBRILLATION (HCC): ICD-10-CM

## 2019-12-27 LAB
LV EF: 45 %
LVEF MODALITY: NORMAL

## 2019-12-27 PROCEDURE — 93306 TTE W/DOPPLER COMPLETE: CPT

## 2020-01-06 NOTE — TELEPHONE ENCOUNTER
Missy Oneil called requesting a refill of the below medication which has been pended for you:     Requested Prescriptions     Pending Prescriptions Disp Refills    losartan (COZAAR) 50 MG tablet [Pharmacy Med Name: LOSARTAN TAB 50MG] 90 tablet 3     Sig: TAKE 1 TABLET DAILY.  IF    BLOOD PRESSURE STARTS TO   RUN LOW CUT DOWN TO 1/2    TABLET       Last Appointment Date: 8/16/2019  Next Appointment Date: 1/17/2020    Allergies   Allergen Reactions    Cardizem [Diltiazem Hcl] Rash    Morphine Nausea And Vomiting

## 2020-01-07 RX ORDER — LOSARTAN POTASSIUM 50 MG/1
TABLET ORAL
Qty: 90 TABLET | Refills: 3 | Status: SHIPPED | OUTPATIENT
Start: 2020-01-07 | End: 2020-11-19

## 2020-01-10 DIAGNOSIS — E55.9 VITAMIN D DEFICIENCY: ICD-10-CM

## 2020-01-10 DIAGNOSIS — I10 ESSENTIAL HYPERTENSION: ICD-10-CM

## 2020-01-10 DIAGNOSIS — E78.2 MIXED HYPERLIPIDEMIA: ICD-10-CM

## 2020-01-10 DIAGNOSIS — R74.8 ELEVATED ALKALINE PHOSPHATASE LEVEL: ICD-10-CM

## 2020-01-10 LAB
ALBUMIN SERPL-MCNC: 4.1 G/DL (ref 3.5–5.2)
ALP BLD-CCNC: 101 U/L (ref 35–104)
ALT SERPL-CCNC: 24 U/L (ref 5–33)
ANION GAP SERPL CALCULATED.3IONS-SCNC: 14 MMOL/L (ref 7–19)
AST SERPL-CCNC: 37 U/L (ref 5–32)
BASOPHILS ABSOLUTE: 0.1 K/UL (ref 0–0.2)
BASOPHILS RELATIVE PERCENT: 1.2 % (ref 0–1)
BILIRUB SERPL-MCNC: 0.5 MG/DL (ref 0.2–1.2)
BUN BLDV-MCNC: 11 MG/DL (ref 8–23)
CALCIUM SERPL-MCNC: 9.7 MG/DL (ref 8.8–10.2)
CHLORIDE BLD-SCNC: 101 MMOL/L (ref 98–111)
CHOLESTEROL, TOTAL: 155 MG/DL (ref 160–199)
CO2: 27 MMOL/L (ref 22–29)
CREAT SERPL-MCNC: 0.8 MG/DL (ref 0.5–0.9)
EOSINOPHILS ABSOLUTE: 0.2 K/UL (ref 0–0.6)
EOSINOPHILS RELATIVE PERCENT: 3.1 % (ref 0–5)
GFR NON-AFRICAN AMERICAN: >60
GLUCOSE BLD-MCNC: 100 MG/DL (ref 74–109)
HCT VFR BLD CALC: 42.2 % (ref 37–47)
HDLC SERPL-MCNC: 65 MG/DL (ref 65–121)
HEMOGLOBIN: 13.4 G/DL (ref 12–16)
IMMATURE GRANULOCYTES #: 0 K/UL
LDL CHOLESTEROL CALCULATED: 69 MG/DL
LYMPHOCYTES ABSOLUTE: 1.6 K/UL (ref 1.1–4.5)
LYMPHOCYTES RELATIVE PERCENT: 27.6 % (ref 20–40)
MCH RBC QN AUTO: 29.9 PG (ref 27–31)
MCHC RBC AUTO-ENTMCNC: 31.8 G/DL (ref 33–37)
MCV RBC AUTO: 94.2 FL (ref 81–99)
MONOCYTES ABSOLUTE: 0.6 K/UL (ref 0–0.9)
MONOCYTES RELATIVE PERCENT: 10 % (ref 0–10)
NEUTROPHILS ABSOLUTE: 3.3 K/UL (ref 1.5–7.5)
NEUTROPHILS RELATIVE PERCENT: 57.8 % (ref 50–65)
PARATHYROID HORMONE INTACT: 73.1 PG/ML (ref 15–65)
PDW BLD-RTO: 14.5 % (ref 11.5–14.5)
PLATELET # BLD: 163 K/UL (ref 130–400)
PMV BLD AUTO: 11.7 FL (ref 9.4–12.3)
POTASSIUM SERPL-SCNC: 5 MMOL/L (ref 3.5–5)
RBC # BLD: 4.48 M/UL (ref 4.2–5.4)
SODIUM BLD-SCNC: 142 MMOL/L (ref 136–145)
TOTAL PROTEIN: 7.7 G/DL (ref 6.6–8.7)
TRIGL SERPL-MCNC: 105 MG/DL (ref 0–149)
VITAMIN D 25-HYDROXY: 9.2 NG/ML
WBC # BLD: 5.7 K/UL (ref 4.8–10.8)

## 2020-01-15 RX ORDER — ERGOCALCIFEROL 1.25 MG/1
50000 CAPSULE ORAL WEEKLY
Qty: 12 CAPSULE | Refills: 3 | Status: SHIPPED | OUTPATIENT
Start: 2020-01-15 | End: 2020-12-18

## 2020-01-24 ENCOUNTER — OFFICE VISIT (OUTPATIENT)
Dept: INTERNAL MEDICINE | Age: 85
End: 2020-01-24
Payer: MEDICARE

## 2020-01-24 VITALS
SYSTOLIC BLOOD PRESSURE: 120 MMHG | DIASTOLIC BLOOD PRESSURE: 80 MMHG | OXYGEN SATURATION: 97 % | WEIGHT: 165 LBS | HEART RATE: 78 BPM | TEMPERATURE: 96.6 F | HEIGHT: 65 IN | BODY MASS INDEX: 27.49 KG/M2

## 2020-01-24 PROBLEM — I27.20 PULMONARY HYPERTENSION (HCC): Status: ACTIVE | Noted: 2020-01-24

## 2020-01-24 PROCEDURE — G8427 DOCREV CUR MEDS BY ELIG CLIN: HCPCS | Performed by: PHYSICIAN ASSISTANT

## 2020-01-24 PROCEDURE — 1090F PRES/ABSN URINE INCON ASSESS: CPT | Performed by: PHYSICIAN ASSISTANT

## 2020-01-24 PROCEDURE — 1036F TOBACCO NON-USER: CPT | Performed by: PHYSICIAN ASSISTANT

## 2020-01-24 PROCEDURE — 99214 OFFICE O/P EST MOD 30 MIN: CPT | Performed by: PHYSICIAN ASSISTANT

## 2020-01-24 PROCEDURE — G8399 PT W/DXA RESULTS DOCUMENT: HCPCS | Performed by: PHYSICIAN ASSISTANT

## 2020-01-24 PROCEDURE — 4040F PNEUMOC VAC/ADMIN/RCVD: CPT | Performed by: PHYSICIAN ASSISTANT

## 2020-01-24 PROCEDURE — G8482 FLU IMMUNIZE ORDER/ADMIN: HCPCS | Performed by: PHYSICIAN ASSISTANT

## 2020-01-24 PROCEDURE — 1123F ACP DISCUSS/DSCN MKR DOCD: CPT | Performed by: PHYSICIAN ASSISTANT

## 2020-01-24 PROCEDURE — G8417 CALC BMI ABV UP PARAM F/U: HCPCS | Performed by: PHYSICIAN ASSISTANT

## 2020-01-24 ASSESSMENT — ENCOUNTER SYMPTOMS
EYE PAIN: 0
EYE REDNESS: 0
DIARRHEA: 0
COLOR CHANGE: 0
RHINORRHEA: 0
VOMITING: 0
PHOTOPHOBIA: 0
SINUS PRESSURE: 0
ABDOMINAL PAIN: 0
CHEST TIGHTNESS: 0
CONSTIPATION: 0
WHEEZING: 0
COUGH: 0
SORE THROAT: 0
NAUSEA: 0
SHORTNESS OF BREATH: 0

## 2020-01-24 NOTE — PROGRESS NOTES
friends think this will help with her mental health and help with some deconditioning. Patient does use a walker for stability. Patient states that she just feels embarrassed having to use the walker and feels like it is a burden for other people to come pick her up but her daughter and her friends do call her and ask her to go out but patient a lot of times has excuses. I urged patient to get out more and to do more things. Patient's vitamin D was also very low at 9.2. We did start patient on 50,000 units weekly, recheck it when she comes back in 4 months we may need to increase the vitamin D up even more. Patient's PTH was also mildly elevated this time. Patient denies any recent falls. 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 James Fleming in about a week to have it removed. Patient also has a lesion on her left cheek that she is going to have removed also. Patient denies any other issues at this time.     Active Ambulatory Problems     Diagnosis Date Noted    2-part displaced fracture of surgical neck of left humerus, initial encounter for closed fracture 07/08/2016    Hypertension 07/09/2016    Hyperlipemia 07/09/2016    GERD (gastroesophageal reflux disease) 07/09/2016    2-part displaced fracture of surgical neck of right humerus, initial encounter for closed fracture 07/09/2016    Nausea 07/09/2016    Glaucoma 07/10/2016    Shortness of breath 08/24/2018    Anxiety and depression 08/24/2018    Elevated brain natriuretic peptide (BNP) level 04/19/2019    Elevated TSH 04/19/2019    Hypothyroidism 04/19/2019    Renal cyst 04/19/2019    Chest pain 05/01/2019    Paroxysmal A-fib ([facility] [address] 05/01/2019    Chest discomfort     Pulmonary hypertension ([facility] [address] 01/24/2020     Resolved Ambulatory Problems     Diagnosis Date Noted    No Resolved Ambulatory Problems Past Medical History:   Diagnosis Date    Arthritis     Atrial fibrillation ([facility] [address]     Hyperlipidemia     Tremor        Past Medical History:   Diagnosis Date    Arthritis     Atrial fibrillation (HCC)     GERD (gastroesophageal reflux disease)     Hyperlipidemia     Hypertension     Tremor     to right hand       Prior to Visit Medications    Medication Sig Taking? Authorizing Provider   vitamin D (ERGOCALCIFEROL) 1.25 MG (07161 UT) CAPS capsule Take 1 capsule by mouth once a week Yes SOLEDAD Torres   losartan (COZAAR) 50 MG tablet TAKE 1 TABLET DAILY.  IF    BLOOD PRESSURE STARTS TO   RUN LOW CUT DOWN TO 1/2    TABLET Yes SOLEDAD Torres   ciclopirox (LOPROX) 0.77 % cream APPLY UNDER BREASTS 2 TIMES DAILY FOR 2 WEEKS Yes Historical Provider, MD   atenolol (TENORMIN) 50 MG tablet Take 1.5 tablets by mouth 2 times daily TAKE 1.5 TABLETS BY MOUTH twice daily Yes Maki Mejia MD   apixaban (ELIQUIS) 5 MG TABS tablet Take 5 mg by mouth 2 times daily Yes Historical Provider, MD   escitalopram (LEXAPRO) 20 MG tablet Take 1 tablet by mouth daily Yes SOLEDAD Torres   aspirin 81 MG chewable tablet Take 1 tablet by mouth daily Yes Kay Quinn MD   atorvastatin (LIPITOR) 40 MG tablet Take 1 tablet by mouth daily Yes SOLEDAD Torres   omeprazole (PRILOSEC) 40 MG delayed release capsule Take 1 capsule by mouth daily Yes SOLEDAD Torres   dorzolamide-timolol (COSOPT) 22.3-6.8 MG/ML ophthalmic solution INSTILL 1 DROP INTO BOTH EYES TWICE A DAY AS DIRECTED Yes Historical Provider, MD   latanoprost (XALATAN) 0.005 % ophthalmic solution INSTILL 1 DROP INTO BOTH EYES IN THE EVENING Yes Historical Provider, MD       Past Surgical History:   Procedure Laterality Date    CHOLECYSTECTOMY      HUMERUS FRACTURE SURGERY Right 7/9/2016    REVERSE TOTAL SHOULDER ARTHROPLASTY performed by Eliezer Franks MD at [facility] OR       Family History   Problem Relation Age of Onset    Heart Attack Mother    [employer] Stroke Father     Heart Attack Father     Heart Disease Sister     Atrial Fibrillation Sister        Allergies   Allergen Reactions    Cardizem [Diltiazem Hcl] Rash    Morphine Nausea And Vomiting       Social History     Socioeconomic History    Marital status:      Spouse name: Not on file    Number of children: Not on file    Years of education: Not on file    Highest education level: Not on file   Occupational History    Not on file   Social Needs    Financial resource strain: Not on file    Food insecurity:     Worry: Not on file     Inability: Not on file    Transportation needs:     Medical: Not on file     Non-medical: Not on file   Tobacco Use    Smoking status: Never Smoker    Smokeless tobacco: Never Used   Substance and Sexual Activity    Alcohol use: No    Drug use: No    Sexual activity: Not Currently   Lifestyle    Physical activity:     Days per week: Not on file     Minutes per session: Not on file    Stress: Not on file   Relationships    Social connections:     Talks on phone: Not on file     Gets together: Not on file     Attends Mandaen service: Not on file     Active member of club or organization: Not on file     Attends meetings of clubs or organizations: Not on file     Relationship status: Not on file    Intimate partner violence:     Fear of current or ex partner: Not on file     Emotionally abused: Not on file     Physically abused: Not on file     Forced sexual activity: Not on file   Other Topics Concern    Not on file   Social History Narrative    Not on file       Review of Systems  Review of Systems   Constitutional: Negative for activity change, appetite change, chills, fatigue and fever. HENT: Negative for congestion, ear pain, postnasal drip, rhinorrhea, sinus pressure, sneezing and sore throat. Eyes: Negative for photophobia, pain and redness. Respiratory: Negative for cough, chest tightness, shortness of breath and wheezing. Cardiovascular: Negative for chest pain, palpitations and leg swelling. Gastrointestinal: Negative for abdominal pain, constipation, diarrhea, nausea and vomiting. Genitourinary: Negative for dysuria, frequency, hematuria and urgency. Musculoskeletal: Positive for gait problem. Negative for arthralgias, joint swelling and myalgias. Patient does use a walker. Skin: Negative for color change, pallor and wound. Neurological: Negative for dizziness, facial asymmetry, speech difficulty, weakness and light-headedness. Hematological: Negative for adenopathy. Does not bruise/bleed easily. Psychiatric/Behavioral: Positive for dysphoric mood. Negative for confusion. The patient is not nervous/anxious. Current Outpatient Medications   Medication Sig Dispense Refill    vitamin D (ERGOCALCIFEROL) 1.25 MG (42066 UT) CAPS capsule Take 1 capsule by mouth once a week 12 capsule 3    losartan (COZAAR) 50 MG tablet TAKE 1 TABLET DAILY.  IF    BLOOD PRESSURE STARTS TO   RUN LOW CUT DOWN TO 1/2    TABLET 90 tablet 3    ciclopirox (LOPROX) 0.77 % cream APPLY UNDER BREASTS 2 TIMES DAILY FOR 2 WEEKS  0    atenolol (TENORMIN) 50 MG tablet Take 1.5 tablets by mouth 2 times daily TAKE 1.5 TABLETS BY MOUTH twice daily 270 tablet 3    apixaban (ELIQUIS) 5 MG TABS tablet Take 5 mg by mouth 2 times daily      escitalopram (LEXAPRO) 20 MG tablet Take 1 tablet by mouth daily 90 tablet 3    aspirin 81 MG chewable tablet Take 1 tablet by mouth daily 30 tablet 3    atorvastatin (LIPITOR) 40 MG tablet Take 1 tablet by mouth daily 90 tablet 3    omeprazole (PRILOSEC) 40 MG delayed release capsule Take 1 capsule by mouth daily 90 capsule 3    dorzolamide-timolol (COSOPT) 22.3-6.8 MG/ML ophthalmic solution INSTILL 1 DROP INTO BOTH EYES TWICE A DAY AS DIRECTED  3    latanoprost (XALATAN) 0.005 % ophthalmic solution INSTILL 1 DROP INTO BOTH EYES IN THE EVENING  3     No current facility-administered medications GLOB 2.8 07/10/2016       Lab Results   Component Value Date    WBC 5.7 01/10/2020    HGB 13.4 01/10/2020    HCT 42.2 01/10/2020    MCV 94.2 01/10/2020     01/10/2020     No results found for: LABA1C  No results found for: EAG  Lab Results   Component Value Date    CHOL 155 (L) 01/10/2020    CHOL 147 (L) 08/16/2019    CHOL 180 03/22/2019     Lab Results   Component Value Date    TRIG 105 01/10/2020    TRIG 109 08/16/2019    TRIG 140 03/22/2019     Lab Results   Component Value Date    HDL 65 01/10/2020    HDL 48 (L) 08/16/2019    HDL 58 (L) 03/22/2019     Lab Results   Component Value Date    LDLCALC 69 01/10/2020    LDLCALC 77 08/16/2019    LDLCALC 94 03/22/2019     No results found for: LABVLDL, VLDL  No results found for: [facility]  Lab Results   Component Value Date    TSHFT4 3.13 05/02/2019    TSH 7.180 (H) 04/18/2019       1/10/2020 11:40 AM - Luisa Kuhn Incoming Lab Results From [facility]     Component Value Ref Range & Units Status Collected Lab   Vit D, 25-Hydroxy 9.2Low   >=30 ng/mL Final 01/10/2020 10:02 AM [facility] Lab   <=20 ng/mL. ........... Sandra Candis Deficient   21-29 ng/mL. ......... Sandra Candis Insufficient   >=30 ng/mL. ........ Sandra Candis Sufficient      1/10/2020 11:31 AM - Luisa Kuhn Incoming Lab Results From [facility]     Component Value Ref Range & Units Status Collected Lab   PTH 73.1High   15.0 - 65.0 pg/mL Final 01/10/2020 10:02 AM [facility] [address] [place]    1. Routine general medical examination at a health care facility Z00.00    2. Pulmonary hypertension (HCC) I27.20    3. Paroxysmal atrial fibrillation (HCC) I48.0    4. Physical deconditioning R53.81    5. 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 Tremor, essential G25.0    7. Mixed hyperlipidemia E78.2 Comprehensive Metabolic Panel     Lipid Panel   8. Essential hypertension I10 CBC Auto Differential   9. Vitamin D deficiency E55.9 Vitamin D 25 Hydroxy   10. Gastroesophageal reflux disease without esophagitis K21.9    11.

## 2020-01-28 ENCOUNTER — APPOINTMENT (OUTPATIENT)
Dept: PREADMISSION TESTING | Facility: HOSPITAL | Age: 85
End: 2020-01-28

## 2020-01-28 VITALS
OXYGEN SATURATION: 96 % | HEART RATE: 86 BPM | BODY MASS INDEX: 27.96 KG/M2 | SYSTOLIC BLOOD PRESSURE: 149 MMHG | DIASTOLIC BLOOD PRESSURE: 79 MMHG | HEIGHT: 64 IN | WEIGHT: 163.8 LBS | RESPIRATION RATE: 18 BRPM

## 2020-01-28 PROCEDURE — 93010 ELECTROCARDIOGRAM REPORT: CPT | Performed by: INTERNAL MEDICINE

## 2020-01-28 PROCEDURE — 93005 ELECTROCARDIOGRAM TRACING: CPT

## 2020-01-28 NOTE — DISCHARGE INSTRUCTIONS
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  Following surgery it is unrealistic to expect you will not have pain.   Pain is how our bodies let us know that something is wrong or cautions us to be careful.  That said, our goal is to make your pain tolerable.    Methods we may use to treat your pain include (oral or IV medications, PCAs, epidurals, nerve blocks, etc.)   While some procedures require IV pain medications for a short time after surgery, transitioning to pain medications by mouth allows for better management of pain.   Your nurse will encourage you to take oral pain medications whenever possible.  IV medications work almost immediately, but only last a short while.  Taking medications by mouth allows for a more constant level of medication in your blood stream for a longer period of time.      Once your pain is out of control it is harder to get back under control.  It is important you are aware when your next dose of pain medication is due.  If you are admitted, your nurse may write the time of your next dose on the white board in your room to help you remember.      We are interested in your pain and encourage you to inform us about aggravating factors during your visit.   Many times a simple repositioning every few hours can make a big difference.    If your physician says it is okay, do not let your pain prevent you from getting out of bed. Be sure to call your nurse for assistance prior to getting up so you do not fall.      Before surgery, please decide your tolerable pain  goal.  These faces help describe the pain ratings we use on a 0-10 scale.   Be prepared to tell us your goal and whether or not you take pain or anxiety medications at home.          BEFORE YOU COME TO THE HOSPITAL  (Pre-op instructions)  • Do not eat, drink, smoke or chew gum after midnight the night before surgery.  This also includes no mints.  • Morning of surgery take only the medicines you have been instructed with a sip of water unless otherwise instructed  by your physician.  • Do not shave, wear makeup or dark nail polish.  • Remove all jewelry including rings.  • Leave anything you consider valuable at home.  • Leave your suitcase in the car until after your surgery.  • Bring the following with you if applicable:  o Picture ID and insurance, Medicare or Medicaid cards  o Co-pay/deductible required by insurance (cash, check, credit card)  o Copy of advance directive, living will or power-of- documents if not brought to PAT  o CPAP or BIPAP mask and tubing  o Relaxation aids ( book, magazine), etc.  o Hearing aids                        ON THE DAY OF SURGERY  · On the day of surgery check in at registration located at the main entrance of the hospital.   ? You will be registered and given a beeper with instructions where to wait in the main lobby.  ? When your beeper lights up and vibrates a member of the Outpatient Surgery staff will meet you at the double doors under the stair steps and escort you to your preoperative room.   · You may have cloth compression devices placed on your legs. These help to prevent blood clots and reduce swelling in your legs.  · An IV may be inserted into one of your veins.  · In the operating room, you may be given one or more of the following:  ? A medicine to help you relax (sedative).  ? A medicine to numb the area (local anesthetic).  ? A medicine to make you fall asleep (general anesthetic).  ? A medicine that is injected into an area of your body to numb  "everything below the injection site (regional anesthetic).  · Your surgical site will be marked or identified.  · You may be given an antibiotic through your IV to help prevent infection.  Contact a health care provider if you:  · Develop a fever of more than 100.4°F (38°C) or other feelings of illness during the 48 hours before your surgery.  · Have symptoms that get worse.  Have questions or concerns about your surgery    General Anesthesia/Surgery, Adult  General anesthesia is the use of medicines to make a person \"go to sleep\" (unconscious) for a medical procedure. General anesthesia must be used for certain procedures, and is often recommended for procedures that:  · Last a long time.  · Require you to be still or in an unusual position.  · Are major and can cause blood loss.  The medicines used for general anesthesia are called general anesthetics. As well as making you unconscious for a certain amount of time, these medicines:  · Prevent pain.  · Control your blood pressure.  · Relax your muscles.  Tell a health care provider about:  · Any allergies you have.  · All medicines you are taking, including vitamins, herbs, eye drops, creams, and over-the-counter medicines.  · Any problems you or family members have had with anesthetic medicines.  · Types of anesthetics you have had in the past.  · Any blood disorders you have.  · Any surgeries you have had.  · Any medical conditions you have.  · Any recent upper respiratory, chest, or ear infections.  · Any history of:  ? Heart or lung conditions, such as heart failure, sleep apnea, asthma, or chronic obstructive pulmonary disease (COPD).  ?  service.  ? Depression or anxiety.  · Any tobacco or drug use, including marijuana or alcohol use.  · Whether you are pregnant or may be pregnant.  What are the risks?  Generally, this is a safe procedure. However, problems may occur, including:  · Allergic reaction.  · Lung and heart problems.  · Inhaling food or " liquid from the stomach into the lungs (aspiration).  · Nerve injury.  · Air in the bloodstream, which can lead to stroke.  · Extreme agitation or confusion (delirium) when you wake up from the anesthetic.  · Waking up during your procedure and being unable to move. This is rare.  These problems are more likely to develop if you are having a major surgery or if you have an advanced or serious medical condition. You can prevent some of these complications by answering all of your health care provider's questions thoroughly and by following all instructions before your procedure.  General anesthesia can cause side effects, including:  · Nausea or vomiting.  · A sore throat from the breathing tube.  · Hoarseness.  · Wheezing or coughing.  · Shaking chills.  · Tiredness.  · Body aches.  · Anxiety.  · Sleepiness or drowsiness.  · Confusion or agitation.  RISKS AND COMPLICATIONS OF SURGERY  Your health care provider will discuss possible risks and complications with you before surgery. Common risks and complications include:    · Problems due to the use of anesthetics.  · Blood loss and replacement (does not apply to minor surgical procedures).  · Temporary increase in pain due to surgery.  · Uncorrected pain or problems that the surgery was meant to correct.  · Infection.  · New damage.    What happens before the procedure?    Medicines  Ask your health care provider about:  · Changing or stopping your regular medicines. This is especially important if you are taking diabetes medicines or blood thinners.  · Taking medicines such as aspirin and ibuprofen. These medicines can thin your blood. Do not take these medicines unless your health care provider tells you to take them.  · Taking over-the-counter medicines, vitamins, herbs, and supplements. Do not take these during the week before your procedure unless your health care provider approves them.  General instructions  · Starting 3-6 weeks before the procedure, do not  use any products that contain nicotine or tobacco, such as cigarettes and e-cigarettes. If you need help quitting, ask your health care provider.  · If you brush your teeth on the morning of the procedure, make sure to spit out all of the toothpaste.  · Tell your health care provider if you become ill or develop a cold, cough, or fever.  · If instructed by your health care provider, bring your sleep apnea device with you on the day of your surgery (if applicable).  · Ask your health care provider if you will be going home the same day, the following day, or after a longer hospital stay.  ? Plan to have someone take you home from the hospital or clinic.  ? Plan to have a responsible adult care for you for at least 24 hours after you leave the hospital or clinic. This is important.  What happens during the procedure?  · You will be given anesthetics through both of the following:  ? A mask placed over your nose and mouth.  ? An IV in one of your veins.  · You may receive a medicine to help you relax (sedative).  · After you are unconscious, a breathing tube may be inserted down your throat to help you breathe. This will be removed before you wake up.  · An anesthesia specialist will stay with you throughout your procedure. He or she will:  ? Keep you comfortable and safe by continuing to give you medicines and adjusting the amount of medicine that you get.  ? Monitor your blood pressure, pulse, and oxygen levels to make sure that the anesthetics do not cause any problems.  The procedure may vary among health care providers and hospitals.  What happens after the procedure?  · Your blood pressure, temperature, heart rate, breathing rate, and blood oxygen level will be monitored until the medicines you were given have worn off.  · You will wake up in a recovery area. You may wake up slowly.  · If you feel anxious or agitated, you may be given medicine to help you calm down.  · If you will be going home the same day, your  health care provider may check to make sure you can walk, drink, and urinate.  · Your health care provider will treat any pain or side effects you have before you go home.  · Do not drive for 24 hours if you were given a sedative.  Summary  · General anesthesia is used to keep you still and prevent pain during a procedure.  · It is important to tell your healthcare provider about your medical history and any surgeries you have had, and previous experience with anesthesia.  · Follow your healthcare provider’s instructions about when to stop eating, drinking, or taking certain medicines before your procedure.  · Plan to have someone take you home from the hospital or clinic.  This information is not intended to replace advice given to you by your health care provider. Make sure you discuss any questions you have with your health care provider.  Document Released: 03/26/2009 Document Revised: 08/03/2018 Document Reviewed: 08/03/2018  [employer] Interactive Patient Education © 2019 [employer]       Fall Prevention in Hospitals, Adult  As a hospital patient, your condition and the treatments you receive can increase your risk for falls. Some additional risk factors for falls in a hospital include:  · Being in an unfamiliar environment.  · Being on bed rest.  · Your surgery.  · Taking certain medicines.  · Your tubing requirements, such as intravenous (IV) therapy or catheters.  It is important that you learn how to decrease fall risks while at the hospital. Below are important tips that can help prevent falls.  SAFETY TIPS FOR PREVENTING FALLS  Talk about your risk of falling.  · Ask your health care provider why you are at risk for falling. Is it your medicine, illness, tubing placement, or something else?  · Make a plan with your health care provider to keep you safe from falls.  · Ask your health care provider or pharmacist about side effects of your medicines. Some medicines can make you dizzy or affect your  coordination.  Ask for help.  · Ask for help before getting out of bed. You may need to press your call button.  · Ask for assistance in getting safely to the toilet.  · Ask for a walker or cane to be put at your bedside. Ask that most of the side rails on your bed be placed up before your health care provider leaves the room.  · Ask family or friends to sit with you.  · Ask for things that are out of your reach, such as your glasses, hearing aids, telephone, bedside table, or call button.  Follow these tips to avoid falling:  · Stay lying or seated, rather than standing, while waiting for help.  · Wear rubber-soled slippers or shoes whenever you walk in the hospital.  · Avoid quick, sudden movements.  ¨ Change positions slowly.  ¨ Sit on the side of your bed before standing.  ¨ Stand up slowly and wait before you start to walk.  · Let your health care provider know if there is a spill on the floor.  · Pay careful attention to the medical equipment, electrical cords, and tubes around you.  · When you need help, use your call button by your bed or in the bathroom. Wait for one of your health care providers to help you.  · If you feel dizzy or unsure of your footing, return to bed and wait for assistance.  · Avoid being distracted by the TV, telephone, or another person in your room.  · Do not lean or support yourself on rolling objects, such as IV poles or bedside tables.     This information is not intended to replace advice given to you by your health care provider. Make sure you discuss any questions you have with your health care provider.     Document Released: 12/15/2001 Document Revised: 01/08/2016 Document Reviewed: 08/25/2013  [employer] Interactive Patient Education ©2016 [employer]       [facility]  CHG 4% Patient Instruction Sheet    Chlorhexidine Before Surgery  Chlorhexidine gluconate (CHG) is a germ-killing (antiseptic) solution that is used to clean the skin. It gets rid of the bacteria  that normally live on the skin. Cleaning your skin with CHG before surgery helps lower the risk for infection after surgery.    How to use CHG solution  · You will take 2 showers, one shower the night before surgery, the second shower the morning of surgery before coming to the hospital.  · Use CHG only as told by your health care provider, and follow the instructions on the label.  · Use CHG solution while taking a shower. Follow these steps when using CHG solution (unless your health care provider gives you different instructions):  1. Start the shower.  2. Use your normal soap and shampoo to wash your face and hair.  3. Turn off the shower or move out of the shower stream.  4. Pour the CHG onto a clean washcloth. Do not use any type of brush or rough-edged sponge.  5. Starting at your neck, lather your body down to your toes. Make sure you:  6. Pay special attention to the part of your body where you will be having surgery. Scrub this area for at least 1 minute.  7. Use the full amount of CHG as directed. Usually, this is one half bottle for each shower.  8. Do not use CHG on your head or face. If the solution gets into your ears or eyes, rinse them well with water.  9. Avoid your genital area.  10. Avoid any areas of skin that have broken skin, cuts, or scrapes.  11. Scrub your back and under your arms. Make sure to wash skin folds.  12. Let the lather sit on your skin for 1-2 minutes or as long as told by your health care  provider.  13. Thoroughly rinse your entire body in the shower. Make sure that all body creases and crevices are rinsed well.  14. Dry off with a clean towel. Do not put any substances on your body afterward, such as powder, lotion, or perfume.  15. Put on clean clothes or pajamas.  16. If it is the night before your surgery, sleep in clean sheets.    What are the risks?  Risks of using CHG include:  · A skin reaction.  · Hearing loss, if CHG gets in your ears.  · Eye injury, if CHG gets in  your eyes and is not rinsed out.  · The CHG product catching fire.  Make sure that you avoid smoking and flames after applying CHG to your skin.  Do not use CHG:  · If you have a chlorhexidine allergy or have previously reacted to chlorhexidine.  · On babies younger than 2 months of age.      On the day of surgery, when you are taken to your room in Outpatient Surgery you will be given a CHG prepackaged cloth to wipe the site for your surgery.  How to use CHG prepackaged cloths  · Follow the instructions on the label.  · Use the CHG cloth on clean, dry skin. Follow these steps when using a CHG cloth (unless your health care provider gives you different instructions):  1. Using the CHG cloth, vigorously scrub the part of your body where you will be having surgery. Scrub using a back-and-forth motion for 3 minutes. The area on your body should be completely wet with CHG when you are finished scrubbing.  2. Do not rinse. Discard the cloth and let the area air-dry for 1 minute. Do not put any substances on your body afterward, such as powder, lotion, or perfume.  Contact a health care provider if:  · Your skin gets irritated after scrubbing.  · You have questions about using your solution or cloth.  Get help right away if:  · Your eyes become very red or swollen.  · Your eyes itch badly.  · Your skin itches badly and is red or swollen.  · Your hearing changes.  · You have trouble seeing.  · You have swelling or tingling in your mouth or throat.  · You have trouble breathing.  · You swallow any chlorhexidine.  Summary  · Chlorhexidine gluconate (CHG) is a germ-killing (antiseptic) solution that is used to clean the skin. Cleaning your skin with CHG before surgery helps lower the risk for infection after surgery.  · You may be given CHG to use at home. It may be in a bottle or in a prepackaged cloth to use on your skin. Carefully follow your health care provider's instructions and the instructions on the product  label.  · Do not use CHG if you have a chlorhexidine allergy.  · Contact your health care provider if your skin gets irritated after scrubbing.  This information is not intended to replace advice given to you by your health care provider. Make sure you discuss any questions you have with your health care provider.  Document Released: 09/11/2013 Document Revised: 11/15/2018 Document Reviewed: 11/15/2018  [employer] Interactive Patient Education © 2019 [employer]          PATIENT/FAMILY/RESPONSIBLE PARTY VERBALIZES UNDERSTANDING OF ABOVE EDUCATION.  COPY OF PAIN SCALE GIVEN AND REVIEWED WITH VERBALIZED UNDERSTANDING.

## 2020-01-31 ENCOUNTER — ANESTHESIA (OUTPATIENT)
Dept: PERIOP | Facility: HOSPITAL | Age: 85
End: 2020-01-31

## 2020-01-31 ENCOUNTER — HOSPITAL ENCOUNTER (OUTPATIENT)
Facility: HOSPITAL | Age: 85
Setting detail: HOSPITAL OUTPATIENT SURGERY
Discharge: HOME | End: 2020-01-31
Attending: SPECIALIST | Admitting: SPECIALIST

## 2020-01-31 ENCOUNTER — ANESTHESIA EVENT (OUTPATIENT)
Dept: PERIOP | Facility: HOSPITAL | Age: 85
End: 2020-01-31

## 2020-01-31 ENCOUNTER — PROCEDURE VISIT (OUTPATIENT)
Dept: OTOLARYNGOLOGY | Facility: CLINIC | Age: 85
End: 2020-01-31

## 2020-01-31 VITALS
RESPIRATION RATE: 16 BRPM | OXYGEN SATURATION: 98 % | HEART RATE: 70 BPM | DIASTOLIC BLOOD PRESSURE: 62 MMHG | SYSTOLIC BLOOD PRESSURE: 130 MMHG | TEMPERATURE: 99 F

## 2020-01-31 DIAGNOSIS — C80.1 CARCINOMA (HCC): ICD-10-CM

## 2020-01-31 DIAGNOSIS — C44.329: Primary | ICD-10-CM

## 2020-01-31 PROCEDURE — 11642 EXC F/E/E/N/L MAL+MRG 1.1-2: CPT | Performed by: OTOLARYNGOLOGY

## 2020-01-31 PROCEDURE — 88305 TISSUE EXAM BY PATHOLOGIST: CPT | Performed by: SPECIALIST

## 2020-01-31 PROCEDURE — 88305 TISSUE EXAM BY PATHOLOGIST: CPT | Performed by: OTOLARYNGOLOGY

## 2020-01-31 PROCEDURE — 25010000002 DEXAMETHASONE PER 1 MG: Performed by: NURSE ANESTHETIST, CERTIFIED REGISTERED

## 2020-01-31 PROCEDURE — 25010000002 FENTANYL CITRATE (PF) 100 MCG/2ML SOLUTION: Performed by: NURSE ANESTHETIST, CERTIFIED REGISTERED

## 2020-01-31 PROCEDURE — 25010000002 ONDANSETRON PER 1 MG: Performed by: NURSE ANESTHETIST, CERTIFIED REGISTERED

## 2020-01-31 PROCEDURE — 25010000002 PROPOFOL 10 MG/ML EMULSION: Performed by: NURSE ANESTHETIST, CERTIFIED REGISTERED

## 2020-01-31 RX ORDER — LIDOCAINE HYDROCHLORIDE 10 MG/ML
0.5 INJECTION, SOLUTION EPIDURAL; INFILTRATION; INTRACAUDAL; PERINEURAL ONCE AS NEEDED
Status: DISCONTINUED | OUTPATIENT
Start: 2020-01-31 | End: 2020-01-31 | Stop reason: HOSPADM

## 2020-01-31 RX ORDER — SODIUM CHLORIDE 0.9 % (FLUSH) 0.9 %
10 SYRINGE (ML) INJECTION EVERY 12 HOURS SCHEDULED
Status: DISCONTINUED | OUTPATIENT
Start: 2020-01-31 | End: 2020-01-31 | Stop reason: HOSPADM

## 2020-01-31 RX ORDER — SODIUM CHLORIDE 0.9 % (FLUSH) 0.9 %
10 SYRINGE (ML) INJECTION AS NEEDED
Status: DISCONTINUED | OUTPATIENT
Start: 2020-01-31 | End: 2020-01-31 | Stop reason: HOSPADM

## 2020-01-31 RX ORDER — NALOXONE HCL 0.4 MG/ML
0.4 VIAL (ML) INJECTION AS NEEDED
Status: DISCONTINUED | OUTPATIENT
Start: 2020-01-31 | End: 2020-01-31 | Stop reason: HOSPADM

## 2020-01-31 RX ORDER — DEXTROSE MONOHYDRATE 25 G/50ML
12.5 INJECTION, SOLUTION INTRAVENOUS AS NEEDED
Status: DISCONTINUED | OUTPATIENT
Start: 2020-01-31 | End: 2020-01-31 | Stop reason: HOSPADM

## 2020-01-31 RX ORDER — FENTANYL CITRATE 50 UG/ML
25 INJECTION, SOLUTION INTRAMUSCULAR; INTRAVENOUS AS NEEDED
Status: DISCONTINUED | OUTPATIENT
Start: 2020-01-31 | End: 2020-01-31 | Stop reason: HOSPADM

## 2020-01-31 RX ORDER — DEXAMETHASONE SODIUM PHOSPHATE 4 MG/ML
INJECTION, SOLUTION INTRA-ARTICULAR; INTRALESIONAL; INTRAMUSCULAR; INTRAVENOUS; SOFT TISSUE AS NEEDED
Status: DISCONTINUED | OUTPATIENT
Start: 2020-01-31 | End: 2020-01-31 | Stop reason: SURG

## 2020-01-31 RX ORDER — OXYCODONE AND ACETAMINOPHEN 10; 325 MG/1; MG/1
1 TABLET ORAL ONCE AS NEEDED
Status: DISCONTINUED | OUTPATIENT
Start: 2020-01-31 | End: 2020-01-31 | Stop reason: HOSPADM

## 2020-01-31 RX ORDER — IPRATROPIUM BROMIDE AND ALBUTEROL SULFATE 2.5; .5 MG/3ML; MG/3ML
3 SOLUTION RESPIRATORY (INHALATION) ONCE AS NEEDED
Status: DISCONTINUED | OUTPATIENT
Start: 2020-01-31 | End: 2020-01-31 | Stop reason: HOSPADM

## 2020-01-31 RX ORDER — MAGNESIUM HYDROXIDE 1200 MG/15ML
LIQUID ORAL AS NEEDED
Status: DISCONTINUED | OUTPATIENT
Start: 2020-01-31 | End: 2020-01-31 | Stop reason: HOSPADM

## 2020-01-31 RX ORDER — FENTANYL CITRATE 50 UG/ML
25 INJECTION, SOLUTION INTRAMUSCULAR; INTRAVENOUS
Status: DISCONTINUED | OUTPATIENT
Start: 2020-01-31 | End: 2020-01-31 | Stop reason: HOSPADM

## 2020-01-31 RX ORDER — SODIUM CHLORIDE, SODIUM LACTATE, POTASSIUM CHLORIDE, CALCIUM CHLORIDE 600; 310; 30; 20 MG/100ML; MG/100ML; MG/100ML; MG/100ML
1000 INJECTION, SOLUTION INTRAVENOUS CONTINUOUS
Status: DISCONTINUED | OUTPATIENT
Start: 2020-01-31 | End: 2020-01-31 | Stop reason: HOSPADM

## 2020-01-31 RX ORDER — IBUPROFEN 600 MG/1
600 TABLET ORAL ONCE AS NEEDED
Status: DISCONTINUED | OUTPATIENT
Start: 2020-01-31 | End: 2020-01-31 | Stop reason: HOSPADM

## 2020-01-31 RX ORDER — ONDANSETRON 2 MG/ML
4 INJECTION INTRAMUSCULAR; INTRAVENOUS ONCE AS NEEDED
Status: DISCONTINUED | OUTPATIENT
Start: 2020-01-31 | End: 2020-01-31 | Stop reason: HOSPADM

## 2020-01-31 RX ORDER — OXYCODONE HYDROCHLORIDE AND ACETAMINOPHEN 5; 325 MG/1; MG/1
1 TABLET ORAL ONCE AS NEEDED
Status: CANCELLED | OUTPATIENT
Start: 2020-01-31 | End: 2020-02-10

## 2020-01-31 RX ORDER — METOCLOPRAMIDE HYDROCHLORIDE 5 MG/ML
5 INJECTION INTRAMUSCULAR; INTRAVENOUS
Status: DISCONTINUED | OUTPATIENT
Start: 2020-01-31 | End: 2020-01-31 | Stop reason: HOSPADM

## 2020-01-31 RX ORDER — FENTANYL CITRATE 50 UG/ML
INJECTION, SOLUTION INTRAMUSCULAR; INTRAVENOUS AS NEEDED
Status: DISCONTINUED | OUTPATIENT
Start: 2020-01-31 | End: 2020-01-31 | Stop reason: SURG

## 2020-01-31 RX ORDER — OXYCODONE AND ACETAMINOPHEN 7.5; 325 MG/1; MG/1
2 TABLET ORAL EVERY 4 HOURS PRN
Status: DISCONTINUED | OUTPATIENT
Start: 2020-01-31 | End: 2020-01-31 | Stop reason: HOSPADM

## 2020-01-31 RX ORDER — OXYCODONE HYDROCHLORIDE AND ACETAMINOPHEN 5; 325 MG/1; MG/1
1-2 TABLET ORAL EVERY 4 HOURS PRN
Qty: 30 TABLET | Refills: 0 | Status: SHIPPED | OUTPATIENT
Start: 2020-01-31 | End: 2020-09-23

## 2020-01-31 RX ORDER — GINSENG 100 MG
CAPSULE ORAL AS NEEDED
Status: DISCONTINUED | OUTPATIENT
Start: 2020-01-31 | End: 2020-01-31 | Stop reason: HOSPADM

## 2020-01-31 RX ORDER — PROPOFOL 10 MG/ML
VIAL (ML) INTRAVENOUS AS NEEDED
Status: DISCONTINUED | OUTPATIENT
Start: 2020-01-31 | End: 2020-01-31 | Stop reason: SURG

## 2020-01-31 RX ORDER — LABETALOL HYDROCHLORIDE 5 MG/ML
5 INJECTION, SOLUTION INTRAVENOUS
Status: DISCONTINUED | OUTPATIENT
Start: 2020-01-31 | End: 2020-01-31 | Stop reason: HOSPADM

## 2020-01-31 RX ORDER — SODIUM CHLORIDE, SODIUM LACTATE, POTASSIUM CHLORIDE, CALCIUM CHLORIDE 600; 310; 30; 20 MG/100ML; MG/100ML; MG/100ML; MG/100ML
9 INJECTION, SOLUTION INTRAVENOUS CONTINUOUS
Status: DISCONTINUED | OUTPATIENT
Start: 2020-01-31 | End: 2020-01-31 | Stop reason: HOSPADM

## 2020-01-31 RX ORDER — SODIUM CHLORIDE 0.9 % (FLUSH) 0.9 %
3 SYRINGE (ML) INJECTION AS NEEDED
Status: DISCONTINUED | OUTPATIENT
Start: 2020-01-31 | End: 2020-01-31 | Stop reason: HOSPADM

## 2020-01-31 RX ORDER — ONDANSETRON 2 MG/ML
INJECTION INTRAMUSCULAR; INTRAVENOUS AS NEEDED
Status: DISCONTINUED | OUTPATIENT
Start: 2020-01-31 | End: 2020-01-31 | Stop reason: SURG

## 2020-01-31 RX ADMIN — FENTANYL CITRATE 50 MCG: 50 INJECTION, SOLUTION INTRAMUSCULAR; INTRAVENOUS at 15:58

## 2020-01-31 RX ADMIN — SODIUM CHLORIDE, POTASSIUM CHLORIDE, SODIUM LACTATE AND CALCIUM CHLORIDE 1000 ML: 600; 310; 30; 20 INJECTION, SOLUTION INTRAVENOUS at 10:59

## 2020-01-31 RX ADMIN — FENTANYL CITRATE 50 MCG: 50 INJECTION, SOLUTION INTRAMUSCULAR; INTRAVENOUS at 15:55

## 2020-01-31 RX ADMIN — PROPOFOL 100 MG: 10 INJECTION, EMULSION INTRAVENOUS at 15:55

## 2020-01-31 RX ADMIN — LIDOCAINE HYDROCHLORIDE 100 MG: 20 INJECTION, SOLUTION INTRAVENOUS at 15:55

## 2020-01-31 RX ADMIN — DEXAMETHASONE SODIUM PHOSPHATE 4 MG: 4 INJECTION, SOLUTION INTRAMUSCULAR; INTRAVENOUS at 16:15

## 2020-01-31 RX ADMIN — ONDANSETRON HYDROCHLORIDE 4 MG: 2 SOLUTION INTRAMUSCULAR; INTRAVENOUS at 16:15

## 2020-01-31 RX ADMIN — CEFAZOLIN 1 G: 1 INJECTION, POWDER, FOR SOLUTION INTRAMUSCULAR; INTRAVENOUS; PARENTERAL at 15:59

## 2020-01-31 NOTE — PATIENT INSTRUCTIONS
WOUND CARE:  Keep dry for   1 day   Begin cleanin day after surgery  Clean 2-3 times daily  Wash gently with fingers to loosen crusting. You may soak area with warm cloth to loosen crusting  Use antibacterial soap (Dial, Dove, Diana soap)    Thank you for enrolling in [employer]. Please follow the instructions below to securely access your online medical record. [employer] allows you to send messages to your doctor, view your test results, renew your prescriptions, schedule appointments, and more.    How Do I Sign Up?  1. In your Internet browser, go to [email or web address]  2. Click on the Sign Up Now link in the New User? box.   3. Enter your [employer] Activation Code exactly as it appears below. You will not need to use this code after you have completed the sign-up process. If you do not sign up before the expiration date, you must request a new code.  [employer] Activation Code: [email or web address]  Expires: 2020 12:17 PM    4. Enter the last four digits of your Social Security Number and your Date of Birth as indicated and click Next. You will be taken to the next sign-up page.  5. Create a [employer] username. Think of one that is secure and easy to remember.  6. Create a [employer] password. You can change your password at any time.  7. Choose a security question, enter your answer, and click Next. This can be used to access [employer] if you forget your password.   8. Select your communication preference. Enter a valid e-mail address if you would like to receive e-mail notifications when new information is available in [employer].  9. Click Sign In. You can now view your medical record.     Additional Information  If you have questions, you can e-mail [email or web address], or call [phone] to talk to our [employer] staff. Remember, [employer] is NOT to be used for urgent needs. For medical emergencies, dial 911.

## 2020-01-31 NOTE — ANESTHESIA PREPROCEDURE EVALUATION
Anesthesia Evaluation     Patient summary reviewed   no history of anesthetic complications:  NPO Solid Status: > 8 hours             Airway   Mallampati: II  TM distance: >3 FB  Neck ROM: full  Dental      Pulmonary    (-) COPD, asthma, sleep apnea, not a smoker  Cardiovascular   Exercise tolerance: good (4-7 METS)    Patient on routine beta blocker and Beta blocker given within 24 hours of surgery    (+) hypertension, dysrhythmias Atrial Fib,   (-) pacemaker, past MI, angina, cardiac stents      Neuro/Psych  (-) seizures, TIA, CVA  GI/Hepatic/Renal/Endo    (+)  GERD,    (-) liver disease, no renal disease, diabetes    Musculoskeletal     Abdominal    Substance History      OB/GYN          Other                        Anesthesia Plan    ASA 3     MAC       Anesthetic plan, all risks, benefits, and alternatives have been provided, discussed and informed consent has been obtained with: patient.

## 2020-01-31 NOTE — PROGRESS NOTES
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 The site was prepped and draped in the usual manner. An excision was created around the left face lesion with at least 3-4 mm margins. The excision was carried down to the subcutaneous fat. The lesion was then taken up off the deep tissue and removed off the patient. The specimen was sent in formalin for permanent section.    Closure:  The wound was hemostatic.  The deep tissues were closed with 4-0 Vicryl, interrupted sutures.  The skin was closed with 5-0 gut, running locking suture. This was completed in RSTL.  Collodion was placed on the wound.    Findings:   1x1 cm lesion on L mandibular body area at jaw line  Defect- 2 x 2 cm with simple closure  Condition:  Stable.  Patient tolerated procedure well.    Complications:  None    Post-procedure instructions reviewed with Patient, Daughter  Instructions documented in AVS for patient to review    Harlan Jaquez Jr, MD  1/31/2020  9:46 AM

## 2020-01-31 NOTE — ANESTHESIA PROCEDURE NOTES
Airway  Urgency: elective    Date/Time: 1/31/2020 3:56 PM  Airway not difficult    General Information and Staff    Patient location during procedure: OR    Indications and Patient Condition  Indications for airway management: airway protection    Preoxygenated: yes  MILS maintained throughout  Mask difficulty assessment: 1 - vent by mask    Final Airway Details  Final airway type: supraglottic airway      Successful airway: classic  Size 4    Number of attempts at approach: 1  Assessment: lips, teeth, and gum same as pre-op and atraumatic intubation

## 2020-01-31 NOTE — PROGRESS NOTES
Harlan Jaquez Jr, MD     ENT FOLLOW UP NOTE   No chief complaint on file.       HISTORY OF PRESENT ILLNESS:  Accompanied by:  Daughter  Liana Zaragoza is a  84 y.o. female who is here for follow up. Patient is here for procedure.  No change in L face lesion. Prior bx SCCa positive.    Review of Systems  Reviewed per patient intake note and confirmed by me    Past History:  Past medical and surgical history, family history and social history reviewed and updated when appropriate.  Current medications and allergies reviewed and updated when appropriate.  Allergies:  Morphine        Vital Signs:      EXAMINATION:  CONSTITUTIONAL:    well nourished, well-developed, alert, oriented, in no acute distress      BODY HABITUS:    Normal body habitus     COMMUNICATION:    able to communicate normally, normal voice quality     HEAD:     Normocephalic, without obvious abnormality, atraumatic     FACE:    structure normal  Bilateral  jawline-         LEFT: 6 mm lesion just lateral to ML fold at jawline  lip-        lower and mid lip inf to vermillion fever blister, drying        SALIVARY GLANDS:    parotid glands with no tenderness, no swelling, no masses, submandibular glands with normal size, nontender      EYE:    ocular motility normal, eyelids normal, orbits normal, no proptosis, conjunctiva clear, sclera non-icteric, pupils equal, round, reactive to light and accomodation  Color:   blue     HEARING:      response to conversational voice decreased  Bilateral     EARS:    Otoscopic exam   normal pinna with no lesions, Canals normal size and shape, Tympanic membranes normal, Ossicular chain intact, Middle ear clear     NOSE EXTERNAL:    APPEARANCE: normal, straight, with good projection, no tenderness, no lesions, no tenderness, good nasal support, patent nares     NOSE INTERNAL:    Anterior rhinoscopy  intact mucosa, normal inferior turbinates, septum midline without perforation, secretions clear and normal amount, nares  patent, normal nasal airway without obstruction, no lesions     ORAL CAVITY:      LIPS:      lower lip- fever blister inf to vermillion, healing     TEETH:       dentition within normal limits for age    GUMS:      gingivae healthy, no lesions    ORAL MUCOSA:       oral mucosa normal, no mucosal lesions    FLOOR OF MOUTH:       Warthin's duct patent, mucosa normal  TONGUE:       lingual mucosa normal without lesions, normal tongue mobility    PALATE:       hard palate with normal mucosa and structure      soft palate with normal mucosa and structure, normal mobility uvula intact     OROPHARYNX:    Direct examination  oropharyngeal mucosa normal, tonsil(s) with normal appearance       NECK:    normal appearance, no masses, no lesions, larynx normal mobility, trachea midline     LYMPH NODES :    no adenopathy     THYROID:    no overt thyromegaly, no tenderness, nodules or mass present on palpation, position midline     CHEST/RESPIRATORY:    respiratory effort normal, no rales, rubs or wheezing, no stridor, normal appearance to chest     CARDIOVASCULAR:      irregularly irregular     NEURO/PSYCHIATRIC :    oriented appropriately for age, mood normal, affect appropriate, cranial nerves intact grossly (unless specifically described), gait normal for age          ASSESSMENT:   Diagnosis Plan   1. Squamous cell skin cancer, chin             PLAN:  Medical and surgical options were discussed including surgical management. Risks, benefits and alternatives were discussed and questions were answered. After considering the options, the patient decided to proceed with surgical management.  Patient returns for removal SCCa L face.  Wound care  MY CHART:  Patient is Encouraged to enroll in My Chart  Encouraged to review data and findings in My Chart          Patient, Daughter understand(s) and agree(s) with the treatment plan as described.    Return in about 6 weeks (around 3/13/2020) for Recheck face.     Harlan Jaquez Jr,  MD  01/31/20  9:45 AM

## 2020-01-31 NOTE — ANESTHESIA POSTPROCEDURE EVALUATION
Patient: Liana Zaragoza    Procedure Summary     Date:  01/31/20 Room / Location:  [facility] OR  /  [facility] OR    Anesthesia Start:  1552 Anesthesia Stop:  1635    Procedure:  EXCISION OF SQUAMOUS CELL CARCINOMA ON LEFT SHIN (Left ) Diagnosis:  (SKIN CANCER)    Surgeon:  Lorraine Ayoub MD Provider:  Tito Wing CRNA    Anesthesia Type:  MAC ASA Status:  3          Anesthesia Type: MAC    Vitals  Vitals Value Taken Time   /57 1/31/2020  5:05 PM   Temp 99 °F (37.2 °C) 1/31/2020  5:00 PM   Pulse 67 1/31/2020  5:07 PM   Resp 16 1/31/2020  5:00 PM   SpO2 93 % 1/31/2020  5:07 PM   Vitals shown include unvalidated device data.        Post Anesthesia Care and Evaluation    Patient location during evaluation: PACU  Patient participation: complete - patient participated  Level of consciousness: awake and alert  Pain management: adequate  Airway patency: patent  Anesthetic complications: No anesthetic complications    Cardiovascular status: acceptable  Respiratory status: acceptable  Hydration status: acceptable    Comments: Blood pressure 107/65, pulse 72, temperature 99 °F (37.2 °C), temperature source Temporal, resp. rate 16, SpO2 92 %, not currently breastfeeding.    Pt discharged from PACU based on paulette score >8

## 2020-02-07 LAB
CYTO UR: NORMAL
LAB AP CASE REPORT: NORMAL
LAB AP CLINICAL INFORMATION: NORMAL
PATH REPORT.FINAL DX SPEC: NORMAL
PATH REPORT.GROSS SPEC: NORMAL

## 2020-02-08 LAB
CYTO UR: NORMAL
LAB AP CASE REPORT: NORMAL
PATH REPORT.FINAL DX SPEC: NORMAL
PATH REPORT.GROSS SPEC: NORMAL

## 2020-02-21 ENCOUNTER — TELEPHONE (OUTPATIENT)
Dept: INTERNAL MEDICINE | Age: 85
End: 2020-02-21

## 2020-02-25 RX ORDER — OMEPRAZOLE 40 MG/1
40 CAPSULE, DELAYED RELEASE ORAL DAILY
Qty: 90 CAPSULE | Refills: 3 | Status: SHIPPED | OUTPATIENT
Start: 2020-02-25 | End: 2020-02-26

## 2020-02-26 RX ORDER — OMEPRAZOLE 40 MG/1
40 CAPSULE, DELAYED RELEASE ORAL DAILY
Qty: 90 CAPSULE | Refills: 3 | Status: SHIPPED | OUTPATIENT
Start: 2020-02-26 | End: 2021-01-01

## 2020-05-05 RX ORDER — ESCITALOPRAM OXALATE 20 MG/1
TABLET ORAL
Qty: 90 TABLET | Refills: 3 | Status: SHIPPED | OUTPATIENT
Start: 2020-05-05 | End: 2021-01-01

## 2020-05-05 NOTE — TELEPHONE ENCOUNTER
Dominga Mclaughlin called requesting a refill of the below medication which has been pended for you:     Requested Prescriptions     Pending Prescriptions Disp Refills    escitalopram (LEXAPRO) 20 MG tablet [Pharmacy Med Name: ESCITALOPRAM 20 MG TABLET] 90 tablet 3     Sig: TAKE 1 TABLET BY MOUTH EVERY DAY       Last Appointment Date: 1/24/2020  Next Appointment Date: 5/29/2020    Allergies   Allergen Reactions    Cardizem [Diltiazem Hcl] Rash    Morphine Nausea And Vomiting

## 2020-05-27 DIAGNOSIS — E78.2 MIXED HYPERLIPIDEMIA: ICD-10-CM

## 2020-05-27 DIAGNOSIS — R53.83 FATIGUE, UNSPECIFIED TYPE: ICD-10-CM

## 2020-05-27 DIAGNOSIS — I10 ESSENTIAL HYPERTENSION: ICD-10-CM

## 2020-05-27 DIAGNOSIS — E55.9 VITAMIN D DEFICIENCY: ICD-10-CM

## 2020-05-27 DIAGNOSIS — R79.89 ELEVATED PARATHYROID HORMONE: ICD-10-CM

## 2020-05-27 LAB
ALBUMIN SERPL-MCNC: 4.1 G/DL (ref 3.5–5.2)
ALP BLD-CCNC: 100 U/L (ref 35–104)
ALT SERPL-CCNC: 19 U/L (ref 5–33)
ANION GAP SERPL CALCULATED.3IONS-SCNC: 11 MMOL/L (ref 7–19)
AST SERPL-CCNC: 30 U/L (ref 5–32)
BASOPHILS ABSOLUTE: 0.1 K/UL (ref 0–0.2)
BASOPHILS RELATIVE PERCENT: 0.7 % (ref 0–1)
BILIRUB SERPL-MCNC: 0.5 MG/DL (ref 0.2–1.2)
BUN BLDV-MCNC: 14 MG/DL (ref 8–23)
CALCIUM IONIZED: 1.18 MMOL/L (ref 1.12–1.32)
CALCIUM SERPL-MCNC: 9.1 MG/DL (ref 8.8–10.2)
CHLORIDE BLD-SCNC: 100 MMOL/L (ref 98–111)
CHOLESTEROL, TOTAL: 141 MG/DL (ref 160–199)
CO2: 28 MMOL/L (ref 22–29)
CREAT SERPL-MCNC: 0.7 MG/DL (ref 0.5–0.9)
EOSINOPHILS ABSOLUTE: 0.2 K/UL (ref 0–0.6)
EOSINOPHILS RELATIVE PERCENT: 2.8 % (ref 0–5)
GFR NON-AFRICAN AMERICAN: >60
GLUCOSE BLD-MCNC: 89 MG/DL (ref 74–109)
HCT VFR BLD CALC: 37.8 % (ref 37–47)
HDLC SERPL-MCNC: 66 MG/DL (ref 65–121)
HEMOGLOBIN: 12.2 G/DL (ref 12–16)
IMMATURE GRANULOCYTES #: 0 K/UL
LDL CHOLESTEROL CALCULATED: 46 MG/DL
LYMPHOCYTES ABSOLUTE: 1.9 K/UL (ref 1.1–4.5)
LYMPHOCYTES RELATIVE PERCENT: 28.4 % (ref 20–40)
MCH RBC QN AUTO: 30.9 PG (ref 27–31)
MCHC RBC AUTO-ENTMCNC: 32.3 G/DL (ref 33–37)
MCV RBC AUTO: 95.7 FL (ref 81–99)
MONOCYTES ABSOLUTE: 0.8 K/UL (ref 0–0.9)
MONOCYTES RELATIVE PERCENT: 11.3 % (ref 0–10)
NEUTROPHILS ABSOLUTE: 3.8 K/UL (ref 1.5–7.5)
NEUTROPHILS RELATIVE PERCENT: 56.5 % (ref 50–65)
PARATHYROID HORMONE INTACT: 94.4 PG/ML (ref 15–65)
PDW BLD-RTO: 13.2 % (ref 11.5–14.5)
PLATELET # BLD: 142 K/UL (ref 130–400)
PMV BLD AUTO: 11.4 FL (ref 9.4–12.3)
POTASSIUM SERPL-SCNC: 4.6 MMOL/L (ref 3.5–5)
RBC # BLD: 3.95 M/UL (ref 4.2–5.4)
SODIUM BLD-SCNC: 139 MMOL/L (ref 136–145)
TOTAL PROTEIN: 7.1 G/DL (ref 6.6–8.7)
TRIGL SERPL-MCNC: 144 MG/DL (ref 0–149)
TSH SERPL DL<=0.05 MIU/L-ACNC: 3.54 UIU/ML (ref 0.27–4.2)
VITAMIN D 25-HYDROXY: 37.7 NG/ML
WBC # BLD: 6.7 K/UL (ref 4.8–10.8)

## 2020-05-29 ENCOUNTER — OFFICE VISIT (OUTPATIENT)
Dept: INTERNAL MEDICINE | Age: 85
End: 2020-05-29
Payer: MEDICARE

## 2020-05-29 VITALS
TEMPERATURE: 96.6 F | SYSTOLIC BLOOD PRESSURE: 124 MMHG | HEIGHT: 65 IN | DIASTOLIC BLOOD PRESSURE: 80 MMHG | OXYGEN SATURATION: 94 % | BODY MASS INDEX: 28.82 KG/M2 | HEART RATE: 73 BPM | WEIGHT: 173 LBS

## 2020-05-29 PROBLEM — C80.1 CARCINOMA (HCC): Status: ACTIVE | Noted: 2020-05-29

## 2020-05-29 PROCEDURE — G0439 PPPS, SUBSEQ VISIT: HCPCS | Performed by: PHYSICIAN ASSISTANT

## 2020-05-29 PROCEDURE — 1123F ACP DISCUSS/DSCN MKR DOCD: CPT | Performed by: PHYSICIAN ASSISTANT

## 2020-05-29 PROCEDURE — 4040F PNEUMOC VAC/ADMIN/RCVD: CPT | Performed by: PHYSICIAN ASSISTANT

## 2020-05-29 ASSESSMENT — PATIENT HEALTH QUESTIONNAIRE - PHQ9
SUM OF ALL RESPONSES TO PHQ QUESTIONS 1-9: 0
SUM OF ALL RESPONSES TO PHQ QUESTIONS 1-9: 0

## 2020-05-29 ASSESSMENT — LIFESTYLE VARIABLES: HOW OFTEN DO YOU HAVE A DRINK CONTAINING ALCOHOL: 0

## 2020-05-29 NOTE — PROGRESS NOTES
non-distended, normal bowel sounds, no masses or organomegaly  Extremities: no cyanosis, clubbing . 1+ lower extremity edema. Musculoskeletal: normal range of motion, no joint swelling, deformity or tenderness  Neurologic:  gait, coordination and speech normal    Patient's complete Health Risk Assessment and screening values have been reviewed and are found in Flowsheets. The following problems were reviewed today and where indicated follow up appointments were made and/or referrals ordered. Positive Risk Factor Screenings with Interventions:     Health Habits/Nutrition:  Health Habits/Nutrition  Do you exercise for at least 20 minutes 2-3 times per week?: (!) No  Have you lost any weight without trying in the past 3 months?: No  Do you eat fewer than 2 meals per day?: No  Have you seen a dentist within the past year?: (!) No  Body mass index is 28.79 kg/m². Health Habits/Nutrition Interventions:  · Inadequate physical activity:  patient is not ready to increase his/her physical activity level at this time  · Dental exam overdue:  patient encouraged to make appointment with his/her dentist    Hearing/Vision:  No exam data present  Hearing/Vision  Do you or your family notice any trouble with your hearing?: (!) Yes  Do you have difficulty driving, watching TV, or doing any of your daily activities because of your eyesight?: No  Have you had an eye exam within the past year?: (!) No  Hearing/Vision Interventions:  · Hearing concerns:  patient declines any further evaluation/treatment for hearing issues  · Vision concerns:  patient encouraged to make appointment with his/her eye specialist    ADL:  ADLs  In the past 7 days, did you need help from others to perform any of the following everyday activities? Eating, dressing, grooming, bathing, toileting, or walking/balance?: None  In the past 7 days, did you need help from others to take care of any of the following?  Laundry, housekeeping, banking/finances, 05/27/2020     No results found for: LABA1C  No results found for: EAG  Lab Results   Component Value Date    CHOL 141 (L) 05/27/2020    CHOL 155 (L) 01/10/2020    CHOL 147 (L) 08/16/2019     Lab Results   Component Value Date    TRIG 144 05/27/2020    TRIG 105 01/10/2020    TRIG 109 08/16/2019     Lab Results   Component Value Date    HDL 66 05/27/2020    HDL 65 01/10/2020    HDL 48 (L) 08/16/2019     Lab Results   Component Value Date    LDLCALC 46 05/27/2020    LDLCALC 69 01/10/2020    LDLCALC 77 08/16/2019     No results found for: LABVLDL, VLDL  No results found for: [facility]  Lab Results   Component Value Date    TSHFT4 3.13 05/02/2019    TSH 3.540 05/27/2020 5/27/2020  4:15 PM - Luisa Kuhn Incoming Lab Results From [facility]     Component Value Ref Range & Units Status Collected Lab   PTH 94.4High   15.0 - 65.0 pg/mL Final 05/27/2020  2:57 PM [address] [facility]     5/27/2020  4:36 PM - BamLuisa cordova Incoming Lab Results From [facility]     Component Value Ref Range & Units Status Collected Lab   Vit D, 25-Hydroxy 37.7  >=30 ng/mL Final 05/27/2020  2:57 PM [address] - [address] Ankur was seen today for medicare awv. Diagnoses and all orders for this visit:    Routine general medical examination at a health care facility  Patient was seen in the office today. Patient seemed to be in pretty good spirits. Carcinoma ([address]  Had squamous cell carcinoma removed from face  left wrist and left lower leg  Essential hypertension  -     CBC Auto Differential; Future  Patient's blood pressure seems fairly well controlled on current medication regiment continue as directed. Mixed hyperlipidemia  -     Comprehensive Metabolic Panel; Future  -     Lipid Panel; Future  Patient's cholesterol seems stable on current medication regimen continue as directed. Vitamin D deficiency  -     Vitamin D 25 Hydroxy; Future  She will continue vitamin D as directed.   Elevated parathyroid hormone  -     PTH, Intact; Future  Continue to monitor PTH. Gastroesophageal reflux disease without esophagitis  Patient currently on medication controlling pretty well. Anxiety and depression  Seems to be stable on current medication regiment continue as directed. Elevated diaphragm  Some shortness of breath with some activities but she was told there is not anything that we can do for it. Shortness of breath  Follow-up as needed if getting any worse. Patient will follow back up with me in 3 months with repeat labs. Or sooner as needed. All questions answered. Patient voices understanding and agrees to plans along with risks and benefits of plan. Patient is instructed to continue prior medications, diet, and exercise plans as instructed. Patient agrees to follow up as instructions, sooner if needed, or to go to ER if condition becomes emergent. Patient identification was verified at the start of the visit: Yes      --SOLEDAD Rodriguez on 5/29/2020 at 1:47 PM    An electronic signature was used to authenticate this note.

## 2020-06-02 RX ORDER — ATORVASTATIN CALCIUM 40 MG/1
TABLET, FILM COATED ORAL
Qty: 90 TABLET | Refills: 3 | Status: SHIPPED | OUTPATIENT
Start: 2020-06-02 | End: 2021-01-01

## 2020-06-15 NOTE — TELEPHONE ENCOUNTER
Wero Munroe called requesting a refill of the below medication which has been pended for you:     Requested Prescriptions     Pending Prescriptions Disp Refills    ELIQUIS 5 MG TABS tablet [Pharmacy Med Name: Carlean Hodgkins TAB 5MG] 180 tablet 3     Sig: TAKE 1 TABLET TWICE A DAY       Last Appointment Date: 5/29/2020  Next Appointment Date: 9/4/2020    Allergies   Allergen Reactions    Cardizem [Diltiazem Hcl] Rash    Morphine Nausea And Vomiting

## 2020-06-16 RX ORDER — APIXABAN 5 MG/1
TABLET, FILM COATED ORAL
Qty: 180 TABLET | Refills: 3 | Status: SHIPPED | OUTPATIENT
Start: 2020-06-16 | End: 2020-07-31 | Stop reason: DRUGHIGH

## 2020-06-19 ENCOUNTER — TELEPHONE (OUTPATIENT)
Dept: CARDIOLOGY | Age: 85
End: 2020-06-19

## 2020-07-01 RX ORDER — ATENOLOL 50 MG/1
TABLET ORAL
Qty: 270 TABLET | Refills: 3 | Status: SHIPPED | OUTPATIENT
Start: 2020-07-01 | End: 2021-01-01

## 2020-07-10 ENCOUNTER — TELEPHONE (OUTPATIENT)
Dept: INTERNAL MEDICINE | Age: 85
End: 2020-07-10

## 2020-07-10 RX ORDER — FUROSEMIDE 20 MG/1
20 TABLET ORAL DAILY PRN
Qty: 15 TABLET | Refills: 1 | Status: SHIPPED | OUTPATIENT
Start: 2020-07-10 | End: 2020-07-17

## 2020-07-10 NOTE — TELEPHONE ENCOUNTER
Patient daughter called stating her mom has had some swelling from the knee down, redness in her feet. Please advise.

## 2020-07-17 RX ORDER — FUROSEMIDE 20 MG/1
20 TABLET ORAL DAILY PRN
Qty: 15 TABLET | Refills: 1 | Status: SHIPPED | OUTPATIENT
Start: 2020-07-17 | End: 2020-08-03

## 2020-07-31 ENCOUNTER — OFFICE VISIT (OUTPATIENT)
Dept: CARDIOLOGY | Age: 85
End: 2020-07-31
Payer: MEDICARE

## 2020-07-31 ENCOUNTER — OFFICE VISIT (OUTPATIENT)
Dept: OTOLARYNGOLOGY | Facility: CLINIC | Age: 85
End: 2020-07-31

## 2020-07-31 VITALS
DIASTOLIC BLOOD PRESSURE: 84 MMHG | OXYGEN SATURATION: 94 % | WEIGHT: 172 LBS | HEART RATE: 76 BPM | SYSTOLIC BLOOD PRESSURE: 124 MMHG | BODY MASS INDEX: 28.66 KG/M2 | HEIGHT: 65 IN

## 2020-07-31 VITALS
HEART RATE: 112 BPM | BODY MASS INDEX: 28.22 KG/M2 | HEIGHT: 65 IN | SYSTOLIC BLOOD PRESSURE: 110 MMHG | DIASTOLIC BLOOD PRESSURE: 71 MMHG | WEIGHT: 169.4 LBS | TEMPERATURE: 97.4 F

## 2020-07-31 DIAGNOSIS — C44.329: Primary | ICD-10-CM

## 2020-07-31 DIAGNOSIS — Z79.01 ANTICOAGULATED: ICD-10-CM

## 2020-07-31 PROCEDURE — 99214 OFFICE O/P EST MOD 30 MIN: CPT | Performed by: NURSE PRACTITIONER

## 2020-07-31 PROCEDURE — 1036F TOBACCO NON-USER: CPT | Performed by: NURSE PRACTITIONER

## 2020-07-31 PROCEDURE — 1123F ACP DISCUSS/DSCN MKR DOCD: CPT | Performed by: NURSE PRACTITIONER

## 2020-07-31 PROCEDURE — G8427 DOCREV CUR MEDS BY ELIG CLIN: HCPCS | Performed by: NURSE PRACTITIONER

## 2020-07-31 PROCEDURE — G8417 CALC BMI ABV UP PARAM F/U: HCPCS | Performed by: NURSE PRACTITIONER

## 2020-07-31 PROCEDURE — 93000 ELECTROCARDIOGRAM COMPLETE: CPT | Performed by: NURSE PRACTITIONER

## 2020-07-31 PROCEDURE — 1090F PRES/ABSN URINE INCON ASSESS: CPT | Performed by: NURSE PRACTITIONER

## 2020-07-31 PROCEDURE — 99213 OFFICE O/P EST LOW 20 MIN: CPT | Performed by: OTOLARYNGOLOGY

## 2020-07-31 PROCEDURE — G8399 PT W/DXA RESULTS DOCUMENT: HCPCS | Performed by: NURSE PRACTITIONER

## 2020-07-31 PROCEDURE — 4040F PNEUMOC VAC/ADMIN/RCVD: CPT | Performed by: NURSE PRACTITIONER

## 2020-07-31 RX ORDER — FUROSEMIDE 20 MG/1
20 TABLET ORAL DAILY PRN
COMMUNITY
Start: 2020-07-21

## 2020-07-31 ASSESSMENT — ENCOUNTER SYMPTOMS
SORE THROAT: 0
CHEST TIGHTNESS: 0
COUGH: 0
EYES NEGATIVE: 1
WHEEZING: 0
SHORTNESS OF BREATH: 0

## 2020-07-31 NOTE — PROGRESS NOTES
Harlan Jaquez Jr, MD     ENT FOLLOW UP NOTE     Chief Complaint   Patient presents with   • Squamous Cell Carcinoma     follow up, np complaints        HISTORY OF PRESENT ILLNESS:  Accompanied by:  Daughter  Liana Zaragoza is a  84 y.o. female who is here for follow up. She is here for follow up for facial skin cancer.  No recurrence of skin lesion.    Review of Systems   Constitutional: Negative.    HENT: Negative.    Respiratory: Negative.    Cardiovascular: Negative.    Genitourinary: Negative.      Reviewed per patient intake note and confirmed by me    Past History:  Past medical and surgical history, family history and social history reviewed and updated when appropriate.  Current medications and allergies reviewed and updated when appropriate.  Allergies:  Morphine and Cardizem [diltiazem]        Vital Signs:   Temp:  [97.4 °F (36.3 °C)] 97.4 °F (36.3 °C)  Heart Rate:  [112] 112  BP: (110)/(71) 110/71  EXAMINATION:  CONSTITUTIONAL:    well nourished, well-developed, alert, oriented, in no acute distress      BODY HABITUS:    Normal body habitus     COMMUNICATION:    able to communicate normally, normal voice quality     HEAD:     Normocephalic, without obvious abnormality, atraumatic     FACE:    structure normal  Bilateral  jawline-         LEFT: 6 mm lesion just lateral to ML fold at jawline  lip-        lower and mid lip inf to vermillion fever blister, drying        SALIVARY GLANDS:    parotid glands with no tenderness, no swelling, no masses, submandibular glands with normal size, nontender      EYE:    ocular motility normal, eyelids normal, orbits normal, no proptosis, conjunctiva clear, sclera non-icteric, pupils equal, round, reactive to light and accomodation  Color:   blue     HEARING:      response to conversational voice decreased  Bilateral     EARS:    Otoscopic exam   normal pinna with no lesions, Canals normal size and shape, Tympanic membranes normal, Ossicular chain intact, Middle ear  clear     NOSE EXTERNAL:    APPEARANCE: normal, straight, with good projection, no tenderness, no lesions, no tenderness, good nasal support, patent nares     NOSE INTERNAL:    Anterior rhinoscopy  intact mucosa, normal inferior turbinates, septum midline without perforation, secretions clear and normal amount, nares patent, normal nasal airway without obstruction, no lesions     ORAL CAVITY:      LIPS:      lower lip- fever blister inf to vermillion, healing     TEETH:       dentition within normal limits for age    GUMS:      gingivae healthy, no lesions    ORAL MUCOSA:       oral mucosa normal, no mucosal lesions    FLOOR OF MOUTH:       Warthin's duct patent, mucosa normal  TONGUE:       lingual mucosa normal without lesions, normal tongue mobility    PALATE:       hard palate with normal mucosa and structure      soft palate with normal mucosa and structure, normal mobility uvula intact     OROPHARYNX:    Direct examination  oropharyngeal mucosa normal, tonsil(s) with normal appearance       NECK:    normal appearance, no masses, no lesions, larynx normal mobility, trachea midline     LYMPH NODES :    no adenopathy     THYROID:    no overt thyromegaly, no tenderness, nodules or mass present on palpation, position midline     CHEST/RESPIRATORY:    respiratory effort normal, no rales, rubs or wheezing, no stridor, normal appearance to chest     CARDIOVASCULAR:      irregularly irregular     NEURO/PSYCHIATRIC :    oriented appropriately for age, mood normal, affect appropriate, cranial nerves intact grossly (unless specifically described), gait normal for age    RESULTS REVIEW:    I have reviewed the patients old records in the chart.           ASSESSMENT:      Diagnosis Plan   1. Squamous cell skin cancer, chin      EDY   2. Anticoagulated      Per Cards           PLAN:      Resume preoperative activity and medications.  Patient is well healed. She has EDY L face. I will refer to Derm for routine skin  checks.  Skin check  Refer to Derm for skin check as well  MY CHART:  Patient is Encouraged to enroll in My Chart  Encouraged to review data and findings in My Chart          Patient, Daughter understand(s) and agree(s) with the treatment plan as described.    Return if symptoms worsen or fail to improve, for Recheck Facial skin.     Harlan Jaquez Jr, MD  07/31/20  13:47

## 2020-07-31 NOTE — PATIENT INSTRUCTIONS
Skin checks    See Derm for skin care    Thank you for enrolling in [employer]. Please follow the instructions below to securely access your online medical record. [employer] allows you to send messages to your doctor, view your test results, renew your prescriptions, schedule appointments, and more.    How Do I Sign Up?  1. In your Internet browser, go to [email or web address]  2. Click on the Sign Up Now link in the New User? box.   3. Enter your [employer] Activation Code exactly as it appears below. You will not need to use this code after you have completed the sign-up process. If you do not sign up before the expiration date, you must request a new code.  [employer] Activation Code: [email or web address]  Expires: 8/30/2020  1:47 PM    4. Enter the last four digits of your Social Security Number and your Date of Birth as indicated and click Next. You will be taken to the next sign-up page.  5. Create a [employer] username. Think of one that is secure and easy to remember.  6. Create a [employer] password. You can change your password at any time.  7. Choose a security question, enter your answer, and click Next. This can be used to access [employer] if you forget your password.   8. Select your communication preference. Enter a valid e-mail address if you would like to receive e-mail notifications when new information is available in [employer].  9. Click Sign In. You can now view your medical record.     Additional Information  If you have questions, you can e-mail [employer]: [email or web address], or call [phone] to talk to our [employer] staff. Remember, [employer] is NOT to be used for urgent needs. For medical emergencies, dial 911.

## 2020-07-31 NOTE — PROGRESS NOTES
discomfort     Chest pain 05/01/2019    Paroxysmal A-fib (HCC) 05/01/2019    Elevated brain natriuretic peptide (BNP) level 04/19/2019    Elevated TSH 04/19/2019    Hypothyroidism 04/19/2019    Renal cyst 04/19/2019    Shortness of breath 08/24/2018    Anxiety and depression 08/24/2018    Glaucoma 07/10/2016    Hypertension 07/09/2016    Hyperlipemia 07/09/2016    GERD (gastroesophageal reflux disease) 07/09/2016    2-part displaced fracture of surgical neck of right humerus, initial encounter for closed fracture 07/09/2016    Nausea 07/09/2016    2-part displaced fracture of surgical neck of left humerus, initial encounter for closed fracture 07/08/2016     Current Outpatient Medications   Medication Sig Dispense Refill    apixaban (ELIQUIS) 2.5 MG TABS tablet Take 1 tablet by mouth 2 times daily 180 tablet 3    furosemide (LASIX) 20 MG tablet TAKE 1 TABLET BY MOUTH DAILY AS NEEDED (FOR EDEMA/SWELLING.) 15 tablet 1    atenolol (TENORMIN) 50 MG tablet TAKE 1 & 1/2 TABLETS BY MOUTH TWICE A  tablet 3    atorvastatin (LIPITOR) 40 MG tablet TAKE 1 TABLET DAILY 90 tablet 3    escitalopram (LEXAPRO) 20 MG tablet TAKE 1 TABLET BY MOUTH EVERY DAY 90 tablet 3    omeprazole (PRILOSEC) 40 MG delayed release capsule Take 1 capsule by mouth daily 90 capsule 3    vitamin D (ERGOCALCIFEROL) 1.25 MG (24727 UT) CAPS capsule Take 1 capsule by mouth once a week 12 capsule 3    losartan (COZAAR) 50 MG tablet TAKE 1 TABLET DAILY.  IF    BLOOD PRESSURE STARTS TO   RUN LOW CUT DOWN TO 1/2    TABLET 90 tablet 3    ciclopirox (LOPROX) 0.77 % cream APPLY UNDER BREASTS 2 TIMES DAILY FOR 2 WEEKS  0    aspirin 81 MG chewable tablet Take 1 tablet by mouth daily 30 tablet 3    dorzolamide-timolol (COSOPT) 22.3-6.8 MG/ML ophthalmic solution INSTILL 1 DROP INTO BOTH EYES TWICE A DAY AS DIRECTED  3    latanoprost (XALATAN) 0.005 % ophthalmic solution INSTILL 1 DROP INTO BOTH EYES IN THE EVENING  3     No current facility-administered medications for this visit. Allergies: Cardizem [diltiazem hcl] and Morphine  Past Medical History:   Diagnosis Date    Arthritis     Atrial fibrillation (HCC)     GERD (gastroesophageal reflux disease)     Hyperlipidemia     Hypertension     Tremor     to right hand     Past Surgical History:   Procedure Laterality Date    CHOLECYSTECTOMY      HUMERUS FRACTURE SURGERY Right 7/9/2016    REVERSE TOTAL SHOULDER ARTHROPLASTY performed by Celina Lamb MD at [facility] OR     Family History   Problem Relation Age of Onset    Heart Attack Mother     Stroke Father     Heart Attack Father     Heart Disease Sister     Atrial Fibrillation Sister      Social History     Tobacco Use    Smoking status: Never Smoker    Smokeless tobacco: Never Used   Substance Use Topics    Alcohol use: No          Review of Systems:    Review of Systems   Constitutional: Negative for activity change, chills, diaphoresis, fatigue and fever. HENT: Negative for congestion and sore throat. Eyes: Negative. Respiratory: Negative for cough, chest tightness, shortness of breath and wheezing. Cardiovascular: Negative for chest pain, palpitations and leg swelling. Genitourinary: Negative. Musculoskeletal: Negative. Neurological: Negative for dizziness, syncope, light-headedness and headaches. Psychiatric/Behavioral: Negative for confusion. The patient is not nervous/anxious. Objective:    /84   Pulse 76   Ht 5' 5\" (1.651 m)   Wt 172 lb (78 kg)   SpO2 94%   BMI 28.62 kg/m²     GENERAL - well developed and well nourished, conversant  HEENT -  PERRLA, Hearing appears normal, gentleman lids are normal.  External inspection of ears and nose appear normal.  NECK - no thyromegaly, no JVD, trachea is in the midline  CARDIOVASCULAR - PMI is in the mid line clavicular position, Normal S1 and S2 with no  systolic murmur. No S3 or S4    PULMONARY - no respiratory distress.   No wheezes or rales. Lungs are clear to ausculation, normal respiratory effort. ABDOMEN  - soft, non tender, no rebound  MUSCULOSKELETAL  - range of motion of the upper and lower extermites appears normal and equal and is without pain   EXTREMITIES - no significant edema   NEUROLOGIC - gait and station are normal  SKIN - turgor is normal, can warm and dry. PSYCHIATRIC - normal mood and affect, alert and orientated x 3,      ASSESSMENT:    ALL THE CARDIOLOGY PROBLEMS ARE LISTED ABOVE; HOWEVER, THE FOLLOWING SPECIFIC CARDIAC PROBLEMS / CONDITIONS WERE ADDRESSED AND TREATED DURING THE OFFICE VISIT TODAY:                                                                                            MEDICAL DECISION MAKING             Cardiac Specific Problem / Diagnosis  Discussion and Data Reviewed Diagnostic Procedures Ordered Management Options Selected           1. PAF   Stable   Review and summation of old records:    EKG in the office showing sinus rhythm with a heart rate of 74 bpm.    Patient is using a walker to get around. Due to her age of 82years old. And no recurrence of paroxysmal atrial fib. Will reduce Eliquis to 2.5 mg twice daily. Yes Continue current medications:     Reduce Eliquis to 2.5 mg twice daily           2. Hypertension   Well Controlled   Blood pressure in the office today 124/84. O2 sat 94%. No Continue current medications:    Yes           3. Hyperlipidemia Stable  managed by primary care provider. Patient is on Lipitor 40 mg daily. 5/27/2020 lipid profile. Total cholesterol 141. Triglycerides 144. HDL 66. LDL 46. No Continue current medications:        Yes                     Orders Placed This Encounter   Procedures    EKG 12 lead     Order Specific Question:   Reason for Exam?     Answer:   Irregular heart rate     Orders Placed This Encounter   Medications    apixaban (ELIQUIS) 2.5 MG TABS tablet     Sig: Take 1 tablet by mouth 2 times daily     Dispense:  180 tablet     Refill: 3       Discussed with patient. Return in about 6 months (around 1/31/2021) for Dr Kasi Osborne or me . I greatly appreciate the opportunity to meet Jaylenetasha Walker and your confidence in allowing me to participate in her cardiovascular care. NURIS Andres NP  7/31/2020 11:34 AM CDT                    This dictation was generated by voice recognition computer software. Although all attempts are made to edit dictation for accuracy, there may be errors in the transcription that are not intended.

## 2020-08-03 RX ORDER — FUROSEMIDE 20 MG/1
20 TABLET ORAL DAILY PRN
Qty: 15 TABLET | Refills: 1 | Status: SHIPPED | OUTPATIENT
Start: 2020-08-03 | End: 2020-08-14

## 2020-08-14 RX ORDER — FUROSEMIDE 20 MG/1
20 TABLET ORAL DAILY PRN
Qty: 15 TABLET | Refills: 1 | Status: SHIPPED | OUTPATIENT
Start: 2020-08-14 | End: 2020-09-01

## 2020-08-31 NOTE — TELEPHONE ENCOUNTER
Ashia Thorpe called requesting a refill of the below medication which has been pended for you:     Requested Prescriptions     Pending Prescriptions Disp Refills    furosemide (LASIX) 20 MG tablet [Pharmacy Med Name: FUROSEMIDE 20 MG TABLET] 15 tablet 1     Sig: TAKE 1 TABLET BY MOUTH DAILY AS NEEDED (FOR EDEMA/SWELLING.)       Last Appointment Date: 5/29/2020  Next Appointment Date: 9/4/2020    Allergies   Allergen Reactions    Cardizem [Diltiazem Hcl] Rash    Morphine Nausea And Vomiting

## 2020-09-01 RX ORDER — FUROSEMIDE 20 MG/1
20 TABLET ORAL DAILY PRN
Qty: 15 TABLET | Refills: 1 | Status: SHIPPED | OUTPATIENT
Start: 2020-09-01 | End: 2020-09-17

## 2020-09-17 RX ORDER — FUROSEMIDE 20 MG/1
20 TABLET ORAL DAILY PRN
Qty: 15 TABLET | Refills: 1 | Status: SHIPPED | OUTPATIENT
Start: 2020-09-17 | End: 2020-09-25

## 2020-09-17 NOTE — TELEPHONE ENCOUNTER
Millie Machuca called requesting a refill of the below medication which has been pended for you:     Requested Prescriptions     Pending Prescriptions Disp Refills    furosemide (LASIX) 20 MG tablet [Pharmacy Med Name: FUROSEMIDE 20 MG TABLET] 15 tablet 1     Sig: TAKE 1 TABLET BY MOUTH DAILY AS NEEDED (FOR EDEMA/SWELLING.)       Last Appointment Date: 9/4/2020  Next Appointment Date: Visit date not found    Allergies   Allergen Reactions    Cardizem [Diltiazem Hcl] Rash    Morphine Nausea And Vomiting

## 2020-09-21 ENCOUNTER — TRANSCRIBE ORDERS (OUTPATIENT)
Dept: ADMINISTRATIVE | Facility: HOSPITAL | Age: 85
End: 2020-09-21

## 2020-09-21 DIAGNOSIS — Z11.59 SCREENING FOR VIRAL DISEASE: Primary | ICD-10-CM

## 2020-09-23 ENCOUNTER — APPOINTMENT (OUTPATIENT)
Dept: PREADMISSION TESTING | Facility: HOSPITAL | Age: 85
End: 2020-09-23

## 2020-09-23 VITALS
DIASTOLIC BLOOD PRESSURE: 83 MMHG | WEIGHT: 173.28 LBS | RESPIRATION RATE: 22 BRPM | HEIGHT: 65 IN | OXYGEN SATURATION: 97 % | SYSTOLIC BLOOD PRESSURE: 153 MMHG | BODY MASS INDEX: 28.87 KG/M2 | HEART RATE: 101 BPM

## 2020-09-23 LAB
ALBUMIN SERPL-MCNC: 4.3 G/DL (ref 3.5–5.2)
ALBUMIN/GLOB SERPL: 1.4 G/DL
ALP SERPL-CCNC: 98 U/L (ref 39–117)
ALT SERPL W P-5'-P-CCNC: 21 U/L (ref 1–33)
ANION GAP SERPL CALCULATED.3IONS-SCNC: 9 MMOL/L (ref 5–15)
AST SERPL-CCNC: 36 U/L (ref 1–32)
BASOPHILS # BLD AUTO: 0.05 10*3/MM3 (ref 0–0.2)
BASOPHILS NFR BLD AUTO: 1 % (ref 0–1.5)
BILIRUB SERPL-MCNC: 0.6 MG/DL (ref 0–1.2)
BUN SERPL-MCNC: 12 MG/DL (ref 8–23)
BUN/CREAT SERPL: 15 (ref 7–25)
CALCIUM SPEC-SCNC: 9.5 MG/DL (ref 8.6–10.5)
CHLORIDE SERPL-SCNC: 100 MMOL/L (ref 98–107)
CO2 SERPL-SCNC: 33 MMOL/L (ref 22–29)
CREAT SERPL-MCNC: 0.8 MG/DL (ref 0.57–1)
DEPRECATED RDW RBC AUTO: 47 FL (ref 37–54)
EOSINOPHIL # BLD AUTO: 0.15 10*3/MM3 (ref 0–0.4)
EOSINOPHIL NFR BLD AUTO: 2.9 % (ref 0.3–6.2)
ERYTHROCYTE [DISTWIDTH] IN BLOOD BY AUTOMATED COUNT: 13.7 % (ref 12.3–15.4)
GFR SERPL CREATININE-BSD FRML MDRD: 69 ML/MIN/1.73
GLOBULIN UR ELPH-MCNC: 3.1 GM/DL
GLUCOSE SERPL-MCNC: 99 MG/DL (ref 65–99)
HCT VFR BLD AUTO: 38.5 % (ref 34–46.6)
HGB BLD-MCNC: 12.5 G/DL (ref 12–15.9)
IMM GRANULOCYTES # BLD AUTO: 0.01 10*3/MM3 (ref 0–0.05)
IMM GRANULOCYTES NFR BLD AUTO: 0.2 % (ref 0–0.5)
LYMPHOCYTES # BLD AUTO: 1.71 10*3/MM3 (ref 0.7–3.1)
LYMPHOCYTES NFR BLD AUTO: 33.3 % (ref 19.6–45.3)
MCH RBC QN AUTO: 30.6 PG (ref 26.6–33)
MCHC RBC AUTO-ENTMCNC: 32.5 G/DL (ref 31.5–35.7)
MCV RBC AUTO: 94.4 FL (ref 79–97)
MONOCYTES # BLD AUTO: 0.55 10*3/MM3 (ref 0.1–0.9)
MONOCYTES NFR BLD AUTO: 10.7 % (ref 5–12)
NEUTROPHILS NFR BLD AUTO: 2.67 10*3/MM3 (ref 1.7–7)
NEUTROPHILS NFR BLD AUTO: 51.9 % (ref 42.7–76)
NRBC BLD AUTO-RTO: 0 /100 WBC (ref 0–0.2)
PLATELET # BLD AUTO: 146 10*3/MM3 (ref 140–450)
PMV BLD AUTO: 11.9 FL (ref 6–12)
POTASSIUM SERPL-SCNC: 4.1 MMOL/L (ref 3.5–5.2)
PROT SERPL-MCNC: 7.4 G/DL (ref 6–8.5)
RBC # BLD AUTO: 4.08 10*6/MM3 (ref 3.77–5.28)
SODIUM SERPL-SCNC: 142 MMOL/L (ref 136–145)
WBC # BLD AUTO: 5.14 10*3/MM3 (ref 3.4–10.8)

## 2020-09-23 PROCEDURE — 85025 COMPLETE CBC W/AUTO DIFF WBC: CPT | Performed by: SPECIALIST

## 2020-09-23 PROCEDURE — 93005 ELECTROCARDIOGRAM TRACING: CPT

## 2020-09-23 PROCEDURE — 93010 ELECTROCARDIOGRAM REPORT: CPT | Performed by: INTERNAL MEDICINE

## 2020-09-23 PROCEDURE — 36415 COLL VENOUS BLD VENIPUNCTURE: CPT

## 2020-09-23 PROCEDURE — 80053 COMPREHEN METABOLIC PANEL: CPT | Performed by: SPECIALIST

## 2020-09-23 PROCEDURE — C9803 HOPD COVID-19 SPEC COLLECT: HCPCS | Performed by: SPECIALIST

## 2020-09-23 PROCEDURE — 87635 SARS-COV-2 COVID-19 AMP PRB: CPT | Performed by: SPECIALIST

## 2020-09-23 NOTE — DISCHARGE INSTRUCTIONS
DAY OF SURGERY INSTRUCTIONS        YOUR SURGEON: Dr. 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  Following surgery it is unrealistic to expect you will not have pain.   Pain is how our bodies let us know that something is wrong or cautions us to be careful.  That said, our goal is to make your pain tolerable.    Methods we may use to treat your pain include (oral or IV medications, PCAs, epidurals, nerve blocks, etc.)   While some procedures require IV pain medications for a short time after surgery, transitioning to pain medications by mouth allows for better management of pain.   Your nurse will encourage you to take oral pain medications whenever possible.  IV medications work almost immediately, but only last a short while.  Taking medications by mouth allows for a more constant level of medication in your blood stream for a longer period of time.      Once your pain is out of control it is harder to get back under control.  It is important you are aware when your next dose of pain medication is due.  If you are admitted, your nurse may write the time of your next dose on the white board in your room to help you remember.      We are interested in your pain and encourage you to inform us about aggravating factors during your visit.   Many times a simple repositioning every few hours can make a big difference.    If your physician says it is okay, do not let your pain prevent you from getting out of bed. Be sure to call your nurse for assistance prior to getting up so you do not fall.      Before  surgery, please decide your tolerable pain goal.  These faces help describe the pain ratings we use on a 0-10 scale.   Be prepared to tell us your goal and whether or not you take pain or anxiety medications at home.          BEFORE YOU COME TO THE HOSPITAL  (Pre-op instructions)  • Do not eat, drink, smoke or chew gum after midnight the night before surgery.  This also includes no mints.  • Morning of surgery take only the medicines you have been instructed with a sip of water unless otherwise instructed  by your physician.  • Do not shave, wear makeup or dark nail polish.  • Remove all jewelry including rings.  • Leave anything you consider valuable at home.  • Leave your suitcase in the car until after your surgery.  • Bring the following with you if applicable:  o Picture ID and insurance, Medicare or Medicaid cards  o Co-pay/deductible required by insurance (cash, check, credit card)  o Copy of advance directive, living will or power-of- documents if not brought to PAT  o CPAP or BIPAP mask and tubing  o Relaxation aids ( book, magazine), etc.  o Hearing aids                        ON THE DAY OF SURGERY  · On the day of surgery check in at registration located at the main entrance of the hospital.   ? You will be registered and given a beeper with instructions where to wait in the main lobby.  ? When your beeper lights up and vibrates a member of the Outpatient Surgery staff will meet you at the double doors under the stair steps and escort you to your preoperative room.   · You may have cloth compression devices placed on your legs. These help to prevent blood clots and reduce swelling in your legs.  · An IV may be inserted into one of your veins.  · In the operating room, you may be given one or more of the following:  ? A medicine to help you relax (sedative).  ? A medicine to numb the area (local anesthetic).  ? A medicine to make you fall asleep (general anesthetic).  ? A medicine that is injected  "into an area of your body to numb everything below the injection site (regional anesthetic).  · Your surgical site will be marked or identified.  · You may be given an antibiotic through your IV to help prevent infection.  Contact a health care provider if you:  · Develop a fever of more than 100.4°F (38°C) or other feelings of illness during the 48 hours before your surgery.  · Have symptoms that get worse.  Have questions or concerns about your surgery    General Anesthesia/Surgery, Adult  General anesthesia is the use of medicines to make a person \"go to sleep\" (unconscious) for a medical procedure. General anesthesia must be used for certain procedures, and is often recommended for procedures that:  · Last a long time.  · Require you to be still or in an unusual position.  · Are major and can cause blood loss.  The medicines used for general anesthesia are called general anesthetics. As well as making you unconscious for a certain amount of time, these medicines:  · Prevent pain.  · Control your blood pressure.  · Relax your muscles.  Tell a health care provider about:  · Any allergies you have.  · All medicines you are taking, including vitamins, herbs, eye drops, creams, and over-the-counter medicines.  · Any problems you or family members have had with anesthetic medicines.  · Types of anesthetics you have had in the past.  · Any blood disorders you have.  · Any surgeries you have had.  · Any medical conditions you have.  · Any recent upper respiratory, chest, or ear infections.  · Any history of:  ? Heart or lung conditions, such as heart failure, sleep apnea, asthma, or chronic obstructive pulmonary disease (COPD).  ?  service.  ? Depression or anxiety.  · Any tobacco or drug use, including marijuana or alcohol use.  · Whether you are pregnant or may be pregnant.  What are the risks?  Generally, this is a safe procedure. However, problems may occur, including:  · Allergic reaction.  · Lung and heart " problems.  · Inhaling food or liquid from the stomach into the lungs (aspiration).  · Nerve injury.  · Air in the bloodstream, which can lead to stroke.  · Extreme agitation or confusion (delirium) when you wake up from the anesthetic.  · Waking up during your procedure and being unable to move. This is rare.  These problems are more likely to develop if you are having a major surgery or if you have an advanced or serious medical condition. You can prevent some of these complications by answering all of your health care provider's questions thoroughly and by following all instructions before your procedure.  General anesthesia can cause side effects, including:  · Nausea or vomiting.  · A sore throat from the breathing tube.  · Hoarseness.  · Wheezing or coughing.  · Shaking chills.  · Tiredness.  · Body aches.  · Anxiety.  · Sleepiness or drowsiness.  · Confusion or agitation.  RISKS AND COMPLICATIONS OF SURGERY  Your health care provider will discuss possible risks and complications with you before surgery. Common risks and complications include:    · Problems due to the use of anesthetics.  · Blood loss and replacement (does not apply to minor surgical procedures).  · Temporary increase in pain due to surgery.  · Uncorrected pain or problems that the surgery was meant to correct.  · Infection.  · New damage.    What happens before the procedure?    Medicines  Ask your health care provider about:  · Changing or stopping your regular medicines. This is especially important if you are taking diabetes medicines or blood thinners.  · Taking medicines such as aspirin and ibuprofen. These medicines can thin your blood. Do not take these medicines unless your health care provider tells you to take them.  · Taking over-the-counter medicines, vitamins, herbs, and supplements. Do not take these during the week before your procedure unless your health care provider approves them.  General instructions  · Starting 3-6 weeks  before the procedure, do not use any products that contain nicotine or tobacco, such as cigarettes and e-cigarettes. If you need help quitting, ask your health care provider.  · If you brush your teeth on the morning of the procedure, make sure to spit out all of the toothpaste.  · Tell your health care provider if you become ill or develop a cold, cough, or fever.  · If instructed by your health care provider, bring your sleep apnea device with you on the day of your surgery (if applicable).  · Ask your health care provider if you will be going home the same day, the following day, or after a longer hospital stay.  ? Plan to have someone take you home from the hospital or clinic.  ? Plan to have a responsible adult care for you for at least 24 hours after you leave the hospital or clinic. This is important.  What happens during the procedure?  · You will be given anesthetics through both of the following:  ? A mask placed over your nose and mouth.  ? An IV in one of your veins.  · You may receive a medicine to help you relax (sedative).  · After you are unconscious, a breathing tube may be inserted down your throat to help you breathe. This will be removed before you wake up.  · An anesthesia specialist will stay with you throughout your procedure. He or she will:  ? Keep you comfortable and safe by continuing to give you medicines and adjusting the amount of medicine that you get.  ? Monitor your blood pressure, pulse, and oxygen levels to make sure that the anesthetics do not cause any problems.  The procedure may vary among health care providers and hospitals.  What happens after the procedure?  · Your blood pressure, temperature, heart rate, breathing rate, and blood oxygen level will be monitored until the medicines you were given have worn off.  · You will wake up in a recovery area. You may wake up slowly.  · If you feel anxious or agitated, you may be given medicine to help you calm down.  · If you will be  going home the same day, your health care provider may check to make sure you can walk, drink, and urinate.  · Your health care provider will treat any pain or side effects you have before you go home.  · Do not drive for 24 hours if you were given a sedative.  Summary  · General anesthesia is used to keep you still and prevent pain during a procedure.  · It is important to tell your healthcare provider about your medical history and any surgeries you have had, and previous experience with anesthesia.  · Follow your healthcare provider’s instructions about when to stop eating, drinking, or taking certain medicines before your procedure.  · Plan to have someone take you home from the hospital or clinic.  This information is not intended to replace advice given to you by your health care provider. Make sure you discuss any questions you have with your health care provider.  Document Released: 03/26/2009 Document Revised: 08/03/2018 Document Reviewed: 08/03/2018  [employer] Interactive Patient Education © 2019 [employer]       Fall Prevention in Hospitals, Adult  As a hospital patient, your condition and the treatments you receive can increase your risk for falls. Some additional risk factors for falls in a hospital include:  · Being in an unfamiliar environment.  · Being on bed rest.  · Your surgery.  · Taking certain medicines.  · Your tubing requirements, such as intravenous (IV) therapy or catheters.  It is important that you learn how to decrease fall risks while at the hospital. Below are important tips that can help prevent falls.  SAFETY TIPS FOR PREVENTING FALLS  Talk about your risk of falling.  · Ask your health care provider why you are at risk for falling. Is it your medicine, illness, tubing placement, or something else?  · Make a plan with your health care provider to keep you safe from falls.  · Ask your health care provider or pharmacist about side effects of your medicines. Some medicines can make  you dizzy or affect your coordination.  Ask for help.  · Ask for help before getting out of bed. You may need to press your call button.  · Ask for assistance in getting safely to the toilet.  · Ask for a walker or cane to be put at your bedside. Ask that most of the side rails on your bed be placed up before your health care provider leaves the room.  · Ask family or friends to sit with you.  · Ask for things that are out of your reach, such as your glasses, hearing aids, telephone, bedside table, or call button.  Follow these tips to avoid falling:  · Stay lying or seated, rather than standing, while waiting for help.  · Wear rubber-soled slippers or shoes whenever you walk in the hospital.  · Avoid quick, sudden movements.  ¨ Change positions slowly.  ¨ Sit on the side of your bed before standing.  ¨ Stand up slowly and wait before you start to walk.  · Let your health care provider know if there is a spill on the floor.  · Pay careful attention to the medical equipment, electrical cords, and tubes around you.  · When you need help, use your call button by your bed or in the bathroom. Wait for one of your health care providers to help you.  · If you feel dizzy or unsure of your footing, return to bed and wait for assistance.  · Avoid being distracted by the TV, telephone, or another person in your room.  · Do not lean or support yourself on rolling objects, such as IV poles or bedside tables.     This information is not intended to replace advice given to you by your health care provider. Make sure you discuss any questions you have with your health care provider.     Document Released: 12/15/2001 Document Revised: 01/08/2016 Document Reviewed: 08/25/2013  [employer] Interactive Patient Education ©2016 [employer]       [facility]  CHG 4% Patient Instruction Sheet    Chlorhexidine Before Surgery  Chlorhexidine gluconate (CHG) is a germ-killing (antiseptic) solution that is used to clean the skin. It  gets rid of the bacteria that normally live on the skin. Cleaning your skin with CHG before surgery helps lower the risk for infection after surgery.    How to use CHG solution  · You will take 2 showers, one shower the night before surgery, the second shower the morning of surgery before coming to the hospital.  · Use CHG only as told by your health care provider, and follow the instructions on the label.  · Use CHG solution while taking a shower. Follow these steps when using CHG solution (unless your health care provider gives you different instructions):  1. Start the shower.  2. Use your normal soap and shampoo to wash your face and hair.  3. Turn off the shower or move out of the shower stream.  4. Pour the CHG onto a clean washcloth. Do not use any type of brush or rough-edged sponge.  5. Starting at your neck, lather your body down to your toes. Make sure you:  6. Pay special attention to the part of your body where you will be having surgery. Scrub this area for at least 1 minute.  7. Use the full amount of CHG as directed. Usually, this is one half bottle for each shower.  8. Do not use CHG on your head or face. If the solution gets into your ears or eyes, rinse them well with water.  9. Avoid your genital area.  10. Avoid any areas of skin that have broken skin, cuts, or scrapes.  11. Scrub your back and under your arms. Make sure to wash skin folds.  12. Let the lather sit on your skin for 1-2 minutes or as long as told by your health care  provider.  13. Thoroughly rinse your entire body in the shower. Make sure that all body creases and crevices are rinsed well.  14. Dry off with a clean towel. Do not put any substances on your body afterward, such as powder, lotion, or perfume.  15. Put on clean clothes or pajamas.  16. If it is the night before your surgery, sleep in clean sheets.    What are the risks?  Risks of using CHG include:  · A skin reaction.  · Hearing loss, if CHG gets in your ears.  · Eye  injury, if CHG gets in your eyes and is not rinsed out.  · The CHG product catching fire.  Make sure that you avoid smoking and flames after applying CHG to your skin.  Do not use CHG:  · If you have a chlorhexidine allergy or have previously reacted to chlorhexidine.  · On babies younger than 2 months of age.      On the day of surgery, when you are taken to your room in Outpatient Surgery you will be given a CHG prepackaged cloth to wipe the site for your surgery.  How to use CHG prepackaged cloths  · Follow the instructions on the label.  · Use the CHG cloth on clean, dry skin. Follow these steps when using a CHG cloth (unless your health care provider gives you different instructions):  1. Using the CHG cloth, vigorously scrub the part of your body where you will be having surgery. Scrub using a back-and-forth motion for 3 minutes. The area on your body should be completely wet with CHG when you are finished scrubbing.  2. Do not rinse. Discard the cloth and let the area air-dry for 1 minute. Do not put any substances on your body afterward, such as powder, lotion, or perfume.  Contact a health care provider if:  · Your skin gets irritated after scrubbing.  · You have questions about using your solution or cloth.  Get help right away if:  · Your eyes become very red or swollen.  · Your eyes itch badly.  · Your skin itches badly and is red or swollen.  · Your hearing changes.  · You have trouble seeing.  · You have swelling or tingling in your mouth or throat.  · You have trouble breathing.  · You swallow any chlorhexidine.  Summary  · Chlorhexidine gluconate (CHG) is a germ-killing (antiseptic) solution that is used to clean the skin. Cleaning your skin with CHG before surgery helps lower the risk for infection after surgery.  · You may be given CHG to use at home. It may be in a bottle or in a prepackaged cloth to use on your skin. Carefully follow your health care provider's instructions and the instructions  on the product label.  · Do not use CHG if you have a chlorhexidine allergy.  · Contact your health care provider if your skin gets irritated after scrubbing.  This information is not intended to replace advice given to you by your health care provider. Make sure you discuss any questions you have with your health care provider.  Document Released: 09/11/2013 Document Revised: 11/15/2018 Document Reviewed: 11/15/2018  [employer] Interactive Patient Education © 2019 [employer]          PATIENT/FAMILY/RESPONSIBLE PARTY VERBALIZES UNDERSTANDING OF ABOVE EDUCATION.  COPY OF PAIN SCALE GIVEN AND REVIEWED WITH VERBALIZED UNDERSTANDING.

## 2020-09-24 LAB — SARS-COV-2 N GENE RESP QL NAA+PROBE: NOT DETECTED

## 2020-09-25 ENCOUNTER — HOSPITAL ENCOUNTER (OUTPATIENT)
Facility: HOSPITAL | Age: 85
Setting detail: HOSPITAL OUTPATIENT SURGERY
Discharge: HOME | End: 2020-09-25
Attending: SPECIALIST | Admitting: SPECIALIST

## 2020-09-25 ENCOUNTER — ANESTHESIA EVENT (OUTPATIENT)
Dept: PERIOP | Facility: HOSPITAL | Age: 85
End: 2020-09-25

## 2020-09-25 ENCOUNTER — ANESTHESIA (OUTPATIENT)
Dept: PERIOP | Facility: HOSPITAL | Age: 85
End: 2020-09-25

## 2020-09-25 VITALS
RESPIRATION RATE: 18 BRPM | TEMPERATURE: 97.9 F | OXYGEN SATURATION: 92 % | SYSTOLIC BLOOD PRESSURE: 145 MMHG | HEART RATE: 75 BPM | DIASTOLIC BLOOD PRESSURE: 60 MMHG

## 2020-09-25 DIAGNOSIS — D09.9 SQUAMOUS CELL CARCINOMA IN SITU: ICD-10-CM

## 2020-09-25 PROCEDURE — 88305 TISSUE EXAM BY PATHOLOGIST: CPT | Performed by: SPECIALIST

## 2020-09-25 PROCEDURE — 25010000002 DEXAMETHASONE PER 1 MG: Performed by: NURSE ANESTHETIST, CERTIFIED REGISTERED

## 2020-09-25 PROCEDURE — 25010000002 ONDANSETRON PER 1 MG: Performed by: NURSE ANESTHETIST, CERTIFIED REGISTERED

## 2020-09-25 PROCEDURE — 25010000002 PROPOFOL 10 MG/ML EMULSION: Performed by: NURSE ANESTHETIST, CERTIFIED REGISTERED

## 2020-09-25 RX ORDER — SODIUM CHLORIDE, SODIUM LACTATE, POTASSIUM CHLORIDE, CALCIUM CHLORIDE 600; 310; 30; 20 MG/100ML; MG/100ML; MG/100ML; MG/100ML
9 INJECTION, SOLUTION INTRAVENOUS CONTINUOUS
Status: DISCONTINUED | OUTPATIENT
Start: 2020-09-25 | End: 2020-09-25 | Stop reason: HOSPADM

## 2020-09-25 RX ORDER — OXYCODONE AND ACETAMINOPHEN 7.5; 325 MG/1; MG/1
2 TABLET ORAL EVERY 4 HOURS PRN
Status: DISCONTINUED | OUTPATIENT
Start: 2020-09-25 | End: 2020-09-25 | Stop reason: HOSPADM

## 2020-09-25 RX ORDER — SODIUM CHLORIDE 0.9 % (FLUSH) 0.9 %
3 SYRINGE (ML) INJECTION AS NEEDED
Status: DISCONTINUED | OUTPATIENT
Start: 2020-09-25 | End: 2020-09-25 | Stop reason: HOSPADM

## 2020-09-25 RX ORDER — ONDANSETRON 2 MG/ML
INJECTION INTRAMUSCULAR; INTRAVENOUS AS NEEDED
Status: DISCONTINUED | OUTPATIENT
Start: 2020-09-25 | End: 2020-09-25 | Stop reason: SURG

## 2020-09-25 RX ORDER — SODIUM CHLORIDE 0.9 % (FLUSH) 0.9 %
10 SYRINGE (ML) INJECTION AS NEEDED
Status: DISCONTINUED | OUTPATIENT
Start: 2020-09-25 | End: 2020-09-25 | Stop reason: HOSPADM

## 2020-09-25 RX ORDER — OXYCODONE AND ACETAMINOPHEN 10; 325 MG/1; MG/1
1 TABLET ORAL ONCE AS NEEDED
Status: DISCONTINUED | OUTPATIENT
Start: 2020-09-25 | End: 2020-09-25 | Stop reason: HOSPADM

## 2020-09-25 RX ORDER — SODIUM CHLORIDE, SODIUM LACTATE, POTASSIUM CHLORIDE, CALCIUM CHLORIDE 600; 310; 30; 20 MG/100ML; MG/100ML; MG/100ML; MG/100ML
1000 INJECTION, SOLUTION INTRAVENOUS CONTINUOUS
Status: DISCONTINUED | OUTPATIENT
Start: 2020-09-25 | End: 2020-09-25 | Stop reason: HOSPADM

## 2020-09-25 RX ORDER — FLUMAZENIL 0.1 MG/ML
0.2 INJECTION INTRAVENOUS AS NEEDED
Status: DISCONTINUED | OUTPATIENT
Start: 2020-09-25 | End: 2020-09-25 | Stop reason: HOSPADM

## 2020-09-25 RX ORDER — NALOXONE HCL 0.4 MG/ML
0.4 VIAL (ML) INJECTION AS NEEDED
Status: DISCONTINUED | OUTPATIENT
Start: 2020-09-25 | End: 2020-09-25 | Stop reason: HOSPADM

## 2020-09-25 RX ORDER — LIDOCAINE HYDROCHLORIDE 10 MG/ML
0.5 INJECTION, SOLUTION EPIDURAL; INFILTRATION; INTRACAUDAL; PERINEURAL ONCE AS NEEDED
Status: DISCONTINUED | OUTPATIENT
Start: 2020-09-25 | End: 2020-09-25 | Stop reason: HOSPADM

## 2020-09-25 RX ORDER — PROPOFOL 10 MG/ML
VIAL (ML) INTRAVENOUS AS NEEDED
Status: DISCONTINUED | OUTPATIENT
Start: 2020-09-25 | End: 2020-09-25 | Stop reason: SURG

## 2020-09-25 RX ORDER — FUROSEMIDE 20 MG/1
20 TABLET ORAL DAILY PRN
Qty: 15 TABLET | Refills: 1 | Status: SHIPPED | OUTPATIENT
Start: 2020-09-25 | End: 2020-10-05

## 2020-09-25 RX ORDER — OXYCODONE HYDROCHLORIDE AND ACETAMINOPHEN 5; 325 MG/1; MG/1
1 TABLET ORAL ONCE AS NEEDED
Status: CANCELLED | OUTPATIENT
Start: 2020-09-25 | End: 2020-10-02

## 2020-09-25 RX ORDER — FENTANYL CITRATE 50 UG/ML
25 INJECTION, SOLUTION INTRAMUSCULAR; INTRAVENOUS
Status: DISCONTINUED | OUTPATIENT
Start: 2020-09-25 | End: 2020-09-25 | Stop reason: HOSPADM

## 2020-09-25 RX ORDER — DEXTROSE MONOHYDRATE 25 G/50ML
12.5 INJECTION, SOLUTION INTRAVENOUS AS NEEDED
Status: DISCONTINUED | OUTPATIENT
Start: 2020-09-25 | End: 2020-09-25 | Stop reason: HOSPADM

## 2020-09-25 RX ORDER — DEXAMETHASONE SODIUM PHOSPHATE 4 MG/ML
INJECTION, SOLUTION INTRA-ARTICULAR; INTRALESIONAL; INTRAMUSCULAR; INTRAVENOUS; SOFT TISSUE AS NEEDED
Status: DISCONTINUED | OUTPATIENT
Start: 2020-09-25 | End: 2020-09-25 | Stop reason: SURG

## 2020-09-25 RX ORDER — LABETALOL HYDROCHLORIDE 5 MG/ML
5 INJECTION, SOLUTION INTRAVENOUS
Status: DISCONTINUED | OUTPATIENT
Start: 2020-09-25 | End: 2020-09-25 | Stop reason: HOSPADM

## 2020-09-25 RX ORDER — MAGNESIUM HYDROXIDE 1200 MG/15ML
LIQUID ORAL AS NEEDED
Status: DISCONTINUED | OUTPATIENT
Start: 2020-09-25 | End: 2020-09-25 | Stop reason: HOSPADM

## 2020-09-25 RX ORDER — ONDANSETRON 2 MG/ML
4 INJECTION INTRAMUSCULAR; INTRAVENOUS ONCE AS NEEDED
Status: DISCONTINUED | OUTPATIENT
Start: 2020-09-25 | End: 2020-09-25 | Stop reason: HOSPADM

## 2020-09-25 RX ORDER — SODIUM CHLORIDE 0.9 % (FLUSH) 0.9 %
10 SYRINGE (ML) INJECTION EVERY 12 HOURS SCHEDULED
Status: DISCONTINUED | OUTPATIENT
Start: 2020-09-25 | End: 2020-09-25 | Stop reason: HOSPADM

## 2020-09-25 RX ORDER — BUPIVACAINE HYDROCHLORIDE AND EPINEPHRINE 2.5; 5 MG/ML; UG/ML
INJECTION, SOLUTION INFILTRATION; PERINEURAL AS NEEDED
Status: DISCONTINUED | OUTPATIENT
Start: 2020-09-25 | End: 2020-09-25 | Stop reason: HOSPADM

## 2020-09-25 RX ORDER — IBUPROFEN 600 MG/1
600 TABLET ORAL ONCE AS NEEDED
Status: DISCONTINUED | OUTPATIENT
Start: 2020-09-25 | End: 2020-09-25 | Stop reason: HOSPADM

## 2020-09-25 RX ORDER — OXYCODONE HYDROCHLORIDE AND ACETAMINOPHEN 5; 325 MG/1; MG/1
1-2 TABLET ORAL EVERY 4 HOURS PRN
Qty: 30 TABLET | Refills: 0 | Status: ON HOLD | OUTPATIENT
Start: 2020-09-25 | End: 2021-01-01

## 2020-09-25 RX ADMIN — CEFAZOLIN 1 G: 1 INJECTION, POWDER, FOR SOLUTION INTRAMUSCULAR; INTRAVENOUS; PARENTERAL at 14:15

## 2020-09-25 RX ADMIN — PROPOFOL 150 MG: 10 INJECTION, EMULSION INTRAVENOUS at 14:10

## 2020-09-25 RX ADMIN — SODIUM CHLORIDE, POTASSIUM CHLORIDE, SODIUM LACTATE AND CALCIUM CHLORIDE 1000 ML: 600; 310; 30; 20 INJECTION, SOLUTION INTRAVENOUS at 13:12

## 2020-09-25 RX ADMIN — DEXAMETHASONE SODIUM PHOSPHATE 4 MG: 4 INJECTION, SOLUTION INTRAMUSCULAR; INTRAVENOUS at 14:18

## 2020-09-25 RX ADMIN — LIDOCAINE HYDROCHLORIDE 100 MG: 20 INJECTION, SOLUTION INTRAVENOUS at 14:10

## 2020-09-25 RX ADMIN — ONDANSETRON HYDROCHLORIDE 4 MG: 2 SOLUTION INTRAMUSCULAR; INTRAVENOUS at 14:22

## 2020-09-25 NOTE — ANESTHESIA PREPROCEDURE EVALUATION
Anesthesia Evaluation     Patient summary reviewed   no history of anesthetic complications:  NPO Solid Status: > 8 hours             Airway   Mallampati: II  TM distance: >3 FB  Neck ROM: full  Dental      Pulmonary    (-) COPD, asthma, sleep apnea, not a smoker  Cardiovascular     ECG reviewed  Patient on routine beta blocker and Beta blocker given within 24 hours of surgery    (+) hypertension, dysrhythmias Paroxysmal Atrial Fib, hyperlipidemia,   (-) pacemaker, past MI, angina, cardiac stents      Neuro/Psych  (-) seizures, TIA, CVA  GI/Hepatic/Renal/Endo    (-) GERD, liver disease, no renal disease, diabetes    Musculoskeletal     Abdominal    Substance History      OB/GYN          Other                        Anesthesia Plan    ASA 3     MAC       Anesthetic plan, all risks, benefits, and alternatives have been provided, discussed and informed consent has been obtained with: patient.

## 2020-09-25 NOTE — ANESTHESIA PROCEDURE NOTES
Airway  Urgency: elective    Date/Time: 9/25/2020 2:11 PM  Airway not difficult    General Information and Staff    Patient location during procedure: OR  CRNA: Kristen Sheehan CRNA    Indications and Patient Condition  Indications for airway management: airway protection    Preoxygenated: yes  Mask difficulty assessment: 1 - vent by mask    Final Airway Details  Final airway type: supraglottic airway      Successful airway: I-gel  Size 4    Number of attempts at approach: 1  Assessment: lips, teeth, and gum same as pre-op

## 2020-09-26 NOTE — ANESTHESIA POSTPROCEDURE EVALUATION
Patient: Liana Zaragoza    Procedure Summary     Date: 09/25/20 Room / Location:  [facility] OR 09 /  [facility] OR    Anesthesia Start: 1409 Anesthesia Stop: 1436    Procedure: WIDE LOCAL EXCISION SQUAMOUS CELL CARCINOMA OF LOWER LEFT LEG (Left ) Diagnosis: (SKIN CANCER)    Surgeon: Lorraine Ayoub MD Provider: Kristen Sheehan CRNA    Anesthesia Type: MAC ASA Status: 3          Anesthesia Type: MAC    Vitals  Vitals Value Taken Time   /74 09/25/20 1505   Temp 97.9 °F (36.6 °C) 09/25/20 1500   Pulse 74 09/25/20 1508   Resp 22 09/25/20 1505   SpO2 93 % 09/25/20 1508   Vitals shown include unvalidated device data.        Post Anesthesia Care and Evaluation    PONV Status: none  Comments: Patient d/c from PACU prior to anes eval based on Florentin score.  Please see RN notes for details of d/c criteria.    Blood pressure 145/60, pulse 75, temperature 97.9 °F (36.6 °C), temperature source Temporal, resp. rate 18, SpO2 92 %, not currently breastfeeding.

## 2020-09-29 LAB
LAB AP CASE REPORT: NORMAL
LAB AP DIAGNOSIS COMMENT: NORMAL
PATH REPORT.FINAL DX SPEC: NORMAL
PATH REPORT.GROSS SPEC: NORMAL

## 2020-10-05 RX ORDER — FUROSEMIDE 20 MG/1
20 TABLET ORAL DAILY PRN
Qty: 15 TABLET | Refills: 1 | Status: SHIPPED | OUTPATIENT
Start: 2020-10-05 | End: 2020-10-19

## 2020-10-19 RX ORDER — FUROSEMIDE 20 MG/1
20 TABLET ORAL DAILY PRN
Qty: 15 TABLET | Refills: 1 | Status: SHIPPED | OUTPATIENT
Start: 2020-10-19 | End: 2020-10-27

## 2020-10-27 RX ORDER — FUROSEMIDE 20 MG/1
20 TABLET ORAL DAILY PRN
Qty: 15 TABLET | Refills: 1 | Status: SHIPPED | OUTPATIENT
Start: 2020-10-27 | End: 2020-11-17

## 2020-10-27 NOTE — TELEPHONE ENCOUNTER
Katie Mittal called requesting a refill of the below medication which has been pended for you:     Requested Prescriptions     Pending Prescriptions Disp Refills    furosemide (LASIX) 20 MG tablet [Pharmacy Med Name: FUROSEMIDE 20 MG TABLET] 15 tablet 1     Sig: TAKE 1 TABLET BY MOUTH DAILY AS NEEDED (FOR EDEMA/SWELLING.)       Last Appointment Date: 9/4/2020  Next Appointment Date: Visit date not found    Allergies   Allergen Reactions    Cardizem [Diltiazem Hcl] Rash    Morphine Nausea And Vomiting

## 2020-11-16 NOTE — TELEPHONE ENCOUNTER
Chavo Irvin called requesting a refill of the below medication which has been pended for you:     Requested Prescriptions     Pending Prescriptions Disp Refills    furosemide (LASIX) 20 MG tablet [Pharmacy Med Name: FUROSEMIDE 20 MG TABLET] 15 tablet 1     Sig: TAKE 1 TABLET BY MOUTH DAILY AS NEEDED (FOR EDEMA/SWELLING.)       Last Appointment Date: 9/4/2020  Next Appointment Date: Visit date not found    Allergies   Allergen Reactions    Cardizem [Diltiazem Hcl] Rash    Morphine Nausea And Vomiting

## 2020-11-17 RX ORDER — FUROSEMIDE 20 MG/1
20 TABLET ORAL DAILY PRN
Qty: 15 TABLET | Refills: 1 | Status: SHIPPED | OUTPATIENT
Start: 2020-11-17 | End: 2020-12-01

## 2020-11-18 NOTE — TELEPHONE ENCOUNTER
Gelacio Reid called requesting a refill of the below medication which has been pended for you:     Requested Prescriptions     Pending Prescriptions Disp Refills    losartan (COZAAR) 50 MG tablet [Pharmacy Med Name: Jennifer Cardoso TAB 50MG] 90 tablet 3     Sig: TAKE 1 TABLET DAILY, IF    BLOOD PRESSURE STARTS TO   RUN LOW CUT DOWN TO 1/2    TABLET       Last Appointment Date: 9/4/2020  Next Appointment Date: Visit date not found    Allergies   Allergen Reactions    Cardizem [Diltiazem Hcl] Rash    Morphine Nausea And Vomiting

## 2020-11-19 RX ORDER — LOSARTAN POTASSIUM 50 MG/1
TABLET ORAL
Qty: 90 TABLET | Refills: 3 | Status: SHIPPED | OUTPATIENT
Start: 2020-11-19 | End: 2021-01-01

## 2020-11-30 NOTE — TELEPHONE ENCOUNTER
Mitra Drake called requesting a refill of the below medication which has been pended for you:     Requested Prescriptions     Pending Prescriptions Disp Refills    furosemide (LASIX) 20 MG tablet [Pharmacy Med Name: FUROSEMIDE 20 MG TABLET] 15 tablet 1     Sig: TAKE 1 TABLET BY MOUTH DAILY AS NEEDED (FOR EDEMA/SWELLING.)       Last Appointment Date: 9/4/2020  Next Appointment Date: Visit date not found    Allergies   Allergen Reactions    Cardizem [Diltiazem Hcl] Rash    Morphine Nausea And Vomiting

## 2020-12-01 RX ORDER — FUROSEMIDE 20 MG/1
20 TABLET ORAL DAILY PRN
Qty: 15 TABLET | Refills: 1 | Status: SHIPPED | OUTPATIENT
Start: 2020-12-01 | End: 2020-12-08 | Stop reason: SDUPTHER

## 2020-12-09 RX ORDER — FUROSEMIDE 20 MG/1
20 TABLET ORAL DAILY PRN
Qty: 90 TABLET | Refills: 1 | Status: SHIPPED | OUTPATIENT
Start: 2020-12-09 | End: 2021-01-01

## 2020-12-18 RX ORDER — ERGOCALCIFEROL 1.25 MG/1
CAPSULE ORAL
Qty: 12 CAPSULE | Refills: 3 | Status: SHIPPED | OUTPATIENT
Start: 2020-12-18 | End: 2021-01-01

## 2021-01-01 ENCOUNTER — HOSPITAL ENCOUNTER (INPATIENT)
Facility: HOSPITAL | Age: 86
LOS: 3 days | Discharge: NURSING, REHAB OR LONG-TERM CARE | End: 2021-11-04
Attending: INTERNAL MEDICINE | Admitting: INTERNAL MEDICINE

## 2021-01-01 ENCOUNTER — TELEPHONE (OUTPATIENT)
Dept: INTERNAL MEDICINE | Age: 86
End: 2021-01-01

## 2021-01-01 ENCOUNTER — TELEPHONE (OUTPATIENT)
Dept: CARDIOLOGY CLINIC | Age: 86
End: 2021-01-01

## 2021-01-01 ENCOUNTER — HOSPITAL ENCOUNTER (OUTPATIENT)
Facility: HOSPITAL | Age: 86
Setting detail: OBSERVATION
Discharge: NURSING, REHAB OR LONG-TERM CARE | End: 2021-11-17
Attending: STUDENT IN AN ORGANIZED HEALTH CARE EDUCATION/TRAINING PROGRAM | Admitting: INTERNAL MEDICINE

## 2021-01-01 ENCOUNTER — APPOINTMENT (OUTPATIENT)
Dept: ULTRASOUND IMAGING | Age: 86
DRG: 308 | End: 2021-01-01
Payer: MEDICARE

## 2021-01-01 ENCOUNTER — HOME HEALTH ADMISSION (OUTPATIENT)
Dept: HOME HEALTH SERVICES | Facility: HOME HEALTHCARE | Age: 86
End: 2021-01-01

## 2021-01-01 ENCOUNTER — APPOINTMENT (OUTPATIENT)
Dept: GENERAL RADIOLOGY | Age: 86
DRG: 308 | End: 2021-01-01
Payer: MEDICARE

## 2021-01-01 ENCOUNTER — HOSPITAL ENCOUNTER (INPATIENT)
Age: 86
LOS: 6 days | Discharge: NURSING, REHAB OR LONG-TERM CARE | DRG: 308 | End: 2021-12-15
Attending: EMERGENCY MEDICINE | Admitting: INTERNAL MEDICINE
Payer: MEDICARE

## 2021-01-01 ENCOUNTER — APPOINTMENT (OUTPATIENT)
Dept: GENERAL RADIOLOGY | Facility: HOSPITAL | Age: 86
End: 2021-01-01

## 2021-01-01 ENCOUNTER — HOME CARE VISIT (OUTPATIENT)
Dept: HOME HEALTH SERVICES | Facility: CLINIC | Age: 86
End: 2021-01-01

## 2021-01-01 ENCOUNTER — APPOINTMENT (OUTPATIENT)
Dept: CARDIOLOGY | Facility: HOSPITAL | Age: 86
End: 2021-01-01

## 2021-01-01 ENCOUNTER — OFFICE VISIT (OUTPATIENT)
Dept: INTERNAL MEDICINE | Age: 86
End: 2021-01-01
Payer: MEDICARE

## 2021-01-01 ENCOUNTER — OFFICE VISIT (OUTPATIENT)
Dept: CARDIOLOGY CLINIC | Age: 86
End: 2021-01-01
Payer: MEDICARE

## 2021-01-01 ENCOUNTER — TRANSCRIBE ORDERS (OUTPATIENT)
Dept: HOME HEALTH SERVICES | Facility: HOME HEALTHCARE | Age: 86
End: 2021-01-01

## 2021-01-01 ENCOUNTER — APPOINTMENT (OUTPATIENT)
Dept: CT IMAGING | Facility: HOSPITAL | Age: 86
End: 2021-01-01

## 2021-01-01 VITALS
DIASTOLIC BLOOD PRESSURE: 64 MMHG | TEMPERATURE: 98.1 F | BODY MASS INDEX: 27.24 KG/M2 | WEIGHT: 169.5 LBS | SYSTOLIC BLOOD PRESSURE: 124 MMHG | RESPIRATION RATE: 18 BRPM | HEIGHT: 66 IN | OXYGEN SATURATION: 92 % | HEART RATE: 94 BPM

## 2021-01-01 VITALS
RESPIRATION RATE: 16 BRPM | TEMPERATURE: 96.7 F | WEIGHT: 170 LBS | SYSTOLIC BLOOD PRESSURE: 138 MMHG | DIASTOLIC BLOOD PRESSURE: 76 MMHG | HEIGHT: 66 IN | BODY MASS INDEX: 27.32 KG/M2 | OXYGEN SATURATION: 99 % | HEART RATE: 75 BPM

## 2021-01-01 VITALS
SYSTOLIC BLOOD PRESSURE: 124 MMHG | WEIGHT: 170 LBS | OXYGEN SATURATION: 99 % | DIASTOLIC BLOOD PRESSURE: 66 MMHG | HEIGHT: 66 IN | HEART RATE: 115 BPM | BODY MASS INDEX: 27.32 KG/M2

## 2021-01-01 VITALS
DIASTOLIC BLOOD PRESSURE: 80 MMHG | SYSTOLIC BLOOD PRESSURE: 172 MMHG | OXYGEN SATURATION: 97 % | HEART RATE: 76 BPM | TEMPERATURE: 98.2 F

## 2021-01-01 VITALS
HEIGHT: 66 IN | WEIGHT: 172.2 LBS | DIASTOLIC BLOOD PRESSURE: 60 MMHG | SYSTOLIC BLOOD PRESSURE: 130 MMHG | HEART RATE: 85 BPM | OXYGEN SATURATION: 96 % | BODY MASS INDEX: 27.68 KG/M2

## 2021-01-01 VITALS
HEART RATE: 83 BPM | BODY MASS INDEX: 28.99 KG/M2 | WEIGHT: 174 LBS | HEIGHT: 65 IN | OXYGEN SATURATION: 97 % | DIASTOLIC BLOOD PRESSURE: 68 MMHG | SYSTOLIC BLOOD PRESSURE: 112 MMHG

## 2021-01-01 VITALS
TEMPERATURE: 97.9 F | BODY MASS INDEX: 26.81 KG/M2 | OXYGEN SATURATION: 95 % | DIASTOLIC BLOOD PRESSURE: 71 MMHG | RESPIRATION RATE: 16 BRPM | HEIGHT: 66 IN | WEIGHT: 166.8 LBS | HEART RATE: 84 BPM | SYSTOLIC BLOOD PRESSURE: 153 MMHG

## 2021-01-01 VITALS
HEIGHT: 65 IN | SYSTOLIC BLOOD PRESSURE: 138 MMHG | WEIGHT: 175 LBS | OXYGEN SATURATION: 96 % | BODY MASS INDEX: 29.16 KG/M2 | DIASTOLIC BLOOD PRESSURE: 70 MMHG | HEART RATE: 72 BPM

## 2021-01-01 VITALS
DIASTOLIC BLOOD PRESSURE: 80 MMHG | RESPIRATION RATE: 22 BRPM | OXYGEN SATURATION: 98 % | TEMPERATURE: 98.1 F | SYSTOLIC BLOOD PRESSURE: 158 MMHG | HEART RATE: 84 BPM

## 2021-01-01 DIAGNOSIS — I48.0 PAROXYSMAL A-FIB (HCC): Primary | ICD-10-CM

## 2021-01-01 DIAGNOSIS — Z79.01 ON CONTINUOUS ORAL ANTICOAGULATION: ICD-10-CM

## 2021-01-01 DIAGNOSIS — K21.9 GASTROESOPHAGEAL REFLUX DISEASE WITHOUT ESOPHAGITIS: ICD-10-CM

## 2021-01-01 DIAGNOSIS — I48.91 ATRIAL FIBRILLATION, UNSPECIFIED TYPE (HCC): Primary | ICD-10-CM

## 2021-01-01 DIAGNOSIS — R79.89 ELEVATED PARATHYROID HORMONE: ICD-10-CM

## 2021-01-01 DIAGNOSIS — I10 ESSENTIAL HYPERTENSION: ICD-10-CM

## 2021-01-01 DIAGNOSIS — E78.5 HYPERLIPIDEMIA, UNSPECIFIED HYPERLIPIDEMIA TYPE: ICD-10-CM

## 2021-01-01 DIAGNOSIS — I48.91 ATRIAL FIBRILLATION WITH RVR (HCC): Primary | ICD-10-CM

## 2021-01-01 DIAGNOSIS — J96.01 ACUTE RESPIRATORY FAILURE WITH HYPOXIA (HCC): ICD-10-CM

## 2021-01-01 DIAGNOSIS — I51.7 CARDIOMEGALY: ICD-10-CM

## 2021-01-01 DIAGNOSIS — Z74.09 IMPAIRED MOBILITY: ICD-10-CM

## 2021-01-01 DIAGNOSIS — F32.A ANXIETY AND DEPRESSION: ICD-10-CM

## 2021-01-01 DIAGNOSIS — I50.42 CHRONIC COMBINED SYSTOLIC AND DIASTOLIC CHF (CONGESTIVE HEART FAILURE) (HCC): ICD-10-CM

## 2021-01-01 DIAGNOSIS — E78.2 MIXED HYPERLIPIDEMIA: ICD-10-CM

## 2021-01-01 DIAGNOSIS — I50.20 HFREF (HEART FAILURE WITH REDUCED EJECTION FRACTION) (HCC): ICD-10-CM

## 2021-01-01 DIAGNOSIS — B02.9 HERPES ZOSTER WITHOUT COMPLICATION: ICD-10-CM

## 2021-01-01 DIAGNOSIS — Z78.9 DECREASED ACTIVITIES OF DAILY LIVING (ADL): ICD-10-CM

## 2021-01-01 DIAGNOSIS — E55.9 VITAMIN D DEFICIENCY: Primary | ICD-10-CM

## 2021-01-01 DIAGNOSIS — E55.9 VITAMIN D DEFICIENCY: ICD-10-CM

## 2021-01-01 DIAGNOSIS — R60.0 LOWER EXTREMITY EDEMA: ICD-10-CM

## 2021-01-01 DIAGNOSIS — M85.89 OSTEOPENIA OF MULTIPLE SITES: ICD-10-CM

## 2021-01-01 DIAGNOSIS — F41.9 ANXIETY AND DEPRESSION: ICD-10-CM

## 2021-01-01 DIAGNOSIS — R60.9 PERIPHERAL EDEMA: ICD-10-CM

## 2021-01-01 DIAGNOSIS — H40.10X0 OPEN-ANGLE GLAUCOMA, UNSPECIFIED GLAUCOMA STAGE, UNSPECIFIED LATERALITY, UNSPECIFIED OPEN-ANGLE GLAUCOMA TYPE: ICD-10-CM

## 2021-01-01 DIAGNOSIS — Z78.9 POOR HISTORIAN: ICD-10-CM

## 2021-01-01 DIAGNOSIS — Z00.00 ROUTINE GENERAL MEDICAL EXAMINATION AT A HEALTH CARE FACILITY: ICD-10-CM

## 2021-01-01 DIAGNOSIS — E03.9 HYPOTHYROIDISM, UNSPECIFIED TYPE: ICD-10-CM

## 2021-01-01 DIAGNOSIS — E21.3 HYPERPARATHYROIDISM (HCC): ICD-10-CM

## 2021-01-01 LAB
ALBUMIN SERPL-MCNC: 3.3 G/DL (ref 3.5–5.2)
ALBUMIN SERPL-MCNC: 3.3 G/DL (ref 3.5–5.2)
ALBUMIN SERPL-MCNC: 3.5 G/DL (ref 3.5–5.2)
ALBUMIN SERPL-MCNC: 3.5 G/DL (ref 3.5–5.2)
ALBUMIN SERPL-MCNC: 3.6 G/DL (ref 3.5–5.2)
ALBUMIN SERPL-MCNC: 3.6 G/DL (ref 3.5–5.2)
ALBUMIN SERPL-MCNC: 3.7 G/DL (ref 3.5–5.2)
ALBUMIN SERPL-MCNC: 3.8 G/DL (ref 3.5–5.2)
ALBUMIN SERPL-MCNC: 3.9 G/DL (ref 3.5–5.2)
ALBUMIN SERPL-MCNC: 4 G/DL (ref 3.5–5.2)
ALBUMIN/GLOB SERPL: 1.1 G/DL
ALBUMIN/GLOB SERPL: 1.1 G/DL
ALBUMIN/GLOB SERPL: 1.2 G/DL
ALP BLD-CCNC: 110 U/L (ref 35–104)
ALP BLD-CCNC: 144 U/L (ref 35–104)
ALP BLD-CCNC: 149 U/L (ref 35–104)
ALP BLD-CCNC: 149 U/L (ref 35–104)
ALP BLD-CCNC: 150 U/L (ref 35–104)
ALP BLD-CCNC: 171 U/L (ref 35–104)
ALP BLD-CCNC: 172 U/L (ref 35–104)
ALP SERPL-CCNC: 110 U/L (ref 39–117)
ALP SERPL-CCNC: 113 U/L (ref 39–117)
ALP SERPL-CCNC: 125 U/L (ref 39–117)
ALT SERPL W P-5'-P-CCNC: 14 U/L (ref 1–33)
ALT SERPL W P-5'-P-CCNC: 15 U/L (ref 1–33)
ALT SERPL W P-5'-P-CCNC: 17 U/L (ref 1–33)
ALT SERPL-CCNC: 15 U/L (ref 5–33)
ALT SERPL-CCNC: 221 U/L (ref 5–33)
ALT SERPL-CCNC: 288 U/L (ref 5–33)
ALT SERPL-CCNC: 381 U/L (ref 5–33)
ALT SERPL-CCNC: 486 U/L (ref 5–33)
ALT SERPL-CCNC: 834 U/L (ref 5–33)
ALT SERPL-CCNC: 868 U/L (ref 5–33)
ANION GAP SERPL CALCULATED.3IONS-SCNC: 10 MMOL/L (ref 5–15)
ANION GAP SERPL CALCULATED.3IONS-SCNC: 10 MMOL/L (ref 7–19)
ANION GAP SERPL CALCULATED.3IONS-SCNC: 10 MMOL/L (ref 7–19)
ANION GAP SERPL CALCULATED.3IONS-SCNC: 11 MMOL/L (ref 5–15)
ANION GAP SERPL CALCULATED.3IONS-SCNC: 11 MMOL/L (ref 5–15)
ANION GAP SERPL CALCULATED.3IONS-SCNC: 11 MMOL/L (ref 7–19)
ANION GAP SERPL CALCULATED.3IONS-SCNC: 12 MMOL/L (ref 5–15)
ANION GAP SERPL CALCULATED.3IONS-SCNC: 14 MMOL/L (ref 7–19)
ANION GAP SERPL CALCULATED.3IONS-SCNC: 15 MMOL/L (ref 7–19)
ANION GAP SERPL CALCULATED.3IONS-SCNC: 16 MMOL/L (ref 7–19)
ANION GAP SERPL CALCULATED.3IONS-SCNC: 17 MMOL/L (ref 7–19)
ANION GAP SERPL CALCULATED.3IONS-SCNC: 8 MMOL/L (ref 5–15)
ANION GAP SERPL CALCULATED.3IONS-SCNC: 8 MMOL/L (ref 5–15)
ANION GAP SERPL CALCULATED.3IONS-SCNC: 9 MMOL/L (ref 7–19)
ANISOCYTOSIS BLD QL: ABNORMAL
ANISOCYTOSIS: ABNORMAL
APTT: 37.1 SEC (ref 26–36.2)
AST SERPL-CCNC: 128 U/L (ref 5–32)
AST SERPL-CCNC: 23 U/L (ref 1–32)
AST SERPL-CCNC: 230 U/L (ref 5–32)
AST SERPL-CCNC: 26 U/L (ref 1–32)
AST SERPL-CCNC: 27 U/L (ref 1–32)
AST SERPL-CCNC: 29 U/L (ref 5–32)
AST SERPL-CCNC: 59 U/L (ref 5–32)
AST SERPL-CCNC: 702 U/L (ref 5–32)
AST SERPL-CCNC: 76 U/L (ref 5–32)
AST SERPL-CCNC: 794 U/L (ref 5–32)
BASE EXCESS ARTERIAL: 7.5 MMOL/L (ref -2–2)
BASOPHILIC STIPPLING: ABNORMAL
BASOPHILS # BLD AUTO: 0.06 10*3/MM3 (ref 0–0.2)
BASOPHILS # BLD AUTO: 0.06 10*3/MM3 (ref 0–0.2)
BASOPHILS ABSOLUTE: 0.1 K/UL (ref 0–0.2)
BASOPHILS NFR BLD AUTO: 0.8 % (ref 0–1.5)
BASOPHILS NFR BLD AUTO: 0.8 % (ref 0–1.5)
BASOPHILS RELATIVE PERCENT: 0.5 % (ref 0–1)
BASOPHILS RELATIVE PERCENT: 0.7 % (ref 0–1)
BASOPHILS RELATIVE PERCENT: 0.9 % (ref 0–1)
BASOPHILS RELATIVE PERCENT: 1 % (ref 0–1)
BASOPHILS RELATIVE PERCENT: 1.2 % (ref 0–1)
BH CV ECHO MEAS - AO MAX PG (FULL): 1.1 MMHG
BH CV ECHO MEAS - AO MAX PG: 3.4 MMHG
BH CV ECHO MEAS - AO MEAN PG (FULL): 1 MMHG
BH CV ECHO MEAS - AO MEAN PG: 2 MMHG
BH CV ECHO MEAS - AO ROOT AREA (BSA CORRECTED): 1.6
BH CV ECHO MEAS - AO ROOT AREA: 6.6 CM^2
BH CV ECHO MEAS - AO ROOT DIAM: 2.9 CM
BH CV ECHO MEAS - AO V2 MAX: 92.7 CM/SEC
BH CV ECHO MEAS - AO V2 MEAN: 69.3 CM/SEC
BH CV ECHO MEAS - AO V2 VTI: 20.6 CM
BH CV ECHO MEAS - AVA(I,A): 2.6 CM^2
BH CV ECHO MEAS - AVA(I,D): 2.6 CM^2
BH CV ECHO MEAS - AVA(V,A): 2.6 CM^2
BH CV ECHO MEAS - AVA(V,D): 2.6 CM^2
BH CV ECHO MEAS - BSA(HAYCOCK): 1.9 M^2
BH CV ECHO MEAS - BSA: 1.9 M^2
BH CV ECHO MEAS - BZI_BMI: 27.4 KILOGRAMS/M^2
BH CV ECHO MEAS - BZI_METRIC_HEIGHT: 167.6 CM
BH CV ECHO MEAS - BZI_METRIC_WEIGHT: 77.1 KG
BH CV ECHO MEAS - EDV(CUBED): 52.7 ML
BH CV ECHO MEAS - EDV(MOD-SP4): 62.4 ML
BH CV ECHO MEAS - EDV(TEICH): 60 ML
BH CV ECHO MEAS - EF(CUBED): 29.3 %
BH CV ECHO MEAS - EF(MOD-SP4): 64.3 %
BH CV ECHO MEAS - EF(TEICH): 24.3 %
BH CV ECHO MEAS - ESV(CUBED): 37.3 ML
BH CV ECHO MEAS - ESV(MOD-SP4): 22.3 ML
BH CV ECHO MEAS - ESV(TEICH): 45.4 ML
BH CV ECHO MEAS - FS: 10.9 %
BH CV ECHO MEAS - IVS/LVPW: 1.5
BH CV ECHO MEAS - IVSD: 0.85 CM
BH CV ECHO MEAS - LA DIMENSION: 3.2 CM
BH CV ECHO MEAS - LA/AO: 1.1
BH CV ECHO MEAS - LAT PEAK E' VEL: 5.1 CM/SEC
BH CV ECHO MEAS - LV DIASTOLIC VOL/BSA (35-75): 33.4 ML/M^2
BH CV ECHO MEAS - LV MASS(C)D: 72.5 GRAMS
BH CV ECHO MEAS - LV MASS(C)DI: 38.8 GRAMS/M^2
BH CV ECHO MEAS - LV MAX PG: 2.4 MMHG
BH CV ECHO MEAS - LV MEAN PG: 1 MMHG
BH CV ECHO MEAS - LV SYSTOLIC VOL/BSA (12-30): 11.9 ML/M^2
BH CV ECHO MEAS - LV V1 MAX: 76.9 CM/SEC
BH CV ECHO MEAS - LV V1 MEAN: 52 CM/SEC
BH CV ECHO MEAS - LV V1 VTI: 16.9 CM
BH CV ECHO MEAS - LVIDD: 3.8 CM
BH CV ECHO MEAS - LVIDS: 3.3 CM
BH CV ECHO MEAS - LVLD AP4: 6.5 CM
BH CV ECHO MEAS - LVLS AP4: 4.9 CM
BH CV ECHO MEAS - LVOT AREA (M): 3.1 CM^2
BH CV ECHO MEAS - LVOT AREA: 3.1 CM^2
BH CV ECHO MEAS - LVOT DIAM: 2 CM
BH CV ECHO MEAS - LVPWD: 0.58 CM
BH CV ECHO MEAS - MED PEAK E' VEL: 4.46 CM/SEC
BH CV ECHO MEAS - MV A MAX VEL: 75.2 CM/SEC
BH CV ECHO MEAS - MV DEC TIME: 0.26 SEC
BH CV ECHO MEAS - MV E MAX VEL: 69.8 CM/SEC
BH CV ECHO MEAS - MV E/A: 0.93
BH CV ECHO MEAS - RAP SYSTOLE: 5 MMHG
BH CV ECHO MEAS - RVSP: 39.6 MMHG
BH CV ECHO MEAS - SI(AO): 72.9 ML/M^2
BH CV ECHO MEAS - SI(CUBED): 8.3 ML/M^2
BH CV ECHO MEAS - SI(LVOT): 28.4 ML/M^2
BH CV ECHO MEAS - SI(MOD-SP4): 21.5 ML/M^2
BH CV ECHO MEAS - SI(TEICH): 7.8 ML/M^2
BH CV ECHO MEAS - SV(AO): 136.1 ML
BH CV ECHO MEAS - SV(CUBED): 15.5 ML
BH CV ECHO MEAS - SV(LVOT): 53.1 ML
BH CV ECHO MEAS - SV(MOD-SP4): 40.1 ML
BH CV ECHO MEAS - SV(TEICH): 14.6 ML
BH CV ECHO MEAS - TR MAX VEL: 294 CM/SEC
BH CV ECHO MEASUREMENTS AVERAGE E/E' RATIO: 14.6
BILIRUB SERPL-MCNC: 0.5 MG/DL (ref 0–1.2)
BILIRUB SERPL-MCNC: 0.5 MG/DL (ref 0–1.2)
BILIRUB SERPL-MCNC: 0.7 MG/DL (ref 0–1.2)
BILIRUB SERPL-MCNC: 0.9 MG/DL (ref 0.2–1.2)
BILIRUB SERPL-MCNC: 1.2 MG/DL (ref 0.2–1.2)
BILIRUB SERPL-MCNC: 1.3 MG/DL (ref 0.2–1.2)
BILIRUB SERPL-MCNC: 1.3 MG/DL (ref 0.2–1.2)
BILIRUB SERPL-MCNC: 1.4 MG/DL (ref 0.2–1.2)
BILIRUB SERPL-MCNC: 1.6 MG/DL (ref 0.2–1.2)
BILIRUB SERPL-MCNC: 1.7 MG/DL (ref 0.2–1.2)
BUN BLDV-MCNC: 10 MG/DL (ref 8–23)
BUN BLDV-MCNC: 22 MG/DL (ref 8–23)
BUN BLDV-MCNC: 24 MG/DL (ref 8–23)
BUN BLDV-MCNC: 26 MG/DL (ref 8–23)
BUN BLDV-MCNC: 27 MG/DL (ref 8–23)
BUN BLDV-MCNC: 27 MG/DL (ref 8–23)
BUN BLDV-MCNC: 28 MG/DL (ref 8–23)
BUN BLDV-MCNC: 33 MG/DL (ref 8–23)
BUN SERPL-MCNC: 11 MG/DL (ref 8–23)
BUN SERPL-MCNC: 11 MG/DL (ref 8–23)
BUN SERPL-MCNC: 15 MG/DL (ref 8–23)
BUN SERPL-MCNC: 15 MG/DL (ref 8–23)
BUN SERPL-MCNC: 17 MG/DL (ref 8–23)
BUN SERPL-MCNC: 20 MG/DL (ref 8–23)
BUN/CREAT SERPL: 13.8 (ref 7–25)
BUN/CREAT SERPL: 15.7 (ref 7–25)
BUN/CREAT SERPL: 17.4 (ref 7–25)
BUN/CREAT SERPL: 19.2 (ref 7–25)
BUN/CREAT SERPL: 19.8 (ref 7–25)
BUN/CREAT SERPL: 21.7 (ref 7–25)
CALCIUM SERPL-MCNC: 8.5 MG/DL (ref 8.8–10.2)
CALCIUM SERPL-MCNC: 9 MG/DL (ref 8.8–10.2)
CALCIUM SERPL-MCNC: 9.1 MG/DL (ref 8.8–10.2)
CALCIUM SERPL-MCNC: 9.1 MG/DL (ref 8.8–10.2)
CALCIUM SERPL-MCNC: 9.3 MG/DL (ref 8.8–10.2)
CALCIUM SERPL-MCNC: 9.4 MG/DL (ref 8.8–10.2)
CALCIUM SERPL-MCNC: 9.4 MG/DL (ref 8.8–10.2)
CALCIUM SERPL-MCNC: 9.5 MG/DL (ref 8.8–10.2)
CALCIUM SPEC-SCNC: 8.8 MG/DL (ref 8.6–10.5)
CALCIUM SPEC-SCNC: 9.1 MG/DL (ref 8.6–10.5)
CALCIUM SPEC-SCNC: 9.2 MG/DL (ref 8.6–10.5)
CALCIUM SPEC-SCNC: 9.3 MG/DL (ref 8.6–10.5)
CALCIUM SPEC-SCNC: 9.3 MG/DL (ref 8.6–10.5)
CALCIUM SPEC-SCNC: 9.5 MG/DL (ref 8.6–10.5)
CARBOXYHEMOGLOBIN ARTERIAL: 2.7 % (ref 0–5)
CHLORIDE BLD-SCNC: 102 MMOL/L (ref 98–111)
CHLORIDE BLD-SCNC: 104 MMOL/L (ref 98–111)
CHLORIDE BLD-SCNC: 88 MMOL/L (ref 98–111)
CHLORIDE BLD-SCNC: 89 MMOL/L (ref 98–111)
CHLORIDE BLD-SCNC: 90 MMOL/L (ref 98–111)
CHLORIDE BLD-SCNC: 94 MMOL/L (ref 98–111)
CHLORIDE BLD-SCNC: 95 MMOL/L (ref 98–111)
CHLORIDE BLD-SCNC: 97 MMOL/L (ref 98–111)
CHLORIDE SERPL-SCNC: 100 MMOL/L (ref 98–107)
CHLORIDE SERPL-SCNC: 101 MMOL/L (ref 98–107)
CHLORIDE SERPL-SCNC: 96 MMOL/L (ref 98–107)
CHLORIDE SERPL-SCNC: 98 MMOL/L (ref 98–107)
CHLORIDE SERPL-SCNC: 99 MMOL/L (ref 98–107)
CHLORIDE SERPL-SCNC: 99 MMOL/L (ref 98–107)
CHOLESTEROL, TOTAL: 135 MG/DL (ref 160–199)
CLUMPED PLATELETS: PRESENT
CO2 SERPL-SCNC: 27 MMOL/L (ref 22–29)
CO2 SERPL-SCNC: 28 MMOL/L (ref 22–29)
CO2 SERPL-SCNC: 30 MMOL/L (ref 22–29)
CO2 SERPL-SCNC: 30 MMOL/L (ref 22–29)
CO2 SERPL-SCNC: 31 MMOL/L (ref 22–29)
CO2 SERPL-SCNC: 31 MMOL/L (ref 22–29)
CO2: 23 MMOL/L (ref 22–29)
CO2: 24 MMOL/L (ref 22–29)
CO2: 29 MMOL/L (ref 22–29)
CO2: 33 MMOL/L (ref 22–29)
CO2: 38 MMOL/L (ref 22–29)
CO2: 39 MMOL/L (ref 22–29)
CO2: 40 MMOL/L (ref 22–29)
CO2: 42 MMOL/L (ref 22–29)
CREAT SERPL-MCNC: 0.7 MG/DL (ref 0.57–1)
CREAT SERPL-MCNC: 0.78 MG/DL (ref 0.57–1)
CREAT SERPL-MCNC: 0.8 MG/DL (ref 0.57–1)
CREAT SERPL-MCNC: 0.8 MG/DL (ref 0.5–0.9)
CREAT SERPL-MCNC: 0.86 MG/DL (ref 0.57–1)
CREAT SERPL-MCNC: 0.86 MG/DL (ref 0.57–1)
CREAT SERPL-MCNC: 0.9 MG/DL (ref 0.5–0.9)
CREAT SERPL-MCNC: 0.92 MG/DL (ref 0.57–1)
CREAT SERPL-MCNC: 1.1 MG/DL (ref 0.5–0.9)
D DIMER PPP FEU-MCNC: 0.51 MG/L (FEU) (ref 0–0.5)
D-LACTATE SERPL-SCNC: 1.6 MMOL/L (ref 0.5–2)
D-LACTATE SERPL-SCNC: 2.7 MMOL/L (ref 0.5–2)
DEPRECATED RDW RBC AUTO: 46.5 FL (ref 37–54)
DEPRECATED RDW RBC AUTO: 48.8 FL (ref 37–54)
DEPRECATED RDW RBC AUTO: 50.5 FL (ref 37–54)
DEPRECATED RDW RBC AUTO: 52.5 FL (ref 37–54)
EKG P AXIS: 110 DEGREES
EKG P AXIS: 45 DEGREES
EKG P AXIS: NORMAL DEGREES
EKG P-R INTERVAL: 190 MS
EKG P-R INTERVAL: 198 MS
EKG P-R INTERVAL: NORMAL MS
EKG Q-T INTERVAL: 316 MS
EKG Q-T INTERVAL: 320 MS
EKG Q-T INTERVAL: 332 MS
EKG Q-T INTERVAL: 386 MS
EKG Q-T INTERVAL: 476 MS
EKG QRS DURATION: 110 MS
EKG QRS DURATION: 72 MS
EKG QRS DURATION: 74 MS
EKG QRS DURATION: 76 MS
EKG QRS DURATION: 84 MS
EKG QTC CALCULATION (BAZETT): 428 MS
EKG QTC CALCULATION (BAZETT): 431 MS
EKG QTC CALCULATION (BAZETT): 439 MS
EKG QTC CALCULATION (BAZETT): 461 MS
EKG QTC CALCULATION (BAZETT): 483 MS
EKG T AXIS: 101 DEGREES
EKG T AXIS: 116 DEGREES
EKG T AXIS: 178 DEGREES
EKG T AXIS: 35 DEGREES
EKG T AXIS: 85 DEGREES
EOSINOPHIL # BLD AUTO: 0.08 10*3/MM3 (ref 0–0.4)
EOSINOPHIL # BLD AUTO: 0.09 10*3/MM3 (ref 0–0.4)
EOSINOPHIL # BLD MANUAL: 0.08 10*3/MM3 (ref 0–0.4)
EOSINOPHIL NFR BLD AUTO: 1 % (ref 0.3–6.2)
EOSINOPHIL NFR BLD AUTO: 1.2 % (ref 0.3–6.2)
EOSINOPHIL NFR BLD MANUAL: 1 % (ref 0.3–6.2)
EOSINOPHILS ABSOLUTE: 0.1 K/UL (ref 0–0.6)
EOSINOPHILS ABSOLUTE: 0.1 K/UL (ref 0–0.6)
EOSINOPHILS ABSOLUTE: 0.2 K/UL (ref 0–0.6)
EOSINOPHILS ABSOLUTE: 0.2 K/UL (ref 0–0.6)
EOSINOPHILS ABSOLUTE: 0.3 K/UL (ref 0–0.6)
EOSINOPHILS ABSOLUTE: 0.4 K/UL (ref 0–0.6)
EOSINOPHILS ABSOLUTE: 0.5 K/UL (ref 0–0.6)
EOSINOPHILS RELATIVE PERCENT: 1 % (ref 0–5)
EOSINOPHILS RELATIVE PERCENT: 1.7 % (ref 0–5)
EOSINOPHILS RELATIVE PERCENT: 1.7 % (ref 0–5)
EOSINOPHILS RELATIVE PERCENT: 2.1 % (ref 0–5)
EOSINOPHILS RELATIVE PERCENT: 3 % (ref 0–5)
EOSINOPHILS RELATIVE PERCENT: 4.8 % (ref 0–5)
EOSINOPHILS RELATIVE PERCENT: 5.6 % (ref 0–5)
ERYTHROCYTE [DISTWIDTH] IN BLOOD BY AUTOMATED COUNT: 14.6 % (ref 12.3–15.4)
ERYTHROCYTE [DISTWIDTH] IN BLOOD BY AUTOMATED COUNT: 14.9 % (ref 12.3–15.4)
ERYTHROCYTE [DISTWIDTH] IN BLOOD BY AUTOMATED COUNT: 15.7 % (ref 12.3–15.4)
ERYTHROCYTE [DISTWIDTH] IN BLOOD BY AUTOMATED COUNT: 15.9 % (ref 12.3–15.4)
GFR AFRICAN AMERICAN: 57
GFR AFRICAN AMERICAN: >59
GFR NON-AFRICAN AMERICAN: 47
GFR NON-AFRICAN AMERICAN: 60
GFR NON-AFRICAN AMERICAN: >60
GFR SERPL CREATININE-BSD FRML MDRD: 58 ML/MIN/1.73
GFR SERPL CREATININE-BSD FRML MDRD: 63 ML/MIN/1.73
GFR SERPL CREATININE-BSD FRML MDRD: 63 ML/MIN/1.73
GFR SERPL CREATININE-BSD FRML MDRD: 68 ML/MIN/1.73
GFR SERPL CREATININE-BSD FRML MDRD: 70 ML/MIN/1.73
GFR SERPL CREATININE-BSD FRML MDRD: 80 ML/MIN/1.73
GLOBULIN UR ELPH-MCNC: 3.3 GM/DL
GLOBULIN UR ELPH-MCNC: 3.3 GM/DL
GLOBULIN UR ELPH-MCNC: 3.5 GM/DL
GLUCOSE BLD-MCNC: 111 MG/DL (ref 74–109)
GLUCOSE BLD-MCNC: 113 MG/DL (ref 74–109)
GLUCOSE BLD-MCNC: 113 MG/DL (ref 74–109)
GLUCOSE BLD-MCNC: 116 MG/DL (ref 74–109)
GLUCOSE BLD-MCNC: 120 MG/DL (ref 74–109)
GLUCOSE BLD-MCNC: 127 MG/DL (ref 74–109)
GLUCOSE BLD-MCNC: 128 MG/DL (ref 74–109)
GLUCOSE BLD-MCNC: 98 MG/DL (ref 74–109)
GLUCOSE SERPL-MCNC: 110 MG/DL (ref 65–99)
GLUCOSE SERPL-MCNC: 111 MG/DL (ref 65–99)
GLUCOSE SERPL-MCNC: 111 MG/DL (ref 65–99)
GLUCOSE SERPL-MCNC: 116 MG/DL (ref 65–99)
GLUCOSE SERPL-MCNC: 117 MG/DL (ref 65–99)
GLUCOSE SERPL-MCNC: 136 MG/DL (ref 65–99)
HAV IGM SER IA-ACNC: NORMAL
HCO3 ARTERIAL: 34.8 MMOL/L (ref 22–26)
HCT VFR BLD AUTO: 33 % (ref 34–46.6)
HCT VFR BLD AUTO: 34.5 % (ref 34–46.6)
HCT VFR BLD AUTO: 37.9 % (ref 34–46.6)
HCT VFR BLD AUTO: 38.5 % (ref 34–46.6)
HCT VFR BLD CALC: 32.9 % (ref 37–47)
HCT VFR BLD CALC: 33 % (ref 37–47)
HCT VFR BLD CALC: 33.1 % (ref 37–47)
HCT VFR BLD CALC: 33.6 % (ref 37–47)
HCT VFR BLD CALC: 35.4 % (ref 37–47)
HCT VFR BLD CALC: 35.8 % (ref 37–47)
HCT VFR BLD CALC: 38.3 % (ref 37–47)
HDLC SERPL-MCNC: 62 MG/DL (ref 65–121)
HEMOGLOBIN, ART, EXTENDED: 9.1 G/DL (ref 12–16)
HEMOGLOBIN: 10.1 G/DL (ref 12–16)
HEMOGLOBIN: 10.3 G/DL (ref 12–16)
HEMOGLOBIN: 12.2 G/DL (ref 12–16)
HEMOGLOBIN: 9.2 G/DL (ref 12–16)
HEMOGLOBIN: 9.3 G/DL (ref 12–16)
HEMOGLOBIN: 9.3 G/DL (ref 12–16)
HEMOGLOBIN: 9.6 G/DL (ref 12–16)
HEPATITIS B CORE IGM ANTIBODY: NORMAL
HEPATITIS B SURFACE ANTIGEN INTERPRETATION: NORMAL
HEPATITIS C ANTIBODY INTERPRETATION: NORMAL
HGB BLD-MCNC: 10.6 G/DL (ref 12–15.9)
HGB BLD-MCNC: 10.7 G/DL (ref 12–15.9)
HGB BLD-MCNC: 11.6 G/DL (ref 12–15.9)
HGB BLD-MCNC: 12.4 G/DL (ref 12–15.9)
HOLD SPECIMEN: NORMAL
HYPOCHROMIA: ABNORMAL
IMM GRANULOCYTES # BLD AUTO: 0.02 10*3/MM3 (ref 0–0.05)
IMM GRANULOCYTES NFR BLD AUTO: 0.3 % (ref 0–0.5)
IMMATURE GRANULOCYTES #: 0 K/UL
IMMATURE GRANULOCYTES #: 0.1 K/UL
IMMATURE GRANULOCYTES #: 0.1 K/UL
INR BLD: 2.82 (ref 0.88–1.18)
INR PPP: 1.25 (ref 0.91–1.09)
LDL CHOLESTEROL CALCULATED: 56 MG/DL
LEFT ATRIUM VOLUME INDEX: 24.9 ML/M2
LEFT ATRIUM VOLUME: 46.6 CM3
LV EF: 58 %
LVEF MODALITY: NORMAL
LYMPHOCYTES # BLD AUTO: 1.46 10*3/MM3 (ref 0.7–3.1)
LYMPHOCYTES # BLD AUTO: 2.13 10*3/MM3 (ref 0.7–3.1)
LYMPHOCYTES # BLD MANUAL: 1.82 10*3/MM3 (ref 0.7–3.1)
LYMPHOCYTES ABSOLUTE: 1 K/UL (ref 1.1–4.5)
LYMPHOCYTES ABSOLUTE: 1.2 K/UL (ref 1.1–4.5)
LYMPHOCYTES ABSOLUTE: 1.4 K/UL (ref 1.1–4.5)
LYMPHOCYTES ABSOLUTE: 1.5 K/UL (ref 1.1–4.5)
LYMPHOCYTES ABSOLUTE: 1.9 K/UL (ref 1.1–4.5)
LYMPHOCYTES NFR BLD AUTO: 18.6 % (ref 19.6–45.3)
LYMPHOCYTES NFR BLD AUTO: 27.4 % (ref 19.6–45.3)
LYMPHOCYTES NFR BLD MANUAL: 11 % (ref 5–12)
LYMPHOCYTES NFR BLD MANUAL: 20 % (ref 19.6–45.3)
LYMPHOCYTES RELATIVE PERCENT: 10.4 % (ref 20–40)
LYMPHOCYTES RELATIVE PERCENT: 12.1 % (ref 20–40)
LYMPHOCYTES RELATIVE PERCENT: 12.9 % (ref 20–40)
LYMPHOCYTES RELATIVE PERCENT: 15 % (ref 20–40)
LYMPHOCYTES RELATIVE PERCENT: 18.7 % (ref 20–40)
LYMPHOCYTES RELATIVE PERCENT: 18.8 % (ref 20–40)
LYMPHOCYTES RELATIVE PERCENT: 22.3 % (ref 20–40)
MAGNESIUM: 1.9 MG/DL (ref 1.6–2.4)
MCH RBC QN AUTO: 26.6 PG (ref 27–31)
MCH RBC QN AUTO: 26.8 PG (ref 27–31)
MCH RBC QN AUTO: 26.8 PG (ref 27–31)
MCH RBC QN AUTO: 26.9 PG (ref 27–31)
MCH RBC QN AUTO: 27.1 PG (ref 27–31)
MCH RBC QN AUTO: 27.1 PG (ref 27–31)
MCH RBC QN AUTO: 27.8 PG (ref 26.6–33)
MCH RBC QN AUTO: 28.5 PG (ref 26.6–33)
MCH RBC QN AUTO: 28.7 PG (ref 26.6–33)
MCH RBC QN AUTO: 29.9 PG (ref 26.6–33)
MCH RBC QN AUTO: 30.3 PG (ref 27–31)
MCHC RBC AUTO-ENTMCNC: 27.7 G/DL (ref 33–37)
MCHC RBC AUTO-ENTMCNC: 28 G/DL (ref 33–37)
MCHC RBC AUTO-ENTMCNC: 28.2 G/DL (ref 33–37)
MCHC RBC AUTO-ENTMCNC: 28.5 G/DL (ref 33–37)
MCHC RBC AUTO-ENTMCNC: 28.8 G/DL (ref 33–37)
MCHC RBC AUTO-ENTMCNC: 29 G/DL (ref 33–37)
MCHC RBC AUTO-ENTMCNC: 30.6 G/DL (ref 31.5–35.7)
MCHC RBC AUTO-ENTMCNC: 31 G/DL (ref 31.5–35.7)
MCHC RBC AUTO-ENTMCNC: 31.9 G/DL (ref 33–37)
MCHC RBC AUTO-ENTMCNC: 32.1 G/DL (ref 31.5–35.7)
MCHC RBC AUTO-ENTMCNC: 32.2 G/DL (ref 31.5–35.7)
MCV RBC AUTO: 89.1 FL (ref 79–97)
MCV RBC AUTO: 90.9 FL (ref 79–97)
MCV RBC AUTO: 91.8 FL (ref 79–97)
MCV RBC AUTO: 93 FL (ref 79–97)
MCV RBC AUTO: 93.2 FL (ref 81–99)
MCV RBC AUTO: 93.5 FL (ref 81–99)
MCV RBC AUTO: 94.1 FL (ref 81–99)
MCV RBC AUTO: 94.6 FL (ref 81–99)
MCV RBC AUTO: 95.3 FL (ref 81–99)
MCV RBC AUTO: 96.8 FL (ref 81–99)
MCV RBC AUTO: 97.1 FL (ref 81–99)
METHEMOGLOBIN ARTERIAL: 0.5 %
MONOCYTES # BLD AUTO: 0.82 10*3/MM3 (ref 0.1–0.9)
MONOCYTES # BLD AUTO: 0.83 10*3/MM3 (ref 0.1–0.9)
MONOCYTES # BLD AUTO: 0.96 10*3/MM3 (ref 0.1–0.9)
MONOCYTES ABSOLUTE: 0.5 K/UL (ref 0–0.9)
MONOCYTES ABSOLUTE: 0.8 K/UL (ref 0–0.9)
MONOCYTES ABSOLUTE: 0.9 K/UL (ref 0–0.9)
MONOCYTES ABSOLUTE: 1 K/UL (ref 0–0.9)
MONOCYTES ABSOLUTE: 1.3 K/UL (ref 0–0.9)
MONOCYTES NFR BLD AUTO: 10.5 % (ref 5–12)
MONOCYTES NFR BLD AUTO: 12.3 % (ref 5–12)
MONOCYTES RELATIVE PERCENT: 10 % (ref 0–10)
MONOCYTES RELATIVE PERCENT: 10.2 % (ref 0–10)
MONOCYTES RELATIVE PERCENT: 10.2 % (ref 0–10)
MONOCYTES RELATIVE PERCENT: 10.3 % (ref 0–10)
MONOCYTES RELATIVE PERCENT: 7.6 % (ref 0–10)
MONOCYTES RELATIVE PERCENT: 9.6 % (ref 0–10)
MONOCYTES RELATIVE PERCENT: 9.7 % (ref 0–10)
NEUTROPHILS # BLD AUTO: 4.78 10*3/MM3 (ref 1.7–7)
NEUTROPHILS ABSOLUTE: 4.4 K/UL (ref 1.5–7.5)
NEUTROPHILS ABSOLUTE: 5.5 K/UL (ref 1.5–7.5)
NEUTROPHILS ABSOLUTE: 6.3 K/UL (ref 1.5–7.5)
NEUTROPHILS ABSOLUTE: 6.3 K/UL (ref 1.5–7.5)
NEUTROPHILS ABSOLUTE: 6.8 K/UL (ref 1.5–7.5)
NEUTROPHILS ABSOLUTE: 7.1 K/UL (ref 1.5–7.5)
NEUTROPHILS ABSOLUTE: 9.3 K/UL (ref 1.5–7.5)
NEUTROPHILS NFR BLD AUTO: 4.51 10*3/MM3 (ref 1.7–7)
NEUTROPHILS NFR BLD AUTO: 5.4 10*3/MM3 (ref 1.7–7)
NEUTROPHILS NFR BLD AUTO: 57.9 % (ref 42.7–76)
NEUTROPHILS NFR BLD AUTO: 68.8 % (ref 42.7–76)
NEUTROPHILS NFR BLD MANUAL: 61 % (ref 42.7–76)
NEUTROPHILS RELATIVE PERCENT: 67.3 % (ref 50–65)
NEUTROPHILS RELATIVE PERCENT: 68 % (ref 50–65)
NEUTROPHILS RELATIVE PERCENT: 68.4 % (ref 50–65)
NEUTROPHILS RELATIVE PERCENT: 68.5 % (ref 50–65)
NEUTROPHILS RELATIVE PERCENT: 70.8 % (ref 50–65)
NEUTROPHILS RELATIVE PERCENT: 74.7 % (ref 50–65)
NEUTROPHILS RELATIVE PERCENT: 75.7 % (ref 50–65)
NEUTS BAND NFR BLD MANUAL: 2 % (ref 0–5)
NRBC BLD AUTO-RTO: 0 /100 WBC (ref 0–0.2)
NT-PROBNP SERPL-MCNC: 7061 PG/ML (ref 0–1800)
NT-PROBNP SERPL-MCNC: ABNORMAL PG/ML (ref 0–1800)
O2 CONTENT ARTERIAL: 12.8 ML/DL
O2 SAT, ARTERIAL: 97.2 %
O2 THERAPY: ABNORMAL
OVALOCYTES: ABNORMAL
PARATHYROID HORMONE INTACT: 91.1 PG/ML (ref 15–65)
PCO2 ARTERIAL: 66 MMHG (ref 35–45)
PDW BLD-RTO: 13.8 % (ref 11.5–14.5)
PDW BLD-RTO: 16.5 % (ref 11.5–14.5)
PDW BLD-RTO: 16.6 % (ref 11.5–14.5)
PDW BLD-RTO: 16.7 % (ref 11.5–14.5)
PDW BLD-RTO: 17 % (ref 11.5–14.5)
PH ARTERIAL: 7.33 (ref 7.35–7.45)
PHOSPHORUS: 2.9 MG/DL (ref 2.5–4.5)
PLASMA CELL PREC NFR BLD MANUAL: 1 % (ref 0–0)
PLATELET # BLD AUTO: 118 10*3/MM3 (ref 140–450)
PLATELET # BLD AUTO: 129 10*3/MM3 (ref 140–450)
PLATELET # BLD AUTO: 145 10*3/MM3 (ref 140–450)
PLATELET # BLD AUTO: 170 10*3/MM3 (ref 140–450)
PLATELET # BLD: 100 K/UL (ref 130–400)
PLATELET # BLD: 102 K/UL (ref 130–400)
PLATELET # BLD: 111 K/UL (ref 130–400)
PLATELET # BLD: 85 K/UL (ref 130–400)
PLATELET # BLD: 89 K/UL (ref 130–400)
PLATELET # BLD: 95 K/UL (ref 130–400)
PLATELET # BLD: 98 K/UL (ref 130–400)
PLATELET SLIDE REVIEW: ABNORMAL
PMV BLD AUTO: 12.8 FL (ref 9.4–12.3)
PMV BLD AUTO: 13 FL (ref 9.4–12.3)
PMV BLD AUTO: 13.2 FL (ref 9.4–12.3)
PMV BLD AUTO: 13.3 FL (ref 6–12)
PMV BLD AUTO: 13.5 FL (ref 6–12)
PMV BLD AUTO: 13.7 FL (ref 6–12)
PMV BLD AUTO: 14.3 FL (ref 9.4–12.3)
PO2 ARTERIAL: 176 MMHG (ref 80–100)
POIKILOCYTOSIS BLD QL SMEAR: ABNORMAL
POLYCHROMASIA BLD QL SMEAR: ABNORMAL
POLYCHROMASIA: ABNORMAL
POTASSIUM REFLEX MAGNESIUM: 3.8 MMOL/L (ref 3.5–5)
POTASSIUM SERPL-SCNC: 3.2 MMOL/L (ref 3.5–5)
POTASSIUM SERPL-SCNC: 3.3 MMOL/L (ref 3.5–5)
POTASSIUM SERPL-SCNC: 3.4 MMOL/L (ref 3.5–5)
POTASSIUM SERPL-SCNC: 3.7 MMOL/L (ref 3.5–5.2)
POTASSIUM SERPL-SCNC: 3.7 MMOL/L (ref 3.5–5.2)
POTASSIUM SERPL-SCNC: 3.8 MMOL/L (ref 3.5–5.2)
POTASSIUM SERPL-SCNC: 3.8 MMOL/L (ref 3.5–5.2)
POTASSIUM SERPL-SCNC: 3.9 MMOL/L (ref 3.5–5)
POTASSIUM SERPL-SCNC: 4 MMOL/L (ref 3.5–5)
POTASSIUM SERPL-SCNC: 4 MMOL/L (ref 3.5–5)
POTASSIUM SERPL-SCNC: 4 MMOL/L (ref 3.5–5.2)
POTASSIUM SERPL-SCNC: 4 MMOL/L (ref 3.5–5.2)
POTASSIUM SERPL-SCNC: 4.3 MMOL/L (ref 3.5–5)
POTASSIUM, WHOLE BLOOD: 3.9
PRO-BNP: 6477 PG/ML (ref 0–1800)
PROT SERPL-MCNC: 6.9 G/DL (ref 6–8.5)
PROT SERPL-MCNC: 7.1 G/DL (ref 6–8.5)
PROT SERPL-MCNC: 7.2 G/DL (ref 6–8.5)
PROTHROMBIN TIME: 15.2 SECONDS (ref 11.9–14.6)
PROTHROMBIN TIME: 29.2 SEC (ref 12–14.6)
QT INTERVAL: 346 MS
QT INTERVAL: 354 MS
QT INTERVAL: 390 MS
QT INTERVAL: 392 MS
QTC INTERVAL: 478 MS
QTC INTERVAL: 520 MS
QTC INTERVAL: 542 MS
QTC INTERVAL: 562 MS
RBC # BLD AUTO: 3.55 10*6/MM3 (ref 3.77–5.28)
RBC # BLD AUTO: 3.76 10*6/MM3 (ref 3.77–5.28)
RBC # BLD AUTO: 4.17 10*6/MM3 (ref 3.77–5.28)
RBC # BLD AUTO: 4.32 10*6/MM3 (ref 3.77–5.28)
RBC # BLD: 3.39 M/UL (ref 4.2–5.4)
RBC # BLD: 3.47 M/UL (ref 4.2–5.4)
RBC # BLD: 3.49 M/UL (ref 4.2–5.4)
RBC # BLD: 3.54 M/UL (ref 4.2–5.4)
RBC # BLD: 3.76 M/UL (ref 4.2–5.4)
RBC # BLD: 3.84 M/UL (ref 4.2–5.4)
RBC # BLD: 4.02 M/UL (ref 4.2–5.4)
SARS-COV-2 RNA PNL SPEC NAA+PROBE: NOT DETECTED
SARS-COV-2 RNA PNL SPEC NAA+PROBE: NOT DETECTED
SARS-COV-2, NAAT: NOT DETECTED
SMALL PLATELETS BLD QL SMEAR: ABNORMAL
SODIUM BLD-SCNC: 137 MMOL/L (ref 136–145)
SODIUM BLD-SCNC: 137 MMOL/L (ref 136–145)
SODIUM BLD-SCNC: 141 MMOL/L (ref 136–145)
SODIUM BLD-SCNC: 141 MMOL/L (ref 136–145)
SODIUM BLD-SCNC: 142 MMOL/L (ref 136–145)
SODIUM BLD-SCNC: 143 MMOL/L (ref 136–145)
SODIUM BLD-SCNC: 144 MMOL/L (ref 136–145)
SODIUM BLD-SCNC: 144 MMOL/L (ref 136–145)
SODIUM SERPL-SCNC: 135 MMOL/L (ref 136–145)
SODIUM SERPL-SCNC: 137 MMOL/L (ref 136–145)
SODIUM SERPL-SCNC: 138 MMOL/L (ref 136–145)
SODIUM SERPL-SCNC: 140 MMOL/L (ref 136–145)
STOMATOCYTES: ABNORMAL
STOMATOCYTES: ABNORMAL
T4 FREE: 1.31 NG/DL (ref 0.93–1.7)
TARGET CELLS: ABNORMAL
TEAR DROP CELLS: ABNORMAL
TOTAL PROTEIN: 6.4 G/DL (ref 6.6–8.7)
TOTAL PROTEIN: 6.5 G/DL (ref 6.6–8.7)
TOTAL PROTEIN: 6.5 G/DL (ref 6.6–8.7)
TOTAL PROTEIN: 6.6 G/DL (ref 6.6–8.7)
TOTAL PROTEIN: 6.8 G/DL (ref 6.6–8.7)
TOTAL PROTEIN: 6.8 G/DL (ref 6.6–8.7)
TOTAL PROTEIN: 7.4 G/DL (ref 6.6–8.7)
TRIGL SERPL-MCNC: 83 MG/DL (ref 0–149)
TROPONIN T SERPL-MCNC: <0.01 NG/ML (ref 0–0.03)
TROPONIN: <0.01 NG/ML (ref 0–0.03)
TROPONIN: <0.01 NG/ML (ref 0–0.03)
TSH SERPL DL<=0.05 MIU/L-ACNC: 5.23 UIU/ML (ref 0.27–4.2)
VARIANT LYMPHS NFR BLD MANUAL: 4 % (ref 0–5)
VITAMIN D 25-HYDROXY: 63.9 NG/ML
WBC # BLD AUTO: 7.58 10*3/MM3 (ref 3.4–10.8)
WBC # BLD AUTO: 7.78 10*3/MM3 (ref 3.4–10.8)
WBC # BLD AUTO: 7.84 10*3/MM3 (ref 3.4–10.8)
WBC # BLD AUTO: 9.21 10*3/MM3 (ref 3.4–10.8)
WBC # BLD: 10 K/UL (ref 4.8–10.8)
WBC # BLD: 12.4 K/UL (ref 4.8–10.8)
WBC # BLD: 6.6 K/UL (ref 4.8–10.8)
WBC # BLD: 8 K/UL (ref 4.8–10.8)
WBC # BLD: 8.9 K/UL (ref 4.8–10.8)
WBC # BLD: 9.2 K/UL (ref 4.8–10.8)
WBC # BLD: 9.3 K/UL (ref 4.8–10.8)
WBC MORPH BLD: NORMAL
WHOLE BLOOD HOLD SPECIMEN: NORMAL

## 2021-01-01 PROCEDURE — 2580000003 HC RX 258: Performed by: INTERNAL MEDICINE

## 2021-01-01 PROCEDURE — G8399 PT W/DXA RESULTS DOCUMENT: HCPCS | Performed by: INTERNAL MEDICINE

## 2021-01-01 PROCEDURE — 6360000002 HC RX W HCPCS: Performed by: HOSPITALIST

## 2021-01-01 PROCEDURE — 2140000000 HC CCU INTERMEDIATE R&B

## 2021-01-01 PROCEDURE — 6370000000 HC RX 637 (ALT 250 FOR IP): Performed by: HOSPITALIST

## 2021-01-01 PROCEDURE — 25010000002 DIGOXIN PER 500 MCG: Performed by: NURSE PRACTITIONER

## 2021-01-01 PROCEDURE — 2500000003 HC RX 250 WO HCPCS: Performed by: HOSPITALIST

## 2021-01-01 PROCEDURE — 94618 PULMONARY STRESS TESTING: CPT

## 2021-01-01 PROCEDURE — 6370000000 HC RX 637 (ALT 250 FOR IP): Performed by: INTERNAL MEDICINE

## 2021-01-01 PROCEDURE — 84484 ASSAY OF TROPONIN QUANT: CPT

## 2021-01-01 PROCEDURE — 93000 ELECTROCARDIOGRAM COMPLETE: CPT | Performed by: NURSE PRACTITIONER

## 2021-01-01 PROCEDURE — G8427 DOCREV CUR MEDS BY ELIG CLIN: HCPCS | Performed by: INTERNAL MEDICINE

## 2021-01-01 PROCEDURE — 93005 ELECTROCARDIOGRAM TRACING: CPT | Performed by: INTERNAL MEDICINE

## 2021-01-01 PROCEDURE — 2700000000 HC OXYGEN THERAPY PER DAY

## 2021-01-01 PROCEDURE — 82803 BLOOD GASES ANY COMBINATION: CPT

## 2021-01-01 PROCEDURE — G0378 HOSPITAL OBSERVATION PER HR: HCPCS

## 2021-01-01 PROCEDURE — 93010 ELECTROCARDIOGRAM REPORT: CPT | Performed by: INTERNAL MEDICINE

## 2021-01-01 PROCEDURE — 0 IOPAMIDOL PER 1 ML: Performed by: STUDENT IN AN ORGANIZED HEALTH CARE EDUCATION/TRAINING PROGRAM

## 2021-01-01 PROCEDURE — 36415 COLL VENOUS BLD VENIPUNCTURE: CPT

## 2021-01-01 PROCEDURE — 0 IOPAMIDOL PER 1 ML: Performed by: NURSE PRACTITIONER

## 2021-01-01 PROCEDURE — 96375 TX/PRO/DX INJ NEW DRUG ADDON: CPT

## 2021-01-01 PROCEDURE — 85025 COMPLETE CBC W/AUTO DIFF WBC: CPT | Performed by: NURSE PRACTITIONER

## 2021-01-01 PROCEDURE — G0299 HHS/HOSPICE OF RN EA 15 MIN: HCPCS

## 2021-01-01 PROCEDURE — 71045 X-RAY EXAM CHEST 1 VIEW: CPT

## 2021-01-01 PROCEDURE — 94799 UNLISTED PULMONARY SVC/PX: CPT

## 2021-01-01 PROCEDURE — 80048 BASIC METABOLIC PNL TOTAL CA: CPT | Performed by: INTERNAL MEDICINE

## 2021-01-01 PROCEDURE — G8399 PT W/DXA RESULTS DOCUMENT: HCPCS | Performed by: NURSE PRACTITIONER

## 2021-01-01 PROCEDURE — 84132 ASSAY OF SERUM POTASSIUM: CPT

## 2021-01-01 PROCEDURE — 71275 CT ANGIOGRAPHY CHEST: CPT

## 2021-01-01 PROCEDURE — 87635 SARS-COV-2 COVID-19 AMP PRB: CPT | Performed by: INTERNAL MEDICINE

## 2021-01-01 PROCEDURE — 97535 SELF CARE MNGMENT TRAINING: CPT | Performed by: OCCUPATIONAL THERAPIST

## 2021-01-01 PROCEDURE — 25010000002 DIPHENHYDRAMINE PER 50 MG: Performed by: STUDENT IN AN ORGANIZED HEALTH CARE EDUCATION/TRAINING PROGRAM

## 2021-01-01 PROCEDURE — 6360000002 HC RX W HCPCS: Performed by: INTERNAL MEDICINE

## 2021-01-01 PROCEDURE — 97161 PT EVAL LOW COMPLEX 20 MIN: CPT

## 2021-01-01 PROCEDURE — 92523 SPEECH SOUND LANG COMPREHEN: CPT

## 2021-01-01 PROCEDURE — 25010000002 FUROSEMIDE PER 20 MG: Performed by: INTERNAL MEDICINE

## 2021-01-01 PROCEDURE — 4040F PNEUMOC VAC/ADMIN/RCVD: CPT | Performed by: NURSE PRACTITIONER

## 2021-01-01 PROCEDURE — 90686 IIV4 VACC NO PRSV 0.5 ML IM: CPT | Performed by: INTERNAL MEDICINE

## 2021-01-01 PROCEDURE — 85730 THROMBOPLASTIN TIME PARTIAL: CPT

## 2021-01-01 PROCEDURE — 4040F PNEUMOC VAC/ADMIN/RCVD: CPT | Performed by: INTERNAL MEDICINE

## 2021-01-01 PROCEDURE — 96366 THER/PROPH/DIAG IV INF ADDON: CPT

## 2021-01-01 PROCEDURE — 93306 TTE W/DOPPLER COMPLETE: CPT

## 2021-01-01 PROCEDURE — 97530 THERAPEUTIC ACTIVITIES: CPT

## 2021-01-01 PROCEDURE — G8417 CALC BMI ABV UP PARAM F/U: HCPCS | Performed by: INTERNAL MEDICINE

## 2021-01-01 PROCEDURE — G8427 DOCREV CUR MEDS BY ELIG CLIN: HCPCS | Performed by: NURSE PRACTITIONER

## 2021-01-01 PROCEDURE — 99214 OFFICE O/P EST MOD 30 MIN: CPT | Performed by: NURSE PRACTITIONER

## 2021-01-01 PROCEDURE — 36600 WITHDRAWAL OF ARTERIAL BLOOD: CPT

## 2021-01-01 PROCEDURE — 1090F PRES/ABSN URINE INCON ASSESS: CPT | Performed by: INTERNAL MEDICINE

## 2021-01-01 PROCEDURE — 85025 COMPLETE CBC W/AUTO DIFF WBC: CPT

## 2021-01-01 PROCEDURE — 96376 TX/PRO/DX INJ SAME DRUG ADON: CPT

## 2021-01-01 PROCEDURE — 1090F PRES/ABSN URINE INCON ASSESS: CPT | Performed by: NURSE PRACTITIONER

## 2021-01-01 PROCEDURE — G8484 FLU IMMUNIZE NO ADMIN: HCPCS | Performed by: INTERNAL MEDICINE

## 2021-01-01 PROCEDURE — 84484 ASSAY OF TROPONIN QUANT: CPT | Performed by: INTERNAL MEDICINE

## 2021-01-01 PROCEDURE — 1123F ACP DISCUSS/DSCN MKR DOCD: CPT | Performed by: NURSE PRACTITIONER

## 2021-01-01 PROCEDURE — 25010000002 INFLUENZA VAC SPLIT QUAD 0.5 ML SUSPENSION PREFILLED SYRINGE: Performed by: INTERNAL MEDICINE

## 2021-01-01 PROCEDURE — 96365 THER/PROPH/DIAG IV INF INIT: CPT

## 2021-01-01 PROCEDURE — 99285 EMERGENCY DEPT VISIT HI MDM: CPT

## 2021-01-01 PROCEDURE — 85025 COMPLETE CBC W/AUTO DIFF WBC: CPT | Performed by: INTERNAL MEDICINE

## 2021-01-01 PROCEDURE — G0008 ADMIN INFLUENZA VIRUS VAC: HCPCS | Performed by: INTERNAL MEDICINE

## 2021-01-01 PROCEDURE — 80053 COMPREHEN METABOLIC PANEL: CPT | Performed by: NURSE PRACTITIONER

## 2021-01-01 PROCEDURE — 99213 OFFICE O/P EST LOW 20 MIN: CPT | Performed by: INTERNAL MEDICINE

## 2021-01-01 PROCEDURE — 1036F TOBACCO NON-USER: CPT | Performed by: NURSE PRACTITIONER

## 2021-01-01 PROCEDURE — 1036F TOBACCO NON-USER: CPT | Performed by: INTERNAL MEDICINE

## 2021-01-01 PROCEDURE — 99232 SBSQ HOSP IP/OBS MODERATE 35: CPT | Performed by: INTERNAL MEDICINE

## 2021-01-01 PROCEDURE — G8417 CALC BMI ABV UP PARAM F/U: HCPCS | Performed by: NURSE PRACTITIONER

## 2021-01-01 PROCEDURE — 93306 TTE W/DOPPLER COMPLETE: CPT | Performed by: INTERNAL MEDICINE

## 2021-01-01 PROCEDURE — 80053 COMPREHEN METABOLIC PANEL: CPT

## 2021-01-01 PROCEDURE — 2500000003 HC RX 250 WO HCPCS: Performed by: INTERNAL MEDICINE

## 2021-01-01 PROCEDURE — 92522 EVALUATE SPEECH PRODUCTION: CPT

## 2021-01-01 PROCEDURE — 93005 ELECTROCARDIOGRAM TRACING: CPT

## 2021-01-01 PROCEDURE — 99284 EMERGENCY DEPT VISIT MOD MDM: CPT

## 2021-01-01 PROCEDURE — 83880 ASSAY OF NATRIURETIC PEPTIDE: CPT | Performed by: NURSE PRACTITIONER

## 2021-01-01 PROCEDURE — 97535 SELF CARE MNGMENT TRAINING: CPT

## 2021-01-01 PROCEDURE — 80074 ACUTE HEPATITIS PANEL: CPT

## 2021-01-01 PROCEDURE — 93005 ELECTROCARDIOGRAM TRACING: CPT | Performed by: STUDENT IN AN ORGANIZED HEALTH CARE EDUCATION/TRAINING PROGRAM

## 2021-01-01 PROCEDURE — 83605 ASSAY OF LACTIC ACID: CPT | Performed by: NURSE PRACTITIONER

## 2021-01-01 PROCEDURE — G8428 CUR MEDS NOT DOCUMENT: HCPCS | Performed by: NURSE PRACTITIONER

## 2021-01-01 PROCEDURE — 84443 ASSAY THYROID STIM HORMONE: CPT

## 2021-01-01 PROCEDURE — 84484 ASSAY OF TROPONIN QUANT: CPT | Performed by: NURSE PRACTITIONER

## 2021-01-01 PROCEDURE — 2500000003 HC RX 250 WO HCPCS: Performed by: EMERGENCY MEDICINE

## 2021-01-01 PROCEDURE — 83880 ASSAY OF NATRIURETIC PEPTIDE: CPT

## 2021-01-01 PROCEDURE — 80048 BASIC METABOLIC PNL TOTAL CA: CPT

## 2021-01-01 PROCEDURE — 84439 ASSAY OF FREE THYROXINE: CPT

## 2021-01-01 PROCEDURE — 85007 BL SMEAR W/DIFF WBC COUNT: CPT | Performed by: NURSE PRACTITIONER

## 2021-01-01 PROCEDURE — 85027 COMPLETE CBC AUTOMATED: CPT | Performed by: NURSE PRACTITIONER

## 2021-01-01 PROCEDURE — 1123F ACP DISCUSS/DSCN MKR DOCD: CPT | Performed by: INTERNAL MEDICINE

## 2021-01-01 PROCEDURE — 83735 ASSAY OF MAGNESIUM: CPT

## 2021-01-01 PROCEDURE — 92610 EVALUATE SWALLOWING FUNCTION: CPT

## 2021-01-01 PROCEDURE — 97162 PT EVAL MOD COMPLEX 30 MIN: CPT

## 2021-01-01 PROCEDURE — 80048 BASIC METABOLIC PNL TOTAL CA: CPT | Performed by: NURSE PRACTITIONER

## 2021-01-01 PROCEDURE — 97116 GAIT TRAINING THERAPY: CPT

## 2021-01-01 PROCEDURE — 97110 THERAPEUTIC EXERCISES: CPT

## 2021-01-01 PROCEDURE — G0439 PPPS, SUBSEQ VISIT: HCPCS | Performed by: INTERNAL MEDICINE

## 2021-01-01 PROCEDURE — 6360000002 HC RX W HCPCS: Performed by: EMERGENCY MEDICINE

## 2021-01-01 PROCEDURE — 99214 OFFICE O/P EST MOD 30 MIN: CPT | Performed by: INTERNAL MEDICINE

## 2021-01-01 PROCEDURE — 96374 THER/PROPH/DIAG INJ IV PUSH: CPT

## 2021-01-01 PROCEDURE — 97165 OT EVAL LOW COMPLEX 30 MIN: CPT | Performed by: OCCUPATIONAL THERAPIST

## 2021-01-01 PROCEDURE — 25010000002 PERFLUTREN 6.52 MG/ML SUSPENSION: Performed by: INTERNAL MEDICINE

## 2021-01-01 PROCEDURE — 93010 ELECTROCARDIOGRAM REPORT: CPT | Performed by: EMERGENCY MEDICINE

## 2021-01-01 PROCEDURE — 87635 SARS-COV-2 COVID-19 AMP PRB: CPT

## 2021-01-01 PROCEDURE — 99222 1ST HOSP IP/OBS MODERATE 55: CPT | Performed by: INTERNAL MEDICINE

## 2021-01-01 PROCEDURE — 80053 COMPREHEN METABOLIC PANEL: CPT | Performed by: INTERNAL MEDICINE

## 2021-01-01 PROCEDURE — 97165 OT EVAL LOW COMPLEX 30 MIN: CPT

## 2021-01-01 PROCEDURE — 99213 OFFICE O/P EST LOW 20 MIN: CPT | Performed by: NURSE PRACTITIONER

## 2021-01-01 PROCEDURE — 85610 PROTHROMBIN TIME: CPT | Performed by: NURSE PRACTITIONER

## 2021-01-01 PROCEDURE — 85610 PROTHROMBIN TIME: CPT

## 2021-01-01 PROCEDURE — 2580000003 HC RX 258: Performed by: EMERGENCY MEDICINE

## 2021-01-01 PROCEDURE — 84100 ASSAY OF PHOSPHORUS: CPT

## 2021-01-01 PROCEDURE — 2500000003 HC RX 250 WO HCPCS: Performed by: PHYSICIAN ASSISTANT

## 2021-01-01 PROCEDURE — 87635 SARS-COV-2 COVID-19 AMP PRB: CPT | Performed by: NURSE PRACTITIONER

## 2021-01-01 PROCEDURE — G8484 FLU IMMUNIZE NO ADMIN: HCPCS | Performed by: NURSE PRACTITIONER

## 2021-01-01 PROCEDURE — 76705 ECHO EXAM OF ABDOMEN: CPT

## 2021-01-01 PROCEDURE — 85379 FIBRIN DEGRADATION QUANT: CPT | Performed by: NURSE PRACTITIONER

## 2021-01-01 PROCEDURE — 92526 ORAL FUNCTION THERAPY: CPT

## 2021-01-01 PROCEDURE — 93005 ELECTROCARDIOGRAM TRACING: CPT | Performed by: PHYSICIAN ASSISTANT

## 2021-01-01 RX ORDER — ONDANSETRON 2 MG/ML
4 INJECTION INTRAMUSCULAR; INTRAVENOUS EVERY 6 HOURS PRN
Status: DISCONTINUED | OUTPATIENT
Start: 2021-01-01 | End: 2021-01-01

## 2021-01-01 RX ORDER — ASPIRIN 81 MG/1
81 TABLET, CHEWABLE ORAL DAILY
Status: DISCONTINUED | OUTPATIENT
Start: 2021-01-01 | End: 2021-01-01 | Stop reason: HOSPADM

## 2021-01-01 RX ORDER — METOPROLOL TARTRATE 5 MG/5ML
5 INJECTION INTRAVENOUS EVERY 6 HOURS
Status: DISCONTINUED | OUTPATIENT
Start: 2021-01-01 | End: 2021-01-01

## 2021-01-01 RX ORDER — BIMATOPROST 0.01 %
DROPS OPHTHALMIC (EYE)
COMMUNITY
Start: 2021-01-01

## 2021-01-01 RX ORDER — SODIUM CHLORIDE 0.9 % (FLUSH) 0.9 %
10 SYRINGE (ML) INJECTION EVERY 12 HOURS SCHEDULED
Status: DISCONTINUED | OUTPATIENT
Start: 2021-01-01 | End: 2021-01-01 | Stop reason: HOSPADM

## 2021-01-01 RX ORDER — ACETAMINOPHEN 650 MG/1
650 SUPPOSITORY RECTAL EVERY 6 HOURS PRN
Status: DISCONTINUED | OUTPATIENT
Start: 2021-01-01 | End: 2021-01-01 | Stop reason: HOSPADM

## 2021-01-01 RX ORDER — ACETAMINOPHEN 325 MG/1
650 TABLET ORAL EVERY 6 HOURS PRN
Status: DISCONTINUED | OUTPATIENT
Start: 2021-01-01 | End: 2021-01-01 | Stop reason: HOSPADM

## 2021-01-01 RX ORDER — ESCITALOPRAM OXALATE 10 MG/1
20 TABLET ORAL NIGHTLY
Status: DISCONTINUED | OUTPATIENT
Start: 2021-01-01 | End: 2021-01-01 | Stop reason: HOSPADM

## 2021-01-01 RX ORDER — SODIUM CHLORIDE 0.9 % (FLUSH) 0.9 %
5-40 SYRINGE (ML) INJECTION EVERY 12 HOURS SCHEDULED
Status: DISCONTINUED | OUTPATIENT
Start: 2021-01-01 | End: 2021-01-01 | Stop reason: HOSPADM

## 2021-01-01 RX ORDER — LANOLIN ALCOHOL/MO/W.PET/CERES
3 CREAM (GRAM) TOPICAL DAILY
COMMUNITY

## 2021-01-01 RX ORDER — SODIUM CHLORIDE 0.9 % (FLUSH) 0.9 %
10 SYRINGE (ML) INJECTION AS NEEDED
Status: DISCONTINUED | OUTPATIENT
Start: 2021-01-01 | End: 2021-01-01 | Stop reason: HOSPADM

## 2021-01-01 RX ORDER — POTASSIUM CHLORIDE 7.45 MG/ML
10 INJECTION INTRAVENOUS
Status: DISPENSED | OUTPATIENT
Start: 2021-01-01 | End: 2021-01-01

## 2021-01-01 RX ORDER — VALACYCLOVIR HYDROCHLORIDE 500 MG/1
1000 TABLET, FILM COATED ORAL EVERY 8 HOURS SCHEDULED
Status: DISCONTINUED | OUTPATIENT
Start: 2021-01-01 | End: 2021-01-01

## 2021-01-01 RX ORDER — SOTALOL HYDROCHLORIDE 120 MG/1
120 TABLET ORAL 2 TIMES DAILY
Status: DISCONTINUED | OUTPATIENT
Start: 2021-01-01 | End: 2021-01-01 | Stop reason: HOSPADM

## 2021-01-01 RX ORDER — ACETAMINOPHEN 650 MG/1
650 SUPPOSITORY RECTAL EVERY 4 HOURS PRN
Status: DISCONTINUED | OUTPATIENT
Start: 2021-01-01 | End: 2021-01-01 | Stop reason: HOSPADM

## 2021-01-01 RX ORDER — DIGOXIN 125 MCG
125 TABLET ORAL
Status: DISCONTINUED | OUTPATIENT
Start: 2021-01-01 | End: 2021-01-01 | Stop reason: HOSPADM

## 2021-01-01 RX ORDER — DIGOXIN 0.25 MG/ML
250 INJECTION INTRAMUSCULAR; INTRAVENOUS ONCE
Status: COMPLETED | OUTPATIENT
Start: 2021-01-01 | End: 2021-01-01

## 2021-01-01 RX ORDER — DIGOXIN 0.25 MG/ML
125 INJECTION INTRAMUSCULAR; INTRAVENOUS ONCE
Status: COMPLETED | OUTPATIENT
Start: 2021-01-01 | End: 2021-01-01

## 2021-01-01 RX ORDER — APIXABAN 2.5 MG/1
2.5 TABLET, FILM COATED ORAL 2 TIMES DAILY
Qty: 180 TABLET | Refills: 3 | Status: SHIPPED | OUTPATIENT
Start: 2021-01-01 | End: 2021-01-01

## 2021-01-01 RX ORDER — PANTOPRAZOLE SODIUM 40 MG/1
40 TABLET, DELAYED RELEASE ORAL
Status: DISCONTINUED | OUTPATIENT
Start: 2021-01-01 | End: 2021-01-01 | Stop reason: HOSPADM

## 2021-01-01 RX ORDER — LATANOPROST 50 UG/ML
1 SOLUTION/ DROPS OPHTHALMIC NIGHTLY
Status: DISCONTINUED | OUTPATIENT
Start: 2021-01-01 | End: 2021-01-01 | Stop reason: HOSPADM

## 2021-01-01 RX ORDER — BUMETANIDE 0.25 MG/ML
2 INJECTION, SOLUTION INTRAMUSCULAR; INTRAVENOUS 2 TIMES DAILY
Status: DISCONTINUED | OUTPATIENT
Start: 2021-01-01 | End: 2021-01-01

## 2021-01-01 RX ORDER — METOPROLOL TARTRATE 5 MG/5ML
5 INJECTION INTRAVENOUS ONCE
Status: COMPLETED | OUTPATIENT
Start: 2021-01-01 | End: 2021-01-01

## 2021-01-01 RX ORDER — ACETAMINOPHEN 325 MG/1
650 TABLET ORAL EVERY 4 HOURS PRN
Status: DISCONTINUED | OUTPATIENT
Start: 2021-01-01 | End: 2021-01-01 | Stop reason: HOSPADM

## 2021-01-01 RX ORDER — VALACYCLOVIR HYDROCHLORIDE 1 G/1
1000 TABLET, FILM COATED ORAL EVERY 12 HOURS SCHEDULED
Qty: 5 TABLET | Refills: 0
Start: 2021-01-01 | End: 2021-01-01

## 2021-01-01 RX ORDER — FUROSEMIDE 20 MG/1
20 TABLET ORAL DAILY
Status: DISCONTINUED | OUTPATIENT
Start: 2021-01-01 | End: 2021-01-01 | Stop reason: HOSPADM

## 2021-01-01 RX ORDER — DORZOLAMIDE HCL 20 MG/ML
1 SOLUTION/ DROPS OPHTHALMIC 2 TIMES DAILY
Status: DISCONTINUED | OUTPATIENT
Start: 2021-01-01 | End: 2021-01-01 | Stop reason: HOSPADM

## 2021-01-01 RX ORDER — ATENOLOL 50 MG/1
50 TABLET ORAL 2 TIMES DAILY
COMMUNITY
End: 2021-01-01 | Stop reason: HOSPADM

## 2021-01-01 RX ORDER — IPRATROPIUM BROMIDE AND ALBUTEROL SULFATE 2.5; .5 MG/3ML; MG/3ML
3 SOLUTION RESPIRATORY (INHALATION) EVERY 4 HOURS PRN
Status: DISCONTINUED | OUTPATIENT
Start: 2021-01-01 | End: 2021-01-01 | Stop reason: HOSPADM

## 2021-01-01 RX ORDER — METOPROLOL TARTRATE 50 MG/1
50 TABLET, FILM COATED ORAL ONCE
Status: COMPLETED | OUTPATIENT
Start: 2021-01-01 | End: 2021-01-01

## 2021-01-01 RX ORDER — SODIUM CHLORIDE 9 MG/ML
25 INJECTION, SOLUTION INTRAVENOUS PRN
Status: DISCONTINUED | OUTPATIENT
Start: 2021-01-01 | End: 2021-01-01 | Stop reason: HOSPADM

## 2021-01-01 RX ORDER — FUROSEMIDE 20 MG/1
20 TABLET ORAL DAILY PRN
Qty: 90 TABLET | Refills: 1 | Status: SHIPPED | OUTPATIENT
Start: 2021-01-01 | End: 2021-01-01

## 2021-01-01 RX ORDER — FUROSEMIDE 20 MG/1
20 TABLET ORAL DAILY
Status: ON HOLD | COMMUNITY
End: 2021-01-01 | Stop reason: HOSPADM

## 2021-01-01 RX ORDER — POLYETHYLENE GLYCOL 3350 17 G/17G
17 POWDER, FOR SOLUTION ORAL DAILY PRN
Status: DISCONTINUED | OUTPATIENT
Start: 2021-01-01 | End: 2021-01-01 | Stop reason: HOSPADM

## 2021-01-01 RX ORDER — VALACYCLOVIR HYDROCHLORIDE 500 MG/1
1000 TABLET, FILM COATED ORAL EVERY 12 HOURS SCHEDULED
Status: DISCONTINUED | OUTPATIENT
Start: 2021-01-01 | End: 2021-01-01 | Stop reason: HOSPADM

## 2021-01-01 RX ORDER — POTASSIUM CHLORIDE 7.45 MG/ML
10 INJECTION INTRAVENOUS
Status: COMPLETED | OUTPATIENT
Start: 2021-01-01 | End: 2021-01-01

## 2021-01-01 RX ORDER — LOSARTAN POTASSIUM 50 MG/1
50 TABLET ORAL DAILY
COMMUNITY
End: 2021-01-01 | Stop reason: HOSPADM

## 2021-01-01 RX ORDER — CARVEDILOL 6.25 MG/1
6.25 TABLET ORAL 2 TIMES DAILY WITH MEALS
Status: DISCONTINUED | OUTPATIENT
Start: 2021-01-01 | End: 2021-01-01

## 2021-01-01 RX ORDER — POTASSIUM CHLORIDE 20 MEQ/1
40 TABLET, EXTENDED RELEASE ORAL PRN
Status: DISCONTINUED | OUTPATIENT
Start: 2021-01-01 | End: 2021-01-01 | Stop reason: HOSPADM

## 2021-01-01 RX ORDER — LISINOPRIL 20 MG/1
20 TABLET ORAL 2 TIMES DAILY
COMMUNITY

## 2021-01-01 RX ORDER — ONDANSETRON 2 MG/ML
4 INJECTION INTRAMUSCULAR; INTRAVENOUS EVERY 6 HOURS PRN
Status: DISCONTINUED | OUTPATIENT
Start: 2021-01-01 | End: 2021-01-01 | Stop reason: HOSPADM

## 2021-01-01 RX ORDER — BUMETANIDE 1 MG/1
1 TABLET ORAL 2 TIMES DAILY
Status: DISCONTINUED | OUTPATIENT
Start: 2021-01-01 | End: 2021-01-01 | Stop reason: HOSPADM

## 2021-01-01 RX ORDER — ATORVASTATIN CALCIUM 40 MG/1
TABLET, FILM COATED ORAL
Qty: 90 TABLET | Refills: 0 | Status: SHIPPED | OUTPATIENT
Start: 2021-01-01 | End: 2021-01-01

## 2021-01-01 RX ORDER — DIPHENHYDRAMINE HYDROCHLORIDE 50 MG/ML
12.5 INJECTION INTRAMUSCULAR; INTRAVENOUS ONCE
Status: COMPLETED | OUTPATIENT
Start: 2021-01-01 | End: 2021-01-01

## 2021-01-01 RX ORDER — FUROSEMIDE 10 MG/ML
20 INJECTION INTRAMUSCULAR; INTRAVENOUS EVERY 12 HOURS
Status: DISCONTINUED | OUTPATIENT
Start: 2021-01-01 | End: 2021-01-01

## 2021-01-01 RX ORDER — POTASSIUM CHLORIDE 20 MEQ/1
20 TABLET, EXTENDED RELEASE ORAL DAILY
Qty: 30 TABLET | Refills: 0 | Status: SHIPPED | OUTPATIENT
Start: 2021-01-01 | End: 2022-01-14

## 2021-01-01 RX ORDER — METOPROLOL TARTRATE 5 MG/5ML
2.5 INJECTION INTRAVENOUS ONCE
Status: COMPLETED | OUTPATIENT
Start: 2021-01-01 | End: 2021-01-01

## 2021-01-01 RX ORDER — ATENOLOL 50 MG/1
TABLET ORAL
Qty: 270 TABLET | Refills: 3 | OUTPATIENT
Start: 2021-01-01

## 2021-01-01 RX ORDER — METOPROLOL TARTRATE 50 MG/1
50 TABLET, FILM COATED ORAL 2 TIMES DAILY
Qty: 180 TABLET | Refills: 3 | Status: ON HOLD | OUTPATIENT
Start: 2021-01-01 | End: 2021-01-01 | Stop reason: HOSPADM

## 2021-01-01 RX ORDER — FUROSEMIDE 20 MG/1
20 TABLET ORAL DAILY PRN
Qty: 90 TABLET | Refills: 1 | Status: SHIPPED | OUTPATIENT
Start: 2021-01-01 | End: 2021-01-01 | Stop reason: SDUPTHER

## 2021-01-01 RX ORDER — LOSARTAN POTASSIUM 50 MG/1
50 TABLET ORAL NIGHTLY
Status: DISCONTINUED | OUTPATIENT
Start: 2021-01-01 | End: 2021-01-01

## 2021-01-01 RX ORDER — FUROSEMIDE 40 MG/1
40 TABLET ORAL DAILY PRN
Qty: 90 TABLET | Refills: 1 | Status: SHIPPED | OUTPATIENT
Start: 2021-01-01 | End: 2021-01-01

## 2021-01-01 RX ORDER — ATORVASTATIN CALCIUM 40 MG/1
40 TABLET, FILM COATED ORAL NIGHTLY
Status: DISCONTINUED | OUTPATIENT
Start: 2021-01-01 | End: 2021-01-01 | Stop reason: HOSPADM

## 2021-01-01 RX ORDER — PANTOPRAZOLE SODIUM 40 MG/1
40 TABLET, DELAYED RELEASE ORAL EVERY MORNING
Status: DISCONTINUED | OUTPATIENT
Start: 2021-01-01 | End: 2021-01-01 | Stop reason: HOSPADM

## 2021-01-01 RX ORDER — BUMETANIDE 1 MG/1
2 TABLET ORAL 2 TIMES DAILY
Status: DISCONTINUED | OUTPATIENT
Start: 2021-01-01 | End: 2021-01-01

## 2021-01-01 RX ORDER — SODIUM CHLORIDE 0.9 % (FLUSH) 0.9 %
5-40 SYRINGE (ML) INJECTION PRN
Status: DISCONTINUED | OUTPATIENT
Start: 2021-01-01 | End: 2021-01-01 | Stop reason: HOSPADM

## 2021-01-01 RX ORDER — SOTALOL HYDROCHLORIDE 80 MG/1
80 TABLET ORAL 2 TIMES DAILY
Status: DISCONTINUED | OUTPATIENT
Start: 2021-01-01 | End: 2021-01-01

## 2021-01-01 RX ORDER — ERGOCALCIFEROL 1.25 MG/1
CAPSULE ORAL
Qty: 12 CAPSULE | Refills: 3 | Status: SHIPPED | OUTPATIENT
Start: 2021-01-01

## 2021-01-01 RX ORDER — POTASSIUM CHLORIDE 7.45 MG/ML
10 INJECTION INTRAVENOUS PRN
Status: DISCONTINUED | OUTPATIENT
Start: 2021-01-01 | End: 2021-01-01 | Stop reason: HOSPADM

## 2021-01-01 RX ORDER — METOPROLOL SUCCINATE 25 MG/1
25 TABLET, EXTENDED RELEASE ORAL 2 TIMES DAILY
Qty: 60 TABLET | Refills: 0 | Status: SHIPPED | OUTPATIENT
Start: 2021-01-01 | End: 2022-01-14

## 2021-01-01 RX ORDER — DORZOLAMIDE HYDROCHLORIDE AND TIMOLOL MALEATE 20; 5 MG/ML; MG/ML
SOLUTION/ DROPS OPHTHALMIC 2 TIMES DAILY
Status: DISCONTINUED | OUTPATIENT
Start: 2021-01-01 | End: 2021-01-01 | Stop reason: HOSPADM

## 2021-01-01 RX ORDER — ATENOLOL 50 MG/1
TABLET ORAL
Qty: 270 TABLET | Refills: 3 | Status: SHIPPED | OUTPATIENT
Start: 2021-01-01 | End: 2021-01-01

## 2021-01-01 RX ORDER — METOPROLOL TARTRATE 50 MG/1
50 TABLET, FILM COATED ORAL 2 TIMES DAILY
Status: DISCONTINUED | OUTPATIENT
Start: 2021-01-01 | End: 2021-01-01

## 2021-01-01 RX ORDER — ATENOLOL 50 MG/1
100 TABLET ORAL EVERY 12 HOURS SCHEDULED
Status: DISCONTINUED | OUTPATIENT
Start: 2021-01-01 | End: 2021-01-01

## 2021-01-01 RX ORDER — METOPROLOL SUCCINATE 25 MG/1
75 TABLET, EXTENDED RELEASE ORAL 2 TIMES DAILY
Qty: 180 TABLET | Refills: 0 | Status: SHIPPED | OUTPATIENT
Start: 2021-01-01 | End: 2021-01-01 | Stop reason: HOSPADM

## 2021-01-01 RX ORDER — LANOLIN ALCOHOL/MO/W.PET/CERES
3 CREAM (GRAM) TOPICAL DAILY
Status: DISCONTINUED | OUTPATIENT
Start: 2021-01-01 | End: 2021-01-01 | Stop reason: HOSPADM

## 2021-01-01 RX ORDER — POTASSIUM CHLORIDE 20 MEQ/1
20 TABLET, EXTENDED RELEASE ORAL DAILY
Qty: 60 TABLET | Refills: 3 | Status: SHIPPED | OUTPATIENT
Start: 2021-01-01 | End: 2021-01-01

## 2021-01-01 RX ORDER — APIXABAN 2.5 MG/1
TABLET, FILM COATED ORAL
COMMUNITY
Start: 2021-01-01 | End: 2021-01-01 | Stop reason: SDUPTHER

## 2021-01-01 RX ORDER — METOPROLOL SUCCINATE 25 MG/1
25 TABLET, EXTENDED RELEASE ORAL 2 TIMES DAILY
Status: DISCONTINUED | OUTPATIENT
Start: 2021-01-01 | End: 2021-01-01 | Stop reason: HOSPADM

## 2021-01-01 RX ORDER — SOTALOL HYDROCHLORIDE 120 MG/1
120 TABLET ORAL 2 TIMES DAILY
Qty: 60 TABLET | Refills: 0 | Status: SHIPPED | OUTPATIENT
Start: 2021-01-01 | End: 2022-01-13

## 2021-01-01 RX ORDER — ONDANSETRON 4 MG/1
4 TABLET, ORALLY DISINTEGRATING ORAL EVERY 8 HOURS PRN
Status: DISCONTINUED | OUTPATIENT
Start: 2021-01-01 | End: 2021-01-01 | Stop reason: HOSPADM

## 2021-01-01 RX ORDER — DORZOLAMIDE HYDROCHLORIDE AND TIMOLOL MALEATE 20; 5 MG/ML; MG/ML
1 SOLUTION/ DROPS OPHTHALMIC 2 TIMES DAILY
Status: DISCONTINUED | OUTPATIENT
Start: 2021-01-01 | End: 2021-01-01

## 2021-01-01 RX ORDER — LISINOPRIL 20 MG/1
20 TABLET ORAL 2 TIMES DAILY
Status: DISCONTINUED | OUTPATIENT
Start: 2021-01-01 | End: 2021-01-01 | Stop reason: HOSPADM

## 2021-01-01 RX ORDER — ATENOLOL 50 MG/1
75 TABLET ORAL DAILY
COMMUNITY

## 2021-01-01 RX ORDER — FUROSEMIDE 10 MG/ML
40 INJECTION INTRAMUSCULAR; INTRAVENOUS ONCE
Status: COMPLETED | OUTPATIENT
Start: 2021-01-01 | End: 2021-01-01

## 2021-01-01 RX ORDER — PANTOPRAZOLE SODIUM 40 MG/1
40 TABLET, DELAYED RELEASE ORAL DAILY
Status: DISCONTINUED | OUTPATIENT
Start: 2021-01-01 | End: 2021-01-01 | Stop reason: HOSPADM

## 2021-01-01 RX ORDER — BUMETANIDE 2 MG/1
2 TABLET ORAL 2 TIMES DAILY
Qty: 60 TABLET | Refills: 0 | Status: SHIPPED | OUTPATIENT
Start: 2021-01-01 | End: 2021-01-01

## 2021-01-01 RX ORDER — BUMETANIDE 1 MG/1
1 TABLET ORAL 2 TIMES DAILY
Qty: 60 TABLET | Refills: 0 | Status: SHIPPED | OUTPATIENT
Start: 2021-01-01 | End: 2022-01-14

## 2021-01-01 RX ORDER — POTASSIUM CHLORIDE 20 MEQ/1
20 TABLET, EXTENDED RELEASE ORAL DAILY
Status: DISCONTINUED | OUTPATIENT
Start: 2021-01-01 | End: 2021-01-01

## 2021-01-01 RX ORDER — ACETAMINOPHEN 160 MG/5ML
650 SOLUTION ORAL EVERY 4 HOURS PRN
Status: DISCONTINUED | OUTPATIENT
Start: 2021-01-01 | End: 2021-01-01 | Stop reason: HOSPADM

## 2021-01-01 RX ORDER — ESCITALOPRAM OXALATE 20 MG/1
TABLET ORAL
Qty: 90 TABLET | Refills: 3 | Status: SHIPPED | OUTPATIENT
Start: 2021-01-01

## 2021-01-01 RX ORDER — OMEPRAZOLE 40 MG/1
CAPSULE, DELAYED RELEASE ORAL
Qty: 90 CAPSULE | Refills: 3 | Status: SHIPPED | OUTPATIENT
Start: 2021-01-01

## 2021-01-01 RX ORDER — DIGOXIN 125 MCG
125 TABLET ORAL
Start: 2021-01-01 | End: 2021-01-01 | Stop reason: HOSPADM

## 2021-01-01 RX ORDER — TIMOLOL MALEATE 5 MG/ML
1 SOLUTION/ DROPS OPHTHALMIC 2 TIMES DAILY
Status: DISCONTINUED | OUTPATIENT
Start: 2021-01-01 | End: 2021-01-01 | Stop reason: HOSPADM

## 2021-01-01 RX ORDER — ATORVASTATIN CALCIUM 40 MG/1
TABLET, FILM COATED ORAL
Qty: 90 TABLET | Refills: 0 | Status: SHIPPED | OUTPATIENT
Start: 2021-01-01

## 2021-01-01 RX ORDER — ESCITALOPRAM OXALATE 10 MG/1
20 TABLET ORAL DAILY
Status: DISCONTINUED | OUTPATIENT
Start: 2021-01-01 | End: 2021-01-01 | Stop reason: HOSPADM

## 2021-01-01 RX ADMIN — METOPROLOL TARTRATE 50 MG: 50 TABLET, FILM COATED ORAL at 08:42

## 2021-01-01 RX ADMIN — SODIUM CHLORIDE, PRESERVATIVE FREE 10 ML: 5 INJECTION INTRAVENOUS at 22:24

## 2021-01-01 RX ADMIN — LATANOPROST 1 DROP: 50 SOLUTION/ DROPS OPHTHALMIC at 20:53

## 2021-01-01 RX ADMIN — ASPIRIN 81 MG: 81 TABLET, CHEWABLE ORAL at 18:55

## 2021-01-01 RX ADMIN — SOTALOL HYDROCHLORIDE 80 MG: 80 TABLET ORAL at 15:24

## 2021-01-01 RX ADMIN — LISINOPRIL 20 MG: 20 TABLET ORAL at 20:13

## 2021-01-01 RX ADMIN — APIXABAN 5 MG: 5 TABLET, FILM COATED ORAL at 21:00

## 2021-01-01 RX ADMIN — INFLUENZA VIRUS VACCINE 0.5 ML: 15; 15; 15; 15 SUSPENSION INTRAMUSCULAR at 11:41

## 2021-01-01 RX ADMIN — SOTALOL HYDROCHLORIDE 120 MG: 120 TABLET ORAL at 09:44

## 2021-01-01 RX ADMIN — MICONAZOLE NITRATE: 2 POWDER TOPICAL at 21:38

## 2021-01-01 RX ADMIN — DORZOLAMIDE HYDROCHLORIDE: 20 SOLUTION/ DROPS OPHTHALMIC at 08:33

## 2021-01-01 RX ADMIN — IOPAMIDOL 100 ML: 755 INJECTION, SOLUTION INTRAVENOUS at 13:52

## 2021-01-01 RX ADMIN — METOPROLOL TARTRATE 2.5 MG: 5 INJECTION INTRAVENOUS at 12:32

## 2021-01-01 RX ADMIN — SOTALOL HYDROCHLORIDE 80 MG: 80 TABLET ORAL at 20:20

## 2021-01-01 RX ADMIN — ATORVASTATIN CALCIUM 40 MG: 40 TABLET, FILM COATED ORAL at 22:02

## 2021-01-01 RX ADMIN — LISINOPRIL 20 MG: 20 TABLET ORAL at 09:29

## 2021-01-01 RX ADMIN — Medication 3 MG: at 22:02

## 2021-01-01 RX ADMIN — APIXABAN 2.5 MG: 2.5 TABLET, FILM COATED ORAL at 21:57

## 2021-01-01 RX ADMIN — LOSARTAN POTASSIUM 50 MG: 50 TABLET, FILM COATED ORAL at 21:03

## 2021-01-01 RX ADMIN — CARVEDILOL 6.25 MG: 6.25 TABLET, FILM COATED ORAL at 08:46

## 2021-01-01 RX ADMIN — ESCITALOPRAM OXALATE 20 MG: 10 TABLET, FILM COATED ORAL at 09:37

## 2021-01-01 RX ADMIN — ASPIRIN 81 MG 81 MG: 81 TABLET ORAL at 09:45

## 2021-01-01 RX ADMIN — FUROSEMIDE 20 MG: 20 TABLET ORAL at 08:20

## 2021-01-01 RX ADMIN — APIXABAN 2.5 MG: 2.5 TABLET, FILM COATED ORAL at 08:58

## 2021-01-01 RX ADMIN — TIMOLOL MALEATE 1 DROP: 5 SOLUTION OPHTHALMIC at 20:20

## 2021-01-01 RX ADMIN — FUROSEMIDE 40 MG: 10 INJECTION, SOLUTION INTRAVENOUS at 15:24

## 2021-01-01 RX ADMIN — Medication 3 MG: at 22:32

## 2021-01-01 RX ADMIN — VALACYCLOVIR HYDROCHLORIDE 1000 MG: 500 TABLET, FILM COATED ORAL at 20:55

## 2021-01-01 RX ADMIN — ATENOLOL 75 MG: 25 TABLET ORAL at 21:42

## 2021-01-01 RX ADMIN — METOPROLOL TARTRATE 5 MG: 5 INJECTION INTRAVENOUS at 20:46

## 2021-01-01 RX ADMIN — DORZOLAMIDE HYDROCHLORIDE: 20 SOLUTION/ DROPS OPHTHALMIC at 21:58

## 2021-01-01 RX ADMIN — TIMOLOL MALEATE 1 DROP: 5 SOLUTION OPHTHALMIC at 08:43

## 2021-01-01 RX ADMIN — METOPROLOL TARTRATE 25 MG: 25 TABLET, FILM COATED ORAL at 08:20

## 2021-01-01 RX ADMIN — ESCITALOPRAM OXALATE 20 MG: 10 TABLET, FILM COATED ORAL at 09:00

## 2021-01-01 RX ADMIN — Medication 10 ML: at 08:48

## 2021-01-01 RX ADMIN — APIXABAN 2.5 MG: 2.5 TABLET, FILM COATED ORAL at 09:32

## 2021-01-01 RX ADMIN — PANTOPRAZOLE SODIUM 40 MG: 40 TABLET, DELAYED RELEASE ORAL at 06:16

## 2021-01-01 RX ADMIN — BUMETANIDE 2 MG: 0.25 INJECTION, SOLUTION INTRAMUSCULAR; INTRAVENOUS at 09:29

## 2021-01-01 RX ADMIN — SODIUM CHLORIDE, PRESERVATIVE FREE 10 ML: 5 INJECTION INTRAVENOUS at 08:48

## 2021-01-01 RX ADMIN — SODIUM CHLORIDE 25 ML: 9 INJECTION, SOLUTION INTRAVENOUS at 18:56

## 2021-01-01 RX ADMIN — Medication 3 MG: at 20:13

## 2021-01-01 RX ADMIN — VALACYCLOVIR HYDROCHLORIDE 1000 MG: 500 TABLET, FILM COATED ORAL at 14:05

## 2021-01-01 RX ADMIN — LATANOPROST 1 DROP: 50 SOLUTION OPHTHALMIC at 22:33

## 2021-01-01 RX ADMIN — BUMETANIDE 2 MG: 0.25 INJECTION, SOLUTION INTRAMUSCULAR; INTRAVENOUS at 20:21

## 2021-01-01 RX ADMIN — PANTOPRAZOLE SODIUM 40 MG: 40 TABLET, DELAYED RELEASE ORAL at 05:31

## 2021-01-01 RX ADMIN — LISINOPRIL 20 MG: 20 TABLET ORAL at 20:20

## 2021-01-01 RX ADMIN — ESCITALOPRAM 20 MG: 10 TABLET, FILM COATED ORAL at 20:53

## 2021-01-01 RX ADMIN — Medication 10 ML: at 21:58

## 2021-01-01 RX ADMIN — METOPROLOL TARTRATE 50 MG: 50 TABLET, FILM COATED ORAL at 23:40

## 2021-01-01 RX ADMIN — APIXABAN 5 MG: 5 TABLET, FILM COATED ORAL at 08:44

## 2021-01-01 RX ADMIN — FUROSEMIDE 20 MG: 10 INJECTION, SOLUTION INTRAVENOUS at 21:39

## 2021-01-01 RX ADMIN — IOPAMIDOL 100 ML: 755 INJECTION, SOLUTION INTRAVENOUS at 19:57

## 2021-01-01 RX ADMIN — LATANOPROST 1 DROP: 50 SOLUTION/ DROPS OPHTHALMIC at 21:40

## 2021-01-01 RX ADMIN — FUROSEMIDE 20 MG: 10 INJECTION, SOLUTION INTRAVENOUS at 06:21

## 2021-01-01 RX ADMIN — FUROSEMIDE 20 MG: 10 INJECTION, SOLUTION INTRAVENOUS at 08:46

## 2021-01-01 RX ADMIN — DORZOLAMIDE HYDROCHLORIDE 1 DROP: 20 SOLUTION/ DROPS OPHTHALMIC at 08:43

## 2021-01-01 RX ADMIN — VALACYCLOVIR HYDROCHLORIDE 1000 MG: 500 TABLET, FILM COATED ORAL at 14:42

## 2021-01-01 RX ADMIN — Medication 3 MG: at 20:20

## 2021-01-01 RX ADMIN — TIMOLOL MALEATE 1 DROP: 5 SOLUTION OPHTHALMIC at 21:28

## 2021-01-01 RX ADMIN — TIMOLOL MALEATE 1 DROP: 5 SOLUTION OPHTHALMIC at 09:37

## 2021-01-01 RX ADMIN — ATORVASTATIN CALCIUM 40 MG: 40 TABLET, FILM COATED ORAL at 20:53

## 2021-01-01 RX ADMIN — SOTALOL HYDROCHLORIDE 80 MG: 80 TABLET ORAL at 21:00

## 2021-01-01 RX ADMIN — ATORVASTATIN CALCIUM 40 MG: 40 TABLET, FILM COATED ORAL at 21:58

## 2021-01-01 RX ADMIN — PANTOPRAZOLE 40 MG: 40 TABLET, DELAYED RELEASE ORAL at 08:19

## 2021-01-01 RX ADMIN — SODIUM CHLORIDE, PRESERVATIVE FREE 10 ML: 5 INJECTION INTRAVENOUS at 09:37

## 2021-01-01 RX ADMIN — APIXABAN 2.5 MG: 2.5 TABLET, FILM COATED ORAL at 08:33

## 2021-01-01 RX ADMIN — SODIUM CHLORIDE, PRESERVATIVE FREE 10 ML: 5 INJECTION INTRAVENOUS at 20:22

## 2021-01-01 RX ADMIN — BUMETANIDE 2 MG: 0.25 INJECTION, SOLUTION INTRAMUSCULAR; INTRAVENOUS at 22:31

## 2021-01-01 RX ADMIN — APIXABAN 5 MG: 5 TABLET, FILM COATED ORAL at 09:45

## 2021-01-01 RX ADMIN — APIXABAN 5 MG: 5 TABLET, FILM COATED ORAL at 09:29

## 2021-01-01 RX ADMIN — METOPROLOL TARTRATE 5 MG: 5 INJECTION INTRAVENOUS at 00:54

## 2021-01-01 RX ADMIN — LISINOPRIL 20 MG: 20 TABLET ORAL at 08:43

## 2021-01-01 RX ADMIN — APIXABAN 5 MG: 5 TABLET, FILM COATED ORAL at 08:42

## 2021-01-01 RX ADMIN — LATANOPROST 1 DROP: 50 SOLUTION OPHTHALMIC at 20:07

## 2021-01-01 RX ADMIN — BUMETANIDE 2 MG: 1 TABLET ORAL at 20:13

## 2021-01-01 RX ADMIN — POTASSIUM CHLORIDE 10 MEQ: 7.46 INJECTION, SOLUTION INTRAVENOUS at 20:14

## 2021-01-01 RX ADMIN — ESCITALOPRAM OXALATE 20 MG: 10 TABLET, FILM COATED ORAL at 08:42

## 2021-01-01 RX ADMIN — DORZOLAMIDE HYDROCHLORIDE 1 DROP: 20 SOLUTION/ DROPS OPHTHALMIC at 22:32

## 2021-01-01 RX ADMIN — ATORVASTATIN CALCIUM 40 MG: 40 TABLET, FILM COATED ORAL at 21:42

## 2021-01-01 RX ADMIN — AMIODARONE HYDROCHLORIDE 1 MG/MIN: 50 INJECTION, SOLUTION INTRAVENOUS at 22:32

## 2021-01-01 RX ADMIN — PANTOPRAZOLE SODIUM 40 MG: 40 TABLET, DELAYED RELEASE ORAL at 05:55

## 2021-01-01 RX ADMIN — APIXABAN 5 MG: 5 TABLET, FILM COATED ORAL at 00:22

## 2021-01-01 RX ADMIN — DORZOLAMIDE HYDROCHLORIDE: 20 SOLUTION/ DROPS OPHTHALMIC at 08:20

## 2021-01-01 RX ADMIN — Medication 3 MG: at 00:22

## 2021-01-01 RX ADMIN — MICONAZOLE NITRATE: 2 POWDER TOPICAL at 10:02

## 2021-01-01 RX ADMIN — APIXABAN 5 MG: 5 TABLET, FILM COATED ORAL at 00:02

## 2021-01-01 RX ADMIN — APIXABAN 5 MG: 5 TABLET, FILM COATED ORAL at 09:39

## 2021-01-01 RX ADMIN — DORZOLAMIDE HYDROCHLORIDE: 20 SOLUTION/ DROPS OPHTHALMIC at 09:35

## 2021-01-01 RX ADMIN — VALACYCLOVIR HYDROCHLORIDE 1000 MG: 500 TABLET, FILM COATED ORAL at 21:03

## 2021-01-01 RX ADMIN — ESCITALOPRAM OXALATE 20 MG: 10 TABLET, FILM COATED ORAL at 08:44

## 2021-01-01 RX ADMIN — ESCITALOPRAM 20 MG: 10 TABLET, FILM COATED ORAL at 21:00

## 2021-01-01 RX ADMIN — DEXTROSE MONOHYDRATE 150 MG: 50 INJECTION, SOLUTION INTRAVENOUS at 22:18

## 2021-01-01 RX ADMIN — DORZOLAMIDE HYDROCHLORIDE: 20 SOLUTION/ DROPS OPHTHALMIC at 08:47

## 2021-01-01 RX ADMIN — DORZOLAMIDE HYDROCHLORIDE 1 DROP: 20 SOLUTION/ DROPS OPHTHALMIC at 20:10

## 2021-01-01 RX ADMIN — POTASSIUM CHLORIDE 20 MEQ: 1500 TABLET, EXTENDED RELEASE ORAL at 09:46

## 2021-01-01 RX ADMIN — SOTALOL HYDROCHLORIDE 80 MG: 80 TABLET ORAL at 08:43

## 2021-01-01 RX ADMIN — SOTALOL HYDROCHLORIDE 80 MG: 80 TABLET ORAL at 08:42

## 2021-01-01 RX ADMIN — PANTOPRAZOLE SODIUM 40 MG: 40 TABLET, DELAYED RELEASE ORAL at 06:22

## 2021-01-01 RX ADMIN — ASPIRIN 81 MG: 81 TABLET, CHEWABLE ORAL at 08:58

## 2021-01-01 RX ADMIN — Medication 3 MG: at 22:09

## 2021-01-01 RX ADMIN — TIMOLOL MALEATE 1 DROP: 5 SOLUTION OPHTHALMIC at 09:36

## 2021-01-01 RX ADMIN — LISINOPRIL 20 MG: 20 TABLET ORAL at 22:03

## 2021-01-01 RX ADMIN — DORZOLAMIDE HYDROCHLORIDE 1 DROP: 20 SOLUTION/ DROPS OPHTHALMIC at 09:37

## 2021-01-01 RX ADMIN — APIXABAN 5 MG: 5 TABLET, FILM COATED ORAL at 20:14

## 2021-01-01 RX ADMIN — LOSARTAN POTASSIUM 50 MG: 50 TABLET, FILM COATED ORAL at 21:57

## 2021-01-01 RX ADMIN — POTASSIUM CHLORIDE 20 MEQ: 1500 TABLET, EXTENDED RELEASE ORAL at 09:29

## 2021-01-01 RX ADMIN — VALACYCLOVIR HYDROCHLORIDE 1000 MG: 500 TABLET, FILM COATED ORAL at 05:50

## 2021-01-01 RX ADMIN — SODIUM CHLORIDE, PRESERVATIVE FREE 10 ML: 5 INJECTION INTRAVENOUS at 08:45

## 2021-01-01 RX ADMIN — VALACYCLOVIR HYDROCHLORIDE 1000 MG: 500 TABLET, FILM COATED ORAL at 08:58

## 2021-01-01 RX ADMIN — METOPROLOL TARTRATE 25 MG: 25 TABLET, FILM COATED ORAL at 00:02

## 2021-01-01 RX ADMIN — DORZOLAMIDE HYDROCHLORIDE: 20 SOLUTION/ DROPS OPHTHALMIC at 08:46

## 2021-01-01 RX ADMIN — LATANOPROST 1 DROP: 50 SOLUTION OPHTHALMIC at 22:22

## 2021-01-01 RX ADMIN — METOPROLOL TARTRATE 5 MG: 5 INJECTION INTRAVENOUS at 14:54

## 2021-01-01 RX ADMIN — ESCITALOPRAM 20 MG: 10 TABLET, FILM COATED ORAL at 21:58

## 2021-01-01 RX ADMIN — LATANOPROST 1 DROP: 50 SOLUTION OPHTHALMIC at 20:20

## 2021-01-01 RX ADMIN — METOPROLOL SUCCINATE 75 MG: 50 TABLET, EXTENDED RELEASE ORAL at 20:14

## 2021-01-01 RX ADMIN — VALACYCLOVIR HYDROCHLORIDE 1000 MG: 500 TABLET, FILM COATED ORAL at 06:16

## 2021-01-01 RX ADMIN — ASPIRIN 81 MG: 81 TABLET, CHEWABLE ORAL at 08:46

## 2021-01-01 RX ADMIN — Medication 10 ML: at 20:55

## 2021-01-01 RX ADMIN — LOSARTAN POTASSIUM 50 MG: 50 TABLET, FILM COATED ORAL at 21:42

## 2021-01-01 RX ADMIN — SODIUM CHLORIDE, PRESERVATIVE FREE 10 ML: 5 INJECTION INTRAVENOUS at 10:02

## 2021-01-01 RX ADMIN — BUMETANIDE 2 MG: 1 TABLET ORAL at 09:45

## 2021-01-01 RX ADMIN — DORZOLAMIDE HYDROCHLORIDE 1 DROP: 20 SOLUTION/ DROPS OPHTHALMIC at 09:47

## 2021-01-01 RX ADMIN — MICONAZOLE NITRATE: 2 POWDER TOPICAL at 09:40

## 2021-01-01 RX ADMIN — CARVEDILOL 6.25 MG: 6.25 TABLET, FILM COATED ORAL at 17:16

## 2021-01-01 RX ADMIN — CARVEDILOL 6.25 MG: 6.25 TABLET, FILM COATED ORAL at 18:00

## 2021-01-01 RX ADMIN — APIXABAN 2.5 MG: 2.5 TABLET, FILM COATED ORAL at 20:57

## 2021-01-01 RX ADMIN — METOPROLOL TARTRATE 5 MG: 5 INJECTION INTRAVENOUS at 20:09

## 2021-01-01 RX ADMIN — Medication 10 ML: at 09:33

## 2021-01-01 RX ADMIN — ASPIRIN 81 MG 81 MG: 81 TABLET ORAL at 11:09

## 2021-01-01 RX ADMIN — VALACYCLOVIR HYDROCHLORIDE 1000 MG: 500 TABLET, FILM COATED ORAL at 21:57

## 2021-01-01 RX ADMIN — VALACYCLOVIR HYDROCHLORIDE 1000 MG: 500 TABLET, FILM COATED ORAL at 21:43

## 2021-01-01 RX ADMIN — ATENOLOL 75 MG: 25 TABLET ORAL at 09:32

## 2021-01-01 RX ADMIN — PANTOPRAZOLE SODIUM 40 MG: 40 TABLET, DELAYED RELEASE ORAL at 05:50

## 2021-01-01 RX ADMIN — DORZOLAMIDE HYDROCHLORIDE: 20 SOLUTION/ DROPS OPHTHALMIC at 08:58

## 2021-01-01 RX ADMIN — ASPIRIN 81 MG 81 MG: 81 TABLET ORAL at 08:43

## 2021-01-01 RX ADMIN — SOTALOL HYDROCHLORIDE 120 MG: 120 TABLET ORAL at 20:14

## 2021-01-01 RX ADMIN — ESCITALOPRAM 20 MG: 10 TABLET, FILM COATED ORAL at 21:03

## 2021-01-01 RX ADMIN — ATORVASTATIN CALCIUM 40 MG: 40 TABLET, FILM COATED ORAL at 22:32

## 2021-01-01 RX ADMIN — Medication 10 ML: at 21:02

## 2021-01-01 RX ADMIN — ASPIRIN 81 MG 81 MG: 81 TABLET ORAL at 08:42

## 2021-01-01 RX ADMIN — TIMOLOL MALEATE 1 DROP: 5 SOLUTION OPHTHALMIC at 09:47

## 2021-01-01 RX ADMIN — DORZOLAMIDE HYDROCHLORIDE 1 DROP: 20 SOLUTION/ DROPS OPHTHALMIC at 20:20

## 2021-01-01 RX ADMIN — FUROSEMIDE 20 MG: 20 TABLET ORAL at 12:21

## 2021-01-01 RX ADMIN — PANTOPRAZOLE SODIUM 40 MG: 40 TABLET, DELAYED RELEASE ORAL at 07:56

## 2021-01-01 RX ADMIN — POTASSIUM CHLORIDE 10 MEQ: 7.46 INJECTION, SOLUTION INTRAVENOUS at 09:06

## 2021-01-01 RX ADMIN — SODIUM CHLORIDE, PRESERVATIVE FREE 10 ML: 5 INJECTION INTRAVENOUS at 20:09

## 2021-01-01 RX ADMIN — DORZOLAMIDE HYDROCHLORIDE 1 DROP: 20 SOLUTION/ DROPS OPHTHALMIC at 22:21

## 2021-01-01 RX ADMIN — FUROSEMIDE 20 MG: 20 TABLET ORAL at 08:33

## 2021-01-01 RX ADMIN — SODIUM CHLORIDE, PRESERVATIVE FREE 10 ML: 5 INJECTION INTRAVENOUS at 08:19

## 2021-01-01 RX ADMIN — ASPIRIN 81 MG 81 MG: 81 TABLET ORAL at 09:28

## 2021-01-01 RX ADMIN — LISINOPRIL 20 MG: 20 TABLET ORAL at 09:44

## 2021-01-01 RX ADMIN — DORZOLAMIDE HYDROCHLORIDE 1 DROP: 20 SOLUTION/ DROPS OPHTHALMIC at 09:30

## 2021-01-01 RX ADMIN — VALACYCLOVIR HYDROCHLORIDE 1000 MG: 500 TABLET, FILM COATED ORAL at 21:00

## 2021-01-01 RX ADMIN — ESCITALOPRAM OXALATE 20 MG: 10 TABLET, FILM COATED ORAL at 09:45

## 2021-01-01 RX ADMIN — APIXABAN 5 MG: 5 TABLET, FILM COATED ORAL at 08:20

## 2021-01-01 RX ADMIN — APIXABAN 5 MG: 5 TABLET, FILM COATED ORAL at 22:32

## 2021-01-01 RX ADMIN — ATORVASTATIN CALCIUM 40 MG: 40 TABLET, FILM COATED ORAL at 20:57

## 2021-01-01 RX ADMIN — TIMOLOL MALEATE 1 DROP: 5 SOLUTION OPHTHALMIC at 22:33

## 2021-01-01 RX ADMIN — ATORVASTATIN CALCIUM 40 MG: 40 TABLET, FILM COATED ORAL at 20:21

## 2021-01-01 RX ADMIN — MICONAZOLE NITRATE: 2 POWDER TOPICAL at 09:37

## 2021-01-01 RX ADMIN — DORZOLAMIDE HYDROCHLORIDE: 20 SOLUTION/ DROPS OPHTHALMIC at 20:57

## 2021-01-01 RX ADMIN — DORZOLAMIDE HYDROCHLORIDE: 20 SOLUTION/ DROPS OPHTHALMIC at 21:01

## 2021-01-01 RX ADMIN — DIGOXIN 125 MCG: 0.25 INJECTION INTRAMUSCULAR; INTRAVENOUS at 21:44

## 2021-01-01 RX ADMIN — ATENOLOL 75 MG: 25 TABLET ORAL at 21:03

## 2021-01-01 RX ADMIN — APIXABAN 2.5 MG: 2.5 TABLET, FILM COATED ORAL at 21:03

## 2021-01-01 RX ADMIN — LATANOPROST 1 DROP: 50 SOLUTION/ DROPS OPHTHALMIC at 21:59

## 2021-01-01 RX ADMIN — METOPROLOL TARTRATE 25 MG: 25 TABLET, FILM COATED ORAL at 08:58

## 2021-01-01 RX ADMIN — PANTOPRAZOLE SODIUM 40 MG: 40 TABLET, DELAYED RELEASE ORAL at 06:06

## 2021-01-01 RX ADMIN — DILTIAZEM HYDROCHLORIDE 5 MG/HR: 5 INJECTION INTRAVENOUS at 19:10

## 2021-01-01 RX ADMIN — SOTALOL HYDROCHLORIDE 120 MG: 120 TABLET ORAL at 09:39

## 2021-01-01 RX ADMIN — POTASSIUM CHLORIDE 10 MEQ: 7.46 INJECTION, SOLUTION INTRAVENOUS at 18:55

## 2021-01-01 RX ADMIN — VALACYCLOVIR HYDROCHLORIDE 1000 MG: 500 TABLET, FILM COATED ORAL at 06:58

## 2021-01-01 RX ADMIN — ASPIRIN 81 MG: 81 TABLET, CHEWABLE ORAL at 09:33

## 2021-01-01 RX ADMIN — METOPROLOL TARTRATE 50 MG: 50 TABLET, FILM COATED ORAL at 09:29

## 2021-01-01 RX ADMIN — LATANOPROST 1 DROP: 50 SOLUTION/ DROPS OPHTHALMIC at 21:01

## 2021-01-01 RX ADMIN — APIXABAN 5 MG: 5 TABLET, FILM COATED ORAL at 21:27

## 2021-01-01 RX ADMIN — Medication 10 ML: at 21:38

## 2021-01-01 RX ADMIN — BUMETANIDE 2 MG: 0.25 INJECTION, SOLUTION INTRAMUSCULAR; INTRAVENOUS at 08:44

## 2021-01-01 RX ADMIN — BUMETANIDE 2 MG: 0.25 INJECTION, SOLUTION INTRAMUSCULAR; INTRAVENOUS at 08:43

## 2021-01-01 RX ADMIN — APIXABAN 2.5 MG: 2.5 TABLET, FILM COATED ORAL at 08:48

## 2021-01-01 RX ADMIN — FUROSEMIDE 20 MG: 10 INJECTION, SOLUTION INTRAVENOUS at 18:55

## 2021-01-01 RX ADMIN — CARVEDILOL 6.25 MG: 6.25 TABLET, FILM COATED ORAL at 08:48

## 2021-01-01 RX ADMIN — LISINOPRIL 20 MG: 20 TABLET ORAL at 21:27

## 2021-01-01 RX ADMIN — DEXTROSE MONOHYDRATE 150 MG: 50 INJECTION, SOLUTION INTRAVENOUS at 12:32

## 2021-01-01 RX ADMIN — ASPIRIN 81 MG: 81 TABLET, CHEWABLE ORAL at 08:33

## 2021-01-01 RX ADMIN — ATORVASTATIN CALCIUM 40 MG: 40 TABLET, FILM COATED ORAL at 00:02

## 2021-01-01 RX ADMIN — MICONAZOLE NITRATE: 2 POWDER TOPICAL at 20:18

## 2021-01-01 RX ADMIN — DIPHENHYDRAMINE HYDROCHLORIDE 12.5 MG: 50 INJECTION, SOLUTION INTRAMUSCULAR; INTRAVENOUS at 19:10

## 2021-01-01 RX ADMIN — POTASSIUM CHLORIDE 20 MEQ: 1500 TABLET, EXTENDED RELEASE ORAL at 16:05

## 2021-01-01 RX ADMIN — BUMETANIDE 2 MG: 1 TABLET ORAL at 21:27

## 2021-01-01 RX ADMIN — APIXABAN 2.5 MG: 2.5 TABLET, FILM COATED ORAL at 08:46

## 2021-01-01 RX ADMIN — ESCITALOPRAM 20 MG: 10 TABLET, FILM COATED ORAL at 21:42

## 2021-01-01 RX ADMIN — APIXABAN 5 MG: 5 TABLET, FILM COATED ORAL at 11:09

## 2021-01-01 RX ADMIN — SOTALOL HYDROCHLORIDE 120 MG: 120 TABLET ORAL at 09:28

## 2021-01-01 RX ADMIN — LATANOPROST 1 DROP: 50 SOLUTION OPHTHALMIC at 21:28

## 2021-01-01 RX ADMIN — ATORVASTATIN CALCIUM 40 MG: 40 TABLET, FILM COATED ORAL at 21:03

## 2021-01-01 RX ADMIN — FUROSEMIDE 20 MG: 20 TABLET ORAL at 08:58

## 2021-01-01 RX ADMIN — SOTALOL HYDROCHLORIDE 120 MG: 120 TABLET ORAL at 21:28

## 2021-01-01 RX ADMIN — ESCITALOPRAM 20 MG: 10 TABLET, FILM COATED ORAL at 00:02

## 2021-01-01 RX ADMIN — PANTOPRAZOLE SODIUM 40 MG: 40 TABLET, DELAYED RELEASE ORAL at 06:58

## 2021-01-01 RX ADMIN — VALACYCLOVIR HYDROCHLORIDE 1000 MG: 500 TABLET, FILM COATED ORAL at 13:47

## 2021-01-01 RX ADMIN — METOPROLOL TARTRATE 50 MG: 50 TABLET, FILM COATED ORAL at 09:45

## 2021-01-01 RX ADMIN — METOPROLOL TARTRATE 50 MG: 50 TABLET, FILM COATED ORAL at 20:21

## 2021-01-01 RX ADMIN — DIGOXIN 250 MCG: 0.25 INJECTION INTRAMUSCULAR; INTRAVENOUS at 12:21

## 2021-01-01 RX ADMIN — METOPROLOL TARTRATE 50 MG: 50 TABLET, FILM COATED ORAL at 22:02

## 2021-01-01 RX ADMIN — Medication 10 ML: at 08:33

## 2021-01-01 RX ADMIN — SOTALOL HYDROCHLORIDE 80 MG: 80 TABLET ORAL at 22:31

## 2021-01-01 RX ADMIN — DORZOLAMIDE HYDROCHLORIDE 1 DROP: 20 SOLUTION/ DROPS OPHTHALMIC at 21:28

## 2021-01-01 RX ADMIN — BUMETANIDE 2 MG: 0.25 INJECTION, SOLUTION INTRAMUSCULAR; INTRAVENOUS at 22:21

## 2021-01-01 RX ADMIN — LOSARTAN POTASSIUM 50 MG: 50 TABLET, FILM COATED ORAL at 20:53

## 2021-01-01 RX ADMIN — ASPIRIN 81 MG: 81 TABLET, CHEWABLE ORAL at 08:48

## 2021-01-01 RX ADMIN — ASPIRIN 81 MG 81 MG: 81 TABLET ORAL at 09:39

## 2021-01-01 RX ADMIN — LISINOPRIL 20 MG: 20 TABLET ORAL at 08:42

## 2021-01-01 RX ADMIN — DIGOXIN 125 MCG: 125 TABLET ORAL at 11:41

## 2021-01-01 RX ADMIN — LISINOPRIL 20 MG: 20 TABLET ORAL at 09:39

## 2021-01-01 RX ADMIN — CARVEDILOL 6.25 MG: 6.25 TABLET, FILM COATED ORAL at 08:33

## 2021-01-01 RX ADMIN — APIXABAN 2.5 MG: 2.5 TABLET, FILM COATED ORAL at 21:43

## 2021-01-01 RX ADMIN — SODIUM CHLORIDE, PRESERVATIVE FREE 10 ML: 5 INJECTION INTRAVENOUS at 22:34

## 2021-01-01 RX ADMIN — ATORVASTATIN CALCIUM 40 MG: 40 TABLET, FILM COATED ORAL at 21:27

## 2021-01-01 RX ADMIN — Medication 10 ML: at 08:46

## 2021-01-01 RX ADMIN — SODIUM CHLORIDE, PRESERVATIVE FREE 10 ML: 5 INJECTION INTRAVENOUS at 09:30

## 2021-01-01 RX ADMIN — APIXABAN 5 MG: 5 TABLET, FILM COATED ORAL at 20:20

## 2021-01-01 RX ADMIN — ASPIRIN 81 MG: 81 TABLET, CHEWABLE ORAL at 08:20

## 2021-01-01 RX ADMIN — Medication 10 ML: at 21:03

## 2021-01-01 RX ADMIN — METOPROLOL TARTRATE 50 MG: 50 TABLET, FILM COATED ORAL at 22:32

## 2021-01-01 RX ADMIN — METOPROLOL TARTRATE 50 MG: 50 TABLET, FILM COATED ORAL at 21:28

## 2021-01-01 RX ADMIN — TIMOLOL MALEATE 1 DROP: 5 SOLUTION OPHTHALMIC at 22:23

## 2021-01-01 RX ADMIN — LISINOPRIL 20 MG: 20 TABLET ORAL at 22:32

## 2021-01-01 RX ADMIN — PERFLUTREN 8.48 MG: 6.52 INJECTION, SUSPENSION INTRAVENOUS at 09:05

## 2021-01-01 RX ADMIN — POTASSIUM CHLORIDE 10 MEQ: 7.46 INJECTION, SOLUTION INTRAVENOUS at 07:56

## 2021-01-01 RX ADMIN — TIMOLOL MALEATE 1 DROP: 5 SOLUTION OPHTHALMIC at 08:45

## 2021-01-01 RX ADMIN — BUMETANIDE 1 MG: 1 TABLET ORAL at 09:38

## 2021-01-01 RX ADMIN — ESCITALOPRAM OXALATE 20 MG: 10 TABLET, FILM COATED ORAL at 09:29

## 2021-01-01 RX ADMIN — METOPROLOL TARTRATE 50 MG: 50 TABLET, FILM COATED ORAL at 00:22

## 2021-01-01 RX ADMIN — ACETAMINOPHEN 650 MG: 325 TABLET, FILM COATED ORAL at 11:41

## 2021-01-01 RX ADMIN — PANTOPRAZOLE SODIUM 40 MG: 40 TABLET, DELAYED RELEASE ORAL at 06:26

## 2021-01-01 RX ADMIN — DORZOLAMIDE HYDROCHLORIDE 1 DROP: 20 SOLUTION/ DROPS OPHTHALMIC at 08:45

## 2021-01-01 RX ADMIN — LATANOPROST 1 DROP: 50 SOLUTION/ DROPS OPHTHALMIC at 20:59

## 2021-01-01 RX ADMIN — DORZOLAMIDE HYDROCHLORIDE: 20 SOLUTION/ DROPS OPHTHALMIC at 20:54

## 2021-01-01 RX ADMIN — ATORVASTATIN CALCIUM 40 MG: 40 TABLET, FILM COATED ORAL at 20:13

## 2021-01-01 RX ADMIN — DORZOLAMIDE HYDROCHLORIDE: 20 SOLUTION/ DROPS OPHTHALMIC at 21:40

## 2021-01-01 RX ADMIN — DIGOXIN 125 MCG: 125 TABLET ORAL at 11:12

## 2021-01-01 RX ADMIN — METOPROLOL TARTRATE 50 MG: 50 TABLET, FILM COATED ORAL at 08:44

## 2021-01-01 RX ADMIN — APIXABAN 2.5 MG: 2.5 TABLET, FILM COATED ORAL at 20:53

## 2021-01-01 RX ADMIN — METOPROLOL TARTRATE 25 MG: 25 TABLET, FILM COATED ORAL at 21:00

## 2021-01-01 RX ADMIN — TIMOLOL MALEATE 1 DROP: 5 SOLUTION OPHTHALMIC at 20:11

## 2021-01-01 SDOH — ECONOMIC STABILITY: FOOD INSECURITY: WITHIN THE PAST 12 MONTHS, THE FOOD YOU BOUGHT JUST DIDN'T LAST AND YOU DIDN'T HAVE MONEY TO GET MORE.: NEVER TRUE

## 2021-01-01 SDOH — ECONOMIC STABILITY: FOOD INSECURITY: WITHIN THE PAST 12 MONTHS, YOU WORRIED THAT YOUR FOOD WOULD RUN OUT BEFORE YOU GOT MONEY TO BUY MORE.: NEVER TRUE

## 2021-01-01 ASSESSMENT — ENCOUNTER SYMPTOMS
WHEEZING: 0
SORE THROAT: 0
VOMITING: 0
EYE DISCHARGE: 0
VOMITING: 0
SORE THROAT: 0
SHORTNESS OF BREATH: 0
SHORTNESS OF BREATH: 0
ABDOMINAL DISTENTION: 0
SHORTNESS OF BREATH: 1
STRIDOR: 0
SHORTNESS OF BREATH: 0
EYE REDNESS: 0
CONSTIPATION: 0
EYE REDNESS: 0
EYE DISCHARGE: 0
CHOKING: 1
CONSTIPATION: 0
COLOR CHANGE: 0
NAUSEA: 0
COUGH: 1
BACK PAIN: 0
COUGH: 0
EYE PAIN: 0
NAUSEA: 0
CHEST TIGHTNESS: 0
WHEEZING: 0
EYES NEGATIVE: 1
WHEEZING: 0
NAUSEA: 0
COLOR CHANGE: 0
VOMITING: 0
ABDOMINAL PAIN: 0
RHINORRHEA: 0
ABDOMINAL PAIN: 0
EYE ITCHING: 0
COUGH: 0
SINUS PRESSURE: 0
SHORTNESS OF BREATH: 0
WHEEZING: 0
GASTROINTESTINAL NEGATIVE: 1
VOMITING: 0
COUGH: 0
VOICE CHANGE: 0
SORE THROAT: 0
BLOOD IN STOOL: 0
COUGH: 0
ABDOMINAL PAIN: 0
SHORTNESS OF BREATH: 0
PHOTOPHOBIA: 0
DIARRHEA: 0
EYE ITCHING: 0
BLOOD IN STOOL: 0
WHEEZING: 0
EYE PAIN: 0
SORE THROAT: 0
SINUS PRESSURE: 0
DIARRHEA: 0
DIARRHEA: 0
SORE THROAT: 0
CHEST TIGHTNESS: 0
ABDOMINAL DISTENTION: 0
TROUBLE SWALLOWING: 0
TROUBLE SWALLOWING: 0
CHOKING: 0
RHINORRHEA: 0
CHEST TIGHTNESS: 0
COUGH: 0
ALLERGIC/IMMUNOLOGIC NEGATIVE: 1
ABDOMINAL PAIN: 0
SHORTNESS OF BREATH: 1

## 2021-01-01 ASSESSMENT — PAIN SCALES - GENERAL
PAINLEVEL_OUTOF10: 0

## 2021-01-01 ASSESSMENT — PATIENT HEALTH QUESTIONNAIRE - PHQ9
SUM OF ALL RESPONSES TO PHQ QUESTIONS 1-9: 0
1. LITTLE INTEREST OR PLEASURE IN DOING THINGS: 0
2. FEELING DOWN, DEPRESSED OR HOPELESS: 0
SUM OF ALL RESPONSES TO PHQ9 QUESTIONS 1 & 2: 0
SUM OF ALL RESPONSES TO PHQ QUESTIONS 1-9: 0
SUM OF ALL RESPONSES TO PHQ QUESTIONS 1-9: 0

## 2021-01-01 ASSESSMENT — SOCIAL DETERMINANTS OF HEALTH (SDOH): HOW HARD IS IT FOR YOU TO PAY FOR THE VERY BASICS LIKE FOOD, HOUSING, MEDICAL CARE, AND HEATING?: NOT HARD AT ALL

## 2021-01-01 ASSESSMENT — LIFESTYLE VARIABLES: HOW OFTEN DO YOU HAVE A DRINK CONTAINING ALCOHOL: 0

## 2021-03-12 PROBLEM — Z78.9 POOR HISTORIAN: Status: ACTIVE | Noted: 2021-01-01

## 2021-03-12 PROBLEM — R60.9 PERIPHERAL EDEMA: Status: ACTIVE | Noted: 2021-01-01

## 2021-03-12 NOTE — PROGRESS NOTES
[address] [facility]  [address] [address] [address] [address] [address]  Dept: [phone]  Dept Fax: [phone]: [phone]      Visit Date: 3/12/2021    Shelton Genao a 82 y.o. female who presents today for:  Chief Complaint   Patient presents with    Other     edema         HPI:     82years old and she is in complaining of edema in her extremities. She is not a very good historian she told my nurse she has been out of her diuretics for 2 weeks and she told me she took 1 this morning so the truth is somewhere in the middle. She is here though complaining of increased edema.     Past Medical History:   Diagnosis Date    Arthritis     Atrial fibrillation (HCC)     GERD (gastroesophageal reflux disease)     Hyperlipidemia     Hypertension     Tremor     to right hand      Past Surgical History:   Procedure Laterality Date    CHOLECYSTECTOMY      HUMERUS FRACTURE SURGERY Right 7/9/2016    REVERSE TOTAL SHOULDER ARTHROPLASTY performed by Yanira Astudillo MD at [facility] OR       Family History   Problem Relation Age of Onset    Heart Attack Mother     Stroke Father     Heart Attack Father     Heart Disease Sister     Atrial Fibrillation Sister        Social History     Tobacco Use    Smoking status: Never Smoker    Smokeless tobacco: Never Used   Substance Use Topics    Alcohol use: No      Current Outpatient Medications   Medication Sig Dispense Refill    omeprazole (PRILOSEC) 40 MG delayed release capsule TAKE 1 CAPSULE DAILY 90 capsule 3    vitamin D (ERGOCALCIFEROL) 1.25 MG (26698 UT) CAPS capsule TAKE 1 CAPSULE BY MOUTH ONE TIME PER WEEK 12 capsule 3    furosemide (LASIX) 20 MG tablet Take 1 tablet by mouth daily as needed (for edema/swelling.) 90 tablet 1    losartan (COZAAR) 50 MG tablet TAKE 1 TABLET DAILY, IF    BLOOD PRESSURE STARTS TO   RUN LOW CUT DOWN TO 1/2    TABLET 90 tablet 3    atenolol (TENORMIN) 50 MG tablet TAKE 1 & 1/2 TABLETS BY MOUTH TWICE A  tablet 3    atorvastatin (LIPITOR) 40 MG tablet TAKE 1 TABLET DAILY 90 tablet 3    escitalopram (LEXAPRO) 20 MG tablet TAKE 1 TABLET BY MOUTH EVERY DAY 90 tablet 3    ciclopirox (LOPROX) 0.77 % cream APPLY UNDER BREASTS 2 TIMES DAILY FOR 2 WEEKS  0    aspirin 81 MG chewable tablet Take 1 tablet by mouth daily 30 tablet 3    dorzolamide-timolol (COSOPT) 22.3-6.8 MG/ML ophthalmic solution INSTILL 1 DROP INTO BOTH EYES TWICE A DAY AS DIRECTED  3    latanoprost (XALATAN) 0.005 % ophthalmic solution INSTILL 1 DROP INTO BOTH EYES IN THE EVENING  3     No current facility-administered medications for this visit. Allergies   Allergen Reactions    Cardizem [Diltiazem Hcl] Rash    Morphine Nausea And Vomiting         Subjective:     Review of Systems   Constitutional: Positive for fatigue. Negative for activity change, appetite change and fever. HENT: Negative for congestion, hearing loss, sinus pressure, sore throat and trouble swallowing. Eyes: Negative for discharge and itching. Respiratory: Negative for shortness of breath and wheezing. Cardiovascular: Positive for leg swelling. Negative for chest pain and palpitations. Gastrointestinal: Negative for abdominal distention, abdominal pain, blood in stool, nausea and vomiting. Endocrine: Negative for cold intolerance, heat intolerance and polydipsia. Genitourinary: Negative for flank pain, frequency, hematuria and urgency. Musculoskeletal: Negative for arthralgias, back pain and joint swelling. Skin: Negative for rash and wound. Allergic/Immunologic: Negative for environmental allergies and food allergies. Neurological: Negative for dizziness, tremors, syncope, weakness, numbness and headaches. Hematological: Negative for adenopathy. Psychiatric/Behavioral: Negative for agitation and hallucinations. The patient is not nervous/anxious.         Objective:      /70   Pulse 72   Ht 5' 5\" (1.651 m) Wt 175 lb (79.4 kg)   SpO2 96%   BMI 29.12 kg/m²    Physical Exam  Constitutional:       General: She is not in acute distress. Appearance: She is well-developed. She is not diaphoretic. HENT:      Head: Normocephalic and atraumatic. Right Ear: External ear normal.      Left Ear: External ear normal.      Nose: Nose normal.      Mouth/Throat:      Pharynx: No oropharyngeal exudate. Eyes:      General: No scleral icterus. Right eye: No discharge. Left eye: No discharge. Conjunctiva/sclera: Conjunctivae normal.      Pupils: Pupils are equal, round, and reactive to light. Neck:      Musculoskeletal: Normal range of motion and neck supple. Thyroid: No thyromegaly. Vascular: No JVD. Trachea: No tracheal deviation. Cardiovascular:      Rate and Rhythm: Normal rate and regular rhythm. Heart sounds: Normal heart sounds. No murmur. No friction rub. No gallop. Pulmonary:      Effort: Pulmonary effort is normal. No respiratory distress. Breath sounds: Normal breath sounds. No wheezing or rales. Abdominal:      General: Bowel sounds are normal. There is no distension. Palpations: Abdomen is soft. There is no mass. Tenderness: There is no abdominal tenderness. There is no guarding or rebound. Musculoskeletal: Normal range of motion. General: No tenderness or deformity. Right lower leg: Edema present. Left lower leg: Edema present. Lymphadenopathy:      Cervical: No cervical adenopathy. Skin:     General: Skin is warm and dry. Coloration: Skin is not pale. Findings: No erythema or rash. Neurological:      Mental Status: She is alert and oriented to person, place, and time. Cranial Nerves: No cranial nerve deficit. Motor: No abnormal muscle tone. Coordination: Coordination normal.      Deep Tendon Reflexes: Reflexes are normal and symmetric.  Reflexes normal.   Psychiatric:         Behavior: Behavior

## 2021-06-09 NOTE — TELEPHONE ENCOUNTER
Called pt and she is aware. She is scheduled with Tyler Thompson on Monday at 12:45. Pt is going to come in early for labs as she has a hard time getting transportation on days other than Fridays.

## 2021-06-09 NOTE — TELEPHONE ENCOUNTER
Pt called and stated that Dr. Daquan Pelaez told her to double her lasix. Pt is out of her medication and she is experiencing some edema. Rx was never changed. Okay to change rx to 40mg or give double supply of the 20mg? [facility]. Please advise.

## 2021-06-09 NOTE — TELEPHONE ENCOUNTER
So my take on this is we do not need to bump her Lasix up like that without keep an eye on her blood work. She could take the 40 for a few days I would just call her in 25s and have her take 2 for the next few days and then get her into see somebody maybe the first of next week and get a CMP on her that day to make sure she does not need potassium.

## 2021-06-14 PROBLEM — R60.0 LOWER EXTREMITY EDEMA: Status: ACTIVE | Noted: 2021-01-01

## 2021-06-14 NOTE — PROGRESS NOTES
[facility] PHYSICIAN SERVICES  [facility]  [address] [facility] [address] [address]  Dept: [phone]  Dept Fax: [phone]: [phone]    Rafal Veliz (:  1935) is a 82 y.o. female,Established patient, here for evaluation of the following chief complaint(s): Other (review lab and discuss lasix)      Rafal Veliz is a 82 y.o. female who presents today for her medical conditions/complaints as noted below. Rafal Veliz is c/steph Other (review lab and discuss lasix)        HPI:     Chief Complaint   Patient presents with    Other     review lab and discuss lasix     HPI  1. Edema of feet and legs; She was seen in March and was told to take 2 diurectics daily'   DR Daquan Pelaez also noted that she had been eating a lot of salt; She is sill having swelling  And some pain; In the am they are not swollen  But swell as the day goes on gets worse; She is not SOB;     She had labs done today and K is 4.0      Past Medical History:   Diagnosis Date    Arthritis     Atrial fibrillation ([address]     GERD (gastroesophageal reflux disease)     Hyperlipidemia     Hypertension     Tremor     to right hand      Past Surgical History:   Procedure Laterality Date    CHOLECYSTECTOMY      HUMERUS FRACTURE SURGERY Right 2016    REVERSE TOTAL SHOULDER ARTHROPLASTY performed by Timo Cooper MD at [facility] 2021 2021 3/12/2021 2020 2020    SYSTOLIC 263 [phone]   DIASTOLIC 80 86 70 84 80 80   Site - - - - - -   Pulse - 76 72 76 73 78   Temp - 98.2 - - 96.6 96.6   Resp - - - - - -   SpO2 - 97 96 94 94 97   Weight - - 175 lb 172 lb 173 lb 165 lb   Height - - 5' 5\" 5' 5\" 5' 5\" 5' 5\"   Body mass index - - 29.12 kg/m2 28.62 kg/m2 28.79 kg/m2 27.45 kg/m2   Pain Level - - - - - -   Some recent data might be hidden       Family History   Problem Relation Age of Onset    Heart Attack Mother     Stroke Father     Heart Attack Father     Heart Disease Sister     Atrial Fibrillation Sister        Social History     Tobacco Use    Smoking status: Never Smoker    Smokeless tobacco: Never Used   Substance Use Topics    Alcohol use: No      Current Outpatient Medications   Medication Sig Dispense Refill    furosemide (LASIX) 40 MG tablet Take 1 tablet by mouth daily as needed (for edema/swelling.) 90 tablet 1    ELIQUIS 2.5 MG TABS tablet TAKE 1 TABLET BY MOUTH TWICE A DAY      LUMIGAN 0.01 % SOLN ophthalmic drops INSTILL 1 DROP INTO BOTH EYES EVERY EVENING AS DIRECTED      escitalopram (LEXAPRO) 20 MG tablet TAKE 1 TABLET BY MOUTH EVERY DAY 90 tablet 3    omeprazole (PRILOSEC) 40 MG delayed release capsule TAKE 1 CAPSULE DAILY 90 capsule 3    vitamin D (ERGOCALCIFEROL) 1.25 MG (81601 UT) CAPS capsule TAKE 1 CAPSULE BY MOUTH ONE TIME PER WEEK 12 capsule 3    losartan (COZAAR) 50 MG tablet TAKE 1 TABLET DAILY, IF    BLOOD PRESSURE STARTS TO   RUN LOW CUT DOWN TO 1/2    TABLET 90 tablet 3    atenolol (TENORMIN) 50 MG tablet TAKE 1 & 1/2 TABLETS BY MOUTH TWICE A  tablet 3    atorvastatin (LIPITOR) 40 MG tablet TAKE 1 TABLET DAILY 90 tablet 3    ciclopirox (LOPROX) 0.77 % cream APPLY UNDER BREASTS 2 TIMES DAILY FOR 2 WEEKS (Patient not taking: Reported on 6/14/2021)  0    aspirin 81 MG chewable tablet Take 1 tablet by mouth daily 30 tablet 3    dorzolamide-timolol (COSOPT) 22.3-6.8 MG/ML ophthalmic solution INSTILL 1 DROP INTO BOTH EYES TWICE A DAY AS DIRECTED  3    latanoprost (XALATAN) 0.005 % ophthalmic solution INSTILL 1 DROP INTO BOTH EYES IN THE EVENING  3     No current facility-administered medications for this visit.      Allergies   Allergen Reactions    Cardizem [Diltiazem Hcl] Rash    Morphine Nausea And Vomiting       Health Maintenance   Topic Date Due    COVID-19 Vaccine (1) Never done    DTaP/Tdap/Td vaccine (1 - Tdap) Never done    Shingles Vaccine (1 of 2) Never done    Pneumococcal 65+ years Vaccine (2 of 2 - PPSV23) 02/09/2019    Annual Wellness Visit (AWV)  Never done    TSH testing  05/27/2021    Flu vaccine (Season Ended) 09/01/2021    Lipid screen  06/11/2022    Potassium monitoring  06/11/2022    Creatinine monitoring  06/11/2022    DEXA (modify frequency per FRAX score)  Completed    Hepatitis A vaccine  Aged Out    Hepatitis B vaccine  Aged Out    Hib vaccine  Aged Out    Meningococcal (ACWY) vaccine  Aged Out       No results found for: LABA1C  No results found for: PSA, PSADIA  TSH   Date Value Ref Range Status   05/27/2020 3.540 0.270 - 4.200 uIU/mL Final   ]  Lab Results   Component Value Date     06/11/2021    K 4.0 06/11/2021     06/11/2021    CO2 29 06/11/2021    BUN 10 06/11/2021    CREATININE 0.8 06/11/2021    GLUCOSE 111 (H) 06/11/2021    CALCIUM 9.4 06/11/2021    PROT 7.4 06/11/2021    LABALBU 4.0 06/11/2021    BILITOT 0.9 06/11/2021    ALKPHOS 110 (H) 06/11/2021    AST 29 06/11/2021    ALT 15 06/11/2021    LABGLOM >60 06/11/2021    GFRAA >59 06/11/2021    GLOB 2.8 07/10/2016     Lab Results   Component Value Date    CHOL 135 (L) 06/11/2021    CHOL 141 (L) 05/27/2020    CHOL 155 (L) 01/10/2020     Lab Results   Component Value Date    TRIG 83 06/11/2021    TRIG 144 05/27/2020    TRIG 105 01/10/2020     Lab Results   Component Value Date    HDL 62 (L) 06/11/2021    HDL 66 05/27/2020    HDL 65 01/10/2020     Lab Results   Component Value Date    LDLCALC 56 06/11/2021    LDLCALC 46 05/27/2020    LDLCALC 69 01/10/2020     Lab Results   Component Value Date     06/11/2021    K 4.0 06/11/2021    K 3.8 05/02/2019     06/11/2021    CO2 29 06/11/2021    BUN 10 06/11/2021    CREATININE 0.8 06/11/2021    GLUCOSE 111 06/11/2021    CALCIUM 9.4 06/11/2021      Lab Results   Component Value Date    WBC 6.6 06/11/2021    HGB 12.2 06/11/2021    HCT 38.3 06/11/2021    MCV 95.3 06/11/2021     (L) 06/11/2021    LYMPHOPCT 22.3 06/11/2021    RBC 4.02 (L) 06/11/2021 MCH 30.3 06/11/2021    MCHC 31.9 (L) 06/11/2021    RDW 13.8 06/11/2021     Lab Results   Component Value Date    VITD25 63.9 06/11/2021     Labs reviewed from 6/11/2021    Subjective:      Review of Systems   Constitutional: Positive for fatigue. Negative for fever and unexpected weight change. HENT: Negative for ear discharge, ear pain, mouth sores, sore throat and trouble swallowing. Eyes: Negative for discharge, itching and visual disturbance. Respiratory: Negative for cough, choking, shortness of breath, wheezing and stridor. Cardiovascular: Positive for leg swelling. Negative for chest pain and palpitations. Gastrointestinal: Negative for abdominal distention, abdominal pain, blood in stool, constipation, diarrhea, nausea and vomiting. Endocrine: Negative for cold intolerance, polydipsia and polyuria. Genitourinary: Negative for difficulty urinating, dysuria, frequency and urgency. Musculoskeletal: Negative for arthralgias and gait problem. Skin: Negative for color change and rash. Allergic/Immunologic: Negative for food allergies and immunocompromised state. Neurological: Negative for dizziness, tremors, syncope, speech difficulty, weakness and headaches. Hematological: Negative for adenopathy. Does not bruise/bleed easily. Psychiatric/Behavioral: Negative for confusion and hallucinations. Objective:     Physical Exam  Constitutional:       General: She is not in acute distress. Appearance: She is well-developed. HENT:      Head: Normocephalic and atraumatic. Eyes:      General: No scleral icterus. Right eye: No discharge. Left eye: No discharge. Pupils: Pupils are equal, round, and reactive to light. Neck:      Thyroid: No thyromegaly. Vascular: No JVD. Cardiovascular:      Rate and Rhythm: Normal rate and regular rhythm. Heart sounds: Normal heart sounds. No murmur heard. Comments: 1+ nonpitting edema bilaterally. Pulmonary:      Effort: Pulmonary effort is normal. No respiratory distress. Breath sounds: Normal breath sounds. No wheezing or rales. Abdominal:      General: Bowel sounds are normal. There is no distension. Palpations: Abdomen is soft. There is no mass. Tenderness: There is no abdominal tenderness. There is no guarding or rebound. Musculoskeletal:         General: No tenderness. Normal range of motion. Cervical back: Normal range of motion and neck supple. Skin:     General: Skin is warm and dry. Findings: No erythema or rash. Neurological:      Mental Status: She is alert and oriented to person, place, and time. Cranial Nerves: No cranial nerve deficit. Coordination: Coordination normal.      Deep Tendon Reflexes: Reflexes are normal and symmetric. Reflexes normal.   Psychiatric:         Mood and Affect: Mood is not depressed. Behavior: Behavior normal.         Thought Content: Thought content normal.         Judgment: Judgment normal.       BP (!) 172/80   Pulse 76   Temp 98.2 °F (36.8 °C)   SpO2 97%           Assessment:      Problem List     Lower extremity edema     As her potassium has been okay on the increased dose of Lasix I have sent her prescription for Lasix 40 p.o. daily          Relevant Medications    furosemide (LASIX) 40 MG tablet          Plan:        Patient given educational materials - see patient instructions. Discussed use, benefit, and side effects of prescribed medications. Allpatient questions answered. Pt voiced understanding. Reviewed health maintenance. Instructed to continue current medications, diet and exercise. Patient agreed with treatment plan. Follow up as directed. MEDICATIONS:  Orders Placed This Encounter   Medications    furosemide (LASIX) 40 MG tablet     Sig: Take 1 tablet by mouth daily as needed (for edema/swelling.)     Dispense:  90 tablet     Refill:  1     DX Code Needed  .          ORDERS:  No orders of the defined types were placed in this encounter. Follow-up:  Return for keep fu appt, no labs. PATIENT INSTRUCTIONS:  Patient Instructions     1. Edema increase Lasix to 40 daily Dr. Edyta Nelson will likely draw another CMP in when she sees you on the 25th. Problem List     Lower extremity edema     As her potassium has been okay on the increased dose of Lasix I have sent her prescription for Lasix 40 p.o. daily          Relevant Medications    furosemide (LASIX) 40 MG tablet        Electronically signed by NURIS Duran on 6/14/2021 at 2:06 PM        EMRDragon/transcription disclaimer:  Much of this encounter note is electronic transcription/translation of spoken language to printed texts. The electronic translation of spoken language may be erroneous, or at times,nonsensical words or phrases may be inadvertently transcribed.   Although I have reviewed the note for such errors, some may still exist.

## 2021-06-14 NOTE — PATIENT INSTRUCTIONS
1.  Edema increase Lasix to 40 daily Dr. Tamir Adames will likely draw another CMP in when she sees you on the 25th.     Problem List     Lower extremity edema     As her potassium has been okay on the increased dose of Lasix I have sent her prescription for Lasix 40 p.o. daily          Relevant Medications    furosemide (LASIX) 40 MG tablet

## 2021-06-14 NOTE — ASSESSMENT & PLAN NOTE
Hematology/Oncology Outpatient Consultation    Patient name: Irais Dai  : 1948  MRN: [id number]  Primary Care Physician: Micaela Ortega APRN  Referring Physician: Micaela Ortega As her potassium has been okay on the increased dose of Lasix I have sent her prescription for Lasix 40 p.o. daily 

## 2021-06-25 PROBLEM — E21.3 HYPERPARATHYROIDISM (HCC): Status: ACTIVE | Noted: 2021-01-01

## 2021-06-25 PROBLEM — C44.721 SQUAMOUS CELL CARCINOMA, LEG: Status: ACTIVE | Noted: 2020-05-29

## 2021-06-25 PROBLEM — R79.89 ELEVATED TSH: Status: RESOLVED | Noted: 2019-04-19 | Resolved: 2021-01-01

## 2021-06-25 PROBLEM — R60.9 PERIPHERAL EDEMA: Status: RESOLVED | Noted: 2021-01-01 | Resolved: 2021-01-01

## 2021-06-25 PROBLEM — D68.9 COAGULATION DEFECT (HCC): Status: ACTIVE | Noted: 2021-01-01

## 2021-06-25 PROBLEM — I50.20 HFREF (HEART FAILURE WITH REDUCED EJECTION FRACTION) (HCC): Status: ACTIVE | Noted: 2021-01-01

## 2021-07-29 PROBLEM — M85.89 OSTEOPENIA OF MULTIPLE SITES: Status: ACTIVE | Noted: 2021-01-01

## 2021-07-30 PROBLEM — D68.9 COAGULATION DEFECT (HCC): Status: RESOLVED | Noted: 2021-01-01 | Resolved: 2021-01-01

## 2021-07-30 NOTE — TELEPHONE ENCOUNTER
Jodelle Harada called requesting a refill of the below medication which has been pended for you:     Requested Prescriptions     Pending Prescriptions Disp Refills    atorvastatin (LIPITOR) 40 MG tablet [Pharmacy Med Name: ATORVASTATIN TAB 40MG] 90 tablet 3     Sig: TAKE 1 TABLET DAILY       Last Appointment Date: 5/29/2020  Next Appointment Date: 7/30/2021    Allergies   Allergen Reactions    Cardizem [Diltiazem Hcl] Rash    Morphine Nausea And Vomiting

## 2021-07-30 NOTE — PATIENT INSTRUCTIONS
Personalized Preventive Plan for stef[facility] - 7/30/2021  Medicare offers a range of preventive health benefits. Some of the tests and screenings are paid in full while other may be subject to a deductible, co-insurance, and/or copay. Some of these benefits include a comprehensive review of your medical history including lifestyle, illnesses that may run in your family, and various assessments and screenings as appropriate. After reviewing your medical record and screening and assessments performed today your provider may have ordered immunizations, labs, imaging, and/or referrals for you. A list of these orders (if applicable) as well as your Preventive Care list are included within your After Visit Summary for your review. Other Preventive Recommendations:    · A preventive eye exam performed by an eye specialist is recommended every 1-2 years to screen for glaucoma; cataracts, macular degeneration, and other eye disorders. · A preventive dental visit is recommended every 6 months. · Try to get at least 150 minutes of exercise per week or 10,000 steps per day on a pedometer . · Order or download the FREE \"Exercise & Physical Activity: Your Everyday Guide\" from [employer]. Call 5-127.289.7473 or search [employer] online. · You need 9323-3169 mg of calcium and 2386-7115 IU of vitamin D per day. It is possible to meet your calcium requirement with diet alone, but a vitamin D supplement is usually necessary to meet this goal.  · When exposed to the sun, use a sunscreen that protects against both UVA and UVB radiation with an SPF of 30 or greater. Reapply every 2 to 3 hours or after sweating, drying off with a towel, or swimming. · Always wear a seat belt when traveling in a car. Always wear a helmet when riding a bicycle or motorcycle. Personalized Preventive Plan for [facility] - 7/30/2021  Medicare offers a range of preventive health benefits. Some of the tests and screenings are paid in full while other may be subject to a deductible, co-insurance, and/or copay. Some of these benefits include a comprehensive review of your medical history including lifestyle, illnesses that may run in your family, and various assessments and screenings as appropriate. After reviewing your medical record and screening and assessments performed today your provider may have ordered immunizations, labs, imaging, and/or referrals for you. A list of these orders (if applicable) as well as your Preventive Care list are included within your After Visit Summary for your review. Other Preventive Recommendations:    A preventive eye exam performed by an eye specialist is recommended every 1-2 years to screen for glaucoma; cataracts, macular degeneration, and other eye disorders. A preventive dental visit is recommended every 6 months. Try to get at least 150 minutes of exercise per week or 10,000 steps per day on a pedometer . Order or download the FREE \"Exercise & Physical Activity: Your Everyday Guide\" from [employer]. Call 4-420.243.3265 or search [employer] online. You need 3086-1200 mg of calcium and 7769-3932 IU of vitamin D per day. It is possible to meet your calcium requirement with diet alone, but a vitamin D supplement is usually necessary to meet this goal.  When exposed to the sun, use a sunscreen that protects against both UVA and UVB radiation with an SPF of 30 or greater. Reapply every 2 to 3 hours or after sweating, drying off with a towel, or swimming. Always wear a seat belt when traveling in a car. Always wear a helmet when riding a bicycle or motorcycle.

## 2021-07-30 NOTE — PROGRESS NOTES
Za Persaud ( 1935) is a 82 y.o. female, Established , here for evaluation of the following chief complaint(s).   Medicare AWV        ASSESSMENT/PLAN:  Problem List        Circulatory    HFrEF (heart failure with reduced ejection fraction) ([facility]      Monitored by specialist- no acute findings meriting change in the plan   Stable, takes furosemide, when she retains water due to excess salt intake, denoes any orthopnea or dyspnea         Relevant Orders    Tracy Vivar MD, Cardiology, [place]    Hypertension      Well-controlled, continue current medications, medication adherence emphasized and lifestyle modifications recommended   Watch salt intake         Relevant Medications    losartan (COZAAR) 50 MG tablet    Paroxysmal A-fib (HCC) - Primary      Well-controlled,          Relevant Orders    Tracy Vivar MD, Cardiology, [place]       Digestive    GERD (gastroesophageal reflux disease)      Well-controlled, continue current medications, medication adherence emphasized and lifestyle modifications recommended         Relevant Medications    omeprazole (PRILOSEC) 40 MG delayed release capsule       Endocrine    Hyperparathyroidism ([facility]      Well-controlled, continue current medications and We will check vitamin D during next visit patient has no hypercalcemia we will continue to monitor         Hypothyroidism      Asymptomatic, continue current medications, medication adherence emphasized and lifestyle modifications recommended   Clinically euthyroid            Musculoskeletal and Integument    Osteopenia of multiple sites       Other    Glaucoma      Monitored by specialist- no acute findings meriting change in the plan         Relevant Medications    dorzolamide-timolol (COSOPT) 22.3-6.8 MG/ML ophthalmic solution    latanoprost (XALATAN) 0.005 % ophthalmic solution    LUMIGAN 0.01 % SOLN ophthalmic drops    Hyperlipemia      Well-controlled, continue current medications, medication adherence emphasized and lifestyle modifications recommended         Relevant Medications    aspirin 81 MG chewable tablet    atenolol (TENORMIN) 50 MG tablet    losartan (COZAAR) 50 MG tablet    ELIQUIS 2.5 MG TABS tablet    furosemide (LASIX) 40 MG tablet    atorvastatin (LIPITOR) 40 MG tablet          Results for orders placed or performed in visit on 06/11/21   PTH, Intact   Result Value Ref Range    PTH 91.1 (H) 15.0 - 65.0 pg/mL   Vitamin D 25 Hydroxy   Result Value Ref Range    Vit D, 25-Hydroxy 63.9 >=30 ng/mL   Lipid Panel   Result Value Ref Range    Cholesterol, Total 135 (L) 160 - 199 mg/dL    Triglycerides 83 0 - 149 mg/dL    HDL 62 (L) 65 - 121 mg/dL    LDL Calculated 56 <100 mg/dL   Comprehensive Metabolic Panel   Result Value Ref Range    Sodium 142 136 - 145 mmol/L    Potassium 4.0 3.5 - 5.0 mmol/L    Chloride 104 98 - 111 mmol/L    CO2 29 22 - 29 mmol/L    Anion Gap 9 7 - 19 mmol/L    Glucose 111 (H) 74 - 109 mg/dL    BUN 10 8 - 23 mg/dL    CREATININE 0.8 0.5 - 0.9 mg/dL    GFR Non-African American >60 >60    GFR African American >59 >59    Calcium 9.4 8.8 - 10.2 mg/dL    Total Protein 7.4 6.6 - 8.7 g/dL    Albumin 4.0 3.5 - 5.2 g/dL    Total Bilirubin 0.9 0.2 - 1.2 mg/dL    Alkaline Phosphatase 110 (H) 35 - 104 U/L    ALT 15 5 - 33 U/L    AST 29 5 - 32 U/L   CBC Auto Differential   Result Value Ref Range    WBC 6.6 4.8 - 10.8 K/uL    RBC 4.02 (L) 4.20 - 5.40 M/uL    Hemoglobin 12.2 12.0 - 16.0 g/dL    Hematocrit 38.3 37.0 - 47.0 %    MCV 95.3 81.0 - 99.0 fL    MCH 30.3 27.0 - 31.0 pg    MCHC 31.9 (L) 33.0 - 37.0 g/dL    RDW 13.8 11.5 - 14.5 %    Platelets 466 (L) 891 - 400 K/uL    MPV 13.0 (H) 9.4 - 12.3 fL    Neutrophils % 67.3 (H) 50.0 - 65.0 %    Lymphocytes % 22.3 20.0 - 40.0 %    Monocytes % 7.6 0.0 - 10.0 %    Eosinophils % 1.7 0.0 - 5.0 %    Basophils % 0.9 0.0 - 1.0 %    Neutrophils Absolute 4.4 1.5 - 7.5 K/uL    Immature Granulocytes # 0.0 K/uL    Lymphocytes Absolute 1.5 1.1 - 4.5 K/uL Heart sounds: Normal heart sounds. No murmur heard. No friction rub. No gallop. Pulmonary:      Effort: Pulmonary effort is normal. No respiratory distress. Breath sounds: Normal breath sounds. No wheezing or rales. Chest:      Chest wall: No tenderness. Abdominal:      Palpations: Abdomen is soft. Tenderness: There is no abdominal tenderness. There is no guarding or rebound. Musculoskeletal:         General: No tenderness or deformity. Normal range of motion. Cervical back: Normal range of motion. Right lower leg: Edema present. Left lower leg: Edema present. Lymphadenopathy:      Cervical: No cervical adenopathy. Skin:     General: Skin is warm and dry. Findings: No erythema, lesion or rash. Neurological:      General: No focal deficit present. Mental Status: She is alert and oriented to person, place, and time. Gait: Gait abnormal.   Psychiatric:         Mood and Affect: Mood normal. Mood is not anxious or depressed. (Time Documentation Optional [id number])    An electronic signaturewaas used to authenticate this note  -Lehman Olszewski, MD on 2021 at 11:19 AM  Medicare Annual Wellness Visit  Name: Austin Ahumada Date: 2021   MRN: [id number] Sex: Female   Age: 82 y.o. Ethnicity: Non- / Non    : 1935 Race: White (non-)      Gerri Gutierrez is here for Medicare AWV    Screenings for behavioral, psychosocial and functional/safety risks, and cognitive dysfunction are all negative except as indicated below. These results, as well as other patient data from the [address] form, are documented in Flowsheets linked to this Encounter. Allergies   Allergen Reactions    Cardizem [Diltiazem Hcl] Rash    Morphine Nausea And Vomiting       Prior to Visit Medications    Medication Sig Taking?  Authorizing Provider   atorvastatin (LIPITOR) 40 MG tablet TAKE 1 TABLET DAILY Yes Ame Grossman MD   ELIQUIS 2.5 MG TABS tablet TAKE 1 TABLET BY MOUTH TWICE A DAY Yes Historical Provider, MD BARON 0.01 % SOLN ophthalmic drops INSTILL 1 DROP INTO BOTH EYES EVERY EVENING AS DIRECTED Yes Historical Provider, MD   furosemide (LASIX) 40 MG tablet Take 1 tablet by mouth daily as needed (for edema/swelling.) Yes NURIS Dacosta   escitalopram (LEXAPRO) 20 MG tablet TAKE 1 TABLET BY MOUTH EVERY DAY Yes Juan Miller MD   omeprazole (PRILOSEC) 40 MG delayed release capsule TAKE 1 CAPSULE DAILY Yes Juan Miller MD   vitamin D (ERGOCALCIFEROL) 1.25 MG (93443 UT) CAPS capsule TAKE 1 CAPSULE BY MOUTH ONE TIME PER WEEK Yes SOLEDAD Rosales   losartan (COZAAR) 50 MG tablet TAKE 1 TABLET DAILY, IF    BLOOD PRESSURE STARTS TO   RUN LOW CUT DOWN TO 1/2    TABLET Yes SOLEDAD Rosales   atenolol (TENORMIN) 50 MG tablet TAKE 1 & 1/2 TABLETS BY MOUTH TWICE A DAY Yes NURIS Pond   aspirin 81 MG chewable tablet Take 1 tablet by mouth daily Yes Stefanie Boothe MD   dorzolamide-timolol (COSOPT) 22.3-6.8 MG/ML ophthalmic solution INSTILL 1 DROP INTO BOTH EYES TWICE A DAY AS DIRECTED Yes Historical Provider, MD   latanoprost (XALATAN) 0.005 % ophthalmic solution INSTILL 1 DROP INTO BOTH EYES IN THE EVENING Yes Historical Provider, MD       Past Medical History:   Diagnosis Date    Arthritis     [facility]     Elevated TSH 4/19/2019    GERD (gastroesophageal reflux disease)     Hyperlipidemia     Hypertension     Tremor     to right hand       Past Surgical History:   Procedure Laterality Date    CHOLECYSTECTOMY      HUMERUS FRACTURE SURGERY Right 7/9/2016    REVERSE TOTAL SHOULDER ARTHROPLASTY performed by Tejal Boone MD at [facility] OR       Family History   Problem Relation Age of Onset    Heart Attack Mother     Stroke Father     Heart Attack Father     Heart Disease Sister     Atrial Fibrillation Sister        CareTeam (Including outside providers/suppliers regularly involved in providing care):   Patient Care Team:  SOLEDAD Page as PCP - General ([address] [place] [address])  SOLEDAD Page as PCP - [facility]  Manjinder Henry MD as Consulting Physician (Interventional Cardiology)    Wt Readings from Last 3 Encounters:   07/30/21 172 lb 3.2 oz (78.1 kg)   03/12/21 175 lb (79.4 kg)   07/31/20 172 lb (78 kg)     Vitals:    07/30/21 1043   BP: 130/60   Site: Right Upper Arm   Position: Sitting   Cuff Size: Large Adult   Pulse: 85   SpO2: 96%   Weight: 172 lb 3.2 oz (78.1 kg)   Height: 5' 6\" (1.676 m)     Body mass index is 27.79 kg/m². Based upon direct observation of the patient, evaluation of cognition reveals recent and remote memory intact. Patient's complete Health Risk Assessment and screening values have been reviewed and are found in Flowsheets. The following problems were reviewed today and where indicated follow up appointments were made and/or referrals ordered. Positive Risk Factor Screenings with Interventions:          General Health and ACP:  General  In general, how would you say your health is?: Fair  In the past 7 days, have you experienced any of the following?  New or Increased Pain, New or Increased Fatigue, Loneliness, Social Isolation, Stress or Anger?: (!) Loneliness  Do you get the social and emotional support that you need?: Yes  Do you have a Living Will?: Yes  Advance Directives     Power of [employer] Will ACP-Advance Directive ACP-Power of     Not on File Not on File Not on File Not on File      General Health Risk Interventions:  · Loneliness: patient declines any further intervention for this issue    Health Habits/Nutrition:  Health Habits/Nutrition  Do you exercise for at least 20 minutes 2-3 times per week?: (!) No  Have you lost any weight without trying in the past 3 months?: No  Do you eat only one meal per day?: No  Have you seen the dentist within the past year?: (!) No  Body mass index: (!) 27.79  Health Habits/Nutrition Interventions:  · Inadequate physical activity:  patient is not ready to increase his/her physical activity level at this time    Hearing/Vision:  No exam data present  Hearing/Vision  Do you or your family notice any trouble with your hearing that hasn't been managed with hearing aids?: (!) Yes  Do you have difficulty driving, watching TV, or doing any of your daily activities because of your eyesight?: No  Have you had an eye exam within the past year?: Appointment is scheduled  Hearing/Vision Interventions:  · none      Personalized Preventive Plan   Current Health Maintenance Status  Immunization History   Administered Date(s) Administered    COVID-19, Moderna, PF, 100mcg/0.5mL 03/11/2019, 04/08/2021    Influenza, High Dose (Fluzone 65 yrs and older) 12/07/2018    Influenza, Triv, inactivated, subunit, adjuvanted, IM (Fluad 65 yrs and older) 11/01/2019, 10/20/2020    Pneumococcal Conjugate 13-valent (Jasmina Mueller) 02/09/2018        Health Maintenance   Topic Date Due    Annual Wellness Visit (AWV)  Never done    TSH testing  05/27/2021    Pneumococcal 65+ years Vaccine (2 of 2 - PPSV23) 06/01/2022 (Originally 2/9/2019)    Shingles Vaccine (1 of 2) 06/14/2022 (Originally 10/15/1987)    DTaP/Tdap/Td vaccine (1 - Tdap) 07/30/2022 (Originally 10/15/1954)    Flu vaccine (1) 09/01/2021    Lipid screen  06/11/2022    Potassium monitoring  06/11/2022    Creatinine monitoring  06/11/2022    DEXA (modify frequency per FRAX score)  Completed    COVID-19 Vaccine  Completed    Hepatitis A vaccine  Aged Out    Hepatitis B vaccine  Aged Out    Hib vaccine  Aged Out    Meningococcal (ACWY) vaccine  Aged Out     Recommendations for [employer] Due: see orders and patient instructions/AVS.  .   Recommended screening schedule for the next 5-10 years is provided to the patient in written form: see Patient Instructions/AVS.    Candido Mcadams was seen today for medicare awv.    Diagnoses and all orders for this visit:    Paroxysmal A-fib ([facility] [address]    Osteopenia of multiple sites    HFrEF (heart failure with reduced ejection fraction) ([facility] [address]    [facility]    Routine general medical examination at a health care facility

## 2021-07-30 NOTE — ASSESSMENT & PLAN NOTE
Well-controlled, continue current medications, medication adherence emphasized and lifestyle modifications recommended   Watch salt intake

## 2021-07-30 NOTE — ASSESSMENT & PLAN NOTE
Asymptomatic, continue current medications, medication adherence emphasized and lifestyle modifications recommended   Clinically euthyroid

## 2021-07-30 NOTE — ASSESSMENT & PLAN NOTE
Monitored by specialist- no acute findings meriting change in the plan   Stable, takes furosemide, when she retains water due to excess salt intake, denoes any orthopnea or dyspnea

## 2021-07-30 NOTE — ASSESSMENT & PLAN NOTE
Well-controlled, continue current medications and We will check vitamin D during next visit patient has no hypercalcemia we will continue to monitor

## 2021-09-10 NOTE — PROGRESS NOTES
[facility] Cardiology   Established Patient Office Visit   [place] [facility]. [address]  [phone]        OFFICE VISIT:  9/10/2021    Mary Jo Fletcher - : 1935    Reason For Visit:  Jeyson Gibbs is a 82 y.o. female who is here for Follow-up    1. Paroxysmal A-fib ([address]    2. Essential hypertension    3. Hyperlipidemia, unspecified hyperlipidemia type      Patient with a history of paroxysmal atrial fib, hypertension, and hyperlipidemia.     She is a patient of Dr Brandon Bush      Patient presents to clinic today for 6-month follow-up. Patient denies any complaints of chest pain, pressure or tightness. There is no shortness of breath, orthopnea or PND. Patient denies any lightheadedness, dizziness or syncope.        DATA:     LEXISCAN 2019.    Grossly negative Cardiolite for the presence of ischemia or infarction with normal wall motion and ejection fraction at rest.     2D ECHO   Normal left ventricular size with mildly reduced systolic function EF 60-59%.  Mild concentric left ventricular hypertrophy with grade 1 diastolic   dysfunction. Mildly dilated left atrium. Mildly thickened tricuspid aortic valve with adequate cusp separation and no significant stenosis or insufficiency.  Mildly thickened but mobile mitral leaflets with mild-to-moderate regurgitation.  Poor visualization of a right-sided chambers. Moderate tricuspid regurgitation with moderate pulmonary hypertension RVSP estimate 64 mmHg. IVC is mildly dilated consistent with elevated right atrial filling pressures in the 10-15 mmHg range. No pericardial effusion and aortic root dimensions are within normal  limits.         Subjective    Mary Jo Fletcher is a 82 y.o. female with the following history as recorded in [place]:    Patient Active Problem List    Diagnosis Date Noted    Osteopenia of multiple sites 2021    Hyperparathyroidism ([address] 2021    HFrEF (heart failure with reduced ejection fraction) ([address] 06/25/2021    Lower extremity edema 06/14/2021    Poor historian 03/12/2021    Squamous cell carcinoma, leg 05/29/2020    Pulmonary hypertension ([facility] 01/24/2020    Chest discomfort     Chest pain 05/01/2019    Paroxysmal A-fib (HCC) 05/01/2019    Elevated brain natriuretic peptide (BNP) level 04/19/2019    Hypothyroidism 04/19/2019    Renal cyst 04/19/2019    Shortness of breath 08/24/2018    Anxiety and depression 08/24/2018    Glaucoma 07/10/2016    Hypertension 07/09/2016    Hyperlipemia 07/09/2016    GERD (gastroesophageal reflux disease) 07/09/2016    Nausea 07/09/2016     Current Outpatient Medications   Medication Sig Dispense Refill    ELIQUIS 2.5 MG TABS tablet Take 1 tablet by mouth 2 times daily 180 tablet 3    atorvastatin (LIPITOR) 40 MG tablet TAKE 1 TABLET DAILY 90 tablet 0    losartan (COZAAR) 50 MG tablet TAKE 1 TABLET DAILY, IF    BLOOD PRESSURE STARTS TO   RUN LOW CUT DOWN TO 1/2    TABLET 90 tablet 0    atenolol (TENORMIN) 50 MG tablet TAKE 1 & 1/2 TABLETS BY MOUTH TWICE A  tablet 3    LUMIGAN 0.01 % SOLN ophthalmic drops INSTILL 1 DROP INTO BOTH EYES EVERY EVENING AS DIRECTED      furosemide (LASIX) 40 MG tablet Take 1 tablet by mouth daily as needed (for edema/swelling.) 90 tablet 1    escitalopram (LEXAPRO) 20 MG tablet TAKE 1 TABLET BY MOUTH EVERY DAY 90 tablet 3    omeprazole (PRILOSEC) 40 MG delayed release capsule TAKE 1 CAPSULE DAILY 90 capsule 3    vitamin D (ERGOCALCIFEROL) 1.25 MG (15112 UT) CAPS capsule TAKE 1 CAPSULE BY MOUTH ONE TIME PER WEEK 12 capsule 3    aspirin 81 MG chewable tablet Take 1 tablet by mouth daily 30 tablet 3    dorzolamide-timolol (COSOPT) 22.3-6.8 MG/ML ophthalmic solution INSTILL 1 DROP INTO BOTH EYES TWICE A DAY AS DIRECTED  3    latanoprost (XALATAN) 0.005 % ophthalmic solution INSTILL 1 DROP INTO BOTH EYES IN THE EVENING  3     No current facility-administered medications for this visit.      Allergies: Cardizem [diltiazem of motion of the upper and lower extermites appears normal and equal and is without pain   EXTREMITIES - no significant edema   NEUROLOGIC  gait and station are normal  SKIN - turgor is normal, can warm and dry. PSYCHIATRIC - normal mood and affect, alert and orientated x 3,      ASSESSMENT:    ALL THE CARDIOLOGY PROBLEMS ARE LISTED ABOVE; HOWEVER, THE FOLLOWING SPECIFIC CARDIAC PROBLEMS / CONDITIONS WERE ADDRESSED AND TREATED DURING THE OFFICE VISIT TODAY:                                                                                            MEDICAL DECISION MAKING             Cardiac Specific Problem / Diagnosis  Discussion and Data Reviewed Diagnostic Procedures Ordered Management Options Selected           1. PAF  Stable   Review and summation of old records:    No recurrence. EKG in the office today poor quality appears to be sinus rhythm heart rate 83 bpm.  Auscultation heart sounds RRR. Patient is on Eliquis 2.5 mg twice daily for stroke prevention. Tenormin 50 mg daily. Yes Continue current medications:     Yes           2. Hypertension  Well Controlled   Pressure in the office today 112/68. O2 sat 97%. Patient is on Cozaar 50 mg daily. Tenormin 50 mg daily. Aspirin 81 mg daily. No Continue current medications:    Yes           3. Hyperlipidemia Stable  patient is on Lipitor 40 mg daily. No Continue current medications: Yes                     Orders Placed This Encounter   Procedures    EKG 12 lead     Order Specific Question:   Reason for Exam?     Answer:   Irregular heart rate     Comments:   A fIb     Orders Placed This Encounter   Medications    ELIQUIS 2.5 MG TABS tablet     Sig: Take 1 tablet by mouth 2 times daily     Dispense:  180 tablet     Refill:  3       Discussed with patient and adult child. Return in about 6 months (around 3/10/2022) for Routine with me .     I greatly appreciate the opportunity to meet Shelbie Daniels and your confidence in allowing me to participate in her cardiovascular care. NURIS Levine NP  9/10/2021 10:37 AM CDT                    This dictation was generated by voice recognition computer software. Although all attempts are made to edit dictation for accuracy, there may be errors in the transcription that are not intended.

## 2021-10-30 PROBLEM — R09.02 HYPOXIA: Status: ACTIVE | Noted: 2021-01-01

## 2021-10-30 PROBLEM — I48.91 ATRIAL FIBRILLATION: Status: ACTIVE | Noted: 2021-01-01

## 2021-10-30 PROBLEM — Z53.20 FUNCTIONAL ASSESSMENT DECLINED: Status: ACTIVE | Noted: 2021-01-01

## 2021-10-30 PROBLEM — E87.20 LACTIC ACIDOSIS: Status: ACTIVE | Noted: 2021-01-01

## 2021-10-30 PROBLEM — Z79.01 CHRONIC ANTICOAGULATION: Status: ACTIVE | Noted: 2021-01-01

## 2021-10-30 PROBLEM — J98.6 ELEVATED HEMIDIAPHRAGM: Status: ACTIVE | Noted: 2021-01-01

## 2021-10-30 PROBLEM — R06.02 SHORTNESS OF BREATH: Status: ACTIVE | Noted: 2021-01-01

## 2021-10-30 PROBLEM — I48.0 PAROXYSMAL ATRIAL FIBRILLATION WITH RAPID VENTRICULAR RESPONSE (HCC): Status: ACTIVE | Noted: 2021-01-01

## 2021-10-30 PROBLEM — B02.9 SHINGLES OUTBREAK: Status: ACTIVE | Noted: 2021-01-01

## 2021-10-30 PROBLEM — R79.89 ELEVATED BRAIN NATRIURETIC PEPTIDE (BNP) LEVEL: Status: ACTIVE | Noted: 2021-01-01

## 2021-10-30 PROBLEM — I27.20 PULMONARY HYPERTENSION: Status: ACTIVE | Noted: 2021-01-01

## 2021-10-30 PROBLEM — I48.91 ATRIAL FIBRILLATION: Status: RESOLVED | Noted: 2021-01-01 | Resolved: 2021-01-01

## 2021-11-01 PROBLEM — I48.91 ATRIAL FIBRILLATION: Status: ACTIVE | Noted: 2021-01-01

## 2021-11-01 NOTE — PLAN OF CARE
Goal Outcome Evaluation:  Plan of Care Reviewed With: patient           Outcome Summary: PT eval complete. She is alert and oriented to person, place. She needs CGA/min assist to stand and to ambulate in and out of the bathroom with a RW. She demos a slow gait w a flexed posture. She was on room air upon entering her room and O2 sat WNL for all activity. She demos generlaized weakness and SOB with activity. PT will cont to progress gait, balance and strengthening. She plans to go to SNF at d[facility]

## 2021-11-01 NOTE — THERAPY EVALUATION
Patient Name: Liana Zaragoza  : 1935    MRN: [id number]                              Today's Date: 2021       Admit Date: 10/30/2021    Visit Dx:     ICD-10-CM ICD-9-CM   1. Atrial fibrillation, unspecified type (HCC)  I48.91 427.31   2. Cardiomegaly  I51.7 429.3   3. Herpes zoster without complication  B02.9 053.9   4. Decreased activities of daily living (ADL)  Z78.9 V49.89   5. Impaired mobility  Z74.09 799.89     Patient Active Problem List   Diagnosis   • Shingles outbreak, left posterior thorax   • Hypoxia   • Elevated left hemidiaphragm   • Paroxysmal atrial fibrillation with rapid ventricular response (HCC)   • Chronic anticoagulation   • Functional assessment declined   • Shortness of breath   • Elevated brain natriuretic peptide (BNP) level   • Pulmonary hypertension (HCC), suspected   • Lactic acidosis   • Atrial fibrillation (HCC)     Past Medical History:   Diagnosis Date   • A-fib (HCC)    • Cancer (HCC)     SKIN CANCER   • Depression    • GERD (gastroesophageal reflux disease)    • Hyperlipidemia    • Hypertension    • PONV (postoperative nausea and vomiting)      Past Surgical History:   Procedure Laterality Date   • LAPAROSCOPIC CHOLECYSTECTOMY     • LEG EXCISION LESION/CYST Left 2020    Procedure: EXCISION OF SQUAMOUS CELL CARCINOMA ON LEFT BAIG;  Surgeon: Lorraine Ayoub MD;  Location: [facility] OR;  Service: General   • LEG EXCISION LESION/CYST Left 2020    Procedure: WIDE LOCAL EXCISION SQUAMOUS CELL CARCINOMA OF LOWER LEFT LEG;  Surgeon: Lorraine Ayoub MD;  Location:  [facility] OR;  Service: General;  Laterality: Left;   • SHOULDER SURGERY Right     FX REPAIR   • SKIN CANCER EXCISION        General Information     Row Name 21 1330          Physical Therapy Time and Intention    Document Type evaluation  Presented to ED with c/o SOA x3 days, shingles  -MOODY     Mode of Treatment physical therapy  -MOODY     Row Name 21 2092          General Information    Patient  Profile Reviewed yes  -MOODY     Prior Level of Function independent:; all household mobility; max assist:; home management; cooking; cleaning  -MOODY     Existing Precautions/Restrictions fall; oxygen therapy device and L/min   shingles  -MOODY     Barriers to Rehab medically complex  -MOODY     Row Name 11/01/21 1330          Living Environment    Lives With alone  meals delivered Mon-Thurs, house keeper every 2 wks  -MOODY     Row Name 11/01/21 1330          Home Main Entrance    Number of Stairs, Main Entrance two  -MOODY     Stair Railings, Main Entrance railings on both sides of stairs  -MOODY     Row Name 11/01/21 1330          Stairs Within Home, Primary    Stairs, Within Home, Primary one step into den, holds to the wall for that step  -MOODY     Number of Stairs, Within Home, Primary one  -MOODY     Stair Railings, Within Home, Primary none  -OMODY     Row Name 11/01/21 1330          Cognition    Orientation Status (Cognition) oriented to; person; place  -MOODY     Row Name 11/01/21 1330          Safety Issues, Functional Mobility    Impairments Affecting Function (Mobility) balance; endurance/activity tolerance; shortness of breath; strength  -MOODY           User Key  (r) = Recorded By, (t) = Taken By, (c) = Cosigned By    Initials Name Provider Type    Shemar Tate, PT DPT Physical Therapist               Mobility     Row Name 11/01/21 1330          Bed Mobility    Bed Mobility supine-sit; sit-supine  -MOODY     Supine-Sit Kincaid (Bed Mobility) minimum assist (75% patient effort)  -MOODY     Sit-Supine [place]aid (Bed Mobility) minimum assist (75% patient effort)  -MOODY     Assistive Device (Bed Mobility) head of bed elevated; draw sheet; bed rails  -MOODY     Row Name 11/01/21 1330          Sit-Stand Transfer    Sit-Stand [place]aid (Transfers) contact guard; minimum assist (75% patient effort)  -MOODY     Assistive Device (Sit-Stand Transfers) walker, front-wheeled  -MOODY     Row Name 11/01/21 1330          Gait/Stairs (Locomotion)     Bronson Level (Gait) contact guard; minimum assist (75% patient effort)  -MOODY     Assistive Device (Gait) walker, front-wheeled  -MOODY     Distance in Feet (Gait) 30 ft  -MOODY     Deviations/Abnormal Patterns (Gait) jayjay decreased; gait speed decreased  -MOODY     Bilateral Gait Deviations forward flexed posture  -MOODY           User Key  (r) = Recorded By, (t) = Taken By, (c) = Cosigned By    Initials Name Provider Type    Shemar Tate PT DPT Physical Therapist               Obj/Interventions     Row Name 11/01/21 1330          Range of Motion Comprehensive    Comment, General Range of Motion B LE AROM WFL  -MOODY     Row Name 11/01/21 1330          Strength Comprehensive (MMT)    Comment, General Manual Muscle Testing (MMT) Assessment B LE functionally 4-/5  -MOODY     Row Name 11/01/21 1330          Balance    Balance Assessment sitting static balance; standing static balance  -MOODY     Static Sitting Balance WFL; unsupported; sitting, edge of bed  -MOODY     Static Standing Balance mild impairment; supported; standing  -MOODY           User Key  (r) = Recorded By, (t) = Taken By, (c) = Cosigned By    Initials Name Provider Type    Shemar Tate, PT DPT Physical Therapist               Goals/Plan     Row Name 11/01/21 1330          Bed Mobility Goal 1 (PT)    Activity/Assistive Device (Bed Mobility Goal 1, PT) sit to supine/supine to sit  -MOODY     Bronson Level/Cues Needed (Bed Mobility Goal 1, PT) standby assist  -MOODY     Time Frame (Bed Mobility Goal 1, PT) long term goal (LTG); 10 days  -MOODY     Progress/Outcomes (Bed Mobility Goal 1, PT) goal ongoing  -MOODY     Row Name 11/01/21 1330          Transfer Goal 1 (PT)    Activity/Assistive Device (Transfer Goal 1, PT) sit-to-stand/stand-to-sit; bed-to-chair/chair-to-bed; walker, rolling  -MOODY     Bronson Level/Cues Needed (Transfer Goal 1, PT) standby assist  -MOODY     Time Frame (Transfer Goal 1, PT) long term goal (LTG); 10 days  -MOODY     Progress/Outcome  (Transfer Goal 1, PT) goal ongoing  -MOODY     Row Name 11/01/21 1330          Gait Training Goal 1 (PT)    Activity/Assistive Device (Gait Training Goal 1, PT) gait (walking locomotion); assistive device use; decrease fall risk; improve balance and speed; increase endurance/gait distance  -MOODY     Williams Level (Gait Training Goal 1, PT) standby assist  -MOODY     Distance (Gait Training Goal 1, PT) 60 ft  -MOODY     Time Frame (Gait Training Goal 1, PT) long term goal (LTG); 10 days  -MOODY     Progress/Outcome (Gait Training Goal 1, PT) goal ongoing  -MOODY           User Key  (r) = Recorded By, (t) = Taken By, (c) = Cosigned By    Initials Name Provider Type    Shemar Tate, PT DPT Physical Therapist               Clinical Impression     Row Name 11/01/21 1330          Pain    Additional Documentation Pain Scale: Numbers Pre/Post-Treatment (Group)  -MOODY     [facility] Name 11/01/21 1330          Pain Scale: Numbers Pre/Post-Treatment    Pretreatment Pain Rating 0/10 - no pain  -MOODY     Row Name 11/01/21 1330          Plan of Care Review    Plan of Care Reviewed With patient  -MOODY     Outcome Summary PT eval complete. She is alert and oriented to person, place. She needs CGA/min assist to stand and to ambulate in and out of the bathroom with a RW. She demos a slow gait w a flexed posture. She was on room air upon entering her room and O2 sat WNL for all activity. She demos generlaized weakness and SOB with activity. PT will cont to progress gait, balance and strengthening. She plans to go to SNF at d.c.,  -MOODY     Row Name 11/01/21 1330          Therapy Assessment/Plan (PT)    Patient/Family Therapy Goals Statement (PT) Pt anticipates d.c to [facility]  -MOODY     Rehab Potential (PT) good, to achieve stated therapy goals  -MOODY     Criteria for Skilled Interventions Met (PT) yes; meets criteria; skilled treatment is necessary  -MOODY     Predicted Duration of Therapy Intervention (PT) until d.c  -MOODY     Row Name 11/01/21 8344           Vital Signs    Pre SpO2 (%) 98  -MOODY     O2 Delivery Pre Treatment room air  -MOODY     O2 Delivery Intra Treatment room air  -MOODY     Post SpO2 (%) 95  -MOODY     O2 Delivery Post Treatment room air  -MOODY     Pre Patient Position Supine  -MOODY     Intra Patient Position Standing  -MOODY     Post Patient Position Supine  -MODOY     Row Name 11/01/21 1330          Positioning and Restraints    Pre-Treatment Position in bed  -MOODY     Post Treatment Position bed  -MOODY     In Bed fowlers; call light within reach; encouraged to call for assist  -MOODY           User Key  (r) = Recorded By, (t) = Taken By, (c) = Cosigned By    Initials Name Provider Type    Shemar Tate, PT DPT Physical Therapist               Outcome Measures     Row Name 11/01/21 1330          How much help from another person do you currently need...    Turning from your back to your side while in flat bed without using bedrails? 3  -MOODY     Moving from lying on back to sitting on the side of a flat bed without bedrails? 3  -MOODY     Moving to and from a bed to a chair (including a wheelchair)? 3  -MOODY     Standing up from a chair using your arms (e.g., wheelchair, bedside chair)? 3  -MOODY     Climbing 3-5 steps with a railing? 2  -MOODY     To walk in hospital room? 3  -MOODY     AM-PAC 6 Clicks Score (PT) 17  -MOODY     Row Name 11/01/21 1330 11/01/21 0812       Functional Assessment    Outcome Measure Options AM-PAC 6 Clicks Basic Mobility (PT)  -MOODY AM-PAC 6 Clicks Daily Activity (OT)  -MW          User Key  (r) = Recorded By, (t) = Taken By, (c) = Cosigned By    Initials Name Provider Type    Shemar Tate, PT DPT Physical Therapist    Kristen Hatch, OTR/L Occupational Therapist                             Physical Therapy Education                 Title: PT OT SLP Therapies (In Progress)     Topic: Physical Therapy (In Progress)     Point: Mobility training (Done)     Learning Progress Summary           Patient Acceptance, E, MENDOZA,DU,NR by MOODY at 11/1/2021 1330     Comment: progression of PT POC, benefits of activity                   Point: Home exercise program (Not Started)     Learner Progress:  Not documented in this visit.          Point: Body mechanics (Not Started)     Learner Progress:  Not documented in this visit.          Point: Precautions (Not Started)     Learner Progress:  Not documented in this visit.                      User Key     Initials Effective Dates Name Provider Type Discipline    MOODY 08/02/16 -  Shemar Turner, PT DPT Physical Therapist PT              PT Recommendation and Plan  Planned Therapy Interventions (PT): bed mobility training, transfer training, gait training, balance training, home exercise program, patient/family education, postural re-education, strengthening  Plan of Care Reviewed With: patient  Outcome Summary: PT eval complete. She is alert and oriented to person, place. She needs CGA/min assist to stand and to ambulate in and out of the bathroom with a RW. She demos a slow gait w a flexed posture. She was on room air upon entering her room and O2 sat WNL for all activity. She demos generlaized weakness and SOB with activity. PT will cont to progress gait, balance and strengthening. She plans to go to SNF at d.c.,     Time Calculation:    PT Charges     Row Name 11/01/21 1524             Time Calculation    Start Time 1330  -MOODY      Stop Time 1415  -MOODY      Time Calculation (min) 45 min  -MOODY      PT Received On 11/01/21  -MOODY      PT Goal Re-Cert Due Date 11/11/21  -MOODY            User Key  (r) = Recorded By, (t) = Taken By, (c) = Cosigned By    Initials Name Provider Type    Shemar Tate, PT DPT Physical Therapist              Therapy Charges for Today     Code Description Service Date Service Provider Modifiers Qty    45905604857 HC PT EVAL MOD COMPLEXITY 3 11/1/2021 Shemar Turner, PT DPT GP 1          PT G-Codes  Outcome Measure Options: AM-PAC 6 Clicks Basic Mobility (PT)  AM-PAC 6 Clicks Score (PT): 17  AM-PAC 6  Clicks Score (OT): 18    Shemar Turner, PT DPT  11/1/2021

## 2021-11-01 NOTE — PLAN OF CARE
Goal Outcome Evaluation:      Pt resting in bed. No complaints of pain or signs of distress noted. Pt on room air to 1L nc. Pt HR AFIB 112-122.

## 2021-11-01 NOTE — PROGRESS NOTES
[facility] Medicine Services  INPATIENT PROGRESS NOTE    Length of Stay: 1  Date of Admission: 10/30/2021  Primary Care Physician: Desmond Choudhury PA-C    Subjective   Chief Complaint: Follow-up  HPI   Patient resting in bed.  She states she feels tired today.  She feels as though her breathing is better in comparison to admission.  She states she has not really moved around much today to assess her shortness of breath with exertion.  She denies any palpitations.  She does have dizziness when she gets up to move at times.  She denies abdominal pain, nausea, or vomiting.  She states her shingles are really not bothering her, not painful or itchy.    Review of Systems   All pertinent negatives and positives are as above. All other systems have been reviewed and are negative unless otherwise stated.     Objective    Temp:  [97.5 °F (36.4 °C)-98.7 °F (37.1 °C)] 98.7 °F (37.1 °C)  Heart Rate:  [] 111  Resp:  [16-20] 16  BP: ()/(52-76) 98/52  Physical Exam  Vitals and nursing note reviewed.   Constitutional:       General: She is not in acute distress.     Appearance: She is ill-appearing.   HENT:      Head: Normocephalic and atraumatic.   Cardiovascular:      Rate and Rhythm: Normal rate and regular rhythm.      Pulses: Normal pulses.      Heart sounds: Normal heart sounds.   Pulmonary:      Effort: Pulmonary effort is normal.      Breath sounds: No wheezing, rhonchi or rales.      Comments: Diminished breath sounds  Abdominal:      General: Bowel sounds are normal. There is no distension.      Palpations: Abdomen is soft.      Tenderness: There is no abdominal tenderness.   Musculoskeletal:         General: No swelling or tenderness.      Cervical back: Normal range of motion and neck supple. No tenderness.   Skin:     General: Skin is warm and dry.      Comments: Shingles lesions noted on the left flank   Neurological:      General: No focal deficit present.       Mental Status: She is alert and oriented to person, place, and time.      Motor: Weakness present.   Psychiatric:         Mood and Affect: Mood normal.         Behavior: Behavior normal.         Thought Content: Thought content normal.         Judgment: Judgment normal.     Results Review:  I have reviewed the labs, radiology results, and diagnostic studies.    Laboratory Data:   Results from last 7 days   Lab Units 10/30/21  1046   WBC 10*3/mm3 7.58   HEMOGLOBIN g/dL 11.6*   HEMATOCRIT % 37.9   PLATELETS 10*3/mm3 118*     Results from last 7 days   Lab Units 11/01/21  0237 10/31/21  0300 10/30/21  1046   SODIUM mmol/L 137 140 140   POTASSIUM mmol/L 3.8 3.7 3.7   CHLORIDE mmol/L 98 101 100   CO2 mmol/L 27.0 31.0* 30.0*   BUN mg/dL 17 15 15   CREATININE mg/dL 0.86 0.78 0.86   CALCIUM mg/dL 9.2 9.3 9.3   BILIRUBIN mg/dL  --   --  0.5   ALK PHOS U/L  --   --  110   ALT (SGPT) U/L  --   --  14   AST (SGOT) U/L  --   --  26   GLUCOSE mg/dL 111* 111* 136*     I have reviewed the patient current medications.     Assessment/Plan     Active Hospital Problems    Diagnosis    • **Shortness of breath    • Shingles outbreak, left posterior thorax    • Hypoxia    • Elevated left hemidiaphragm    • Paroxysmal atrial fibrillation with rapid ventricular response (HCC)    • Chronic anticoagulation    • Functional assessment declined    • Elevated brain natriuretic peptide (BNP) level    • Pulmonary hypertension (HCC), suspected    • Lactic acidosis      Plan:  1.  Patient presented to the emergency department 10/30/2021 with a 3-day history of shortness of breath.  Chest x-ray showed small left pneumothorax versus skinfold with recommended CT.  A CTA of the chest was performed, which showed no acute findings.  Cardiomegaly noted and reflux of contrast into the hepatic veins, which can be seen with right heart dysfunction.  BNP was elevated on admission.  2.  Transthoracic echocardiogram shows normal systolic function with ejection  fraction of 61 to 65%.  Mildly elevated right ventricular systolic pressure.  No significant valvular pathology.  Patient has been weaned from supplemental oxygen.  She has no complaints of shortness of breath presently.  Will discontinue and monitor off of IV diuretic.  Will likely resume home medication oral Lasix tomorrow.  3.  The patient does have a history of atrial fibrillation, went into atrial fibrillation overnight with elevated heart rates.  Will change atenolol to Coreg and monitor heart rates closely.  May be able to increase Coreg if blood pressure tolerates, or will consider diltiazem addition.  4.  Continue Valtrex for shingles.  5.  Continue PT/OT.  Skilled nursing facility versus home with home health recommended.  Patient does live alone.  Discussed with her daughter Elysia via telephone.  She would like referrals made to [facility] and [facility] for rehab.  6.  Labs in AM    Discharge Planning: I expect the patient to be discharged to SNF in 1-2 days.    Electronically signed by GAIL Guajardo, 11/1/2021, 11:20 CDT.

## 2021-11-01 NOTE — THERAPY EVALUATION
Patient Name: Liana Zaragoza  : 1935    MRN: [id number]                              Today's Date: 2021       Admit Date: 10/30/2021    Visit Dx:     ICD-10-CM ICD-9-CM   1. Atrial fibrillation, unspecified type (HCC)  I48.91 427.31   2. Cardiomegaly  I51.7 429.3   3. Herpes zoster without complication  B02.9 053.9   4. Decreased activities of daily living (ADL)  Z78.9 V49.89     Patient Active Problem List   Diagnosis   • Shingles outbreak, left posterior thorax   • Hypoxia   • Elevated left hemidiaphragm   • Paroxysmal atrial fibrillation with rapid ventricular response (HCC)   • Chronic anticoagulation   • Functional assessment declined   • Shortness of breath   • Elevated brain natriuretic peptide (BNP) level   • Pulmonary hypertension (HCC), suspected   • Lactic acidosis     Past Medical History:   Diagnosis Date   • A-fib (HCC)    • Cancer (HCC)     SKIN CANCER   • Depression    • GERD (gastroesophageal reflux disease)    • Hyperlipidemia    • Hypertension    • PONV (postoperative nausea and vomiting)      Past Surgical History:   Procedure Laterality Date   • LAPAROSCOPIC CHOLECYSTECTOMY     • LEG EXCISION LESION/CYST Left 2020    Procedure: EXCISION OF SQUAMOUS CELL CARCINOMA ON LEFT BAIG;  Surgeon: Lorraine Ayoub MD;  Location:  [facility] OR;  Service: General   • LEG EXCISION LESION/CYST Left 2020    Procedure: WIDE LOCAL EXCISION SQUAMOUS CELL CARCINOMA OF LOWER LEFT LEG;  Surgeon: Lorraine Ayoub MD;  Location:  [facility];  Service: General;  Laterality: Left;   • SHOULDER SURGERY Right     FX REPAIR   • SKIN CANCER EXCISION        General Information     Row Name 21          OT Time and Intention    Document Type evaluation  -MW     Mode of Treatment occupational therapy  -MW     Row Name 21          General Information    Patient Profile Reviewed yes  -MW     Prior Level of Function independent:; ADL's; all household mobility; max assist:; home management;  cooking; cleaning; dependent:; driving  -     Existing Precautions/Restrictions fall; oxygen therapy device and L/min  -     Barriers to Rehab none identified  -     Row Name 11/01/21 0812          Occupational Profile    Reason for Services/Referral (Occupational Profile) Presented to ED with c/o SOA x3 days, shingles  -     Environmental Supports and Barriers (Occupational Profile) rollator at all times, tub shower with shower chair, raised toilet seat  -     Row Name 11/01/21 0812          Living Environment    Lives With alone  meals delivered Mon-Thurs, house keeper every 2 wks  -     Row Name 11/01/21 0812          Home Main Entrance    Number of Stairs, Main Entrance two  -MW     Stair Railings, Main Entrance railings on both sides of stairs  -     Row Name 11/01/21 0812          Stairs Within Home, Primary    Stairs, Within Home, Primary one step into den, holds to the wall for that step  -     Number of Stairs, Within Home, Primary one  -MW     Stair Railings, Within Home, Primary none  -     Row Name 11/01/21 0812          Cognition    Orientation Status (Cognition) oriented to; person; place; disoriented to; situation  -     Row Name 11/01/21 0812          Safety Issues, Functional Mobility    Safety Issues Affecting Function (Mobility) ability to follow commands; awareness of need for assistance; insight into deficits/self-awareness  -     Impairments Affecting Function (Mobility) balance; endurance/activity tolerance; shortness of breath; strength  -           User Key  (r) = Recorded By, (t) = Taken By, (c) = Cosigned By    Initials Name Provider Type     Kristen Jones, OTR/L Occupational Therapist                 Mobility/ADL's     Row Name 11/01/21 0812          Bed Mobility    Bed Mobility scooting/bridging; supine-sit; sit-supine  -     Scooting/Bridging [place] (Bed Mobility) supervision  -     Supine-Sit [place] (Bed Mobility) standby assist  -      Sit-Supine [place] (Bed Mobility) standby assist  -     Assistive Device (Bed Mobility) bed rails; head of bed elevated  -     Comment (Bed Mobility) grossly weak requiring increased time to come to EOB and return to supine, lying RB upon return to bed before able to reposition self  -MW     Row Name 11/01/21 0812          Transfers    Transfers sit-stand transfer  -MW     Sit-Stand [place] (Transfers) contact guard  -     Row Name 11/01/21 0812          Sit-Stand Transfer    Assistive Device (Sit-Stand Transfers) walker, front-wheeled  -     Row Name 11/01/21 0812          Functional Mobility    Functional Mobility- Ind. Level contact guard assist  -MW     Functional Mobility- Device rolling walker  -     Functional Mobility- Comment amb short loop to door and back to bed, mildly unsteady, demos decreased endurance with increased SOA with minimal activity  -MW     Row Name 11/01/21 0812          Activities of Daily Living    BADL Assessment/Intervention lower body dressing  -     Row Name 11/01/21 0812          Lower Body Dressing Assessment/Training    [place] Level (Lower Body Dressing) don; socks; supervision  -     Position (Lower Body Dressing) edge of bed sitting  -     Comment (Lower Body Dressing) requires seated rest break between socks, task is physically challenging 2' decreased endurance  -           User Key  (r) = Recorded By, (t) = Taken By, (c) = Cosigned By    Initials Name Provider Type    MW Kristen Jones, OTR/L Occupational Therapist               Obj/Interventions     Row Name 11/01/21 0812          Range of Motion Comprehensive    Comment, General Range of Motion AROM WFL BUE  -MW     Row Name 11/01/21 0812          Strength Comprehensive (MMT)    Comment, General Manual Muscle Testing (MMT) Assessment BUE functionally 4-/5  -MW     Row Name 11/01/21 0812          Balance    Balance Assessment sitting static balance; sitting dynamic balance; standing static  balance; standing dynamic balance  -MW     Static Sitting Balance WFL  -MW     Dynamic Sitting Balance WFL; sitting, edge of bed  SBA  -MW     Static Standing Balance mild impairment; supported  -MW     Dynamic Standing Balance mild impairment; supported  -MW           User Key  (r) = Recorded By, (t) = Taken By, (c) = Cosigned By    Initials Name Provider Type    MW Kristen Jones, OTR/L Occupational Therapist               Goals/Plan     Row Name 11/01/21 0812          Transfer Goal 1 (OT)    Activity/Assistive Device (Transfer Goal 1, OT) sit-to-stand/stand-to-sit; bed-to-chair/chair-to-bed; shower chair; tub  -MW     [place] Level/Cues Needed (Transfer Goal 1, OT) modified independence  -MW     Time Frame (Transfer Goal 1, OT) long term goal (LTG); 10 days  -MW     Progress/Outcome (Transfer Goal 1, OT) goal ongoing  -     Row Name 11/01/21 0812          Bathing Goal 1 (OT)    Activity/Device (Bathing Goal 1, OT) bathing skills, all; grab bar, tub/shower; shower chair  -MW     [place] Level/Cues Needed (Bathing Goal 1, OT) supervision required; set-up required  -MW     Time Frame (Bathing Goal 1, OT) long term goal (LTG); 10 days  -MW     Strategies/Barriers (Bathing Goal 1, OT) demo use of energy conservation strategies and self-directed RB PRN independently  -MW     Progress/Outcomes (Bathing Goal 1, OT) goal ongoing  -     Row Name 11/01/21 0812          Dressing Goal 1 (OT)    Progress/Outcome (Dressing Goal 1, OT) goal ongoing  -     Row Name 11/01/21 0812          Toileting Goal 1 (OT)    Activity/Device (Toileting Goal 1, OT) toileting skills, all  -MW     [place] Level/Cues Needed (Toileting Goal 1, OT) modified independence  -MW     Time Frame (Toileting Goal 1, OT) long term goal (LTG); 10 days  -MW     Progress/Outcome (Toileting Goal 1, OT) goal ongoing  -     Row Name 11/01/21 0812          Problem Specific Goal 1 (OT)    Problem Specific Goal 1 (OT) pt will perform 5 min  dynamic balance task at S with appropriate use of AD for stability with no LOB  -MW     Time Frame (Problem Specific Goal 1, OT) long term goal (LTG); 10 days  -MW     Progress/Outcome (Problem Specific Goal 1, OT) goal ongoing  -MW     Row Name 11/01/21 0812          Therapy Assessment/Plan (OT)    Planned Therapy Interventions (OT) activity tolerance training; BADL retraining; functional balance retraining; occupation/activity based interventions; patient/caregiver education/training; strengthening exercise; transfer/mobility retraining; IADL retraining  -MW           User Key  (r) = Recorded By, (t) = Taken By, (c) = Cosigned By    Initials Name Provider Type    MW Kristen Jones, OTR/L Occupational Therapist               Clinical Impression     Row Name 11/01/21 0812          Pain Assessment    Additional Documentation Pain Scale: Numbers Pre/Post-Treatment (Group)  -MW     Row Name 11/01/21 0812          Pain Scale: Numbers Pre/Post-Treatment    Pretreatment Pain Rating 0/10 - no pain  -MW     Posttreatment Pain Rating 0/10 - no pain  -MW     Pre/Posttreatment Pain Comment SOA  -MW     Row Name 11/01/21 0812          Plan of Care Review    Plan of Care Reviewed With patient  -MW     Progress no change  -MW     Outcome Summary OT eval completed. Pt awake in fowlers, O2 tubing in one nostril satting in low 90's. With O2 removed pt maintains in low 90's, desats into upper 80's with activity. Pulse ox with variable readings throughout exam and would benefit from further assessment of activity tolerance. Pt is able to perform bed mobility with SBA, however she is very fatigued with all mobility requiring seated rest once to EOB before scooting out to get her feet on the floor. Rest required between feet to don socks 2' SOA. CGA to amb short loop to door and back to bed in room, mild unsteadiness obs with RW for stability. Pt is grossly weak and demos decreased endurance with minimal activity, OT indicated to  address stated deficits to increase independence and decrease fall risk in context of ADL/IADL and fxl mobility. Pt does report she lives alone and does not know if she would be able to have someone stay with her. At this time recommend short term rehab placement in swing bed or SNF vs home w assist and HH.  -     Row Name 11/01/21 0812          Therapy Assessment/Plan (OT)    Patient/Family Therapy Goal Statement (OT) return home  -MW     Rehab Potential (OT) good, to achieve stated therapy goals  -     Criteria for Skilled Therapeutic Interventions Met (OT) yes; skilled treatment is necessary  -MW     Therapy Frequency (OT) 5 times/wk  -MW     Predicted Duration of Therapy Intervention (OT) 10 days  -     Row Name 11/01/21 0812          Therapy Plan Review/Discharge Plan (OT)    Anticipated Discharge Disposition (OT) home with /7 care; home with home health; sub acute care setting; skilled nursing facility  -     Row Name 11/01/21 0812          Vital Signs    Pre SpO2 (%) 96  -MW     O2 Delivery Pre Treatment supplemental O2  pt on 1L but only one nostril has tubing in  -MW     Intra SpO2 (%) 88  -MW     O2 Delivery Intra Treatment room air  -MW     Post SpO2 (%) 94  -MW     O2 Delivery Post Treatment room air  -MW     Pre Patient Position Supine  -MW     Intra Patient Position Standing  -MW     Post Patient Position Sitting  -MW     Row Name 11/01/21 0812          Positioning and Restraints    Pre-Treatment Position in bed  -MW     Post Treatment Position bed  -MW     In Bed fowlers; call light within reach; encouraged to call for assist; with nsg; side rails up x2  -MW           User Key  (r) = Recorded By, (t) = Taken By, (c) = Cosigned By    Initials Name Provider Type    MW Kristen Jones, OTR/L Occupational Therapist               Outcome Measures     Row Name 11/01/21 0812          How much help from another is currently needed...    Putting on and taking off regular lower body clothing? 3  -MW      Bathing (including washing, rinsing, and drying) 3  -MW     Toileting (which includes using toilet bed pan or urinal) 3  -MW     Putting on and taking off regular upper body clothing 3  -MW     Taking care of personal grooming (such as brushing teeth) 3  -MW     Eating meals 3  -MW     AM-PAC 6 Clicks Score (OT) 18  -MW     Row Name 11/01/21 0812          Functional Assessment    Outcome Measure Options AM-PAC 6 Clicks Daily Activity (OT)  -           User Key  (r) = Recorded By, (t) = Taken By, (c) = Cosigned By    Initials Name Provider Type     Kristen Jones, OTR/L Occupational Therapist                Occupational Therapy Education                 Title: PT OT SLP Therapies (Done)     Topic: Occupational Therapy (Done)     Point: ADL training (Done)     Description:   Instruct learner(s) on proper safety adaptation and remediation techniques during self care or transfers.   Instruct in proper use of assistive devices.              Learning Progress Summary           Patient Acceptance, E,D, VU,NR by  at 11/1/2021 0901                   Point: Home exercise program (Done)     Description:   Instruct learner(s) on appropriate technique for monitoring, assisting and/or progressing therapeutic exercises/activities.              Learning Progress Summary           Patient Acceptance, E,D, VU,NR by  at 11/1/2021 0901                               User Key     Initials Effective Dates Name Provider Type [facility] 08/28/18 -  Kristen Jones, OTR/L Occupational Therapist OT              OT Recommendation and Plan  Planned Therapy Interventions (OT): activity tolerance training, BADL retraining, functional balance retraining, occupation/activity based interventions, patient/caregiver education/training, strengthening exercise, transfer/mobility retraining, IADL retraining  Therapy Frequency (OT): 5 times/wk  Plan of Care Review  Plan of Care Reviewed With: patient  Progress: no change  Outcome  Summary: OT eval completed. Pt awake in fowlers, O2 tubing in one nostril satting in low 90's. With O2 removed pt maintains in low 90's, desats into upper 80's with activity. Pulse ox with variable readings throughout exam and would benefit from further assessment of activity tolerance. Pt is able to perform bed mobility with SBA, however she is very fatigued with all mobility requiring seated rest once to EOB before scooting out to get her feet on the floor. Rest required between feet to don socks 2' SOA. CGA to amb short loop to door and back to bed in room, mild unsteadiness obs with RW for stability. Pt is grossly weak and demos decreased endurance with minimal activity, OT indicated to address stated deficits to increase independence and decrease fall risk in context of ADL/IADL and fxl mobility. Pt does report she lives alone and does not know if she would be able to have someone stay with her. At this time recommend short term rehab placement in swing bed or SNF vs home w assist and HH.     Time Calculation:    Time Calculation- OT     Row Name 11/01/21 0900             Time Calculation- OT    OT Start Time 0812  +7 min chart review  -MW      OT Stop Time 0846  -MW      OT Time Calculation (min) 34 min  -MW      OT Received On 11/01/21  -MW      OT Goal Re-Cert Due Date 11/11/21  -MW            User Key  (r) = Recorded By, (t) = Taken By, (c) = Cosigned By    Initials Name Provider Type     Kristen Jones, OTR/L Occupational Therapist              Therapy Charges for Today     Code Description Service Date Service Provider Modifiers Qty    [id number]  OT EVAL LOW COMPLEXITY 3 11/1/2021 Kristen Jones, OTR/L GO 1               Kristen Jones OTR/L  11/1/2021

## 2021-11-01 NOTE — PLAN OF CARE
Goal Outcome Evaluation:  Plan of Care Reviewed With: patient        Progress: no change  Outcome Summary: OT eval completed. Pt awake in fowlers, O2 tubing in one nostril satting in low 90's. With O2 removed pt maintains in low 90's, desats into upper 80's with activity. Pulse ox with variable readings throughout exam and would benefit from further assessment of activity tolerance. Pt is able to perform bed mobility with SBA, however she is very fatigued with all mobility requiring seated rest once to EOB before scooting out to get her feet on the floor. Rest required between feet to don socks 2' SOA. CGA to amb short loop to door and back to bed in room, mild unsteadiness obs with RW for stability. Pt is grossly weak and demos decreased endurance with minimal activity, OT indicated to address stated deficits to increase independence and decrease fall risk in context of ADL/IADL and fxl mobility. Pt does report she lives alone and does not know if she would be able to have someone stay with her. At this time recommend short term rehab placement in swing bed or SNF vs home w assist and HH.

## 2021-11-01 NOTE — PLAN OF CARE
Goal Outcome Evaluation:  Plan of Care Reviewed With: patient           Outcome Summary: vss>  resp clear and even>  nO DISTRESS NOTED, EVEN WHILE ON ELOQUIS.  no acute distress.  Patient did go back afib.  Gave a Metoprolol 5mg push IV x1 and increased her Atenolol to 100mg BID.  Cont to monitor.  call for concerns.

## 2021-11-01 NOTE — PROGRESS NOTES
Exercise Oximetry    Patient Name:Liana Zaragoza   MRN: [id number]   Date: 11/01/21             ROOM AIR BASELINE   SpO2%  94   Heart Rate 118   Blood Pressure      EXERCISE ON ROOM AIR SpO2% EXERCISE ON O2 @  LPM SpO2%   1 MINUTE  1 MINUTE    2 MINUTES  2 MINUTES    3 MINUTES  3 MINUTES    4 MINUTES  4 MINUTES    5 MINUTES  5 MINUTES    6 MINUTES  6 MINUTES               Distance Walked   Distance Walked   Dyspnea ([place] Scale)   Dyspnea ([place] Scale)   Fatigue ([place] Scale)   Fatigue ([place] Scale)   SpO2% Post Exercise   SpO2% Post Exercise   HR Post Exercise   HR Post Exercise   Time to Recovery   Time to Recovery     Comments:  Pulse ox room air rest 94% pt refused to walk at this time

## 2021-11-01 NOTE — PLAN OF CARE
Goal Outcome Evaluation: Outcome Summary: NTN assessment completed. Average PO 25% due to dx. Menu provided and interviewed for preference. No recent weight changes. Will continue to follow.

## 2021-11-01 NOTE — CASE MANAGEMENT/SOCIAL WORK
Continued Stay Note  [facility]     Patient Name: Liana Zaragoza  MRN: [id number]  Today's Date: 11/1/2021    Admit Date: 10/30/2021     Discharge Plan     Row Name 11/01/21 1716       Plan    Plan SKILLED NSG FACILITY; PENDING EVAL AND BED OFFERS    Provided Post Acute Provider List? Yes    Post Acute Provider List Nursing Home    Provided Post Acute Provider Quality & Resource List? Yes    Post Acute Provider Quality and Resource List Nursing Home    Delivered To Patient    Method of Delivery In person    Patient/Family in Agreement with Plan yes    Plan Comments PT. AGREES FOR SKILLED NSG CONSIDERATION AT [facility] AND [facility]; REFERRALS MADE.  WILL FOLLOW FOR EVAL'S AND BED OFFERS. NOTED PT. IS NOW INPATIENT STATUS.               Discharge Codes    No documentation.                     GORDON Martinez

## 2021-11-02 NOTE — CASE MANAGEMENT/SOCIAL WORK
Continued Stay Note  [facility]     Patient Name: Liana Zaragoza  MRN: [id number]  Today's Date: 11/2/2021    Admit Date: 10/30/2021     Discharge Plan     Row Name 11/02/21 1605       Plan    Plan [facility]; SKILLED LEVEL OF CARE    Patient/Family in Agreement with Plan yes    Plan Comments PT. REC'D BED OFFERS FROM BOTH [facility] AND [facility].  PT. [facility]OSE [facility] AND WILL FOLLOW TO COORDINATE PT'S TRANSFER WHEN STABLE FOR D/C.               Discharge Codes    No documentation.                     GORDON Martinez

## 2021-11-02 NOTE — PLAN OF CARE
Goal Outcome Evaluation:  Plan of Care Reviewed With: patient           Outcome Summary: OT tx completed. Pt is alert and agreeable to therapy this am. Pt requested to take shower with therapy. She was able to complete bed mobility with Min A. CGA/Min A for sit <> stand t/f from EOB and commode. CGA for all in room mobility with rwx. Set-up and SBA for UBD and UBD tasks, Mod A for LBB tasks seated in shower chair. Pt fatigued at end of session, return fowlers in bed. Continue OT POC.

## 2021-11-02 NOTE — THERAPY TREATMENT NOTE
Patient Name: Liana Zaragoza  : 1935    MRN: [id number]                              Today's Date: 2021       Admit Date: 10/30/2021    Visit Dx:     ICD-10-CM ICD-9-CM   1. Atrial fibrillation, unspecified type (HCC)  I48.91 427.31   2. Cardiomegaly  I51.7 429.3   3. Herpes zoster without complication  B02.9 053.9   4. Decreased activities of daily living (ADL)  Z78.9 V49.89   5. Impaired mobility  Z74.09 799.89     Patient Active Problem List   Diagnosis   • Shingles outbreak, left posterior thorax   • Hypoxia   • Elevated left hemidiaphragm   • Paroxysmal atrial fibrillation with rapid ventricular response (HCC)   • Chronic anticoagulation   • Functional assessment declined   • Shortness of breath   • Elevated brain natriuretic peptide (BNP) level   • Pulmonary hypertension (HCC), suspected   • Lactic acidosis   • Atrial fibrillation (HCC)     Past Medical History:   Diagnosis Date   • A-fib (HCC)    • Cancer (HCC)     SKIN CANCER   • Depression    • GERD (gastroesophageal reflux disease)    • Hyperlipidemia    • Hypertension    • PONV (postoperative nausea and vomiting)      Past Surgical History:   Procedure Laterality Date   • LAPAROSCOPIC CHOLECYSTECTOMY     • LEG EXCISION LESION/CYST Left 2020    Procedure: EXCISION OF SQUAMOUS CELL CARCINOMA ON LEFT BAIG;  Surgeon: Lorraine Ayoub MD;  Location: [facility] OR;  Service: General   • LEG EXCISION LESION/CYST Left 2020    Procedure: WIDE LOCAL EXCISION SQUAMOUS CELL CARCINOMA OF LOWER LEFT LEG;  Surgeon: Lorraine Ayoub MD;  Location:  [facility] OR;  Service: General;  Laterality: Left;   • SHOULDER SURGERY Right     FX REPAIR   • SKIN CANCER EXCISION        General Information     Row Name 2127          OT Time and Intention    Document Type evaluation  -JJ     Mode of Treatment occupational therapy  -JJ     Row Name 21 09          General Information    Patient Profile Reviewed yes  -JJ     Existing  Precautions/Restrictions fall  shingles  -     Barriers to Rehab medically complex  -     Row Name 11/02/21 0927          Cognition    Orientation Status (Cognition) oriented to; person; place; situation  -     Row Name 11/02/21 0927          Safety Issues, Functional Mobility    Impairments Affecting Function (Mobility) balance; endurance/activity tolerance; shortness of breath; strength  -           User Key  (r) = Recorded By, (t) = Taken By, (c) = Cosigned By    Initials Name Provider Type     Elizabeth López OTR/L Occupational Therapist                 Mobility/ADL's     Row Name 11/02/21 0927          Bed Mobility    Bed Mobility supine-sit; sit-supine  -     Supine-Sit Seanor (Bed Mobility) minimum assist (75% patient effort)  -     Sit-Supine Seanor (Bed Mobility) minimum assist (75% patient effort)  -     Assistive Device (Bed Mobility) head of bed elevated; draw sheet; bed rails  -     Row Name 11/02/21 0927          Transfers    Transfers sit-stand transfer; toilet transfer  -     Sit-Stand Seanor (Transfers) contact guard; minimum assist (75% patient effort)  -     Seanor Level (Toilet Transfer) minimum assist (75% patient effort); verbal cues  -     Assistive Device (Toilet Transfer) commode; grab bars/safety frame; walker, front-wheeled  -     Row Name 11/02/21 0927          Sit-Stand Transfer    Assistive Device (Sit-Stand Transfers) walker, front-wheeled  -     Row Name 11/02/21 0927          Toilet Transfer    Type (Toilet Transfer) sit-stand; stand-sit  -[facility] Name 11/02/21 0927          Functional Mobility    Functional Mobility- Ind. Level contact guard assist  -     Functional Mobility- Device rolling walker  -     Functional Mobility- Safety Issues step length decreased  -     Functional Mobility- Comment bed to bathroom, shower and back to bed.  -     Row Name 11/02/21 0927          Activities of Daily Living    BADL  Assessment/Intervention toileting; lower body dressing; upper body dressing; grooming; bathing  -JJ     Row Name 11/02/21 0927          Lower Body Dressing Assessment/Training    Charles Level (Lower Body Dressing) don; socks; supervision  -     Position (Lower Body Dressing) edge of bed sitting  -     Row Name 11/02/21 0927          Toileting Assessment/Training    Charles Level (Toileting) adjust/manage clothing; change pad/brief; perform perineal hygiene; contact guard assist; set up  -     Assistive Devices (Toileting) commode; grab bar/safety frame  -     Position (Toileting) supported standing; unsupported sitting  -     Row Name 11/02/21 0927          Upper Body Dressing Assessment/Training    Charles Level (Upper Body Dressing) don; doff; front opening garment; set up; supervision  -     Position (Upper Body Dressing) unsupported sitting  -     Comment (Upper Body Dressing) hospital gown  -     Row Name 11/02/21 0927          Grooming Assessment/Training    Charles Level (Grooming) wash face, hands; hair care, combing/brushing; set up; supervision  -     Position (Grooming) unsupported sitting  -     Row Name 11/02/21 0927          Bathing Assessment/Intervention    Charles Level (Bathing) upper body; chest/trunk; proximal lower extremities; perineal area; set up; supervision; distal lower extremities/feet; moderate assist (50% patient effort)  -     Assistive Devices (Bathing) grab bar, tub/shower; hand-held shower spray hose; shower chair  -     Position (Bathing) supported sitting; supported standing  -           User Key  (r) = Recorded By, (t) = Taken By, (c) = Cosigned By    Initials Name Provider Type    Elizabeth Rowland OTR/L Occupational Therapist               Obj/Interventions     Row Name 11/02/21 0927          Balance    Balance Assessment sitting static balance; standing static balance; sitting dynamic balance  -     Static Sitting  Balance WFL; unsupported  -JJ     Dynamic Sitting Balance WFL; unsupported; sitting, edge of bed  -JJ     Static Standing Balance mild impairment; supported; standing  -JJ           User Key  (r) = Recorded By, (t) = Taken By, (c) = Cosigned By    Initials Name Provider Type    Elizabeth Rowland, OTR/L Occupational Therapist               Goals/Plan    No documentation.                Clinical Impression     Row Name 11/02/21 0927          Pain Assessment    Additional Documentation Pain Scale: Numbers Pre/Post-Treatment (Group)  -     Row Name 11/02/21 0927          Pain Scale: Numbers Pre/Post-Treatment    Pretreatment Pain Rating 0/10 - no pain  -JJ     Posttreatment Pain Rating 0/10 - no pain  -     Pain Intervention(s) Medication (See MAR); Ambulation/increased activity; Repositioned  -     Row Name 11/02/21 0927          Plan of Care Review    Plan of Care Reviewed With patient  -     Outcome Summary OT tx completed. Pt is alert and agreeable to therapy this am. Pt requested to take shower with therapy. She was able to complete bed mobility with Min A. CGA/Min A for sit <> stand t/f from EOB and commode. CGA for all in room mobility with rwx. Set-up and SBA for UBD and UBD tasks, Mod A for LBB tasks seated in shower chair. Pt fatigued at end of session, return fowlers in bed. Continue OT POC.  -     Row Name 11/02/21 0927          Therapy Plan Review/Discharge Plan (OT)    Anticipated Discharge Disposition (OT) home with 24/7 care; home with home health; sub acute care setting; skilled nursing facility  -     Row Name 11/02/21 0927          Vital Signs    Pre Patient Position Supine  -JJ     Intra Patient Position Standing  -JJ     Post Patient Position Supine  -JJ     Row Name 11/02/21 0927          Positioning and Restraints    Pre-Treatment Position in bed  -JJ     Post Treatment Position bed  -JJ     In Bed notified nsg; fowlers; call light within reach; encouraged to call for assist; side  rails up x3  -JJ           User Key  (r) = Recorded By, (t) = Taken By, (c) = Cosigned By    Initials Name Provider Type    Elizabeth Rowland OTR/L Occupational Therapist               Outcome Measures     Row Name 11/02/21 0927          How much help from another is currently needed...    Putting on and taking off regular lower body clothing? 3  -JJ     Bathing (including washing, rinsing, and drying) 3  -JJ     Toileting (which includes using toilet bed pan or urinal) 3  -JJ     Putting on and taking off regular upper body clothing 3  -JJ     Taking care of personal grooming (such as brushing teeth) 4  -JJ     Eating meals 4  -JJ     AM-PAC 6 Clicks Score (OT) 20  -JJ     Row Name 11/02/21 0927          Functional Assessment    Outcome Measure Options AM-PAC 6 Clicks Daily Activity (OT)  -JJ           User Key  (r) = Recorded By, (t) = Taken By, (c) = Cosigned By    Initials Name Provider Type    Elizabeth Rowland OTR/L Occupational Therapist                Occupational Therapy Education                 Title: PT OT SLP Therapies (In Progress)     Topic: Occupational Therapy (Done)     Point: ADL training (Done)     Description:   Instruct learner(s) on proper safety adaptation and remediation techniques during self care or transfers.   Instruct in proper use of assistive devices.              Learning Progress Summary           Patient Acceptance, E,D, VU,NR by  at 11/1/2021 0901                   Point: Home exercise program (Done)     Description:   Instruct learner(s) on appropriate technique for monitoring, assisting and/or progressing therapeutic exercises/activities.              Learning Progress Summary           Patient Acceptance, E,D, VU,NR by  at 11/1/2021 0901                               User Key     Initials Effective Dates Name Provider Type Discipline     08/28/18 -  Kristen Jones, OTR/L Occupational Therapist OT              OT Recommendation and Plan     Plan of Care  Review  Plan of Care Reviewed With: patient  Outcome Summary: OT tx completed. Pt is alert and agreeable to therapy this am. Pt requested to take shower with therapy. She was able to complete bed mobility with Min A. CGA/Min A for sit <> stand t/f from EOB and commode. CGA for all in room mobility with rwx. Set-up and SBA for UBD and UBD tasks, Mod A for LBB tasks seated in shower chair. Pt fatigued at end of session, return fowlers in bed. Continue OT POC.     Time Calculation:    Time Calculation- OT     Row Name 11/02/21 1052             Time Calculation- OT    OT Start Time 0927  -      OT Stop Time 1030  -      OT Time Calculation (min) 63 min  -      Total Timed Code Minutes- OT 63 minute(s)  -      OT Received On 11/02/21  -            User Key  (r) = Recorded By, (t) = Taken By, (c) = Cosigned By    Initials Name Provider Type    Elizabeth Rowland OTR/L Occupational Therapist              Therapy Charges for Today     Code Description Service Date Service Provider Modifiers Qty    [id number]  OT SELF CARE/MGMT/TRAIN EA 15 MIN 11/2/2021 Elizabeth López OTR/L GO 4               CATA Lema  11/2/2021

## 2021-11-02 NOTE — PLAN OF CARE
Problem: Adult Inpatient Plan of Care  Goal: Plan of Care Review  Outcome: Ongoing, Progressing  Flowsheets (Taken 11/2/2021 0336)  Progress: improving  Plan of Care Reviewed With: patient  Outcome Summary: Patient awaiting placement At [facility] or [facility].  Patient shingles is better and they are not bothering her.  She is not in pain or itching.  She is on room air with normal sats.  Continue to monitor.  call for concerns.   Goal Outcome Evaluation:  Plan of Care Reviewed With: patient        Progress: improving  Outcome Summary: Patient awaiting placement At [facility] or [facility].  Patient shingles is better and they are not bothering her.  She is not in pain or itching.  She is on room air with normal sats.  Continue to monitor.  call for concerns.

## 2021-11-02 NOTE — PROGRESS NOTES
[facility] Medicine Services  INPATIENT PROGRESS NOTE    Length of Stay: 2  Date of Admission: 10/30/2021  Primary Care Physician: Desmond Choudhury PA-C    Subjective   Chief Complaint: Follow-up  HPI   Patient resting in bed.  She has just taken a shower, and states it wore her out.  She still has shortness of breath with exertion, which she feels is fairly close to her baseline due to her elevated diaphragm.  She denies any chest pain.  She denies cough.  She denies any pain or discomfort to her left flank from shingles.  She has not had any palpitations.  She did not notice any dizziness while she was in the shower.  She denies abdominal pain, nausea, or vomiting.    Review of Systems   All pertinent negatives and positives are as above. All other systems have been reviewed and are negative unless otherwise stated.     Objective    Temp:  [97.7 °F (36.5 °C)-98.2 °F (36.8 °C)] 98 °F (36.7 °C)  Heart Rate:  [104-113] 113  Resp:  [16-18] 18  BP: ()/(56-91) 100/58  Physical Exam  Vitals and nursing note reviewed.   Constitutional:       General: She is not in acute distress.     Appearance: She is ill-appearing.   HENT:      Head: Normocephalic and atraumatic.   Cardiovascular:      Rate and Rhythm: Tachycardic and irregular rhythm.     Pulses: Normal pulses.      Heart sounds: Normal heart sounds.   A flutter 114-125.  Pulmonary:      Effort: Pulmonary effort is normal.      Breath sounds: No wheezing, rhonchi or rales.      Comments: Diminished breath sounds  Abdominal:      General: Bowel sounds are normal. There is no distension.      Palpations: Abdomen is soft.      Tenderness: There is no abdominal tenderness.   Musculoskeletal:         General: No swelling or tenderness.      Cervical back: Normal range of motion and neck supple. No tenderness.   Skin:     General: Skin is warm and dry.      Comments: Shingles lesions noted on the left flank   Neurological:       General: No focal deficit present.      Mental Status: She is alert and oriented to person, place, and time.      Motor: Weakness present.   Psychiatric:         Mood and Affect: Mood normal.         Behavior: Behavior normal.         Thought Content: Thought content normal.         Judgment: Judgment normal.     Results Review:  I have reviewed the labs, radiology results, and diagnostic studies.    Laboratory Data:   Results from last 7 days   Lab Units 11/02/21  0616 10/30/21  1046   WBC 10*3/mm3 9.21 7.58   HEMOGLOBIN g/dL 12.4 11.6*   HEMATOCRIT % 38.5 37.9   PLATELETS 10*3/mm3 170 118*     Results from last 7 days   Lab Units 11/02/21  0616 11/01/21  0237 10/31/21  0300 10/30/21  1046 10/30/21  1046   SODIUM mmol/L 135* 137 140   < > 140   POTASSIUM mmol/L 3.8 3.8 3.7   < > 3.7   CHLORIDE mmol/L 96* 98 101   < > 100   CO2 mmol/L 31.0* 27.0 31.0*   < > 30.0*   BUN mg/dL 20 17 15   < > 15   CREATININE mg/dL 0.92 0.86 0.78   < > 0.86   CALCIUM mg/dL 9.5 9.2 9.3   < > 9.3   BILIRUBIN mg/dL  --   --   --   --  0.5   ALK PHOS U/L  --   --   --   --  110   ALT (SGPT) U/L  --   --   --   --  14   AST (SGOT) U/L  --   --   --   --  26   GLUCOSE mg/dL 117* 111* 111*   < > 136*    < > = values in this interval not displayed.     I have reviewed the patient current medications.     Assessment/Plan     Active Hospital Problems    Diagnosis    • **Shortness of breath    • Atrial fibrillation (HCC)    • Shingles outbreak, left posterior thorax    • Hypoxia    • Elevated left hemidiaphragm    • Paroxysmal atrial fibrillation with rapid ventricular response (HCC)    • Chronic anticoagulation    • Functional assessment declined    • Elevated brain natriuretic peptide (BNP) level    • Pulmonary hypertension (HCC), suspected    • Lactic acidosis      Plan:  1.  Patient presented to the emergency department 10/30/2021 with a 3-day history of shortness of breath.  Chest x-ray showed small left pneumothorax versus skinfold with  recommended CT.  A CTA of the chest was performed, which showed no acute findings.  Cardiomegaly noted and reflux of contrast into the hepatic veins, which can be seen with right heart dysfunction.  BNP was elevated on admission.  2.  Transthoracic echocardiogram shows normal systolic function with ejection fraction of 61 to 65%.  Mildly elevated right ventricular systolic pressure.  No significant valvular pathology.  Patient has been weaned from supplemental oxygen.  IV diuretics stopped yesterday, home medication oral Lasix 20 mg resumed today.  3.  Patient does have history of atrial fibrillation, remains in atrial fibrillation with mildly elevated heart rates.  Atenolol was stopped in favor of Coreg yesterday.  Do not feel her blood pressure will tolerate increase of Coreg.  We will give IV digoxin x1 dose today and start oral digoxin tomorrow.  Continue to monitor heart rates closely.  Home medication Eliquis for DVT prophylaxis.  She has been taking 2.5 mg at home, does not necessarily meet criteria for reduced dosage based on her age alone.  Her weight is greater than 60 kg and baseline creatinine is greater than 1.5.  We will will consider increasing her dosage to 5 mg every 12 hours.  Will discuss with Dr. Radford.  4.  Continue Valtrex for shingles.  5.  Continue PT/OT.  Referrals have been made to [facility] and [facility], patient prefers [facility].   following for bed offer/denial.  6.  Lab holiday in a.m.    Discharge Planning: I expect the patient to be discharged to SNF in 1-2 days.    Electronically signed by GAIL Guajardo, 11/2/2021, 11:35 CDT.

## 2021-11-03 NOTE — PLAN OF CARE
Problem: Adult Inpatient Plan of Care  Goal: Plan of Care Review  Outcome: Ongoing, Progressing  Flowsheets (Taken 11/3/2021 0348)  Progress: improving  Plan of Care Reviewed With: patient  Outcome Summary: VSS  Heart Rate and Rhythm still AF/-139  Patient had no c/o's throughout the night.  Plan is for her to go to [place] at some time.  She still has the shingles on her left side though she states they are giving her no problem.  She is on Valtrex.  Cont. to monitor.  Call for concerns.   Goal Outcome Evaluation:  Plan of Care Reviewed With: patient        Progress: improving  Outcome Summary: VSS  Heart Rate and Rhythm still AF/-139  Patient had no c/o's throughout the night.  Plan is for her to go to [place] at some time.  She still has the shingles on her left side though she states they are giving her no problem.  She is on Valtrex.  Cont. to monitor.  Call for concerns.

## 2021-11-03 NOTE — PLAN OF CARE
Goal Outcome Evaluation:  Plan of Care Reviewed With: patient        Progress: improving  Outcome Summary: Patient did not c/o pain this shift. VSS. Converted to sinus rhythm at 1232, heart rate ranging from 72-88 per tele. Patient received x1 dose of lopressor IV before she converted to sinus rhythm. PT/OT working with pt. Patient was up in the chair this morning until lunch time. Possible dicharge tomorrow. Continue to monitor.

## 2021-11-03 NOTE — PROGRESS NOTES
Continued Stay Note   [facility]     Patient Name: Liana Zaragoza  MRN: [id number]  Today's Date: 11/3/2021    Admit Date: 10/30/2021     Discharge Plan     Row Name 11/03/21 1046       Plan    Plan [facility]    Plan Comments Patient has chosen [facility] and has completed three night inpatient stay. SW will follow for discharge order.                   NBA De La CruzW MSW Student

## 2021-11-03 NOTE — PROGRESS NOTES
[facility] Medicine Services  INPATIENT PROGRESS NOTE    Length of Stay: 3  Date of Admission: 10/30/2021  Primary Care Physician: Desmond Choudhury PA-C    Subjective   Chief Complaint: Follow-up  HPI   Patient resting in bed.  She is in very good spirits today.  She denies any chest pain or palpitations.  She has had some dizziness and lightheadedness with movement.  She continues to have shortness of breath, which she feels is at baseline.  She denies abdominal pain, nausea, or vomiting.  Her main complaint is weakness.    Review of Systems   All pertinent negatives and positives are as above. All other systems have been reviewed and are negative unless otherwise stated.     Objective    Temp:  [97.1 °F (36.2 °C)-98 °F (36.7 °C)] 97.9 °F (36.6 °C)  Heart Rate:  [107-126] 118  Resp:  [16-18] 18  BP: ()/(55-81) 108/65  Physical Exam  Vitals and nursing note reviewed.   Constitutional:       General: She is not in acute distress.     Appearance: She is ill-appearing.   HENT:      Head: Normocephalic and atraumatic.   Cardiovascular:      Rate and Rhythm: Tachycardic and irregular rhythm.     Pulses: Normal pulses.      Heart sounds: Normal heart sounds.   Afib 101-139  Pulmonary:      Effort: Pulmonary effort is normal.      Breath sounds: No wheezing, rhonchi or rales.      Comments: Diminished breath sounds  Abdominal:      General: Bowel sounds are normal. There is no distension.      Palpations: Abdomen is soft.      Tenderness: There is no abdominal tenderness.   Musculoskeletal:         General: No swelling or tenderness.      Cervical back: Normal range of motion and neck supple. No tenderness.   Skin:     General: Skin is warm and dry.      Comments: Shingles lesions noted on the left flank   Neurological:      General: No focal deficit present.      Mental Status: She is alert and oriented to person, place, and time.      Motor: Weakness present.   Psychiatric:          Mood and Affect: Mood normal.         Behavior: Behavior normal.         Thought Content: Thought content normal.         Judgment: Judgment normal.     Results Review:  I have reviewed the labs, radiology results, and diagnostic studies.    Laboratory Data:   Results from last 7 days   Lab Units 11/02/21  0616 10/30/21  1046   WBC 10*3/mm3 9.21 7.58   HEMOGLOBIN g/dL 12.4 11.6*   HEMATOCRIT % 38.5 37.9   PLATELETS 10*3/mm3 170 118*     Results from last 7 days   Lab Units 11/02/21  0616 11/01/21  0237 10/31/21  0300 10/30/21  1046 10/30/21  1046   SODIUM mmol/L 135* 137 140   < > 140   POTASSIUM mmol/L 3.8 3.8 3.7   < > 3.7   CHLORIDE mmol/L 96* 98 101   < > 100   CO2 mmol/L 31.0* 27.0 31.0*   < > 30.0*   BUN mg/dL 20 17 15   < > 15   CREATININE mg/dL 0.92 0.86 0.78   < > 0.86   CALCIUM mg/dL 9.5 9.2 9.3   < > 9.3   BILIRUBIN mg/dL  --   --   --   --  0.5   ALK PHOS U/L  --   --   --   --  110   ALT (SGPT) U/L  --   --   --   --  14   AST (SGOT) U/L  --   --   --   --  26   GLUCOSE mg/dL 117* 111* 111*   < > 136*    < > = values in this interval not displayed.     I have reviewed the patient current medications.     Assessment/Plan     Active Hospital Problems    Diagnosis    • **Shortness of breath    • Atrial fibrillation (HCC)    • Shingles outbreak, left posterior thorax    • Hypoxia    • Elevated left hemidiaphragm    • Paroxysmal atrial fibrillation with rapid ventricular response (HCC)    • Chronic anticoagulation    • Functional assessment declined    • Elevated brain natriuretic peptide (BNP) level    • Pulmonary hypertension (HCC), suspected    • Lactic acidosis      Plan:  1.  Patient presented to the emergency department 10/30/2021 with a 3-day history of shortness of breath.  Chest x-ray showed small left pneumothorax versus skinfold with recommended CT.  A CTA of the chest was performed, which showed no acute findings.  Cardiomegaly noted and reflux of contrast into the hepatic veins, which  can be seen with right heart dysfunction.  BNP was elevated on admission.  2.  Transthoracic echocardiogram shows normal systolic function with ejection fraction of 61 to 65%.  Mildly elevated right ventricular systolic pressure.  No significant valvular pathology.  Patient has been weaned from supplemental oxygen.  IV diuretics stopped 11/1.  Home medication oral Lasix resumed yesterday.  Patient appears euvolemic.  3.  Patient does have history of atrial fibrillation, remains in atrial fibrillation with elevated heart rates.  She appears to be mostly asymptomatic with this.  No current indication for losartan and as she has had low blood pressure readings at times, will stop losartan.  Digoxin was given yesterday IV, oral to start today.  She has not had good response with either atenolol or Coreg.  We will give a one-time dose of IV Lopressor and start oral Lopressor tonight.  4.  Home medication Eliquis for DVT prophylaxis.  She has been taking 2.5 mg at home, does not necessarily meet criteria for reduced dosage based on her age alone.  Her weight is greater than 60 kg and baseline creatinine is greater than 1.5.  We will will consider increasing her dosage to 5 mg every 12 hours.  Will discuss with Dr. Radford.  5.  Continue Valtrex for shingles.  6.  Continue PT/OT.  Patient has accepted a bed at [facility] and can discharge when medically stable.  If heart rates are better controlled, she may be appropriate for discharge tomorrow.    Discharge Planning: I expect the patient to be discharged to SNF in 1-2 days.    Electronically signed by GAIL Guajardo, 11/3/2021, 12:35 CDT.

## 2021-11-03 NOTE — PLAN OF CARE
Goal Outcome Evaluation:  Plan of Care Reviewed With: patient        Progress: no change  Outcome Summary: OT tx complete. Pt sitting up in chair upon arrival and is aggreable to tx. She sit to stand x2 with RB needed d/t sudden feeling of light headedness. She ambulates from chair to commode and performs toileting act MOD I. Pt aggreable to shower. She completes entire shower bathing ADL mod A with increased assist needed for back. She takes constant breaks during task and is educated on breathing techniques and energy conservation for increased functional endurance. Pt returns to bed and is able to resposition self in bed. She hopes to dc home, will would benefit from cont OT tx to address her fxl endurance needed for independent/safe ADL's. Continue OT POC

## 2021-11-03 NOTE — THERAPY TREATMENT NOTE
Patient Name: Liana Zaragoza  : 1935    MRN: [id number]                              Today's Date: 11/3/2021       Admit Date: 10/30/2021    Visit Dx:     ICD-10-CM ICD-9-CM   1. Atrial fibrillation, unspecified type (HCC)  I48.91 427.31   2. Cardiomegaly  I51.7 429.3   3. Herpes zoster without complication  B02.9 053.9   4. Decreased activities of daily living (ADL)  Z78.9 V49.89   5. Impaired mobility  Z74.09 799.89     Patient Active Problem List   Diagnosis   • Shingles outbreak, left posterior thorax   • Hypoxia   • Elevated left hemidiaphragm   • Paroxysmal atrial fibrillation with rapid ventricular response (HCC)   • Chronic anticoagulation   • Functional assessment declined   • Shortness of breath   • Elevated brain natriuretic peptide (BNP) level   • Pulmonary hypertension (HCC), suspected   • Lactic acidosis   • Atrial fibrillation (HCC)     Past Medical History:   Diagnosis Date   • A-fib (HCC)    • Cancer (HCC)     SKIN CANCER   • Depression    • GERD (gastroesophageal reflux disease)    • Hyperlipidemia    • Hypertension    • PONV (postoperative nausea and vomiting)      Past Surgical History:   Procedure Laterality Date   • LAPAROSCOPIC CHOLECYSTECTOMY     • LEG EXCISION LESION/CYST Left 2020    Procedure: EXCISION OF SQUAMOUS CELL CARCINOMA ON LEFT BAIG;  Surgeon: Lorraine Ayoub MD;  Location: [facility] OR;  Service: General   • LEG EXCISION LESION/CYST Left 2020    Procedure: WIDE LOCAL EXCISION SQUAMOUS CELL CARCINOMA OF LOWER LEFT LEG;  Surgeon: Lorraine Ayoub MD;  Location:  [facility] OR;  Service: General;  Laterality: Left;   • SHOULDER SURGERY Right     FX REPAIR   • SKIN CANCER EXCISION        General Information     Row Name 21 0955          OT Time and Intention    Document Type therapy note (daily note)  -MW (r) EJ (t) MW (c)     Mode of Treatment occupational therapy  -MW (r) EJ (t) MW (c)     Row Name 21 0955          General Information    Patient Profile  Reviewed yes  -MW (r) EJ (t) MW (c)     Existing Precautions/Restrictions fall   shingles  -MW (r) EJ (t) MW (c)     Barriers to Rehab medically complex  -MW (r) EJ (t) MW (c)     Row Name 11/03/21 0955          Cognition    Orientation Status (Cognition) oriented to; person; place; situation; disoriented to; time  -MW (r) EJ (t) MW (c)     Row Name 11/03/21 0955          Safety Issues, Functional Mobility    Safety Issues Affecting Function (Mobility) awareness of need for assistance; friction/shear risk; insight into deficits/self-awareness  -MW (r) EJ (t) MW (c)     Impairments Affecting Function (Mobility) balance; endurance/activity tolerance; shortness of breath; strength  -MW (r) EJ (t) MW (c)           User Key  (r) = Recorded By, (t) = Taken By, (c) = Cosigned By    Initials Name Provider Type    MW Kristen Jones, OTR/L Occupational Therapist    Cris Escalante OT Student OT Student                 Mobility/ADL's     Row Name 11/03/21 0955          Bed Mobility    Bed Mobility sit-supine; scooting/bridging  -MW (r) EJ (t) MW (c)     Scooting/Bridging [place] (Bed Mobility) standby assist; verbal cues; 1 person assist  -MW (r) EJ (t) MW (c)     Sit-Supine [place] (Bed Mobility) verbal cues; standby assist; 1 person assist  -MW (r) EJ (t) MW (c)     Bed Mobility, Safety Issues decreased use of arms for pushing/pulling; decreased use of legs for bridging/pushing  -MW (r) EJ (t) MW (c)     Assistive Device (Bed Mobility) bed rails  -MW (r) EJ (t) MW (c)     Comment (Bed Mobility) Pt with lack of mobility in L shoulder needed for bed mob  -MW (r) EJ (t) MW (c)     Row Name 11/03/21 0955          Transfers    Transfers sit-stand transfer; toilet transfer  -MW (r) EJ (t) MW (c)     Sit-Stand [place] (Transfers) verbal cues; nonverbal cues (demo/gesture); minimum assist (75% patient effort); 1 person assist  -MW (r) EJ (t) MW (c)     [place] Level (Toilet Transfer) verbal cues; nonverbal  cues (demo/gesture); minimum assist (75% patient effort); 1 person assist  -MW (r) EJ (t) MW (c)     Assistive Device (Toilet Transfer) commode; grab bars/safety frame; walker, front-wheeled  -MW (r) EJ (t) MW (c)     Row Name 11/03/21 0955          Sit-Stand Transfer    Assistive Device (Sit-Stand Transfers) walker, front-wheeled  -MW (r) EJ (t) MW (c)     Row Name 11/03/21 0955          Toilet Transfer    Type (Toilet Transfer) stand pivot/stand step  -MW (r) EJ (t) MW (c)     Row Name 11/03/21 0955          Functional Mobility    Functional Mobility- Ind. Level verbal cues required; nonverbal cues required (demo/gesture); contact guard assist; 1 person  -MW (r) EJ (t) MW (c)     Functional Mobility- Device rolling walker  -MW (r) EJ (t) MW (c)     Functional Mobility- Safety Issues balance decreased during turns; step length decreased  -MW (r) EJ (t) MW (c)     Functional Mobility- Comment chair to commode, shower and back to bed  -MW (r) EJ (t) MW (c)     Row Name 11/03/21 0955          Activities of Daily Living    BADL Assessment/Intervention bathing; upper body dressing; lower body dressing; toileting  -MW (r) EJ (t) MW (c)     Row Name 11/03/21 0955          Lower Body Dressing Assessment/Training    [place] Level (Lower Body Dressing) doff; don; socks; maximum assist (25% patient effort)  -MW (r) EJ (t) MW (c)     Position (Lower Body Dressing) unsupported sitting  -MW (r) EJ (t) MW (c)     Comment (Lower Body Dressing) pt fatigued today, req max A for son/soff of socks  -MW (r) EJ (t) MW (c)     Row Name 11/03/21 0955          Toileting Assessment/Training    [place] Level (Toileting) adjust/manage clothing; change pad/brief; perform perineal hygiene; toileting skills; modified independence  -MW (r) EJ (t) MW (c)     Assistive Devices (Toileting) commode; grab bar/safety frame  -MW (r) EJ (t) MW (c)     Position (Toileting) supported standing; unsupported sitting  -MW (r) EJ (t) MW (c)     Row  Name 11/03/21 0955          Upper Body Dressing Assessment/Training    [place] Level (Upper Body Dressing) doff; don; verbal cues; nonverbal cues (demo/gesture); minimum assist (75% patient effort)  -MW (r) EJ (t) MW (c)     Comment (Upper Body Dressing) [facility]  -MW (r) EJ (t) MW (c)     Row Name 11/03/21 0955          Bathing Assessment/Intervention    [place] Level (Bathing) upper body; chest/trunk; proximal lower extremities; perineal area; set up; supervision; distal lower extremities/feet; moderate assist (50% patient effort)  -MW (r) EJ (t) MW (c)     Assistive Devices (Bathing) grab bar, tub/shower; hand-held shower spray hose; shower chair  -MW (r) EJ (t) MW (c)     Position (Bathing) supported sitting; supported standing  -MW (r) EJ (t) MW (c)     Comment (Bathing) OTS provides assist with bathing of back  -MW (r) EJ (t) MW (c)           User Key  (r) = Recorded By, (t) = Taken By, (c) = Cosigned By    Initials Name Provider Type    MW Kristen Jones, OTR/L Occupational Therapist    Cris Escalante, OT Student OT Student               Obj/Interventions     Row Name 11/03/21 0955          Motor Skills    Motor Skills functional endurance  -MW (r) EJ (t) MW (c)     Functional Endurance req 3 5 miniute rest breaks in between tasks  -MW (r) EJ (t) MW (c)     Row Name 11/03/21 0955          Balance    Balance Assessment sitting static balance; sitting dynamic balance; sit to stand dynamic balance; standing static balance; standing dynamic balance  -MW (r) EJ (t) MW (c)     Static Sitting Balance WFL; unsupported; sitting in chair  -MW (r) EJ (t) MW (c)     Dynamic Sitting Balance WFL; unsupported; sitting in chair  -MW (r) EJ (t) MW (c)     Sit to Stand Dynamic Balance mild impairment; supported; standing  -MW (r) EJ (t) MW (c)     Static Standing Balance mild impairment; supported; standing  with rwx  -MW (r) EJ (t) MW (c)     Dynamic Standing Balance mild impairment; supported; standing   -MW (r) EJ (t) MW (c)           User Key  (r) = Recorded By, (t) = Taken By, (c) = Cosigned By    Initials Name Provider Type    Kristen Hatch, OTR/L Occupational Therapist    Cris Escalante, OT Student OT Student               Goals/Plan     Row Name 11/03/21 0955          Toileting Goal 1 (OT)    Activity/Device (Toileting Goal 1, OT) toileting skills, all  -MW (r) EJ (t) MW (c)     [place] Level/Cues Needed (Toileting Goal 1, OT) modified independence  -MW (r) EJ (t) MW (c)     Time Frame (Toileting Goal 1, OT) long term goal (LTG); 10 days  -MW (r) EJ (t) MW (c)     Progress/Outcome (Toileting Goal 1, OT) goal met  -MW (r) EJ (t) MW (c)           User Key  (r) = Recorded By, (t) = Taken By, (c) = Cosigned By    Initials Name Provider Type    Kristen Hatch, OTR/L Occupational Therapist    Cris Escalante, OT Student OT Student               Clinical Impression     Row Name 11/03/21 0955          Pain Assessment    Additional Documentation Pain Scale: Numbers Pre/Post-Treatment (Group)  -MW (r) EJ (t) MW (c)     Row Name 11/03/21 0955          Pain Scale: Numbers Pre/Post-Treatment    Pretreatment Pain Rating 3/10  -MW (r) EJ (t) MW (c)     Posttreatment Pain Rating 3/10  -MW (r) EJ (t) MW (c)     Pain Location back  -MW (r) EJ (t) MW (c)     Pre/Posttreatment Pain Comment pt fatigued, weak, SOA and c/o pain on back in area of shingles  -MW (r) EJ (t) MW (c)     Pain Intervention(s) Repositioned; Ambulation/increased activity; Shower  -MW (r) EJ (t) MW (c)     Row Name 11/03/21 0955          Plan of Care Review    Plan of Care Reviewed With patient  -MW (r) EJ (t) MW (c)     Progress no change  -MW (r) EJ (t) MW (c)     Outcome Summary OT tx complete. Pt sitting up in chair upon arrival and is aggreable to tx. She sit to stand x2 with RB needed d/t sudden feeling of light headedness. She ambulates from chair to commode and performs toileting act MOD I. Pt aggreable to shower. She completes  entire shower bathing ADL mod A with increased assist needed for back. She takes constant breaks during task and is educated on breathing techniques and energy conservation for increased functional endurance. Pt returns to bed and is able to resposition self in bed. She hopes to dc home, will would benefit from cont OT tx to address her fxl endurance needed for independent/safe ADL's. Continue OT POC  -MW (r) EJ (t) MW (c)     Row Name 11/03/21 0955          Vital Signs    O2 Delivery Pre Treatment room air  -MW (r) EJ (t) MW (c)     O2 Delivery Intra Treatment room air  -MW (r) EJ (t) MW (c)     O2 Delivery Post Treatment room air  -MW (r) EJ (t) MW (c)     Row Name 11/03/21 0955          Positioning and Restraints    Pre-Treatment Position sitting in chair/recliner  -MW (r) EJ (t) MW (c)     Post Treatment Position bed  -MW (r) EJ (t) MW (c)     In Bed notified nsg; fowlers; call light within reach; encouraged to call for assist; side rails up x2  -MW (r) EJ (t) MW (c)           User Key  (r) = Recorded By, (t) = Taken By, (c) = Cosigned By    Initials Name Provider Type    MW Kristen Jones, OTR/L Occupational Therapist    Cris Escalante, OT Student OT Student               Outcome Measures     Row Name 11/03/21 0955          How much help from another is currently needed...    Putting on and taking off regular lower body clothing? 1  -MW (r) EJ (t) MW (c)     Bathing (including washing, rinsing, and drying) 3  -MW (r) EJ (t) MW (c)     Toileting (which includes using toilet bed pan or urinal) 4  -MW (r) EJ (t) MW (c)     Putting on and taking off regular upper body clothing 4  -MW (r) EJ (t) MW (c)     Taking care of personal grooming (such as brushing teeth) 4  -MW (r) EJ (t) MW (c)     Eating meals 4  -MW (r) EJ (t) MW (c)     AM-PAC 6 Clicks Score (OT) 20  -MW (r) EJ (t)     Row Name 11/03/21 4930          Functional Assessment    Outcome Measure Options AM-PAC 6 Clicks Daily Activity (OT)  -MW (r) EJ  (t)  (c)           User Key  (r) = Recorded By, (t) = Taken By, (c) = Cosigned By    Initials Name Provider Type     Kristen Jones OTR/L Occupational Therapist    Cris Escalante, OT Student OT Student                Occupational Therapy Education                 Title: PT OT SLP Therapies (In Progress)     Topic: Occupational Therapy (Done)     Point: ADL training (Done)     Description:   Instruct learner(s) on proper safety adaptation and remediation techniques during self care or transfers.   Instruct in proper use of assistive devices.              Learning Progress Summary           Patient Acceptance, E,TB, VU,DU,NR by  at 11/3/2021 1054    Acceptance, E,D, VU,NR by  at 11/1/2021 0901                   Point: Home exercise program (Done)     Description:   Instruct learner(s) on appropriate technique for monitoring, assisting and/or progressing therapeutic exercises/activities.              Learning Progress Summary           Patient Acceptance, E,TB, VU,DU,NR by  at 11/3/2021 1054    Acceptance, E,D, VU,NR by  at 11/1/2021 0901                   Point: Precautions (Done)     Description:   Instruct learner(s) on prescribed precautions during self-care and functional transfers.              Learning Progress Summary           Patient Acceptance, E,TB, VU,DU,NR by  at 11/3/2021 1054                   Point: Body mechanics (Done)     Description:   Instruct learner(s) on proper positioning and spine alignment during self-care, functional mobility activities and/or exercises.              Learning Progress Summary           Patient Acceptance, E,TB, VU,DU,NR by  at 11/3/2021 1054                               User Key     Initials Effective Dates Name Provider Type [facility] 08/28/18 -  Kristen Jones OTR/L Occupational Therapist OT     08/04/21 -  Cris Giles, OT Student OT Student OT              OT Recommendation and Plan     Plan of Care Review  Plan of Care Reviewed  With: patient  Progress: no change  Outcome Summary: OT tx complete. Pt sitting up in chair upon arrival and is aggreable to tx. She sit to stand x2 with RB needed d/t sudden feeling of light headedness. She ambulates from chair to commode and performs toileting act MOD I. Pt aggreable to shower. She completes entire shower bathing ADL mod A with increased assist needed for back. She takes constant breaks during task and is educated on breathing techniques and energy conservation for increased functional endurance. Pt returns to bed and is able to resposition self in bed. She hopes to dc home, will would benefit from cont OT tx to address her fxl endurance needed for independent/safe ADL's. Continue OT POC     Time Calculation:    Time Calculation- OT     Row Name 11/03/21 0955             Time Calculation- OT    OT Start Time 0955  -MW (r) EJ (t) MW (c)      OT Stop Time 1041  -MW (r) EJ (t) MW (c)      OT Time Calculation (min) 46 min  -MW (r) EJ (t)      OT Received On 11/03/21  -MW (r) EJ (t) MW (c)            User Key  (r) = Recorded By, (t) = Taken By, (c) = Cosigned By    Initials Name Provider Type    Kristen Hatch, OTR/L Occupational Therapist    Rebecca Escalante, OT Student OT Student                       REBECCA FRANCO, OT Student  11/3/2021

## 2021-11-04 NOTE — PLAN OF CARE
Goal Outcome Evaluation:  Plan of Care Reviewed With: patient        Progress: improving  Outcome Summary: Pt had no c/o pain. VSS. Pt remains on room air. Possible d/c to [facility] today. No falls. Safety maintained. S 60-75.

## 2021-11-04 NOTE — THERAPY DISCHARGE NOTE
Acute Care - Occupational Therapy Discharge  [facility]    Patient Name: Liana Zaragoza  : 1935    MRN: [id number]                              Today's Date: 2021       Admit Date: 10/30/2021    Visit Dx:     ICD-10-CM ICD-9-CM   1. Atrial fibrillation, unspecified type (HCC)  I48.91 427.31   2. Cardiomegaly  I51.7 429.3   3. Herpes zoster without complication  B02.9 053.9   4. Decreased activities of daily living (ADL)  Z78.9 V49.89   5. Impaired mobility  Z74.09 799.89     Patient Active Problem List   Diagnosis   • Shingles outbreak, left posterior thorax   • Hypoxia   • Elevated left hemidiaphragm   • Paroxysmal atrial fibrillation with rapid ventricular response (HCC)   • Chronic anticoagulation   • Functional assessment declined   • Shortness of breath   • Elevated brain natriuretic peptide (BNP) level   • Pulmonary hypertension (HCC), suspected   • Lactic acidosis   • Atrial fibrillation (HCC)     Past Medical History:   Diagnosis Date   • A-fib (HCC)    • Cancer (HCC)     SKIN CANCER   • Depression    • GERD (gastroesophageal reflux disease)    • Hyperlipidemia    • Hypertension    • PONV (postoperative nausea and vomiting)      Past Surgical History:   Procedure Laterality Date   • LAPAROSCOPIC CHOLECYSTECTOMY     • LEG EXCISION LESION/CYST Left 2020    Procedure: EXCISION OF SQUAMOUS CELL CARCINOMA ON LEFT BAIG;  Surgeon: Lorraine Ayoub MD;  Location: [facility] OR;  Service: General   • LEG EXCISION LESION/CYST Left 2020    Procedure: WIDE LOCAL EXCISION SQUAMOUS CELL CARCINOMA OF LOWER LEFT LEG;  Surgeon: Lorraine Ayoub MD;  Location: [facility] OR;  Service: General;  Laterality: Left;   • SHOULDER SURGERY Right     FX REPAIR   • SKIN CANCER EXCISION        General Information     Row Name 21 0746          OT Time and Intention    Document Type therapy note (daily note)  -CH (r) EJ (t) CH (c)     Mode of Treatment occupational therapy  -CH (r) EJ (t) CH (c)     Row Name  11/04/21 0746          General Information    Patient Profile Reviewed yes  -CH (r) EJ (t) CH (c)     Existing Precautions/Restrictions fall   shingles  -CH (r) EJ (t) CH (c)     Row Name 11/04/21 0746          Cognition    Orientation Status (Cognition) oriented x 4  -CH (r) EJ (t) CH (c)     Row Name 11/04/21 0746          Safety Issues, Functional Mobility    Safety Issues Affecting Function (Mobility) friction/shear risk; insight into deficits/self-awareness  -CH (r) EJ (t) CH (c)     Impairments Affecting Function (Mobility) balance; endurance/activity tolerance; shortness of breath; strength  -CH (r) EJ (t) CH (c)           User Key  (r) = Recorded By, (t) = Taken By, (c) = Cosigned By    Initials Name Provider Type    Sheila Hendricks, OTR/L Occupational Therapist    Cris Escalante OT Student OT Student               Mobility/ADL's     Row Name 11/04/21 0746          Bed Mobility    Bed Mobility bed mobility (all) activities  -CH (r) EJ (t) CH (c)     All Activities, Boundary (Bed Mobility) minimum assist (75% patient effort); 1 person assist  -CH (r) EJ (t) CH (c)     Assistive Device (Bed Mobility) bed rails; head of bed elevated  -CH (r) EJ (t) CH (c)     Row Name 11/04/21 0746          Transfers    Transfers toilet transfer  -CH (r) EJ (t) CH (c)     Sit-Stand Boundary (Transfers) verbal cues; nonverbal cues (demo/gesture); contact guard; 1 person assist  -CH (r) EJ (t) CH (c)     Boundary Level (Toilet Transfer) verbal cues; nonverbal cues (demo/gesture); contact guard; 1 person assist  -CH (r) EJ (t) CH (c)     Assistive Device (Toilet Transfer) commode; grab bars/safety frame; walker, front-wheeled  -CH (r) EJ (t) CH (c)     Row Name 11/04/21 0746          Sit-Stand Transfer    Assistive Device (Sit-Stand Transfers) walker, front-wheeled  -CH (r) EJ (t) CH (c)     Row Name 11/04/21 0746          Toilet Transfer    Type (Toilet Transfer) stand pivot/stand step  -CH (r) EJ (t) CH (c)      Row Name 11/04/21 0746          Functional Mobility    Functional Mobility- Ind. Level verbal cues required; nonverbal cues required (demo/gesture); contact guard assist; 1 person  -CH (r) EJ (t) CH (c)     Functional Mobility- Device rolling walker  -CH (r) EJ (t) CH (c)     Functional Mobility- Comment bed to bathroom and then to chair  -CH (r) EJ (t) CH (c)     Row Name 11/04/21 0746          Activities of Daily Living    BADL Assessment/Intervention lower body dressing; grooming; toileting  -CH (r) EJ (t) CH (c)     Row Name 11/04/21 0746          Lower Body Dressing Assessment/Training    [place] Level (Lower Body Dressing) doff; don; socks; maximum assist (25% patient effort)  -CH (r) EJ (t) CH (c)     Position (Lower Body Dressing) unsupported sitting  -CH (r) EJ (t) CH (c)     Comment (Lower Body Dressing) states she doesn't wear socks at home  -CH (r) EJ (t) CH (c)     Row Name 11/04/21 0746          Toileting Assessment/Training    [place] Level (Toileting) adjust/manage clothing; change pad/brief; perform perineal hygiene; toileting skills; modified independence  -CH (r) EJ (t) CH (c)     Assistive Devices (Toileting) commode; grab bar/safety frame  -CH (r) EJ (t) CH (c)     Position (Toileting) supported standing; unsupported sitting  -CH (r) EJ (t) CH (c)     Row Name 11/04/21 0746          Grooming Assessment/Training    [place] Level (Grooming) wash face, hands; hair care, combing/brushing; set up; supervision  -CH (r) EJ (t) CH (c)     Position (Grooming) sink side  -CH (r) EJ (t) CH (c)           User Key  (r) = Recorded By, (t) = Taken By, (c) = Cosigned By    Initials Name Provider Type    Sheila Hendricks, OTR/L Occupational Therapist    Cris Escalante OT Student OT Student               Obj/Interventions     Row Name 11/04/21 0746          Motor Skills    Motor Skills functional endurance  -CH (r) EJ (t) CH (c)     Functional Endurance upon sitting EOB she req a 5 min  rest break before standing  -CH (r) EJ (t) CH (c)     Row Name 11/04/21 0746          Balance    Balance Assessment sitting static balance; sitting dynamic balance; sit to stand dynamic balance; standing dynamic balance; standing static balance  -CH (r) EJ (t) CH (c)     Static Sitting Balance WFL; unsupported; sitting, edge of bed  -CH (r) EJ (t) CH (c)     Dynamic Sitting Balance WFL; unsupported; sitting, edge of bed  -CH (r) EJ (t) CH (c)     Sit to Stand Dynamic Balance WFL; supported; standing  -CH (r) EJ (t) CH (c)     Static Standing Balance WFL; supported; standing  with rwx  -CH (r) EJ (t) CH (c)     Dynamic Standing Balance WFL; supported; standing  -CH (r) EJ (t) CH (c)           User Key  (r) = Recorded By, (t) = Taken By, (c) = Cosigned By    Initials Name Provider Type    Sheila Hendricks, OTR/L Occupational Therapist    Cris Escalante, OT Student OT Student               Goals/Plan     Row Name 11/04/21 0799          Transfer Goal 1 (OT)    Activity/Assistive Device (Transfer Goal 1, OT) sit-to-stand/stand-to-sit; bed-to-chair/chair-to-bed; shower chair; tub  -CH (r) EJ (t) CH (c)     [place] Level/Cues Needed (Transfer Goal 1, OT) modified independence  -CH (r) EJ (t) CH (c)     Time Frame (Transfer Goal 1, OT) long term goal (LTG); 10 days  -CH (r) EJ (t) CH (c)     Progress/Outcome (Transfer Goal 1, OT) discharged from facility; goal not met  -CH (r) EJ (t) CH (c)     Row Name 11/04/21 0746          Bathing Goal 1 (OT)    Activity/Device (Bathing Goal 1, OT) bathing skills, all; grab bar, tub/shower; shower chair  -CH (r) EJ (t) CH (c)     [place] Level/Cues Needed (Bathing Goal 1, OT) supervision required; set-up required  -CH (r) EJ (t) CH (c)     Time Frame (Bathing Goal 1, OT) long term goal (LTG); 10 days  -CH (r) EJ (t) CH (c)     Strategies/Barriers (Bathing Goal 1, OT) demo use of energy conservation strategies and self-directed RB PRN independently  -CH (r) EJ (t) CH (c)      Progress/Outcomes (Bathing Goal 1, OT) discharged from facility; goal not met  -CH (r) EJ (t) CH (c)     Row Name 11/04/21 0746          Toileting Goal 1 (OT)    Activity/Device (Toileting Goal 1, OT) toileting skills, all  -CH (r) EJ (t) CH (c)     [place] Level/Cues Needed (Toileting Goal 1, OT) modified independence  -CH (r) EJ (t) CH (c)     Time Frame (Toileting Goal 1, OT) long term goal (LTG); 10 days  -CH (r) EJ (t) CH (c)     Progress/Outcome (Toileting Goal 1, OT) goal met  -CH (r) EJ (t) CH (c)     Row Name 11/04/21 0746          Problem Specific Goal 1 (OT)    Problem Specific Goal 1 (OT) pt will perform 5 min dynamic balance task at S with appropriate use of AD for stability with no LOB  -CH (r) EJ (t) CH (c)     Time Frame (Problem Specific Goal 1, OT) long term goal (LTG); 10 days  -CH (r) EJ (t) CH (c)     Progress/Outcome (Problem Specific Goal 1, OT) goal not met; discharged from facility  -CH (r) EJ (t) CH (c)           User Key  (r) = Recorded By, (t) = Taken By, (c) = Cosigned By    Initials Name Provider Type    Sheila Hendricks, OTR/L Occupational Therapist    Cris Escalante, OT Student OT Student               Clinical Impression     Row Name 11/04/21 0746          Pain Assessment    Additional Documentation Pain Scale: Numbers Pre/Post-Treatment (Group)  -CH (r) EJ (t) CH (c)     Row Name 11/04/21 0746          Pain Scale: Numbers Pre/Post-Treatment    Pretreatment Pain Rating 0/10 - no pain  -CH (r) EJ (t) CH (c)     Posttreatment Pain Rating 0/10 - no pain  -CH (r) EJ (t) CH (c)     Pain Intervention(s) Repositioned; Ambulation/increased activity  -CH (r) EJ (t) CH (c)     Row Name 11/04/21 0746          Plan of Care Review    Plan of Care Reviewed With patient  -CH (r) EJ (t) CH (c)     Progress improving  -CH (r) EJ (t) CH (c)     Outcome Summary OT tx complete. Pt willing and aggreable to participate in tx. Pt comes to EOB SBA with additional time at EOB needed to orient  self. She ambulates from bed to bathroom and performs toileting task MOD I, stands sink side to perform groomings tasks SBA. OTS encourages pursed lip breathing and to sit while at sink side for energy conservation. She abulates from bathroom to chair and is able to recline self in chair. APRN in rm states pt is medically stable and will dc today or tomorrow to [place] for additional rehab. Continue OT POC  -CH (r) EJ (t) CH (c)     Row Name 11/04/21 0746          Therapy Assessment/Plan (OT)    Patient/Family Therapy Goal Statement (OT) [facility]  -CH (r) EJ (t) CH (c)     Row Name 11/04/21 0746          Therapy Plan Review/Discharge Plan (OT)    Anticipated Discharge Disposition (OT) skilled nursing facility  -CH (r) EJ (t) CH (c)     Row Name 11/04/21 0746          Vital Signs    O2 Delivery Pre Treatment room air  -CH (r) EJ (t) CH (c)     O2 Delivery Intra Treatment room air  -CH (r) EJ (t) CH (c)     O2 Delivery Post Treatment room air  -CH (r) EJ (t) CH (c)     Row Name 11/04/21 0746          Positioning and Restraints    Pre-Treatment Position in bed  -CH (r) EJ (t) CH (c)     Post Treatment Position chair  -CH (r) EJ (t) CH (c)     In Chair reclined; call light within reach; encouraged to call for assist  -CH (r) EJ (t) CH (c)           User Key  (r) = Recorded By, (t) = Taken By, (c) = Cosigned By    Initials Name Provider Type    Sheila Hendricks, OTR/L Occupational Therapist    Cris Escalante, OT Student OT Student               Outcome Measures     Row Name 11/04/21 0746          How much help from another is currently needed...    Putting on and taking off regular lower body clothing? 1  -CH (r) EJ (t) CH (c)     Bathing (including washing, rinsing, and drying) 2  -CH (r) EJ (t) CH (c)     Toileting (which includes using toilet bed pan or urinal) 4  -CH (r) EJ (t) CH (c)     Putting on and taking off regular upper body clothing 4  -CH (r) EJ (t) CH (c)     Taking  care of personal grooming (such as brushing teeth) 4  -CH (r) EJ (t) CH (c)     Eating meals 4  -CH (r) EJ (t) CH (c)     AM-PAC 6 Clicks Score (OT) 19  -CH (r) EJ (t)           User Key  (r) = Recorded By, (t) = Taken By, (c) = Cosigned By    Initials Name Provider Type    Sheila Hendricks, OTR/L Occupational Therapist    Cris Escalante, OT Student OT Student              Occupational Therapy Education                 Title: PT OT SLP Therapies (Resolved)     Topic: Occupational Therapy (Resolved)     Point: ADL training (Resolved)     Description:   Instruct learner(s) on proper safety adaptation and remediation techniques during self care or transfers.   Instruct in proper use of assistive devices.              Learning Progress Summary           Patient Acceptance, E,TB,D, VU,DU,NR by  at 11/4/2021 0823    Acceptance, E,TB, VU,DU,NR by  at 11/3/2021 1054    Acceptance, E,D, VU,NR by  at 11/1/2021 0901                   Point: Home exercise program (Resolved)     Description:   Instruct learner(s) on appropriate technique for monitoring, assisting and/or progressing therapeutic exercises/activities.              Learning Progress Summary           Patient Acceptance, E,TB,D, VU,DU,NR by  at 11/4/2021 0823    Acceptance, E,TB, VU,DU,NR by  at 11/3/2021 1054    Acceptance, E,D, VU,NR by  at 11/1/2021 0901                   Point: Precautions (Resolved)     Description:   Instruct learner(s) on prescribed precautions during self-care and functional transfers.              Learning Progress Summary           Patient Acceptance, E,TB,D, VU,DU,NR by  at 11/4/2021 0823    Acceptance, E,TB, VU,DU,NR by  at 11/3/2021 1054                   Point: Body mechanics (Resolved)     Description:   Instruct learner(s) on proper positioning and spine alignment during self-care, functional mobility activities and/or exercises.              Learning Progress Summary           Patient Acceptance, E,TB,D, VU,DU,NR  by AMEYA at 11/4/2021 0823    Acceptance, E,TB, VU,DU,NR by AMEYA at 11/3/2021 1054                               User Key     Initials Effective Dates Name Provider Type Discipline     08/28/18 -  Kristen Jones, OTR/L Occupational Therapist OT    AMEYA 08/04/21 -  Cris Giles OT Student OT Student OT              OT Recommendation and Plan  Retired Outcome Summary/Treatment Plan (OT)  Anticipated Discharge Disposition (OT): skilled nursing facility  Plan of Care Review  Plan of Care Reviewed With: patient  Progress: improving  Outcome Summary: OT tx complete. Pt willing and aggreable to participate in tx. Pt comes to EOB SBA with additional time at EOB needed to orient self. She ambulates from bed to bathroom and performs toileting task MOD I, stands sink side to perform groomings tasks SBA. OTS encourages pursed lip breathing and to sit while at sink side for energy conservation. She abulates from bathroom to chair and is able to recline self in chair. APRN in  states pt is medically stable and will dc today or tomorrow to [facility] for additional rehab. Continue OT POC  Plan of Care Reviewed With: patient  Outcome Summary: OT tx complete. Pt willing and aggreable to participate in tx. Pt comes to EOB SBA with additional time at EOB needed to orient self. She ambulates from bed to bathroom and performs toileting task MOD I, stands sink side to perform groomings tasks SBA. OTS encourages pursed lip breathing and to sit while at sink side for energy conservation. She abulates from bathroom to chair and is able to recline self in chair. APRN in  states pt is medically stable and will dc today or tomorrow to [facility] for additional rehab. Continue OT POC     Time Calculation:    Time Calculation- OT     Row Name 11/04/21 0746             Time Calculation- OT    OT Start Time 0746  -CH (r) EJ (t) CH (c)      OT Stop Time 0814  -CH (r) EJ (t) CH (c)      OT Time Calculation (min) 28 min  -CH (r) EJ  (t)      OT Received On 11/04/21  -MINAL (r) AMEYA (t) MINAL (c)            User Key  (r) = Recorded By, (t) = Taken By, (c) = Cosigned By    Initials Name Provider Type    Sheila Hendricks, OTR/L Occupational Therapist    Cris Escalante, OT Student OT Student                       CRIS FRANCO, OT Student  11/4/2021

## 2021-11-04 NOTE — DISCHARGE SUMMARY
[facility] Medicine Services  DISCHARGE SUMMARY       Date of Admission: 10/30/2021  Date of Discharge:  11/4/2021  Primary Care Physician: Desmond Choudhury PA-C    Presenting Problem/History of Present Illness:  Shortness of breath    Final Discharge Diagnoses:  Active Hospital Problems    Diagnosis    • **Shortness of breath    • Atrial fibrillation (HCC)    • Shingles outbreak, left posterior thorax    • Hypoxia    • Elevated left hemidiaphragm    • Paroxysmal atrial fibrillation with rapid ventricular response (HCC)    • Chronic anticoagulation    • Functional assessment declined    • Elevated brain natriuretic peptide (BNP) level    • Pulmonary hypertension (HCC), suspected    • Lactic acidosis      Consults: None    Procedures Performed: None    Pertinent Test Results:   Lab Results (all)     Procedure Component Value Units Date/Time    Basic Metabolic Panel [[id number]]  (Abnormal) Collected: 11/02/21 0616    Specimen: Blood Updated: 11/02/21 0650     Glucose 117 mg/dL      BUN 20 mg/dL      Creatinine 0.92 mg/dL      Sodium 135 mmol/L      Potassium 3.8 mmol/L      Comment: Slight hemolysis detected by analyzer. Results may be affected.        Chloride 96 mmol/L      CO2 31.0 mmol/L      Calcium 9.5 mg/dL      eGFR Non African Amer 58 mL/min/1.73      BUN/Creatinine Ratio 21.7     Anion Gap 8.0 mmol/L     CBC (No Diff) [[id number]]  (Abnormal) Collected: 11/02/21 0616    Specimen: Blood Updated: 11/02/21 0635     WBC 9.21 10*3/mm3      RBC 4.32 10*6/mm3      Hemoglobin 12.4 g/dL      Hematocrit 38.5 %      MCV 89.1 fL      MCH 28.7 pg      MCHC 32.2 g/dL      RDW 14.6 %      RDW-SD 46.5 fl      MPV 13.7 fL      Platelets 170 10*3/mm3     Basic Metabolic Panel [[id number]]  (Abnormal) Collected: 11/01/21 0237    Specimen: Blood Updated: 11/01/21 0320     Glucose 111 mg/dL      BUN 17 mg/dL      Creatinine 0.86 mg/dL      Sodium 137 mmol/L      Potassium 3.8 mmol/L       Comment: Slight hemolysis detected by analyzer. Results may be affected.        Chloride 98 mmol/L      CO2 27.0 mmol/L      Calcium 9.2 mg/dL      eGFR Non African Amer 63 mL/min/1.73      BUN/Creatinine Ratio 19.8     Anion Gap 12.0 mmol/L     Basic Metabolic Panel [[id number]]  (Abnormal) Collected: 10/31/21 0300    Specimen: Blood Updated: 10/31/21 0408     Glucose 111 mg/dL      BUN 15 mg/dL      Creatinine 0.78 mg/dL      Sodium 140 mmol/L      Potassium 3.7 mmol/L      Chloride 101 mmol/L      CO2 31.0 mmol/L      Calcium 9.3 mg/dL      eGFR Non African Amer 70 mL/min/1.73      BUN/Creatinine Ratio 19.2     Anion Gap 8.0 mmol/L     COVID PRE-OP / PRE-PROCEDURE SCREENING ORDER (NO ISOLATION) - Swab, Nasal Cavity [[id number]]  (Normal) Collected: 10/30/21 1628    Specimen: Swab from Nasal Cavity Updated: 10/30/21 1754    COVID-19,[facility] IN-HOUSE,Nasal Swab No Transport Media 3-4 HR TAT - Swab, Nasal Cavity [[id number]]  (Normal) Collected: 10/30/21 1628    Specimen: Swab from Nasal Cavity Updated: 10/30/21 1754     COVID19 Not Detected    BNP [[id number]]  (Abnormal) Collected: 10/30/21 1454    Specimen: Blood Updated: 10/30/21 1531     proBNP 10,990.0 pg/mL     STAT Lactic Acid, Reflex [[id number]]  (Normal) Collected: 10/30/21 1454    Specimen: Blood Updated: 10/30/21 1526     Lactate 1.6 mmol/L     [place] Draw [[id number]] Collected: 10/30/21 1046    Specimen: Blood Updated: 10/30/21 1500    Narrative:      Manual Differential [[id number]]  (Abnormal) Collected: 10/30/21 1046    Specimen: Blood Updated: 10/30/21 1238     Neutrophil % 61.0 %      Lymphocyte % 20.0 %      Monocyte % 11.0 %      Eosinophil % 1.0 %      Bands %  2.0 %      Atypical Lymphocyte % 4.0 %      Plasma Cells % 1.0 %      Neutrophils Absolute 4.78 10*3/mm3      Lymphocytes Absolute 1.82 10*3/mm3      Monocytes Absolute 0.83 10*3/mm3      Eosinophils Absolute 0.08 10*3/mm3      Anisocytosis Slight/1+     Poikilocytes Slight/1+      Polychromasia Large/3+     WBC Morphology Normal     Platelet Estimate Decreased     Clumped Platelets Present    CBC & Differential [[id number]]  (Abnormal) Collected: 10/30/21 1046    Specimen: Blood Updated: 10/30/21 1238    CBC Auto Differential [[id number]]  (Abnormal) Collected: 10/30/21 1046    Specimen: Blood Updated: 10/30/21 1238     WBC 7.58 10*3/mm3      RBC 4.17 10*6/mm3      Hemoglobin 11.6 g/dL      Hematocrit 37.9 %      MCV 90.9 fL      MCH 27.8 pg      MCHC 30.6 g/dL      RDW 14.9 %      RDW-SD 48.8 fl      Platelets 118 10*3/mm3     Narrative:      Verified platelet count with citrate tube as well;, platelet clumping present in both edta and citrate ; plt count may be falsely decreased    Green Top (Gel) [[id number]] Collected: 10/30/21 1046    Specimen: Blood Updated: 10/30/21 1200     Extra Tube Hold for add-ons.     Comment: Auto resulted.       [facility] Blood Culture Bottle Set [[id number]] Collected: 10/30/21 1046    Specimen: Blood from Arm, Left Updated: 10/30/21 1200     Extra Tube Hold for add-ons.     Comment: Auto resulted.       Lavender Top [[id number]] Collected: 10/30/21 1046    Specimen: Blood Updated: 10/30/21 1200     Extra Tube hold for add-on     Comment: Auto resulted       Light Blue Top [[id number]] Collected: 10/30/21 1046    Specimen: Blood Updated: 10/30/21 1200     Extra Tube hold for add-on     Comment: Auto resulted       Red Top [[id number]] Collected: 10/30/21 1046    Specimen: Blood Updated: 10/30/21 1200     Extra Tube Hold for add-ons.     Comment: Auto resulted.       Lactic Acid, Plasma [[id number]]  (Abnormal) Collected: 10/30/21 1046    Specimen: Blood Updated: 10/30/21 1124     Lactate 2.7 mmol/L     Comprehensive Metabolic Panel [[id number]]  (Abnormal) Collected: 10/30/21 1046    Specimen: Blood Updated: 10/30/21 1123     Glucose 136 mg/dL      BUN 15 mg/dL      Creatinine 0.86 mg/dL      Sodium 140 mmol/L      Potassium 3.7 mmol/L      Chloride 100 mmol/L      CO2  30.0 mmol/L      Calcium 9.3 mg/dL      Total Protein 7.1 g/dL      Albumin 3.80 g/dL      ALT (SGPT) 14 U/L      AST (SGOT) 26 U/L      Alkaline Phosphatase 110 U/L      Total Bilirubin 0.5 mg/dL      eGFR Non African Amer 63 mL/min/1.73      Globulin 3.3 gm/dL      A/G Ratio 1.2 g/dL      BUN/Creatinine Ratio 17.4     Anion Gap 10.0 mmol/L     Troponin [[id number]]  (Normal) Collected: 10/30/21 1046    Specimen: Blood Updated: 10/30/21 1121     Troponin T <0.010 ng/mL     D-dimer, Quantitative [[id number]]  (Abnormal) Collected: 10/30/21 1046    Specimen: Blood Updated: 10/30/21 1119     D-Dimer, Quantitative 0.51 mg/L (FEU)     Protime-INR [[id number]]  (Abnormal) Collected: 10/30/21 1046    Specimen: Blood Updated: 10/30/21 1119     Protime 15.2 Seconds      INR 1.25        Imaging Results (All)     Procedure Component Value Units Date/Time    CT Angiogram Chest [[id number]] Collected: 10/30/21 1401     Updated: 10/30/21 1410    Narrative:      EXAM: CT ANGIOGRAM CHEST- - 10/30/2021 1:40 PM CDT     HISTORY: Chest pain, nonspecific, shortness of breath     COMPARISON: Same day chest radiograph.      DOSE LENGTH PRODUCT: 520 mGy cm. Automatic exposure control was utilized  to make radiation dose as low as reasonably achievable.     TECHNIQUE: Enhanced axial CT images obtained with multiplanar reformats.  3D postprocessing, including MIPs, performed and images saved to PACS.     FINDINGS:   AIRWAYS/PULMONARY PARENCHYMA: The central airways are midline and  patent.      No pneumothorax. No focal consolidation. No pleural effusion.     VESSELS: Contrast bolus is adequate. No pulmonary artery filling defect  to suggest pulmonary embolus. Enlarged pulmonary artery measures 3.2 cm  in transverse dimension, which can be seen with pulmonary artery  hypertension. Aorta normal in course and caliber with calcified  atherosclerosis.     CARDIAC:  Cardiomegaly, predominantly biatrial enlargement. There  appears to be thickening of  the left ventricular muscular wall suggests  left ventricular hypertrophy. No pericardial effusion.  Coronary artery  calcification versus stent.     MEDIASTINUM: Mediastinum shifted toward the right, likely related to  elevation of the left hemidiaphragm. Esophagus has normal coarse,  caliber and wall thickness.     EXTRATHORACIC: The visualized portions of the thyroid gland are  unremarkable. No thoracic inlet or axillary adenopathy. The  extrathoracic soft tissues are within normal limits.      INCLUDED UPPER ABDOMEN: Cholecystectomy clips. Visualized portion of the  liver, pancreas, spleen, adrenal glands are within normal limits. Reflux  of contrast into the hepatic veins, which can be seen with right heart  dysfunction.     OSSEOUS: Right shoulder replacement. Degenerative changes of the spine.  No acute bony finding.          Impression:      1. No acute cardiopulmonary finding. Specifically, no pneumothorax or  pulmonary embolism.  2. Cardiomegaly, predominantly biatrial enlargement. Thickening of the  left ventricular wall suggests left ventricular hypertrophy. Coronary  artery calcifications versus stent. Reflux of contrast into the hepatic  veins, which can be seen with right heart dysfunction.  3. Left hemidiaphragm elevation with mediastinum shifted toward the  right.  4. Enlarged pulmonary artery, which can be seen with pulmonary artery  hypertension.  This report was finalized on 10/30/2021 14:07 by Dr Radha Potter MD.    XR Chest 1 View [[id number]] Collected: 10/30/21 1200     Updated: 10/30/21 1205    Narrative:      EXAM: XR CHEST 1 VW- - 10/30/2021 11:31 AM CDT     HISTORY: Chest pain       COMPARISON: No existing relevant imaging studies available.      TECHNIQUE:  1 images.  Frontal view of the chest.     FINDINGS:    Small left pneumothorax versus skinfold. Left hemidiaphragm elevation.  Cardiac mediastinal silhouette appears shifted toward the right, likely  exaggerated due to rotation.  Partially visualized right shoulder  hardware. No acute bony finding.          Impression:      1. Small left pneumothorax versus skinfold. Recommend CT for further  evaluation.  2. Left hemidiaphragm elevation and rotation limit evaluation.  This report was finalized on 10/30/2021 12:02 by Dr Radha Potter MD.        History of Present Illness on Day of Discharge:   Patient is sitting on side of bed.  She was currently working with physical therapy.  She states that she feels good today.  He was unaware that she converted back to sinus rhythm last night.  She had no chest pain or shortness of breath.    Hospital Course:  The patient is a 86 y.o. female who presented to [facility] with a 3-day history of shortness of breath on 10/30/2021.  Patient has a past medical history that includes atrial fibrillation, elevated left hemidiaphragm, and chronic anticoagulation. A chest x-ray performed in the ED showed a small left pneumothorax versus skinfold and recommended CT scanning.  On the following CTA scan there were no acute findings.  There were however cardiomegaly and reflux of the contrast dye used into the hepatic veins, which can be seen with right heart dysfunction.  Her BNP was also elevated on admission to hospitalist.    Echo was performed due to her increased BNP, and showed normal systolic function with an ejection fraction of 61 to 65%, mildly elevated right ventricular systolic pressure, and no significant valvular pathology.  At this time patient had began to wean off of her supplemental oxygen and IV diuretics were stopped on 11/1.  In place we resumed her home Lasix.    Patient did have history of atrial fibrillation and remained in atrial fibrillation with elevated heart rates most of her stay with us.  She appeared to be asymptomatic, and was not aware that she was in atrial fibrillation.  We stopped the patient's losartan due lower blood pressure readings and the need to add a rate  "controlling drug for her atrial fibrillation.  She did not have good response with either atenolol or Coreg.  We then started digoxin and a one-time dose of IV Lopressor followed by oral Lopressor.  Overnight the patient converted back to sinus rhythm.    Patient was taking Eliquis previously for stroke prophylaxis with history of atrial fibrillation.  She was on low-dose at 2.5 mg at home but does not meet the criteria to be taking a low-dose based solely on her age.  We increased her dosage to 5 mg every 12 hours.  She is unsure why she takes aspirin, will remain on 81 mg dosing for now.  Will need to be discussed with her primary care provider.  She is on Prilosec for GI prophylaxis.    Patient also presents with a shingles outbreak on her left upper posterior.  We started her on Valtrex and she will need to continue treatment for 7 days.  Her lesions are now scabbed as well.    The patient was evaluated by physical and Occupational Therapy who recommended discharge to a skilled nursing facility for further rehabilitation efforts.  Patient has been accepted for a bed at [facility].  She is medically stable and is ready for discharge.    Condition on Discharge: Medically stable    Physical Exam on Discharge:  /71 (BP Location: Left arm, Patient Position: Lying)   Pulse 84   Temp 97.9 °F (36.6 °C) (Oral)   Resp 16   Ht 167.6 cm (66\")   Wt 75.7 kg (166 lb 12.8 oz)   SpO2 95%   Breastfeeding No   BMI 26.92 kg/m²   Physical Exam  Physical Exam  Vitals and nursing note reviewed.   Constitutional:       General: She is not in acute distress.     Appearance: She is not ill-appearing.  HENT:      Head: Normocephalic and atraumatic.   Cardiovascular:      Rate and Rhythm: Sinus rhythm 71-85     Pulses: Normal pulses.      Heart sounds: Normal heart sounds.   Pulmonary:      Effort: Pulmonary effort is normal.      Breath sounds: No wheezing, rhonchi or rales.      Comments: Diminished breath " sounds  Abdominal:      General: Bowel sounds are normal. There is no distensio.      Palpations: Abdomen is soft.      Tenderness: There is no abdominal tenderness.   Musculoskeletal:         General: No swelling or tenderness.      Cervical back: Normal range of motion and neck supple. No tenderness.   Skin:     General: Skin is warm and dry.      Comments: Shingles lesions noted on the left flank   Neurological:      General: No focal deficit present.      Mental Status: She is alert and oriented to person, place, and time.      Motor: Weakness present.   Psychiatric:         Mood and Affect: Mood normal.         Behavior: Behavior normal.         Thought Content: Thought content normal.         Judgment: Judgment normal.     Discharge Disposition:  Skilled Nursing Facility (DC - External)    Discharge Medications:     Discharge Medications      New Medications      Instructions Start Date   influenza vac split quad 0.5 ML suspension prefilled syringe injection  Commonly known as: FLUZONE,FLUARIX,AFLURIA,FLULAVAL   0.5 mL, Intramuscular, Once      metoprolol tartrate 25 MG tablet  Commonly known as: LOPRESSOR   25 mg, Oral, Every 12 Hours Scheduled      valACYclovir 1000 MG tablet  Commonly known as: VALTREX   1,000 mg, Oral, Every 12 Hours Scheduled         Changes to Medications      Instructions Start Date   apixaban 5 MG tablet tablet  Commonly known as: ELIQUIS  What changed:   · medication strength  · how much to take  · when to take this   5 mg, Oral, Every 12 Hours Scheduled         Continue These Medications      Instructions Start Date   aspirin 81 MG chewable tablet   81 mg, Oral, Daily      atorvastatin 40 MG tablet  Commonly known as: LIPITOR   40 mg, Oral, Nightly      dorzolamide-timolol 22.3-6.8 MG/ML ophthalmic solution  Commonly known as: COSOPT   1 drop, Both Eyes, 2 Times Daily      escitalopram 20 MG tablet  Commonly known as: LEXAPRO   20 mg, Oral, Nightly      furosemide 20 MG  tablet  Commonly known as: LASIX   20 mg, Oral, Daily      Lumigan 0.01 % ophthalmic drops  Generic drug: bimatoprost   1 drop, Both Eyes, Nightly      omeprazole 40 MG capsule  Commonly known as: priLOSEC   40 mg, Oral, Daily      VITAMIN D2 PO   1.25 mg, Oral, Weekly, On Saturday nights weekly         Stop These Medications    atenolol 50 MG tablet  Commonly known as: TENORMIN     losartan 50 MG tablet  Commonly known as: COZAAR          Discharge Diet:   Diet Instructions     Diet: Cardiac      Discharge Diet: Cardiac        Activity at Discharge:   Activity Instructions     Up WIth Assist          Discharge Care Plan/Instructions:   1.  Return for any acute or worsening symptoms  2.  Recent dosage of Eliquis to 5 mg every 12 hours.  3.  Continue Valtrex for course.  4.  Stop taking atenolol and losartan.  Start taking digoxin and Lopressor.    Follow-up Appointments:   1.  Skilled nursing facility physician as soon as possible for posthospitalization assessment.  Primary care provider within 1 week of discharge from skilled nursing facility.    Test Results Pending at Discharge:  1.  Repeat Covid test.    Electronically signed by GAIL Guajardo, 11/4/2021, 08:51 CDT.    Time: 35 minutes      I personally evaluated and examined the patient in conjunction with GAIL Mann and agree with the assessment, treatment plan, and disposition of the patient as recorded by her. My history, exam, and further recommendations are:     Patient is doing well clinically. On room air. Now back in sinus rhythm. Would continue Lopressor at this time discharge but would not continue digoxin. Agree with increasing Eliquis to 5 mg twice daily. Close interval follow-up as an outpatient with PCP. Plan for discharge to skilled nursing facility today.      Electronically signed by Kev Radford DO, 11/4/2021, 17:04 CDT.

## 2021-11-04 NOTE — CASE MANAGEMENT/SOCIAL WORK
Continued Stay Note   [facility]     Patient Name: Liana Zaragoza  MRN: [id number]  Today's Date: 11/4/2021    Admit Date: 10/30/2021     Discharge Plan     Row Name 11/04/21 0913       Plan    Plan [facility]    Patient/Family in Agreement with Plan yes    Final Discharge Disposition Code 03 - skilled nursing facility (SNF)    Final Note Pt to dc to [facility] today; SW has informed admissions.  Phone: [phone] Fax: [phone].               Discharge Codes    No documentation.               Expected Discharge Date and Time     Expected Discharge Date Expected Discharge Time    Nov 4, 2021             LON Epperson

## 2021-11-04 NOTE — PLAN OF CARE
Goal Outcome Evaluation:  Plan of Care Reviewed With: patient        Progress: improving  Outcome Summary: OT tx complete. Pt willing and aggreable to participate in tx. Pt comes to EOB SBA with additional time at EOB needed to orient self. She ambulates from bed to bathroom and performs toileting task MOD I, stands sink side to perform groomings tasks SBA. OTS encourages pursed lip breathing and to sit while at sink side for energy conservation. She abulates from bathroom to chair and is able to recline self in chair. APRN in rm states pt is medically stable and will dc today or tomorrow to [facility] for additional rehab. Continue OT POC

## 2021-11-04 NOTE — PLAN OF CARE
Goal Outcome Evaluation:      Patient stands with CGA. Ambulated 75' with Rwx and CGA. Instruct on staying closer to wx at all times especially while turning. Actively worked thru LE ex's.   Will benefit from strengthening activities.

## 2021-11-05 NOTE — THERAPY DISCHARGE NOTE
Acute Care - Physical Therapy Discharge Summary  [facility]       Patient Name: Liana Zaragoza  : 1935  MRN: [id number]    Today's Date: 2021                 Admit Date: 10/30/2021      PT Recommendation and Plan    Visit Dx:    ICD-10-CM ICD-9-CM   1. Atrial fibrillation, unspecified type (HCC)  I48.91 427.31   2. Cardiomegaly  I51.7 429.3   3. Herpes zoster without complication  B02.9 053.9   4. Decreased activities of daily living (ADL)  Z78.9 V49.89   5. Impaired mobility  Z74.09 799.89                PT Rehab Goals     Row Name 21 0707             Bed Mobility Goal 1 (PT)    Activity/Assistive Device (Bed Mobility Goal 1, PT) sit to supine/supine to sit  -AB      [place] Level/Cues Needed (Bed Mobility Goal 1, PT) standby assist  -AB      Time Frame (Bed Mobility Goal 1, PT) long term goal (LTG); 10 days  -AB      Progress/Outcomes (Bed Mobility Goal 1, PT) goal partially met  -AB              Transfer Goal 1 (PT)    Activity/Assistive Device (Transfer Goal 1, PT) sit-to-stand/stand-to-sit; bed-to-chair/chair-to-bed; walker, rolling  -AB      [place] Level/Cues Needed (Transfer Goal 1, PT) standby assist  -AB      Time Frame (Transfer Goal 1, PT) long term goal (LTG); 10 days  -AB      Progress/Outcome (Transfer Goal 1, PT) goal not met  -AB              Gait Training Goal 1 (PT)    Activity/Assistive Device (Gait Training Goal 1, PT) gait (walking locomotion); assistive device use; decrease fall risk; improve balance and speed; increase endurance/gait distance  -AB      [place] Level (Gait Training Goal 1, PT) standby assist  -AB      Distance (Gait Training Goal 1, PT) 60 ft  -AB      Time Frame (Gait Training Goal 1, PT) long term goal (LTG); 10 days  -AB      Progress/Outcome (Gait Training Goal 1, PT) goal partially met  -AB            User Key  (r) = Recorded By, (t) = Taken By, (c) = Cosigned By    Initials Name Provider Type Discipline    AB Batsheva Gomes, PTA  Physical Therapy Assistant PT                    PT Discharge Summary  Anticipated Discharge Disposition (PT): skilled nursing facility  Reason for Discharge: Discharge from facility  Outcomes Achieved: Refer to plan of care for updates on goals achieved  Discharge Destination: SNF      Batsheva Gomes, PTA   11/5/2021

## 2021-11-17 PROBLEM — I10 ESSENTIAL HYPERTENSION: Status: ACTIVE | Noted: 2021-01-01

## 2021-11-17 PROBLEM — E78.5 HYPERLIPIDEMIA: Status: ACTIVE | Noted: 2021-01-01

## 2021-11-17 NOTE — PLAN OF CARE
Goal Outcome Evaluation:  Plan of Care Reviewed With: patient        Progress: no change  Outcome Summary: Pt admitted from ER to 4B for AF. Cardizem gtt on at 5 mg/hr. SOA with activity. No c/o pain during shift. Sleeping between care.

## 2021-11-17 NOTE — CASE MANAGEMENT/SOCIAL WORK
Discharge Planning Assessment  [facility]     Patient Name: Liana Zaragoza  MRN: [id number]  Today's Date: 11/17/2021    Admit Date: 11/16/2021     Discharge Needs Assessment     Row Name 11/17/21 1020       Living Environment    Lives With alone    Current Living Arrangements home/apartment/condo    Primary Care Provided by self    Quality of Family Relationships helpful; involved; supportive    Able to Return to Prior Arrangements yes       Resource/Environmental Concerns    Resource/Environmental Concerns none       Transition Planning    Patient/Family Anticipates Transition to home    Patient/Family Anticipated Services at Transition home health care    Transportation Anticipated family or friend will provide       Discharge Needs Assessment    Readmission Within the Last 30 Days no previous admission in last 30 days    Equipment Currently Used at Home bath bench; commode; rollator    Concerns to be Addressed no discharge needs identified    Anticipated Changes Related to Illness none    Equipment Needed After Discharge none    Current Discharge Risk lives alone    Discharge Coordination/Progress Pt resides alone.  Pt has Meals on Wheels that are delivered 5x per week.  She also has a house keeper.  Pt has all needed DME.  Pt is requesting [facility] upon dc.  SW has informed Dr. Radford of this request.  SW will follow.               Discharge Plan    No documentation.               Continued Care and Services - Admitted Since 11/16/2021    Coordination has not been started for this encounter.     Selected Continued Care - Prior Encounters Includes selections from prior encounters from 8/18/2021 to 11/17/2021    Discharged on 11/4/2021 Admission date: 10/30/2021 - Discharge disposition: Skilled Nursing Facility (DC - External)    Destination     Service Provider Selected Services Address Phone Fax Patient Preferred    [facility]  [facility] [address] [facility], [facility] [address] [phone]  [phone] --                       Demographic Summary    No documentation.                Functional Status    No documentation.                Psychosocial    No documentation.                Abuse/Neglect    No documentation.                Legal    No documentation.                Substance Abuse    No documentation.                Patient Forms    No documentation.                   NBA EppersonW

## 2021-11-17 NOTE — H&P
"    [facility]  HISTORY AND PHYSICAL    Date of Admission: 11/16/2021  Primary Care Physician: Desmond Choudhury PA-C    Subjective     Chief Complaint: Dyspnea    History of Present Illness  86-year-old female who presents emergency department for shortness of breath.  She states she just woke up with it.  She has history of shingles on her left posterior thorax.  She is currently a resident at [facility] after recent discharge from the hospital for similar condition.  The patient states that now that her heart rate is under better control, she is feeling somewhat better.  She denies any chest pain, but describes a \"smothering\" type of feeling.  She denies any swelling in her lower extremity.  She states overall just feel yucky.  She states her urine has been okay, and she had a bowel movement this morning.  She states she remembers following with a [facility] cardiologist as an outpatient.  Patient is currently chronically anticoagulated.    Cardiac echo on 10/30/2021 demonstrated:  · Left ventricular ejection fraction appears to be 61 - 65%. Left ventricular systolic function is normal.  · Estimated right ventricular systolic pressure from tricuspid regurgitation is mildly elevated (35-45 mmHg).  · Normal size and function of the right ventricle.  · No significant valvular pathology.        Patient's last admission was on 10/30/2021 with [facility]  Patient's discharge diagnosis was as follows:  Final Discharge Diagnoses:       Active Hospital Problems     Diagnosis     • **Shortness of breath     • Atrial fibrillation (HCC)     • Shingles outbreak, left posterior thorax     • Hypoxia     • Elevated left hemidiaphragm     • Paroxysmal atrial fibrillation with rapid ventricular response (HCC)     • Chronic anticoagulation     • Functional assessment declined     • Elevated brain natriuretic peptide (BNP) " level     • Pulmonary hypertension (HCC), suspected     • Lactic acidosis        Review of Systems   Dyspnea   Otherwise complete ROS reviewed and negative except as mentioned in the HPI.    Past Medical History:   Past Medical History:   Diagnosis Date   • A-fib (HCC)    • Cancer (HCC)     SKIN CANCER   • Depression    • GERD (gastroesophageal reflux disease)    • Hyperlipidemia    • Hypertension    • PONV (postoperative nausea and vomiting)    • Zoster without complications 11/242021     Past Surgical History:  Past Surgical History:   Procedure Laterality Date   • JOINT REPLACEMENT      Right shoulder   • LAPAROSCOPIC CHOLECYSTECTOMY     • LEG EXCISION LESION/CYST Left 1/31/2020    Procedure: EXCISION OF SQUAMOUS CELL CARCINOMA ON LEFT BAIG;  Surgeon: Lorraine Ayoub MD;  Location:  [facility] OR;  Service: General   • LEG EXCISION LESION/CYST Left 9/25/2020    Procedure: WIDE LOCAL EXCISION SQUAMOUS CELL CARCINOMA OF LOWER LEFT LEG;  Surgeon: Lorraine Ayoub MD;  Location:  [facility] OR;  Service: General;  Laterality: Left;   • SHOULDER SURGERY Right     FX REPAIR   • SKIN CANCER EXCISION       Social History:  reports that she has never smoked. She has never used smokeless tobacco. Drug use questions deferred to the physician. She reports that she does not drink alcohol.    Family History: family history includes Heart failure in her brother, father, maternal aunt, maternal grandfather, maternal grandmother, maternal uncle, mother, paternal aunt, paternal grandfather, paternal grandmother, paternal uncle, and sister.      Allergies:  Allergies   Allergen Reactions   • Morphine Confusion   • Cardizem [Diltiazem] Itching and Rash       Medications:  Prior to Admission medications    Medication Sig Start Date End Date Taking? Authorizing Provider   apixaban (ELIQUIS) 5 MG tablet tablet Take 1 tablet by mouth Every 12 (Twelve) Hours. 11/4/21   Liana Diaz APRN   aspirin 81 MG chewable tablet Chew 81 mg Daily.  "5/4/19   Cecilia Barry MD   atorvastatin (LIPITOR) 40 MG tablet Take 40 mg by mouth Every Night. 4/24/19   Cecilia Barry MD   dorzolamide-timolol (COSOPT) 22.3-6.8 MG/ML ophthalmic solution Administer 1 drop to both eyes 2 (Two) Times a Day. 4/11/18   Cecilia Barry MD   Ergocalciferol (VITAMIN D2 PO) Take 1.25 mg by mouth 1 (One) Time Per Week. On Saturday nights weekly 10/30/21   Cecilia Barry MD   escitalopram (LEXAPRO) 20 MG tablet Take 20 mg by mouth Every Night. 5/7/19   Cecilia Barry MD   furosemide (LASIX) 20 MG tablet Take 20 mg by mouth Daily. 7/21/20   Cecilia Barry MD   LUMIGAN 0.01 % ophthalmic drops Administer 1 drop to both eyes Every Night. 11/1/19   Cecilia Barry MD   metoprolol tartrate (LOPRESSOR) 25 MG tablet Take 1 tablet by mouth Every 12 (Twelve) Hours. 11/4/21   Liana Diaz APRN   omeprazole (priLOSEC) 40 MG capsule Take 40 mg by mouth Daily. 12/7/18   Cecilia Barry MD     I have utilized all available immediate resources to obtain, update, and review the patient's current medications.    Objective     Vital Signs: /92   Pulse 110   Temp 98.1 °F (36.7 °C) (Oral)   Resp 28   Ht 167.6 cm (66\")   Wt 76 kg (167 lb 8.8 oz)   SpO2 100%   BMI 27.04 kg/m²   Physical Exam  Vitals reviewed.   Constitutional:       Appearance: She is ill-appearing.   HENT:      Head: Normocephalic and atraumatic.      Nose: Nose normal.   Eyes:      General: No scleral icterus.     Conjunctiva/sclera: Conjunctivae normal.   Cardiovascular:      Rate and Rhythm: Tachycardia present. Rhythm irregular.      Heart sounds: Normal heart sounds.   Pulmonary:      Effort: Pulmonary effort is normal.      Breath sounds: Normal breath sounds.   Abdominal:      General: There is no distension.      Palpations: Abdomen is soft.   Musculoskeletal:         General: No swelling or tenderness.      Cervical back: Normal range of motion and neck supple. "   Skin:     General: Skin is warm and dry.   Neurological:      General: No focal deficit present.      Mental Status: She is alert.      Cranial Nerves: No cranial nerve deficit.   Psychiatric:         Mood and Affect: Mood normal.         Behavior: Behavior normal.        Results Reviewed:  Lab Results (last 24 hours)     Procedure Component Value Units Date/Time    [place] Draw [[id number]] Collected: 11/16/21 1659    Specimen: Blood Updated: 11/16/21 1802    Narrative:      The following orders were created for panel order [place] Draw.  Procedure                               Abnormality         Status                     ---------                               -----------         ------                     Green Top (Gel)[[id number]]                                  Final result               Lavender Top[[id number]]                                     Final result               Red Top[[id number]]                                          Final result               Light Blue Top[[id number]]                                   Final result                 Please view results for these tests on the individual orders.    Green Top (Gel) [[id number]] Collected: 11/16/21 1659    Specimen: Blood Updated: 11/16/21 1802     Extra Tube Hold for add-ons.     Comment: Auto resulted.       Light Blue Top [[id number]] Collected: 11/16/21 1659    Specimen: Blood Updated: 11/16/21 1802     Extra Tube hold for add-on     Comment: Auto resulted       Lavender Top [[id number]] Collected: 11/16/21 1659    Specimen: Blood Updated: 11/16/21 1802     Extra Tube hold for add-on     Comment: Auto resulted       Red Top [[id number]] Collected: 11/16/21 1659    Specimen: Blood Updated: 11/16/21 1802     Extra Tube Hold for add-ons.     Comment: Auto resulted.       Comprehensive Metabolic Panel [[id number]]  (Abnormal) Collected: 11/16/21 1659    Specimen: Blood Updated: 11/16/21 1756     Glucose 116 mg/dL      BUN 11 mg/dL      Creatinine 0.80  mg/dL      Sodium 138 mmol/L      Potassium 4.0 mmol/L      Comment: Slight hemolysis detected by analyzer. Results may be affected.        Chloride 99 mmol/L      CO2 28.0 mmol/L      Calcium 8.8 mg/dL      Total Protein 7.2 g/dL      Albumin 3.70 g/dL      ALT (SGPT) 17 U/L      AST (SGOT) 27 U/L      Alkaline Phosphatase 125 U/L      Total Bilirubin 0.5 mg/dL      eGFR Non African Amer 68 mL/min/1.73      Globulin 3.5 gm/dL      A/G Ratio 1.1 g/dL      BUN/Creatinine Ratio 13.8     Anion Gap 11.0 mmol/L     Narrative:      GFR Normal >60  Chronic Kidney Disease <60  Kidney Failure <15      Troponin [[id number]]  (Normal) Collected: 11/16/21 1659    Specimen: Blood Updated: 11/16/21 1754     Troponin T <0.010 ng/mL     Narrative:      Troponin T Reference Range:  <= 0.03 ng/mL-   Negative for AMI  >0.03 ng/mL-     Abnormal for myocardial necrosis.  Clinicians would have to utilize clinical acumen, EKG, Troponin and serial changes to determine if it is an Acute Myocardial Infarction or myocardial injury due to an underlying chronic condition.       Results may be falsely decreased if patient taking Biotin.      BNP [[id number]]  (Abnormal) Collected: 11/16/21 1659    Specimen: Blood Updated: 11/16/21 1753     proBNP 7,061.0 pg/mL     Narrative:      Among patients with dyspnea, NT-proBNP is highly sensitive for the detection of acute congestive heart failure. In addition NT-proBNP of <300 pg/ml effectively rules out acute congestive heart failure with 99% negative predictive value.    Results may be falsely decreased if patient taking Biotin.      CBC & Differential [[id number]]  (Abnormal) Collected: 11/16/21 1659    Specimen: Blood Updated: 11/16/21 1720    Narrative:      The following orders were created for panel order CBC & Differential.  Procedure                               Abnormality         Status                     ---------                               -----------         ------                      CBC Auto Differential[[id number]]        Abnormal            Final result                 Please view results for these tests on the individual orders.    CBC Auto Differential [[id number]]  (Abnormal) Collected: 11/16/21 1659    Specimen: Blood Updated: 11/16/21 1720     WBC 7.78 10*3/mm3      RBC 3.76 10*6/mm3      Hemoglobin 10.7 g/dL      Hematocrit 34.5 %      MCV 91.8 fL      MCH 28.5 pg      MCHC 31.0 g/dL      RDW 15.7 %      RDW-SD 50.5 fl      MPV 13.5 fL      Platelets 145 10*3/mm3      Neutrophil % 57.9 %      Lymphocyte % 27.4 %      Monocyte % 12.3 %      Eosinophil % 1.2 %      Basophil % 0.8 %      Neutrophils, Absolute 4.51 10*3/mm3      Lymphocytes, Absolute 2.13 10*3/mm3      Monocytes, Absolute 0.96 10*3/mm3      Eosinophils, Absolute 0.09 10*3/mm3      Basophils, Absolute 0.06 10*3/mm3         Imaging Results (Last 24 Hours)     Procedure Component Value Units Date/Time    CT Angiogram Chest [[id number]] Collected: 11/16/21 2003     Updated: 11/16/21 2012    Narrative:      EXAM: CT ANGIOGRAM CHEST-     INDICATION: tachycardia, sob     COMPARISON: None available.     DOSE LENGTH PRODUCT: 480 mGy cm. Automated exposure control was also  utilized to decrease patient radiation dose.     FINDINGS:     3-D MIP images were obtained.      No evidence of pulmonary embolus. Main pulmonary artery is nondilated.  Thoracic aorta is nonaneurysmal and contains atherosclerotic  calcification. Moderate coronary calcification. No pericardial effusion.  Heart is enlarged, similar to prior exam.     Central airways are clear. Chronic LEFT hemidiaphragm elevation with  compressive LEFT lower lobe atelectasis. Trace LEFT pleural effusion.  Unchanged 3 mm LEFT upper lobe pulmonary nodule on image 26. No  consolidation. No enlarged thoracic lymph nodes.     Uniform thyroid. No acute chest wall soft tissue abnormality. No acute  finding in the upper abdomen. RIGHT shoulder arthroplasty hardware. No  acute osseous  finding.       Impression:         1.  No evidence of pulmonary embolus.  2.  Chronic LEFT hemidiaphragm elevation with compressive LEFT lower  lobe atelectasis.  This report was finalized on 11/16/2021 20:09 by Dr. Johnnie Mtz MD.    XR Chest 1 View [[id number]] Collected: 11/16/21 1724     Updated: 11/16/21 1730    Narrative:      EXAM: XR CHEST 1 VW-     INDICATION: SOA Triage Protocol     COMPARISON: 10/30/2021     FINDINGS:     Cardiac silhouette is enlarged but stable. Chronic LEFT hemidiaphragm  elevation is again noted. Compressive LEFT lower lobe atelectasis,  unchanged. No pleural effusion or pneumothorax. No new area of  consolidation. Partially imaged RIGHT shoulder arthroplasty hardware. No  acute osseous finding.       Impression:         1.  No acute finding or significant change.  2.  Redemonstrated enlarged cardiac silhouette and LEFT diaphragm  elevation with resultant LEFT lower lobe atelectasis.  This report was finalized on 11/16/2021 17:27 by Dr. Johnnie Mtz MD.        I have personally reviewed and interpreted the radiology studies and ECG obtained at time of admission.     Assessment / Plan     Assessment:   Active Hospital Problems    Diagnosis    • Atrial fibrillation (HCC)      Impression:  1.  A. fib with RVR  2.  Shingles outbreak left posterior thorax  3.  Dyspnea  4.  Anemia  5.  Generalized debility with decline  6.  Hypertension    Plan:   1. Cardizem GTT  2. Labs in the am   3. Continue home medications  4. PT/OT consult  5. Telemetry      Code Status/Advanced Care Plan: Full    The patient's surrogate decision maker is family.     I discussed my findings and recommendations with the patient .    Estimated length of stay is 1-2 days.     The patient was seen and examined by me on 11/16/21 at 2100.    Electronically signed by Berny Mckeon DO, 11/16/21, 21:35 CST.

## 2021-11-17 NOTE — THERAPY EVALUATION
Patient Name: Liana Zaragoza  : 1935    MRN: [id number]                              Today's Date: 2021       Admit Date: 2021    Visit Dx:     ICD-10-CM ICD-9-CM   1. Atrial fibrillation, unspecified type (HCC)  I48.91 427.31   2. Impaired mobility  Z74.09 799.89   3. Decreased activities of daily living (ADL)  Z78.9 V49.89     Patient Active Problem List   Diagnosis   • Shingles outbreak, left posterior thorax   • Hypoxia   • Elevated left hemidiaphragm   • Paroxysmal atrial fibrillation with rapid ventricular response (HCC)   • Chronic anticoagulation   • Functional assessment declined   • Shortness of breath   • Elevated brain natriuretic peptide (BNP) level   • Pulmonary hypertension (HCC), suspected   • Lactic acidosis   • Atrial fibrillation (HCC)     Past Medical History:   Diagnosis Date   • A-fib (HCC)    • Cancer (HCC)     SKIN CANCER   • Depression    • GERD (gastroesophageal reflux disease)    • Hyperlipidemia    • Hypertension    • PONV (postoperative nausea and vomiting)    • Zoster without complications      Past Surgical History:   Procedure Laterality Date   • JOINT REPLACEMENT      Right shoulder   • LAPAROSCOPIC CHOLECYSTECTOMY     • LEG EXCISION LESION/CYST Left 2020    Procedure: EXCISION OF SQUAMOUS CELL CARCINOMA ON LEFT BAIG;  Surgeon: Lorraine Ayoub MD;  Location:  [facility];  Service: General   • LEG EXCISION LESION/CYST Left 2020    Procedure: WIDE LOCAL EXCISION SQUAMOUS CELL CARCINOMA OF LOWER LEFT LEG;  Surgeon: Lorraine Ayoub MD;  Location:  [facility] OR;  Service: General;  Laterality: Left;   • SHOULDER SURGERY Right     FX REPAIR   • SKIN CANCER EXCISION        General Information     Row Name 2128 21 0850       OT Time and Intention    Document Type evaluation  SOB. Dx: A-fib w RVR. Hx Shingles  -JJ evaluation  -JJ    Mode of Treatment occupational therapy  -JJ occupational therapy  -JJ    Row Name 2128 21 0832        General Information    Patient Profile Reviewed yes  -JJ yes  -    Prior Level of Function independent:; ADL's; transfer; bed mobility  pt reports at NH completing bed mobility, t/f, in room mobility and toileting and bathing independently. Required Min A for dressing.  - --    Existing Precautions/Restrictions fall  - fall; oxygen therapy device and L/min  -    Barriers to Rehab previous functional deficit  - --    Row Name 11/17/21 0928          Living Environment    Lives With alone  -     Row Name 11/17/21 0928          Cognition    Orientation Status (Cognition) oriented x 4  -     Row Name 11/17/21 0928          Safety Issues, Functional Mobility    Impairments Affecting Function (Mobility) endurance/activity tolerance; strength; shortness of breath  -           User Key  (r) = Recorded By, (t) = Taken By, (c) = Cosigned By    Initials Name Provider Type    Elizbaeth Longo, OTR/L, CSRS Occupational Therapist                 Mobility/ADL's     Row Name 11/17/21 0928          Bed Mobility    Bed Mobility sit-supine  -     Sit-Supine [place] (Bed Mobility) standby assist  -     Assistive Device (Bed Mobility) bed rails; head of bed elevated  -     Comment (Bed Mobility) up in bathroom upon entry to room  -     Row Name 11/17/21 0928          Transfers    Transfers sit-stand transfer; toilet transfer  -     Sit-Stand [place] (Transfers) minimum assist (75% patient effort); contact guard; moderate assist (50% patient effort)  -     [place] Level (Toilet Transfer) moderate assist (50% patient effort); minimum assist (75% patient effort); verbal cues  -     Assistive Device (Toilet Transfer) commode  -     Row Name 11/17/21 0928          Sit-Stand Transfer    Assistive Device (Sit-Stand Transfers) walker, front-wheeled  -     Row Name 11/17/21 0928          Toilet Transfer    Type (Toilet Transfer) sit-stand; stand-sit  -     Row Name 11/17/21 0928           Functional Mobility    Functional Mobility- Ind. Level contact guard assist; minimum assist (75% patient effort)  -     Functional Mobility- Device rolling walker  -     Functional Mobility- Safety Issues step length decreased  -     Functional Mobility- Comment short distance in hallway and back to bed.  -     Row Name 11/17/21 0928          Activities of Daily Living    BADL Assessment/Intervention lower body dressing; toileting  -JJ     Row Name 11/17/21 0928          Lower Body Dressing Assessment/Training    [place] Level (Lower Body Dressing) don; socks; maximum assist (25% patient effort)  -     Position (Lower Body Dressing) supported sitting  -     Row Name 11/17/21 0928          Toileting Assessment/Training    [place] Level (Toileting) perform perineal hygiene; independent; adjust/manage clothing; minimum assist (75% patient effort)  -     Assistive Devices (Toileting) commode; grab bar/safety frame  -     Position (Toileting) supported sitting; unsupported standing  -           User Key  (r) = Recorded By, (t) = Taken By, (c) = Cosigned By    Initials Name Provider Type    Elizabeth Longo, VIJAY/L, CSRS Occupational Therapist               Obj/Interventions     Row Name 11/17/21 0928          Sensory Assessment (Somatosensory)    Sensory Assessment (Somatosensory) UE sensation intact  -[facility] Name 11/17/21 0928          Range of Motion Comprehensive    General Range of Motion bilateral upper extremity ROM WFL  -JJ     Row Name 11/17/21 0928          Strength Comprehensive (MMT)    Comment, General Manual Muscle Testing (MMT) Assessment B UE strength grossly 4/5  -     Row Name 11/17/21 0928          Balance    Balance Assessment sitting static balance; standing static balance  -     Static Sitting Balance WFL; unsupported; sitting, edge of bed  -     Static Standing Balance mild impairment; supported; standing  -           User Key  (r) = Recorded By, (t) =  Taken By, (c) = Cosigned By    Initials Name Provider Type    MARYElizabeth Longo OTR/L, CSRS Occupational Therapist               Goals/Plan     Row Name 11/17/21 0928          Bathing Goal 1 (OT)    Activity/Device (Bathing Goal 1, OT) bathing skills, all  AE PRN  -JJ     [place] Level/Cues Needed (Bathing Goal 1, OT) supervision required  -JJ     Time Frame (Bathing Goal 1, OT) long term goal (LTG); by discharge  -[facility] Name 11/17/21 0928          Dressing Goal 1 (OT)    Activity/Device (Dressing Goal 1, OT) lower body dressing  -JJ     [place]/Cues Needed (Dressing Goal 1, OT) minimum assist (75% or more patient effort)  -JJ     Time Frame (Dressing Goal 1, OT) long term goal (LTG); by discharge  -JJ     Progress/Outcome (Dressing Goal 1, OT) goal ongoing  -JJ     Row Name 11/17/21 0928          Toileting Goal 1 (OT)    Activity/Device (Toileting Goal 1, OT) toileting skills, all  -JJ     [place] Level/Cues Needed (Toileting Goal 1, OT) modified independence  -JJ     Time Frame (Toileting Goal 1, OT) long term goal (LTG); by discharge  -JJ     Progress/Outcome (Toileting Goal 1, OT) goal ongoing  -JJ     Row Name 11/17/21 0928          Therapy Assessment/Plan (OT)    Planned Therapy Interventions (OT) activity tolerance training; BADL retraining; adaptive equipment training; occupation/activity based interventions; functional balance retraining; patient/caregiver education/training; strengthening exercise; transfer/mobility retraining  -           User Key  (r) = Recorded By, (t) = Taken By, (c) = Cosigned By    Initials Name Provider Type    MARYElizabeth Longo, OTR/L, CSRS Occupational Therapist               Clinical Impression     Row Name 11/17/21 0928          Pain Assessment    Additional Documentation Pain Scale: FACES Pre/Post-Treatment (Group)  -[facility] Name 11/17/21 0928          Pain Scale: Numbers Pre/Post-Treatment    Pain Intervention(s) Repositioned;  Ambulation/increased activity  -     Row Name 11/17/21 0928          Pain Scale: FACES Pre/Post-Treatment    Pain: FACES Scale, Pretreatment 0-->no hurt  -     Row Name 11/17/21 0928          Plan of Care Review    Plan of Care Reviewed With patient  -     Outcome Summary OT eval completed. Pt is alert and oriented x4. Pt up in bathroom upon entry to room. She demonstrates decreased strength, balance, and activity tolerance. Required assist with LBD and toileting ranging from SBA to Max A. CGA/Min A for mobility. She would benefit from skilled OT services to address these deficits. Recommend d/c back to SNF for continued rehab.  -     Row Name 11/17/21 0928          Therapy Assessment/Plan (OT)    Rehab Potential (OT) good, to achieve stated therapy goals  -     Criteria for Skilled Therapeutic Interventions Met (OT) yes; skilled treatment is necessary  -     Therapy Frequency (OT) 5 times/wk  -     Predicted Duration of Therapy Intervention (OT) 10 days  -     Row Name 11/17/21 0928          Therapy Plan Review/Discharge Plan (OT)    Anticipated Discharge Disposition (OT) skilled nursing facility  -     Row Name 11/17/21 0928          Vital Signs    Pre Patient Position Standing  -JJ     Intra Patient Position Standing  -JJ     Post Patient Position Supine  -     Row Name 11/17/21 0928          Positioning and Restraints    Pre-Treatment Position in bed  -JJ     Post Treatment Position bed  -JJ     In Bed fowlers; call light within reach; encouraged to call for assist  -           User Key  (r) = Recorded By, (t) = Taken By, (c) = Cosigned By    Initials Name Provider Type    Elizabeth Rowland, VIJAY/L, CSRS Occupational Therapist               Outcome Measures     Row Name 11/17/21 0928          How much help from another is currently needed...    Putting on and taking off regular lower body clothing? 2  -JJ     Bathing (including washing, rinsing, and drying) 2  -JJ     Toileting (which  includes using toilet bed pan or urinal) 3  -JJ     Putting on and taking off regular upper body clothing 4  -JJ     Taking care of personal grooming (such as brushing teeth) 4  -JJ     Eating meals 4  -JJ     AM-PAC 6 Clicks Score (OT) 19  -JJ     Row Name 11/17/21 0918          How much help from another person do you currently need...    Turning from your back to your side while in flat bed without using bedrails? 3  -MOODY     Moving from lying on back to sitting on the side of a flat bed without bedrails? 3  -MOODY     Moving to and from a bed to a chair (including a wheelchair)? 3  -MOODY     Standing up from a chair using your arms (e.g., wheelchair, bedside chair)? 3  -MOODY     Climbing 3-5 steps with a railing? 2  -MOODY     To walk in hospital room? 3  -MOODY     AM-PAC 6 Clicks Score (PT) 17  -MOODY     Row Name 11/17/21 0928 11/17/21 0918       Functional Assessment    Outcome Measure Options AM-PAC 6 Clicks Daily Activity (OT)  -J AM-PAC 6 Clicks Basic Mobility (PT)  -MOODY          User Key  (r) = Recorded By, (t) = Taken By, (c) = Cosigned By    Initials Name Provider Type    Shemar Tate, PT DPT Physical Therapist    Elizabeth Rowland, OTR/L, CSRS Occupational Therapist                Occupational Therapy Education                 Title: PT OT SLP Therapies (In Progress)     Topic: Occupational Therapy (In Progress)     Point: ADL training (Done)     Description:   Instruct learner(s) on proper safety adaptation and remediation techniques during self care or transfers.   Instruct in proper use of assistive devices.              Learning Progress Summary           Patient Acceptance, E, VU by ELIU at 11/17/2021 1151                   Point: Home exercise program (Not Started)     Description:   Instruct learner(s) on appropriate technique for monitoring, assisting and/or progressing therapeutic exercises/activities.              Learner Progress:  Not documented in this visit.          Point: Precautions (Done)      Description:   Instruct learner(s) on prescribed precautions during self-care and functional transfers.              Learning Progress Summary           Patient Acceptance, E, VU by ELIU at 11/17/2021 1151                   Point: Body mechanics (Not Started)     Description:   Instruct learner(s) on proper positioning and spine alignment during self-care, functional mobility activities and/or exercises.              Learner Progress:  Not documented in this visit.                      User Key     Initials Effective Dates Name Provider Type Discipline     11/10/21 -  Elizabeth López OTR/L, KATE Occupational Therapist OT              OT Recommendation and Plan  Planned Therapy Interventions (OT): activity tolerance training, BADL retraining, adaptive equipment training, occupation/activity based interventions, functional balance retraining, patient/caregiver education/training, strengthening exercise, transfer/mobility retraining  Therapy Frequency (OT): 5 times/wk  Plan of Care Review  Plan of Care Reviewed With: patient  Outcome Summary: OT eval completed. Pt is alert and oriented x4. Pt up in bathroom upon entry to room. She demonstrates decreased strength, balance, and activity tolerance. Required assist with LBD and toileting ranging from SBA to Max A. CGA/Min A for mobility. She would benefit from skilled OT services to address these deficits. Recommend d/c back to SNF for continued rehab.     Time Calculation:    Time Calculation- OT     Row Name 11/17/21 1151             Time Calculation- OT    OT Start Time 0928  add 15 minutes for chart review and discussion of care with RN  -ELIU      OT Stop Time 0955  -ELIU      OT Time Calculation (min) 27 min  -ELIU      OT Received On 11/17/21  -ELIU      OT Goal Re-Cert Due Date 11/27/21  -ELIU            User Key  (r) = Recorded By, (t) = Taken By, (c) = Cosigned By    Initials Name Provider Type    Elizabeth Rowland OTR/L, KATE Occupational Therapist               Therapy Charges for Today     Code Description Service Date Service Provider Modifiers Qty    31769806239 HC OT EVAL LOW COMPLEXITY 3 11/17/2021 Elizabeth López OTR/L, CSRS GO 1               VIJAY Lema/L, CSRS  11/17/2021

## 2021-11-17 NOTE — ED PROVIDER NOTES
Subjective   Patient states that she has been having shortness of breath for the past few hours to maybe a day.  States that she feels like it is her A. fib.  States that she takes Xarelto for her A. fib.  States that she has not had any other symptoms.  Does not have any real chest pain.  States that she is not having any abdominal pain or dysuria.  Denies any recent falls or injuries to her chest.  States that she has not had any fevers or chills.          Review of Systems   All other systems reviewed and are negative.      Past Medical History:   Diagnosis Date   • A-fib (HCC)    • Cancer (HCC)     SKIN CANCER   • Depression    • GERD (gastroesophageal reflux disease)    • Hyperlipidemia    • Hypertension    • PONV (postoperative nausea and vomiting)    • Zoster without complications 11/242021       Allergies   Allergen Reactions   • Morphine Confusion   • Cardizem [Diltiazem] Itching and Rash       Past Surgical History:   Procedure Laterality Date   • JOINT REPLACEMENT      Right shoulder   • LAPAROSCOPIC CHOLECYSTECTOMY     • LEG EXCISION LESION/CYST Left 1/31/2020    Procedure: EXCISION OF SQUAMOUS CELL CARCINOMA ON LEFT BAIG;  Surgeon: Lorraine Ayoub MD;  Location:  [facility] OR;  Service: General   • LEG EXCISION LESION/CYST Left 9/25/2020    Procedure: WIDE LOCAL EXCISION SQUAMOUS CELL CARCINOMA OF LOWER LEFT LEG;  Surgeon: Lorraine Ayoub MD;  Location:  [facility] OR;  Service: General;  Laterality: Left;   • SHOULDER SURGERY Right     FX REPAIR   • SKIN CANCER EXCISION         Family History   Problem Relation Age of Onset   • Heart failure Mother    • Heart failure Father    • Heart failure Sister    • Heart failure Brother    • Heart failure Maternal Aunt    • Heart failure Maternal Uncle    • Heart failure Paternal Aunt    • Heart failure Paternal Uncle    • Heart failure Maternal Grandmother    • Heart failure Maternal Grandfather    • Heart failure Paternal Grandmother    • Heart failure Paternal  Grandfather        Social History     Socioeconomic History   • Marital status:    Tobacco Use   • Smoking status: Never Smoker   • Smokeless tobacco: Never Used   Vaping Use   • Vaping Use: Never used   Substance and Sexual Activity   • Alcohol use: No   • Drug use: Defer   • Sexual activity: Defer           Objective   Physical Exam  Vitals and nursing note reviewed.   Constitutional:       General: She is not in acute distress.     Appearance: She is not toxic-appearing or diaphoretic.   HENT:      Head: Normocephalic and atraumatic.      Nose: Nose normal.   Eyes:      General:         Right eye: No discharge.         Left eye: No discharge.      Extraocular Movements: Extraocular movements intact.      Conjunctiva/sclera: Conjunctivae normal.      Pupils: Pupils are equal, round, and reactive to light.   Cardiovascular:      Rate and Rhythm: Tachycardia present. Rhythm irregular.   Pulmonary:      Effort: Pulmonary effort is normal. No respiratory distress.      Breath sounds: No stridor. No rhonchi.   Abdominal:      General: Abdomen is flat.      Tenderness: There is no abdominal tenderness. There is no guarding or rebound.   Musculoskeletal:         General: No tenderness, deformity or signs of injury. Normal range of motion.      Cervical back: Normal range of motion. No rigidity.      Right lower leg: No edema.      Left lower leg: No edema.   Skin:     General: Skin is warm.      Capillary Refill: Capillary refill takes less than 2 seconds.   Neurological:      General: No focal deficit present.      Mental Status: She is alert and oriented to person, place, and time.      Cranial Nerves: No cranial nerve deficit.      Sensory: No sensory deficit.      Motor: No weakness.   Psychiatric:         Mood and Affect: Mood normal.         Behavior: Behavior normal.         Thought Content: Thought content normal.         Judgment: Judgment normal.         Procedures           ED Course                                            MDM  Number of Diagnoses or Management Options  Atrial fibrillation, unspecified type (HCC)  Diagnosis management comments: This is a 86yoF presenting with shortness of breath. Patient arrived hemodynamically stable and was afebrile. Patient was placed on the monitor and IV access was established. EKG was obtained and did not reveal any acute ST elevations, evidence of Brugada, or significantly prolonged QTc. EKG does show evidence of Afib w/ RVR with rates in the 130s.    Differentials include, but are not limited to ACS, PE, anxiety, CHF exacerbation, COPD exacerbation. Plan to obtain cbc, cmp, ekg, troponin x 2, administer diltiazem and diltiazem infusion, and reassess. Presentation not consistent with other acute, emergent causes of shortness of breath at this time. Low suspicion for DVT as there is no new calf swelling, tenderness, palpable tortuous lower extremity vein, and there is no temo's sign bilaterally. Low suspicion for pneumothorax as the patient has no radiographic evidence of a pneumothorax. Low suspicion for dissection there is no widened mediastinum, hypotension, pulse deficits, and no tearing back/abdominal pain. Low suspicion for tamponade as there is no JVD, muffled heart sounds, electrical alternans on EKG, and no hypotension. Low suspicion for pericarditis as there is no diffuse ST elevation or MA depression and the patient is afebrile. No evidence of a GI bleed per patient's history that could contribute to the shortness of breath due to blood loss anemia.     The workup was reviewed and found to have evidence of afib w/ rvr requiring a diltiazem infusion. I reassessed the patient and discussed the findings of the work up so far. I explained my impression of the workup to her and addressed all of her questions regarding the emergency department evaluation, diagnosis, and treatment plan in plain and simple language that she was able to understand.     I told her that  the next step in treatment would be admission to the hospital for further workup and care. She voiced agreement with the plan of care so far and had no further questions. I told her that there is always some diagnostic uncertainty in the ER and that her work up, current physical exam, and even her current presentation may not always reveal other underlying conditions. I also went over the fact that her condition may change or worsen while being in the hospital. I told her that they may even find more things that require treatment or follow-up. She expressed understanding and agreed that there are reasonable limitations with the practice of emergency medicine.    The hospitalist service was consulted for evaluation and admission. The hospitalist service assumed primary care of the patient and admitted the patient in stable condition.           Amount and/or Complexity of Data Reviewed  Clinical lab tests: reviewed and ordered  Tests in the radiology section of CPT®: reviewed and ordered  Tests in the medicine section of CPT®: reviewed  Decide to obtain previous medical records or to obtain history from someone other than the patient: yes    Risk of Complications, Morbidity, and/or Mortality  Presenting problems: moderate  Diagnostic procedures: low  Management options: low        Final diagnoses:   Atrial fibrillation, unspecified type (HCC)       ED Disposition  ED Disposition     ED Disposition Condition Comment    Decision to Admit  Level of Care: Telemetry [5]   Diagnosis: Atrial fibrillation, unspecified type (HCC) [[id number]]   Admitting Physician: GIGI TALLEY [[id number]]   Attending Physician: GIGI TALLEY [[id number]]            [address] [id number] [phone]        Desmond Choudhury PA-C  [address] [place] [address]  [address]  [facility] [address]  [phone]      Follow-up with physician at skilled nursing facility within 24-48 hours for posthospitalization  assessment.    Desmond Choudhury PA-C  [address] [place] [address]  [address]  [facility] [address]  [phone]               Medication List      Stop    losartan 50 MG tablet  Commonly known as: COZAAR        ASK your doctor about these medications    apixaban 2.5 MG tablet tablet  Commonly known as: ELIQUIS  Ask about: Which instructions should I use?     atenolol 50 MG tablet  Commonly known as: TENORMIN  Ask about: Which instructions should I use?             Talisha Truong MD  11/19/21 020

## 2021-11-17 NOTE — PLAN OF CARE
Goal Outcome Evaluation:  Plan of Care Reviewed With: patient           Outcome Summary: PT eval complete. She is alert and oriented x 4. She was found on the toilet upon entry. She needs min/mod assist to stand from the toilet and CGA/min assist to stand from the EOB. She ambulates a short distance with a RW and CGA/min assist. PT will cont to progress her functional mobility, balance, and strength. Anticipate d/c back to SNF for cont rehab.

## 2021-11-17 NOTE — CASE MANAGEMENT/SOCIAL WORK
Continued Stay Note   [place]     Patient Name: Liana Zaragoza  MRN: [id number]  Today's Date: 11/17/2021    Admit Date: 11/16/2021     Discharge Plan     Row Name 11/17/21 1308       Plan    Plan [facility]    Patient/Family in Agreement with Plan yes    Final Discharge Disposition Code 03 - skilled nursing facility (SNF)    Final Note Pt is actually from SNF, [facility] however, she told SW she was from home.  Pt is agreeing to return to SNF.  Phone: [phone] Fax: [phone].               Discharge Codes    No documentation.                     LON Epperson

## 2021-11-17 NOTE — DISCHARGE SUMMARY
[facility] Medicine Services  DISCHARGE SUMMARY       Date of Admission: 11/16/2021  Date of Discharge:  11/17/2021  Primary Care Physician: Desmond Choudhury PA-C    Presenting Problem/History of Present Illness:  Atrial fibrillation, unspecified type (HCC) [I48.91]     Final Discharge Diagnoses:  Active Hospital Problems    Diagnosis    • **Paroxysmal atrial fibrillation with rapid ventricular response (HCC)    • Essential hypertension    • Hyperlipidemia    • Chronic anticoagulation        Consults: None.    Procedures Performed: None.    Pertinent Test Results:   Results for orders placed during the hospital encounter of 10/30/21    Adult Transthoracic Echo Complete W/ Cont if Necessary Per Protocol    Interpretation Summary  · Left ventricular ejection fraction appears to be 61 - 65%. Left ventricular systolic function is normal.  · Estimated right ventricular systolic pressure from tricuspid regurgitation is mildly elevated (35-45 mmHg).  · Normal size and function of the right ventricle.  · No significant valvular pathology.    Imaging Results (Last 72 Hours)     Procedure Component Value Units Date/Time    CT Angiogram Chest [[id number]] Collected: 11/16/21 2003     Updated: 11/16/21 2012    Narrative:      EXAM: CT ANGIOGRAM CHEST-     INDICATION: tachycardia, sob     COMPARISON: None available.     DOSE LENGTH PRODUCT: 480 mGy cm. Automated exposure control was also  utilized to decrease patient radiation dose.     FINDINGS:     3-D MIP images were obtained.      No evidence of pulmonary embolus. Main pulmonary artery is nondilated.  Thoracic aorta is nonaneurysmal and contains atherosclerotic  calcification. Moderate coronary calcification. No pericardial effusion.  Heart is enlarged, similar to prior exam.     Central airways are clear. Chronic LEFT hemidiaphragm elevation with  compressive LEFT lower lobe atelectasis. Trace LEFT pleural effusion.  Unchanged 3 mm LEFT  upper lobe pulmonary nodule on image 26. No  consolidation. No enlarged thoracic lymph nodes.     Uniform thyroid. No acute chest wall soft tissue abnormality. No acute  finding in the upper abdomen. RIGHT shoulder arthroplasty hardware. No  acute osseous finding.       Impression:         1.  No evidence of pulmonary embolus.  2.  Chronic LEFT hemidiaphragm elevation with compressive LEFT lower  lobe atelectasis.  This report was finalized on 11/16/2021 20:09 by Dr. Johnnie Mtz MD.    XR Chest 1 View [[id number]] Collected: 11/16/21 1724     Updated: 11/16/21 1730    Narrative:      EXAM: XR CHEST 1 VW-     INDICATION: SOA Triage Protocol     COMPARISON: 10/30/2021     FINDINGS:     Cardiac silhouette is enlarged but stable. Chronic LEFT hemidiaphragm  elevation is again noted. Compressive LEFT lower lobe atelectasis,  unchanged. No pleural effusion or pneumothorax. No new area of  consolidation. Partially imaged RIGHT shoulder arthroplasty hardware. No  acute osseous finding.       Impression:         1.  No acute finding or significant change.  2.  Redemonstrated enlarged cardiac silhouette and LEFT diaphragm  elevation with resultant LEFT lower lobe atelectasis.  This report was finalized on 11/16/2021 17:27 by Dr. Johnnie Mtz MD.        Lab Results (last 72 hours)     Procedure Component Value Units Date/Time    Comprehensive Metabolic Panel [[id number]]  (Abnormal) Collected: 11/17/21 0436    Specimen: Blood Updated: 11/17/21 0521     Glucose 110 mg/dL      BUN 11 mg/dL      Creatinine 0.70 mg/dL      Sodium 140 mmol/L      Potassium 4.0 mmol/L      Chloride 99 mmol/L      CO2 30.0 mmol/L      Calcium 9.1 mg/dL      Total Protein 6.9 g/dL      Albumin 3.60 g/dL      ALT (SGPT) 15 U/L      AST (SGOT) 23 U/L      Alkaline Phosphatase 113 U/L      Total Bilirubin 0.7 mg/dL      eGFR Non African Amer 80 mL/min/1.73      Globulin 3.3 gm/dL      A/G Ratio 1.1 g/dL      BUN/Creatinine Ratio 15.7     Anion Gap  11.0 mmol/L     Narrative:      GFR Normal >60  Chronic Kidney Disease <60  Kidney Failure <15      Troponin [[id number]]  (Normal) Collected: 11/17/21 0436    Specimen: Blood Updated: 11/17/21 0521     Troponin T <0.010 ng/mL     Narrative:      Troponin T Reference Range:  <= 0.03 ng/mL-   Negative for AMI  >0.03 ng/mL-     Abnormal for myocardial necrosis.  Clinicians would have to utilize clinical acumen, EKG, Troponin and serial changes to determine if it is an Acute Myocardial Infarction or myocardial injury due to an underlying chronic condition.       Results may be falsely decreased if patient taking Biotin.      CBC Auto Differential [[id number]]  (Abnormal) Collected: 11/17/21 0436    Specimen: Blood Updated: 11/17/21 0501     WBC 7.84 10*3/mm3      RBC 3.55 10*6/mm3      Hemoglobin 10.6 g/dL      Hematocrit 33.0 %      MCV 93.0 fL      MCH 29.9 pg      MCHC 32.1 g/dL      RDW 15.9 %      RDW-SD 52.5 fl      MPV 13.3 fL      Platelets 129 10*3/mm3      Neutrophil % 68.8 %      Lymphocyte % 18.6 %      Monocyte % 10.5 %      Eosinophil % 1.0 %      Basophil % 0.8 %      Immature Grans % 0.3 %      Neutrophils, Absolute 5.40 10*3/mm3      Lymphocytes, Absolute 1.46 10*3/mm3      Monocytes, Absolute 0.82 10*3/mm3      Eosinophils, Absolute 0.08 10*3/mm3      Basophils, Absolute 0.06 10*3/mm3      Immature Grans, Absolute 0.02 10*3/mm3      nRBC 0.0 /100 WBC     [place] Draw [[id number]] Collected: 11/16/21 1659    Specimen: Blood Updated: 11/16/21 1802    Narrative:      The following orders were created for panel order [place] Draw.  Procedure                               Abnormality         Status                     ---------                               -----------         ------                     Green Top (Gel)[[id number]]                                  Final result               Lavender Top[[id number]]                                     Final result               Red Top[[id number]]                                           Final result               Light Blue Top[[id number]]                                   Final result                 Please view results for these tests on the individual orders.    Green Top (Gel) [[id number]] Collected: 11/16/21 1659    Specimen: Blood Updated: 11/16/21 1802     Extra Tube Hold for add-ons.     Comment: Auto resulted.       Light Blue Top [[id number]] Collected: 11/16/21 1659    Specimen: Blood Updated: 11/16/21 1802     Extra Tube hold for add-on     Comment: Auto resulted       Lavender Top [[id number]] Collected: 11/16/21 1659    Specimen: Blood Updated: 11/16/21 1802     Extra Tube hold for add-on     Comment: Auto resulted       Red Top [[id number]] Collected: 11/16/21 1659    Specimen: Blood Updated: 11/16/21 1802     Extra Tube Hold for add-ons.     Comment: Auto resulted.       Comprehensive Metabolic Panel [[id number]]  (Abnormal) Collected: 11/16/21 1659    Specimen: Blood Updated: 11/16/21 1756     Glucose 116 mg/dL      BUN 11 mg/dL      Creatinine 0.80 mg/dL      Sodium 138 mmol/L      Potassium 4.0 mmol/L      Comment: Slight hemolysis detected by analyzer. Results may be affected.        Chloride 99 mmol/L      CO2 28.0 mmol/L      Calcium 8.8 mg/dL      Total Protein 7.2 g/dL      Albumin 3.70 g/dL      ALT (SGPT) 17 U/L      AST (SGOT) 27 U/L      Alkaline Phosphatase 125 U/L      Total Bilirubin 0.5 mg/dL      eGFR Non African Amer 68 mL/min/1.73      Globulin 3.5 gm/dL      A/G Ratio 1.1 g/dL      BUN/Creatinine Ratio 13.8     Anion Gap 11.0 mmol/L     Narrative:      GFR Normal >60  Chronic Kidney Disease <60  Kidney Failure <15      Troponin [[id number]]  (Normal) Collected: 11/16/21 1659    Specimen: Blood Updated: 11/16/21 1754     Troponin T <0.010 ng/mL     Narrative:      Troponin T Reference Range:  <= 0.03 ng/mL-   Negative for AMI  >0.03 ng/mL-     Abnormal for myocardial necrosis.  Clinicians would have to utilize clinical acumen, EKG, Troponin and  serial changes to determine if it is an Acute Myocardial Infarction or myocardial injury due to an underlying chronic condition.       Results may be falsely decreased if patient taking Biotin.      BNP [[id number]]  (Abnormal) Collected: 11/16/21 1659    Specimen: Blood Updated: 11/16/21 1753     proBNP 7,061.0 pg/mL     Narrative:      Among patients with dyspnea, NT-proBNP is highly sensitive for the detection of acute congestive heart failure. In addition NT-proBNP of <300 pg/ml effectively rules out acute congestive heart failure with 99% negative predictive value.    Results may be falsely decreased if patient taking Biotin.      CBC & Differential [[id number]]  (Abnormal) Collected: 11/16/21 1659    Specimen: Blood Updated: 11/16/21 1720    Narrative:      The following orders were created for panel order CBC & Differential.  Procedure                               Abnormality         Status                     ---------                               -----------         ------                     CBC Auto Differential[[id number]]        Abnormal            Final result                 Please view results for these tests on the individual orders.    CBC Auto Differential [[id number]]  (Abnormal) Collected: 11/16/21 1659    Specimen: Blood Updated: 11/16/21 1720     WBC 7.78 10*3/mm3      RBC 3.76 10*6/mm3      Hemoglobin 10.7 g/dL      Hematocrit 34.5 %      MCV 91.8 fL      MCH 28.5 pg      MCHC 31.0 g/dL      RDW 15.7 %      RDW-SD 50.5 fl      MPV 13.5 fL      Platelets 145 10*3/mm3      Neutrophil % 57.9 %      Lymphocyte % 27.4 %      Monocyte % 12.3 %      Eosinophil % 1.2 %      Basophil % 0.8 %      Neutrophils, Absolute 4.51 10*3/mm3      Lymphocytes, Absolute 2.13 10*3/mm3      Monocytes, Absolute 0.96 10*3/mm3      Eosinophils, Absolute 0.09 10*3/mm3      Basophils, Absolute 0.06 10*3/mm3         Chief Complaint on Day of Discharge: Overall, patient reports feeling well.  States her breathing feels at  "baseline.  Tolerating room air.  Denies any acute complaints.    Hospital Course:  The patient is a 86 y.o. female who presented to [facility] with shortness of breath.  She has a past medical history significant for paroxysmal atrial fibrillation, hypertension, hyperlipidemia, elevated left hemidiaphragm and chronic anticoagulation with Eliquis.  She follows with NADEEM Brumfield for primary care and GAIL Hernandez with cardiology at [facility].  She was recently admitted to our facility on 10/30 211/4/2021 for paroxysmal atrial fibrillation with rapid ventricular response, shingles outbreak, and elevated BNP.  Transthoracic echocardiogram showed normal systolic function with an ejection fraction of 61 to 65%, mildly elevated right ventricular systolic pressure and no significant valvular pathology.  Reguarding her atrial fibrillation she had actually converted to normal sinus rhythm and was discharged on Lopressor.  She was discharged to [facility].  She returned on 11/16 with 1 day history of shortness of breath.  States that she\" woke up with it.\"  In the emergency department, she was found to be in atrial fibrillation with rates up to 130 bpm.  She was started on a Cardizem drip.  Troponin negative x2.  BNP 7061.  CT angiogram chest negative for pulmonary embolus.  Chronic left hemidiaphragm elevation with compressive left lower lobe atelectasis.  She was admitted as observation to the hospital service for further evaluation and management.    Cardizem drip was weaned and she was resumed on beta-blocker.  She has remained rate controlled atrial fibrillation,  bpm today.  She was resumed on oral Lasix.  Overall, patient reports feeling well.  States her breathing feels at baseline.  Tolerating room air.  Denies chest pain or pressure.  She denies any acute complaints.  She feels ready to return to [facility].  States that [facility] plan to \"get me home by " "Thanksgiving.\"  Feels that she is medically stable and appropriate for discharge back to [facility].  She is to follow-up with physician at [facility] within 24-48 hours for posthospitalization assessment.    Condition on Discharge:  Medically stable.    Physical Exam on Discharge:  /64 (BP Location: Right arm, Patient Position: Lying)   Pulse 94   Temp 98.1 °F (36.7 °C) (Oral)   Resp 18   Ht 167.6 cm (66\")   Wt 76.9 kg (169 lb 8 oz)   SpO2 92%   BMI 27.36 kg/m²   Physical Exam  Constitutional:       Appearance: She is well-developed.      Comments: Sitting up in the chair.  No acute distress.  Tolerating room air.  No family at bedside.  Discussed with her nurse, Meghan.   HENT:      Head: Normocephalic and atraumatic.   Eyes:      General: No scleral icterus.     Conjunctiva/sclera: Conjunctivae normal.      Pupils: Pupils are equal, round, and reactive to light.   Neck:      Vascular: No JVD.   Cardiovascular:      Rate and Rhythm: Normal rate. Rhythm irregular.      Heart sounds: Normal heart sounds.      Comments: Atrial fibrillation   Pulmonary:      Effort: Pulmonary effort is normal.      Breath sounds: Normal breath sounds. No wheezing or rales.   Abdominal:      General: Bowel sounds are normal. There is no distension.      Palpations: Abdomen is soft.      Tenderness: There is no abdominal tenderness.   Musculoskeletal:         General: Normal range of motion.      Cervical back: Neck supple.   Lymphadenopathy:      Cervical: No cervical adenopathy.   Skin:     General: Skin is warm and dry.   Neurological:      Mental Status: She is alert and oriented to person, place, and time.      Cranial Nerves: No cranial nerve deficit.   Psychiatric:         Behavior: Behavior normal.       Discharge Disposition:  Skilled Nursing Facility (DC - External)    Discharge Medications:     Discharge Medications      Changes to Medications      Instructions Start Date   apixaban 5 MG tablet " tablet  Commonly known as: ELIQUIS  What changed: how much to take   5 mg, Oral, Every 12 Hours Scheduled         Continue These Medications      Instructions Start Date   aspirin 81 MG chewable tablet   81 mg, Oral, Daily      atorvastatin 40 MG tablet  Commonly known as: LIPITOR   40 mg, Oral, Nightly      dorzolamide-timolol 22.3-6.8 MG/ML ophthalmic solution  Commonly known as: COSOPT   1 drop, Both Eyes, 2 Times Daily      escitalopram 20 MG tablet  Commonly known as: LEXAPRO   20 mg, Oral, Nightly      furosemide 20 MG tablet  Commonly known as: LASIX   20 mg, Oral, Daily      Lumigan 0.01 % ophthalmic drops  Generic drug: bimatoprost   1 drop, Both Eyes, Nightly      metoprolol tartrate 25 MG tablet  Commonly known as: LOPRESSOR   25 mg, Oral, Every 12 Hours Scheduled      omeprazole 40 MG capsule  Commonly known as: priLOSEC   40 mg, Oral, Daily      VITAMIN D2 PO   1.25 mg, Oral, Weekly, On Saturday nights weekly         Stop These Medications    atenolol 50 MG tablet  Commonly known as: TENORMIN     losartan 50 MG tablet  Commonly known as: COZAAR            Discharge Diet:   Diet Instructions     Diet: Regular, Consistent Carbohydrate, Cardiac      Discharge Diet:  Regular  Consistent Carbohydrate  Cardiac             Activity at Discharge:   Activity Instructions     Activity as Tolerated            Discharge Care Plan/Instructions:   1.  Follow-up with physician at skilled nursing facility within 24-48 hours for posthospitalization assessment.  2.  Seek evaluation for worsening symptoms.    Test Results Pending at Discharge: None.    Electronically signed by GAIL Mcrae, 11/17/21, 13:36 CST.    Time: 35 minutes.    .

## 2021-11-17 NOTE — THERAPY EVALUATION
Patient Name: Liana Zaragoza  : 1935    MRN: [id number]                              Today's Date: 2021       Admit Date: 2021    Visit Dx:     ICD-10-CM ICD-9-CM   1. Atrial fibrillation, unspecified type (HCC)  I48.91 427.31   2. Impaired mobility  Z74.09 799.89     Patient Active Problem List   Diagnosis   • Shingles outbreak, left posterior thorax   • Hypoxia   • Elevated left hemidiaphragm   • Paroxysmal atrial fibrillation with rapid ventricular response (HCC)   • Chronic anticoagulation   • Functional assessment declined   • Shortness of breath   • Elevated brain natriuretic peptide (BNP) level   • Pulmonary hypertension (HCC), suspected   • Lactic acidosis   • Atrial fibrillation (HCC)     Past Medical History:   Diagnosis Date   • A-fib (HCC)    • Cancer (HCC)     SKIN CANCER   • Depression    • GERD (gastroesophageal reflux disease)    • Hyperlipidemia    • Hypertension    • PONV (postoperative nausea and vomiting)    • Zoster without complications      Past Surgical History:   Procedure Laterality Date   • JOINT REPLACEMENT      Right shoulder   • LAPAROSCOPIC CHOLECYSTECTOMY     • LEG EXCISION LESION/CYST Left 2020    Procedure: EXCISION OF SQUAMOUS CELL CARCINOMA ON LEFT BAIG;  Surgeon: Lorraine Ayoub MD;  Location: [facility] OR;  Service: General   • LEG EXCISION LESION/CYST Left 2020    Procedure: WIDE LOCAL EXCISION SQUAMOUS CELL CARCINOMA OF LOWER LEFT LEG;  Surgeon: Lorraine Ayoub MD;  Location:  [facility] OR;  Service: General;  Laterality: Left;   • SHOULDER SURGERY Right     FX REPAIR   • SKIN CANCER EXCISION        General Information     Row Name 21          Physical Therapy Time and Intention    Document Type evaluation  SOB. Dx: A-fib w RVR. Hx Shingles  -MOODY     Mode of Treatment physical therapy  -MOODY     Row Name 21          General Information    Patient Profile Reviewed yes  -MOODY     Prior Level of Function --  Was CGA to  ambulate with a RW 75 ft on recent admit  -MOODY     Existing Precautions/Restrictions fall  -MOODY     Barriers to Rehab previous functional deficit  -MOODY     Row Name 11/17/21 0918          Living Environment    Lives With facility resident  -MOODY     Row Name 11/17/21 0918          Cognition    Orientation Status (Cognition) oriented x 4  -MOODY     Row Name 11/17/21 0918          Safety Issues, Functional Mobility    Impairments Affecting Function (Mobility) endurance/activity tolerance; strength; shortness of breath  -MOODY           User Key  (r) = Recorded By, (t) = Taken By, (c) = Cosigned By    Initials Name Provider Type    Shemar Tate, PT DPT Physical Therapist               Mobility     Row Name 11/17/21 0918          Bed Mobility    Bed Mobility supine-sit; sit-supine  -MOODY     Supine-Sit [place] (Bed Mobility) not tested  -MOODY     Sit-Supine [place] (Bed Mobility) standby assist  -MOODY     Assistive Device (Bed Mobility) bed rails; head of bed elevated  -MOODY     Row Name 11/17/21 0918          Transfers    Comment (Transfers) needs min/mod to stand from toilet, CGA/min to stand from EOB  -MOODY     Row Name 11/17/21 0918          Sit-Stand Transfer    Sit-Stand [place] (Transfers) minimum assist (75% patient effort); contact guard; moderate assist (50% patient effort)  -MOODY     Assistive Device (Sit-Stand Transfers) walker, front-wheeled  -MOODY     Row Name 11/17/21 0918          Gait/Stairs (Locomotion)    [place] Level (Gait) contact guard; minimum assist (75% patient effort)  -MOODY     Assistive Device (Gait) walker, front-wheeled  -MOODY     Distance in Feet (Gait) 60 ft  -MOODY     Deviations/Abnormal Patterns (Gait) jayjay decreased; gait speed decreased  -MOODY     Bilateral Gait Deviations forward flexed posture  -MOODY     Comment (Gait/Stairs) SOB w activity  -MOODY           User Key  (r) = Recorded By, (t) = Taken By, (c) = Cosigned By    Initials Name Provider Type    Shemar Tate, PT DPT  Physical Therapist               Obj/Interventions     Row Name 11/17/21 0918          Range of Motion Comprehensive    General Range of Motion bilateral lower extremity ROM WFL  -MOODY     Row Name 11/17/21 0918          Strength Comprehensive (MMT)    Comment, General Manual Muscle Testing (MMT) Assessment BLE grossly 4-/5  -MOODY     Row Name 11/17/21 0918          Balance    Balance Assessment sitting static balance; standing static balance  -MOODY     Static Sitting Balance WFL; unsupported; sitting, edge of bed  -MOODY     Static Standing Balance mild impairment; supported; standing  -MOODY           User Key  (r) = Recorded By, (t) = Taken By, (c) = Cosigned By    Initials Name Provider Type    Shemar Tate, PT DPT Physical Therapist               Goals/Plan     Row Name 11/17/21 0918          Bed Mobility Goal 1 (PT)    Activity/Assistive Device (Bed Mobility Goal 1, PT) sit to supine/supine to sit  -MOODY     [place] Level/Cues Needed (Bed Mobility Goal 1, PT) standby assist  -MOODY     Time Frame (Bed Mobility Goal 1, PT) long term goal (LTG); 10 days  -MOODY     Progress/Outcomes (Bed Mobility Goal 1, PT) goal ongoing  -MOODY     Row Name 11/17/21 0918          Transfer Goal 1 (PT)    Activity/Assistive Device (Transfer Goal 1, PT) sit-to-stand/stand-to-sit; bed-to-chair/chair-to-bed; walker, rolling  -MOODY     [place] Level/Cues Needed (Transfer Goal 1, PT) contact guard assist  -MOODY     Time Frame (Transfer Goal 1, PT) long term goal (LTG); 10 days  -MOODY     Progress/Outcome (Transfer Goal 1, PT) goal ongoing  -MOODY     Row Name 11/17/21 0918          Gait Training Goal 1 (PT)    Activity/Assistive Device (Gait Training Goal 1, PT) gait (walking locomotion); assistive device use; decrease fall risk; improve balance and speed; increase endurance/gait distance  -MOODY     [place] Level (Gait Training Goal 1, PT) contact guard assist  -MOODY     Distance (Gait Training Goal 1, PT) 125 ft  -MOODY     Time Frame (Gait Training  Goal 1, PT) long term goal (LTG); 10 days  -MOODY     Progress/Outcome (Gait Training Goal 1, PT) goal ongoing  -MOODY           User Key  (r) = Recorded By, (t) = Taken By, (c) = Cosigned By    Initials Name Provider Type    Shemar Tate, PT DPT Physical Therapist               Clinical Impression     Row Name 11/17/21 0918          Pain    Additional Documentation Pain Scale: FACES Pre/Post-Treatment (Group)  -MOODY     Row Name 11/17/21 0918          Pain Scale: FACES Pre/Post-Treatment    Pain: FACES Scale, Pretreatment 0-->no hurt  -MOODY     Row Name 11/17/21 0918          Plan of Care Review    Plan of Care Reviewed With patient  -MOODY     Outcome Summary PT eval complete. She is alert and oriented x 4. She was found on the toilet upon entry. She needs min/mod assist to stand from the toilet and CGA/min assist to stand from the EOB. She ambulates a short distance with a RW and CGA/min assist. PT will cont to progress her functional mobility, balance, and strength. Anticipate d/c back to SNF for cont rehab.  -MOODY     Row Name 11/17/21 0918          Therapy Assessment/Plan (PT)    Patient/Family Therapy Goals Statement (PT) return to SNF  -MOODY     Rehab Potential (PT) good, to achieve stated therapy goals  -MOODY     Criteria for Skilled Interventions Met (PT) yes; meets criteria; skilled treatment is necessary  -MOODY     Predicted Duration of Therapy Intervention (PT) unti ld.c  -MOODY     Row Name 11/17/21 0918          Positioning and Restraints    Pre-Treatment Position in bed  -MOODY     Post Treatment Position bed  -MOODY     In Bed fowlers; side lying left; call light within reach; encouraged to call for assist  -MOODY           User Key  (r) = Recorded By, (t) = Taken By, (c) = Cosigned By    Initials Name Provider Type    Shemar Tate, PT DPT Physical Therapist               Outcome Measures     Row Name 11/17/21 0918          How much help from another person do you currently need...    Turning from your back to your  side while in flat bed without using bedrails? 3  -MOODY     Moving from lying on back to sitting on the side of a flat bed without bedrails? 3  -MOODY     Moving to and from a bed to a chair (including a wheelchair)? 3  -MOODY     Standing up from a chair using your arms (e.g., wheelchair, bedside chair)? 3  -MOODY     Climbing 3-5 steps with a railing? 2  -MOODY     To walk in hospital room? 3  -MOODY     AM-PAC 6 Clicks Score (PT) 17  -MOODY     Row Name 11/17/21 0918          Functional Assessment    Outcome Measure Options AM-PAC 6 Clicks Basic Mobility (PT)  -MOODY           User Key  (r) = Recorded By, (t) = Taken By, (c) = Cosigned By    Initials Name Provider Type    Shemar Tate, PT DPT Physical Therapist                             Physical Therapy Education                 Title: PT OT SLP Therapies (In Progress)     Topic: Physical Therapy (In Progress)     Point: Mobility training (Done)     Learning Progress Summary           Patient Acceptance, E, VU,NR by MOODY at 11/17/2021 0918    Comment: benefits of activity, progression of PT POC                   Point: Home exercise program (Not Started)     Learner Progress:  Not documented in this visit.          Point: Body mechanics (Not Started)     Learner Progress:  Not documented in this visit.          Point: Precautions (Not Started)     Learner Progress:  Not documented in this visit.                      User Key     Initials Effective Dates Name Provider Type Linnea URIOSTEGUI 08/02/16 -  Shemar Turner PT DPT Physical Therapist PT              PT Recommendation and Plan  Planned Therapy Interventions (PT): bed mobility training, transfer training, gait training, balance training, home exercise program, patient/family education, postural re-education, strengthening  Plan of Care Reviewed With: patient  Outcome Summary: PT eval complete. She is alert and oriented x 4. She was found on the toilet upon entry. She needs min/mod assist to stand from the toilet and  CGA/min assist to stand from the EOB. She ambulates a short distance with a RW and CGA/min assist. PT will cont to progress her functional mobility, balance, and strength. Anticipate d/c back to SNF for cont rehab.     Time Calculation:    PT Charges     Row Name 11/17/21 1138             Time Calculation    Start Time 0918  -MOODY      Stop Time 0958  -MOODY      Time Calculation (min) 40 min  -MOODY      PT Received On 11/17/21  -MOODY      PT Goal Re-Cert Due Date 11/27/21  -MOODY            User Key  (r) = Recorded By, (t) = Taken By, (c) = Cosigned By    Initials Name Provider Type    Shemar Tate, PT DPT Physical Therapist              Therapy Charges for Today     Code Description Service Date Service Provider Modifiers Qty    [id number] HC PT EVAL MOD COMPLEXITY 3 11/17/2021 Shemar Turner PT DPT GP 1          PT G-Codes  Outcome Measure Options: AM-PAC 6 Clicks Basic Mobility (PT)  AM-PAC 6 Clicks Score (PT): Mauricio Turner, PT DPT  11/17/2021

## 2021-11-17 NOTE — PLAN OF CARE
Goal Outcome Evaluation:  Plan of Care Reviewed With: patient           Outcome Summary: OT eval completed. Pt is alert and oriented x4. Pt up in bathroom upon entry to room. She demonstrates decreased strength, balance, and activity tolerance. Required assist with LBD and toileting ranging from SBA to Max A. CGA/Min A for mobility. She would benefit from skilled OT services to address these deficits. Recommend d/c back to SNF for continued rehab.

## 2021-11-18 NOTE — THERAPY DISCHARGE NOTE
Acute Care - Occupational Therapy Discharge Summary  [facility]     Patient Name: Liana Zaragoza  : 1935  MRN: [id number]    Today's Date: 2021                 Admit Date: 2021        OT Recommendation and Plan    Visit Dx:    ICD-10-CM ICD-9-CM   1. Atrial fibrillation, unspecified type (HCC)  I48.91 427.31   2. Impaired mobility  Z74.09 799.89   3. Decreased activities of daily living (ADL)  Z78.9 V49.89                OT Rehab Goals     Row Name 21 0800             Bathing Goal 1 (OT)    Activity/Device (Bathing Goal 1, OT) bathing skills, all  -TS      [place] Level/Cues Needed (Bathing Goal 1, OT) supervision required  -TS      Time Frame (Bathing Goal 1, OT) long term goal (LTG); by discharge  -TS      Progress/Outcomes (Bathing Goal 1, OT) goal not met  -TS              Dressing Goal 1 (OT)    Activity/Device (Dressing Goal 1, OT) lower body dressing  -TS      [place]/Cues Needed (Dressing Goal 1, OT) minimum assist (75% or more patient effort)  -TS      Time Frame (Dressing Goal 1, OT) long term goal (LTG); by discharge  -TS      Progress/Outcome (Dressing Goal 1, OT) goal not met  -TS              Toileting Goal 1 (OT)    Activity/Device (Toileting Goal 1, OT) toileting skills, all  -TS      [place] Level/Cues Needed (Toileting Goal 1, OT) modified independence  -TS      Time Frame (Toileting Goal 1, OT) long term goal (LTG); by discharge  -TS      Progress/Outcome (Toileting Goal 1, OT) goal not met  -TS            User Key  (r) = Recorded By, (t) = Taken By, (c) = Cosigned By    Initials Name Provider Type Discipline    TS Irma Lyons COTA Occupational Therapy Assistant OT                            OT Discharge Summary  Anticipated Discharge Disposition (OT): skilled nursing facility  Reason for Discharge: Discharge from facility  Outcomes Achieved: Refer to plan of care for updates on goals achieved  Discharge Destination: SNF      Irma CHOUDHARY  DEON Lyons  11/18/2021

## 2021-11-18 NOTE — THERAPY DISCHARGE NOTE
Acute Care - Physical Therapy Discharge Summary  [facility]       Patient Name: Liana Zaragoza  : 1935  MRN: [id number]    Today's Date: 2021                 Admit Date: 2021      PT Recommendation and Plan    Visit Dx:    ICD-10-CM ICD-9-CM   1. Atrial fibrillation, unspecified type (HCC)  I48.91 427.31   2. Impaired mobility  Z74.09 799.89   3. Decreased activities of daily living (ADL)  Z78.9 V49.89                PT Rehab Goals     Row Name 21 0705             Bed Mobility Goal 1 (PT)    Activity/Assistive Device (Bed Mobility Goal 1, PT) sit to supine/supine to sit  -AB      [place] Level/Cues Needed (Bed Mobility Goal 1, PT) standby assist  -AB      Time Frame (Bed Mobility Goal 1, PT) long term goal (LTG); 10 days  -AB      Progress/Outcomes (Bed Mobility Goal 1, PT) goal not met  -AB              Transfer Goal 1 (PT)    Activity/Assistive Device (Transfer Goal 1, PT) sit-to-stand/stand-to-sit; bed-to-chair/chair-to-bed; walker, rolling  -AB      [place] Level/Cues Needed (Transfer Goal 1, PT) contact guard assist  -AB      Time Frame (Transfer Goal 1, PT) long term goal (LTG); 10 days  -AB      Progress/Outcome (Transfer Goal 1, PT) goal not met  -AB              Gait Training Goal 1 (PT)    Activity/Assistive Device (Gait Training Goal 1, PT) gait (walking locomotion); assistive device use; decrease fall risk; improve balance and speed; increase endurance/gait distance  -AB      [place] Level (Gait Training Goal 1, PT) contact guard assist  -AB      Distance (Gait Training Goal 1, PT) 125 ft  -AB      Time Frame (Gait Training Goal 1, PT) long term goal (LTG); 10 days  -AB      Progress/Outcome (Gait Training Goal 1, PT) goal not met  -AB            User Key  (r) = Recorded By, (t) = Taken By, (c) = Cosigned By    Initials Name Provider Type Discipline    AB Batsheva Gomes, PTA Physical Therapy Assistant PT                    PT Discharge Summary  Anticipated  Colonoscopy History and Physical      Patient: Elfego Gonzales    Physician: Yovani Melendez MD    Referring Physician: Liliana Mckinney MD    Chief Complaint: For colonoscopy    History of Present Illness: Pt presents for colonoscopy. Initial exam.    History:  Past Medical History:   Diagnosis Date    Allergic rhinitis due to pollen     DM2 (diabetes mellitus, type 2) ([facility] [address] 2012    Full dentures     Hyperlipidemia     Hypertension     controlled with med    Morbid obesity ([facility] [address] 11/18/14    BMI- 42.6    Wears dentures      Past Surgical History:   Procedure Laterality Date    ABDOMEN SURGERY PROC UNLISTED      umb hernia    HX ORTHOPAEDIC      carpal tunnel to right wrist      Social History     Social History    Marital status:      Spouse name: N/A    Number of children: N/A    Years of education: N/A     Social History Main Topics    Smoking status: Never Smoker    Smokeless tobacco: Never Used    Alcohol use 0.0 oz/week     0 Standard drinks or equivalent per week      Comment: social    Drug use: None    Sexual activity: Not Asked     Other Topics Concern    None     Social History Narrative      Family History   Problem Relation Age of Onset    Diabetes Mother     Heart Disease Mother 39     MI    Diabetes Father     Alcohol abuse Father     Stroke Sister     Heart Disease Brother 52     CABG    Diabetes Brother     Diabetes Other     Elevated Lipids Other     Hypertension Other        Medications:   Prior to Admission medications    Medication Sig Start Date End Date Taking? Authorizing Provider   metFORMIN (GLUCOPHAGE) 1,000 mg tablet Take 1 Tab by mouth two (2) times daily (with meals) for 90 days. Indications: type 2 diabetes mellitus 3/1/18 5/30/18 Yes Liliana Mckinney MD   triamcinolone acetonide (KENALOG) 0.1 % ointment Apply  to affected area two (2) times a day.  use thin layer 1/31/18  Yes Liliana Mckinney MD   lisinopril-hydroCHLOROthiazide [facility], Discharge Disposition (PT): skilled nursing facility  Reason for Discharge: Discharge from facility  Outcomes Achieved: Refer to plan of care for updates on goals achieved  Discharge Destination: SNF      Batsheva Gomes, PTA   11/18/2021        ZESTORETIC) 20-25 mg per tablet Take 1 Tab by mouth daily for 30 days. REFILLS GO TO [facility] 1/31/18 3/16/18 Yes Jose Alberto Garnett MD   metoprolol succinate (TOPROL-XL) 100 mg tablet Take 1.5 Tabs by mouth daily for 90 days. REFILLS GO TO [facility] 1/31/18 5/1/18 Yes Jose Alberto Garnett MD   glipiZIDE (GLUCOTROL) 10 mg tablet Take 1 Tab by mouth daily (after breakfast). 11/30/17  Yes Jose Alberto Garnett MD   atorvastatin (LIPITOR) 80 mg tablet take 1 tablet by mouth every morning 10/4/17  Yes Jose Alberto Garnett MD   omega-3 fatty acids-vitamin e (FISH OIL) 1,000 mg cap Take 1 capsule by mouth every morning. Last dose 11/18/14 - holding for surgery   Indications: no reason   Yes Historical Provider   Aspirin, Buffered 81 mg tab Take  by mouth every morning. Last dose 11/18/14 - holding for surgery   Indications: MYOCARDIAL INFARCTION PREVENTION   Yes Historical Provider   Cholecalciferol, Vitamin D3, (VITAMIN D3) 1,000 unit cap Take  by mouth every morning. Indications: PREVENTION OF VITAMIN D DEFICIENCY   Yes Historical Provider   JARDIANCE 25 mg tablet take 1 tablet by mouth every morning 10/4/17   Jose Alberto Garnett MD   ezetimibe (ZETIA) 10 mg tablet Take 1 Tab by mouth every morning for 90 days. REFILLS GO TO [facility]  Indications: hypercholesterolemia 10/3/17 1/31/18  Jose Alberto Garnett MD       Allergies: Allergies   Allergen Reactions    Flonase [Fluticasone] Other (comments)     headaches    Pollen Extracts Runny Nose, Sneezing and Other (comments)     congestion       Physical Exam:     Vital Signs:   Visit Vitals    /84    Pulse 94    Temp 98.4 °F (36.9 °C)    Resp 18    Ht 6' (1.829 m)    Wt 272 lb (123.4 kg)    SpO2 97%    BMI 36.89 kg/m2     . General: in NAD      Heart: regular   Lungs: unlabored   Abdominal: soft   Neurological: grossly normal        Findings/Diagnosis: Screening for colorectal cancer     Plan of Care/Planned Procedure: Colonoscopy.   Risks of endoscopy include  bleeding, perforation. They understand and agree to proceed.       Signed:  Jeana Pickett MD   3/16/2018

## 2021-11-24 NOTE — TELEPHONE ENCOUNTER
Sandeep Gonzales called to request a refill on her medication.       Last office visit : 7/30/2021   Next office visit : 12/10/2021     Requested Prescriptions     Pending Prescriptions Disp Refills    vitamin D (ERGOCALCIFEROL) 1.25 MG (56439 UT) CAPS capsule [Pharmacy Med Name: VITAMIN D2 1.25MG(50,000 UNIT)] 12 capsule 3     Sig: TAKE 1 CAPSULE BY MOUTH ONE TIME PER WEEK            Opal Hammond MA

## 2021-12-01 NOTE — TELEPHONE ENCOUNTER
[facility] called stating pt has been taking lopressor 150 mg q am and 100 mg qhs. Pt has a horrible time staying in rhythm and this dose helps with that. pls advise. You sent script for lopressor 50 mg bid.      Call nicol at [phone]  Or fax [phone]

## 2021-12-01 NOTE — PROGRESS NOTES
[facility]   Established Patient Office Visit   Quoc Chacko. [address] [facility]  [phone]        OFFICE VISIT:  2021    Beverly Bangura - : 1935    Reason For Visit:  Kassie Little is a 82 y.o. female who is here for Atrial Fibrillation and Shortness of Breath    1. Paroxysmal A-fib ([address]    2. Essential hypertension    3. Hyperlipidemia, unspecified hyperlipidemia type      Patient with a history of paroxysmal atrial fib, hypertension, and hyperlipidemia.     She is a patient of Dr ARISTIDES KINNEY [facility]    Patient had a recent hospitalization at [address] [place] [address] for A. Fib. 2D echo showed: Left ventricular ejection fraction 61 to 65%. Left ventricular systolic function is normal.  Estimated right ventricular systolic pressure from tricuspid regurgitation is mildly elevated 35 to 45 mmHg. Normal size and function of the right ventricle. No significant valvular pathology. CT of the chest showed no evidence of pulmonary embolism. Chronic left hemidiaphragm elevation with compressive left lower lobe atelectasis. Troponins were negative. Elevated BNP. Patient was started on a Cardizem drip and then weaned off and her beta-blocker was resumed. Patient presents to clinic today per request of [facility] for atrial fib. Patient denies any chest pain, pressure or tightness. There is no shortness of breath, orthopnea or PND. Patient does note tachycardia.       Beba Bangura is a 82 y.o. female with the following history as recorded in [facility]:    Patient Active Problem List    Diagnosis Date Noted    Osteopenia of multiple sites 2021    Hyperparathyroidism ([address] 2021    HFrEF (heart failure with reduced ejection fraction) ([address] 2021    Lower extremity edema 2021    Poor historian 2021    Squamous cell carcinoma, leg 2020    Pulmonary hypertension ([address] 2020    Chest discomfort     Chest pain 05/01/2019    Paroxysmal A-fib (HCC) 05/01/2019    Elevated brain natriuretic peptide (BNP) level 04/19/2019    Hypothyroidism 04/19/2019    Renal cyst 04/19/2019    Shortness of breath 08/24/2018    Anxiety and depression 08/24/2018    Glaucoma 07/10/2016    Hypertension 07/09/2016    Hyperlipemia 07/09/2016    GERD (gastroesophageal reflux disease) 07/09/2016    Nausea 07/09/2016     Current Outpatient Medications   Medication Sig Dispense Refill    apixaban (ELIQUIS) 5 MG TABS tablet Take 5 mg by mouth 2 times daily      furosemide (LASIX) 20 MG tablet Take 20 mg by mouth daily      melatonin 3 MG TABS tablet Take 3 mg by mouth daily      lisinopril (PRINIVIL;ZESTRIL) 20 MG tablet Take 20 mg by mouth 2 times daily      metoprolol tartrate (LOPRESSOR) 50 MG tablet Take 1 tablet by mouth 2 times daily 180 tablet 3    vitamin D (ERGOCALCIFEROL) 1.25 MG (44127 UT) CAPS capsule TAKE 1 CAPSULE BY MOUTH ONE TIME PER WEEK 12 capsule 3    atorvastatin (LIPITOR) 40 MG tablet TAKE 1 TABLET DAILY 90 tablet 0    LUMIGAN 0.01 % SOLN ophthalmic drops INSTILL 1 DROP INTO BOTH EYES EVERY EVENING AS DIRECTED      escitalopram (LEXAPRO) 20 MG tablet TAKE 1 TABLET BY MOUTH EVERY DAY 90 tablet 3    omeprazole (PRILOSEC) 40 MG delayed release capsule TAKE 1 CAPSULE DAILY 90 capsule 3    aspirin 81 MG chewable tablet Take 1 tablet by mouth daily 30 tablet 3    dorzolamide-timolol (COSOPT) 22.3-6.8 MG/ML ophthalmic solution INSTILL 1 DROP INTO BOTH EYES TWICE A DAY AS DIRECTED  3     No current facility-administered medications for this visit.      Allergies: Cardizem [diltiazem hcl] and Morphine  Past Medical History:   Diagnosis Date    Arthritis     Atrial fibrillation ([address]     Elevated TSH 4/19/2019    GERD (gastroesophageal reflux disease)     Hyperlipidemia     Hypertension     Tremor     to right hand     Past Surgical History:   Procedure Laterality Date    CHOLECYSTECTOMY      HUMERUS FRACTURE SURGERY Right 7/9/2016    REVERSE TOTAL SHOULDER ARTHROPLASTY performed by Maxx Alvarez MD at [facility] OR     Family History   Problem Relation Age of Onset    Heart Attack Mother     Stroke Father     Heart Attack Father     Heart Disease Sister     Atrial Fibrillation Sister      Social History     Tobacco Use    Smoking status: Never Smoker    Smokeless tobacco: Never Used   Substance Use Topics    Alcohol use: No          Review of Systems:    Review of Systems   Constitutional: Negative for activity change, chills, diaphoresis, fatigue and fever. HENT: Negative for congestion and sore throat. Respiratory: Negative for cough, chest tightness, shortness of breath and wheezing. Cardiovascular: Positive for palpitations. Negative for chest pain and leg swelling. Neurological: Negative for dizziness, syncope, light-headedness and headaches. Psychiatric/Behavioral: Negative for confusion. The patient is not nervous/anxious. Objective:    /66   Pulse 115   Ht 5' 6\" (1.676 m)   Wt 170 lb (77.1 kg)   SpO2 99%   BMI 27.44 kg/m²     GENERAL - well developed and well nourished, conversant  HEENT   PERRLA, Hearing appears normal, gentleman lids are normal.  External inspection of ears and nose appear normal.  NECK - no thyromegaly, no JVD, trachea is in the midline  CARDIOVASCULAR  PMI is in the mid line clavicular position, Normal S1 and S2 with no systolic murmur. No S3 or S4    PULMONARY  no respiratory distress. No wheezes or rales. Lungs are clear to ausculation, normal respiratory effort. ABDOMEN   soft, non tender, no rebound  MUSCULOSKELETAL   range of motion of the upper and lower extermites appears normal and equal and is without pain   EXTREMITIES - no significant edema   NEUROLOGIC  gait and station are normal  SKIN - turgor is normal, can warm and dry.   PSYCHIATRIC - normal mood and affect, alert and orientated x 3,      ASSESSMENT:    ALL THE CARDIOLOGY PROBLEMS ARE LISTED ABOVE; HOWEVER, THE FOLLOWING SPECIFIC CARDIAC PROBLEMS / CONDITIONS WERE ADDRESSED AND TREATED DURING THE OFFICE VISIT TODAY:                                                                                            MEDICAL DECISION MAKING             Cardiac Specific Problem / Diagnosis  Discussion and Data Reviewed Diagnostic Procedures Ordered Management Options Selected           1. PAF   Shows no change   Review and summation of old records:    KG in the office today showing A. fib with a heart rate of 120 bpm.  Will increase Lopressor from 25 mg twice daily to 50 mg twice daily. Patient to continue to take Eliquis 5 mg twice daily for stroke prevention. Yes Continue current medications:     Yes           2. Hypertension  Well Controlled   Blood pressure in the office 124/66. O2 99%. Patient is on lisinopril 20 mg twice daily. No Continue current medications:    Yes           3. Hyperlipidemia Stable  patient is on Lipitor 40 mg daily. No Continue current medications: Yes                     Orders Placed This Encounter   Procedures    EKG 12 lead     Order Specific Question:   Reason for Exam?     Answer:   Irregular heart rate     Orders Placed This Encounter   Medications    metoprolol tartrate (LOPRESSOR) 50 MG tablet     Sig: Take 1 tablet by mouth 2 times daily     Dispense:  180 tablet     Refill:  3       Discussed with patient. Return in about 4 weeks (around 12/29/2021) for Dr Xin Salcedo . I greatly appreciate the opportunity to meet Nanoadriel Paulson and your confidence in allowing me to participate in her cardiovascular care. NURIS Meraz NP  12/1/2021 10:10 AM CST                    This dictation was generated by voice recognition computer software. Although all attempts are made to edit dictation for accuracy, there may be errors in the transcription that are not intended.

## 2021-12-02 NOTE — TELEPHONE ENCOUNTER
NURIS Perez - CORNELL  You 21 minutes ago (9:01 AM)     LW    Continue with the prescription that I sent of Lopressor 50 mg twice daily. Monica Paris will be reevaluated in 4 weeks. Kaveh Bri is on an anticoagulant.

## 2021-12-09 PROBLEM — I48.91 ATRIAL FIBRILLATION WITH RVR (HCC): Status: ACTIVE | Noted: 2021-01-01

## 2021-12-09 NOTE — HOME HEALTH
SOC visit for 86 year old female post hospitalization for afib RVR. Pt lives at [facility] and son is present for visit. Pt only has 4 medications in her home out of lengthy list. Son is present for visit and asked him if he can go  the remainder of her meds so she can start taking them. He is leaving at this time to go there. Home care services discussed with patient and she voices understanding of all information discussed. Plan of care established with patient. Proceeded with SOC assessment.

## 2021-12-10 PROBLEM — R79.89 ELEVATED LIVER FUNCTION TESTS: Status: ACTIVE | Noted: 2021-01-01

## 2021-12-10 NOTE — CONSULTS
[facility]  Cardiology Consult      Requesting MD:  Cornell Arellano, *   Admit Status:  Inpatient [101]       History obtained from:   ? Patient  ? Other (specify):     PROBLEM LIST:    Patient Active Problem List    Diagnosis Date Noted    Elevated liver function tests 12/10/2021     Priority: Low    [facility] 12/09/2021     Priority: Low    Osteopenia of multiple sites 07/29/2021     Priority: Low    Hyperparathyroidism ([facility] 06/25/2021     Priority: Low    HFrEF (heart failure with reduced ejection fraction) ([facility] 06/25/2021     Priority: Low    Lower extremity edema 06/14/2021     Priority: Low    Poor historian 03/12/2021     Priority: Low    Squamous cell carcinoma, leg 05/29/2020     Priority: Low    Pulmonary hypertension ([facility] 01/24/2020     Priority: Low    Chest discomfort      Priority: Low    Chest pain 05/01/2019     Priority: Low    Paroxysmal A-fib ([facility] 05/01/2019     Priority: Low    Elevated brain natriuretic peptide (BNP) level 04/19/2019     Priority: Low    Hypothyroidism 04/19/2019     Priority: Low    Renal cyst 04/19/2019     Priority: Low    Shortness of breath 08/24/2018     Priority: Low    Anxiety and depression 08/24/2018     Priority: Low    Glaucoma 07/10/2016     Priority: Low    Hypertension 07/09/2016     Priority: Low    Hyperlipemia 07/09/2016     Priority: Low    GERD (gastroesophageal reflux disease) 07/09/2016     Priority: Low    Nausea 07/09/2016     Priority: Low     1. Paroxysmal atrial fibrillation, on Eliquis, ejection fraction 45 to 50%, moderate to severe pulmonary hypertension. 2.  Early dementia with memory loss.   3.  Abnormal liver function studies    PRESENTATION: Kris Medellin is a 82y.o. year old female who presents with increasing leg swelling with shortness of breath that has progressed over the last week associated with generalized deterioration with decreased responsiveness and lethargy. No fever reported. Noted to be in atrial fibrillation with RVR with rates at 150 bpm.  Initiated on IV amiodarone with conversion to sinus rhythm. LFTs noted to be significantly elevated. Chest x-ray with elevated left hemidiaphragm without other significant change. Ultrasound abdomen shows fatty liver. Negative hepatitis profile  Troponin serially negative. BNP not available. REVIEW OF SYSTEMS:  Review of Systems   Unable to perform ROS: Mental status change       Past Medical History:      Diagnosis Date    Arthritis     Atrial fibrillation ([address]     Elevated TSH 4/19/2019    GERD (gastroesophageal reflux disease)     Hyperlipidemia     Hypertension     Tremor     to right hand       Past Surgical History:      Procedure Laterality Date    CHOLECYSTECTOMY      HUMERUS FRACTURE SURGERY Right 7/9/2016    REVERSE TOTAL SHOULDER ARTHROPLASTY performed by Christiano Bates MD at [facility] OR       Allergies:  Cardizem [diltiazem hcl] and Morphine    Past Social History:  Social History     Socioeconomic History    Marital status:      Spouse name: Not on file    Number of children: Not on file    Years of education: Not on file    Highest education level: Not on file   Occupational History    Not on file   Tobacco Use    Smoking status: Never Smoker    Smokeless tobacco: Never Used   Vaping Use    Vaping Use: Never used   Substance and Sexual Activity    Alcohol use: No    Drug use: No    Sexual activity: Not Currently   Other Topics Concern    Not on file   Social History Narrative    Not on file     Social Determinants of Health     Financial Resource Strain: Low Risk     Difficulty of Paying Living Expenses: Not hard at all   Food Insecurity: No Food Insecurity    Worried About [address] in the Last Year: Never true    [address] in the Last Year: Never true   Transportation Needs:     Lack of Transportation (Medical):  Not on file    Lack of Transportation (Non-Medical): Not on file   Physical Activity:     Days of Exercise per Week: Not on file    Minutes of Exercise per Session: Not on file   Stress:     Feeling of Stress : Not on file   Social Connections:     Frequency of Communication with Friends and Family: Not on file    Frequency of Social Gatherings with Friends and Family: Not on file    Attends Protestant Services: Not on file    Active Member of [address] [place] [address] or Organizations: Not on file    Attends Club or Organization Meetings: Not on file    Marital Status: Not on file   Intimate Partner Violence:     Fear of Current or Ex-Partner: Not on file    Emotionally Abused: Not on file    Physically Abused: Not on file    Sexually Abused: Not on file   Housing Stability:     Unable to Pay for Housing in the Last Year: Not on file    Number of [place] in the Last Year: Not on file    Unstable Housing in the Last Year: Not on file       Family History:       Problem Relation Age of Onset    Heart Attack Mother     Stroke Father     Heart Attack Father     Heart Disease Sister     Atrial Fibrillation Sister        Home Meds:  Prior to Admission medications    Medication Sig Start Date End Date Taking?  Authorizing Provider   apixaban (ELIQUIS) 5 MG TABS tablet Take 5 mg by mouth 2 times daily   Yes Historical Provider, MD   furosemide (LASIX) 20 MG tablet Take 20 mg by mouth daily   Yes Historical Provider, MD   lisinopril (PRINIVIL;ZESTRIL) 20 MG tablet Take 20 mg by mouth 2 times daily   Yes Historical Provider, MD   metoprolol tartrate (LOPRESSOR) 50 MG tablet Take 1 tablet by mouth 2 times daily 12/1/21  Yes NURIS Chavarria NP   vitamin D (ERGOCALCIFEROL) 1.25 MG (55251 UT) CAPS capsule TAKE 1 CAPSULE BY MOUTH ONE TIME PER WEEK 11/24/21  Yes Marylen Neighbors, PA   atorvastatin (LIPITOR) 40 MG tablet TAKE 1 TABLET DAILY 9/9/21  Yes Leticia Zambrano MD   escitalopram (LEXAPRO) 20 MG tablet TAKE 1 TABLET BY MOUTH EVERY DAY 4/5/21  Yes Rossy Clark MD   omeprazole (PRILOSEC) 40 MG delayed release capsule TAKE 1 CAPSULE DAILY 2/1/21  Yes Rossy Clark MD   aspirin 81 MG chewable tablet Take 1 tablet by mouth daily 5/4/19  Yes Tez Zambrano MD   melatonin 3 MG TABS tablet Take 3 mg by mouth daily    Historical Provider, MD BARON 0.01 % SOLN ophthalmic drops INSTILL 1 DROP INTO BOTH EYES EVERY EVENING AS DIRECTED 5/30/21   Historical Provider, MD   dorzolamide-timolol (COSOPT) 22.3-6.8 MG/ML ophthalmic solution INSTILL 1 DROP INTO BOTH EYES TWICE A DAY AS DIRECTED 4/11/18   Historical Provider, MD       Current Meds:   bumetanide  2 mg IntraVENous BID    sotalol  80 mg Oral BID    apixaban  5 mg Oral BID    aspirin  81 mg Oral Daily    escitalopram  20 mg Oral Daily    melatonin  3 mg Oral Daily    metoprolol tartrate  50 mg Oral BID    sodium chloride flush  5-40 mL IntraVENous 2 times per day       Current Infused Meds:   sodium chloride         Physical Exam:  Vitals:    12/10/21 1317   BP: (!) 153/79   Pulse: 80   Resp: 20   Temp: 98.8 °F (37.1 °C)   SpO2: 98%     No intake or output data in the 24 hours ending 12/10/21 1408  Estimated body mass index is 27.44 kg/m² as calculated from the following:    Height as of this encounter: 5' 6\" (1.676 m). Weight as of this encounter: 170 lb (77.1 kg). Physical Exam  Constitutional:       General: She is not in acute distress. Appearance: She is not diaphoretic. Comments: Decreased responsiveness but slowly able to converse to direct questions. Reports her neck is aching. HENT:      Mouth/Throat:      Pharynx: No oropharyngeal exudate. Eyes:      General: No scleral icterus. Right eye: No discharge. Left eye: No discharge. Neck:      Thyroid: No thyromegaly. Vascular: No JVD. Cardiovascular:      Rate and Rhythm: Normal rate and regular rhythm. No extrasystoles are present.      Heart sounds: Normal heart sounds, S1 normal and S2 normal. No murmur heard. No systolic murmur is present. No diastolic murmur is present. No friction rub. No gallop. No S3 or S4 sounds. Comments: JVP difficult to discern but appears mildly elevated. 1+ pitting edema  No obvious systolic or diastolic murmurs noted  No gallop  Pulmonary:      Effort: Pulmonary effort is normal. No respiratory distress. Breath sounds: Normal breath sounds. No wheezing or rales. Comments: Air entry present in both lung fields  No wheezing noted  Chest:      Chest wall: No tenderness. Abdominal:      General: Bowel sounds are normal. There is no distension. Palpations: Abdomen is soft. There is no mass. Tenderness: There is no abdominal tenderness. There is no guarding or rebound. Hernia: No hernia is present. Comments: Soft, nontender  No palpable organomegaly   Musculoskeletal:         General: Normal range of motion. Skin:     General: Skin is warm. Coloration: Skin is not pale. Findings: No rash. Neurological:      Cranial Nerves: No cranial nerve deficit. Deep Tendon Reflexes: Reflexes normal.           Labs:  Recent Labs     12/09/21 2005   WBC 12.4*   HGB 9.3*   *       Recent Labs     12/09/21  2005 12/10/21  0250    141   K 3.8 4.3   CL 97* 102   CO2 23 24   BUN 26* 28*   CREATININE 0.9 0.8   LABGLOM 60* >60   CALCIUM 9.5 9.1       CK, CKMB, Troponin: @LABRCNT (CKTOTAL:3, CKMB:3, TROPONINI:3)@    Last 3 BNP:          IMAGING:  US LIVER    Result Date: 12/10/2021  US LIVER 12/10/2021 8:20 AM History: Elevated liver function tests. The appearance of the liver is compatible with mild diffuse fatty infiltration. No focal liver lesion or bile duct dilation is seen. The gallbladder is absent. The common bile duct diameter is normal at 3 mm. The pancreas is obscured by bowel gas and cannot be visualized. The visualized portion of the upper aorta is of normal size.  The right kidney shows no abnormality and measures 87 x 48 x 39 mm.    1. Fatty liver. 2. No acute abnormality is seen. Signed by Dr Maxx Escobar    XR CHEST PORTABLE    Result Date: 12/9/2021  EXAMINATION: XR CHEST PORTABLE 12/9/2021 9:24 PM HISTORY: Shortness of breath. Report: A portable upright frontal view the chest was obtained. COMPARISON: Chest x-ray 9/8/2019. There is marked elevation of the left hemidiaphragm which appears stable. Lung volumes are low, this appears similar to the previous chest x-ray. There is shift of the mediastinal structures to the right and mild cardiomegaly is present. There appears be moderate ectasia of the thoracic aorta. No pneumothorax is identified. A small left pleural effusion cannot be excluded. There is previous right shoulder arthroplasty. Stable chest x-ray with no acute findings. There is marked elevation of the left hemidiaphragm, no obvious lung consolidation. There is mediastinal shift to the right as before. No pneumothorax is identified. Moderate thoracic aortic ectasia and mild cardiomegaly. No evidence of CHF. Signed by Dr Jason Friedman. [facility]          Assessment and Plan: This is a 82y.o. year old female with past medical history of paroxysmal atrial fibrillation on Eliquis, mild LV systolic dysfunction with EF 45%, moderate to severe pulmonary hypertension, early dementia with memory loss presenting with progressive leg swelling, shortness of breath, altered mental status and rapid atrial fibrillation. 1.  Currently back in sinus rhythm on IV amiodarone. Noted markedly abnormal liver function studies. Would discontinue IV amiodarone. Will empirically start patient on sotalol 80 mg p.o. twice daily in order to maintain sinus rhythm. Can discontinue Lopressor if rates below 60 bpm.  2.  Obtain proBNP. Would give Lasix 40 mg IV x1 dose. Likely significant pulmonary hypertension. Repeat echocardiogram to assess. 3.  Have spoken to patient's daughters.   They do not wish any aggressive measures. She is DNR status. 4.  I am off this weekend. Dr. Rukhsana Rhodes covering.       Electronically signed by Jacoby Beauchamp MD on 12/10/2021 at 2:08 PM

## 2021-12-10 NOTE — ED NOTES
Pt resting comfortably with no distress noted, resp even and unlabored, skin warm and dry. Pt has no complaints at this time. Pt being held in CDU, due to no availability in unit. Due to staffing shortages, pt has no dedicated nurse at this time. Clinical house was made aware, but adequate staff was unable to be provided. Pt has no complaints at this time, but encouraged use call light if needed.              Patti De Leon RN  12/10/21 [id number]

## 2021-12-10 NOTE — PROGRESS NOTES
Value Units Date/Time       Potassium, Whole Blood [[id number]] Collected: 12/10/21 1417      Updated: 12/10/21 1418      Potassium, Whole Blood 3.9     Blood Gas, Arterial [[id number]] (Abnormal) Collected: 12/10/21 1417     Specimen: Blood gases Updated: 12/10/21 1418      pH, Arterial 7.330      pCO2, Arterial 66.0 mmHg       pO2, Arterial 176.0 mmHg       HCO3, Arterial 34.8 mmol/L       Base Excess, Arterial 7.5 mmol/L       Hemoglobin, Art, Extended 9.1 g/dL       O2 Sat, Arterial 97.2 %       Carboxyhgb, Arterial 2.7 %       Methemoglobin, Arterial 0.5 %       O2 Content, Arterial 12.8 mL/dL       O2 Therapy Unknown     PROBNP, N-TERMINAL [[id number]]      6 L/M NC RR16 RR POS JASMIN

## 2021-12-10 NOTE — H&P
HISTORY AND PHYSICAL             Date: 12/10/2021        Patient Name: Vitor Marquez     YOB: 1935      Age:  82 y.o. Chief Complaint     Chief Complaint   Patient presents with    Respiratory Distress     rapid heartbeat; hx of chf, A fib; sees Md Vanda Dubon      Elderly female comes in accompanied by dtr. She states that she has had increased swelling all over   Weakness  She cannot ambulate as well  She has no energy   Lethargic. History Obtained From   Patient and dtr. History of Present Illness   Patient has a long standing history of afib with rvr and on anticoagulation   She has had increased palpitations recently   Uncontrolled heart rate   She has had increased fluid retention   Feels weak all over   She cannot lie flat she feels like she is choking   No appetite  She does not want to leave her house and go anywhere. Past Medical History     Past Medical History:   Diagnosis Date    Arthritis     Atrial fibrillation ([address]     Elevated TSH 4/19/2019    GERD (gastroesophageal reflux disease)     Hyperlipidemia     Hypertension     Tremor     to right hand        Past Surgical History     Past Surgical History:   Procedure Laterality Date    CHOLECYSTECTOMY      HUMERUS FRACTURE SURGERY Right 7/9/2016    REVERSE TOTAL SHOULDER ARTHROPLASTY performed by Christiano Bates MD at [facility] OR        Medications Prior to Admission     Prior to Admission medications    Medication Sig Start Date End Date Taking?  Authorizing Provider   apixaban (ELIQUIS) 5 MG TABS tablet Take 5 mg by mouth 2 times daily   Yes Historical Provider, MD   furosemide (LASIX) 20 MG tablet Take 20 mg by mouth daily   Yes Historical Provider, MD   lisinopril (PRINIVIL;ZESTRIL) 20 MG tablet Take 20 mg by mouth 2 times daily   Yes Historical Provider, MD   metoprolol tartrate (LOPRESSOR) 50 MG tablet Take 1 tablet by mouth 2 times daily 12/1/21  Yes Remigio Knapp, APRN - NP   vitamin D (ERGOCALCIFEROL) 1.25 MG (17428 UT) CAPS capsule TAKE 1 CAPSULE BY MOUTH ONE TIME PER WEEK 11/24/21  Yes SOLEDAD Blum   atorvastatin (LIPITOR) 40 MG tablet TAKE 1 TABLET DAILY 9/9/21  Yes Corwin Hooper MD   escitalopram (LEXAPRO) 20 MG tablet TAKE 1 TABLET BY MOUTH EVERY DAY 4/5/21  Yes Steff Edwards MD   omeprazole (PRILOSEC) 40 MG delayed release capsule TAKE 1 CAPSULE DAILY 2/1/21  Yes Steff Edwards MD   aspirin 81 MG chewable tablet Take 1 tablet by mouth daily 5/4/19  Yes Edmar Ray MD   melatonin 3 MG TABS tablet Take 3 mg by mouth daily    Historical Provider, MD   LUMIGAN 0.01 % SOLN ophthalmic drops INSTILL 1 DROP INTO BOTH EYES EVERY EVENING AS DIRECTED 5/30/21   Historical Provider, MD   dorzolamide-timolol (COSOPT) 22.3-6.8 MG/ML ophthalmic solution INSTILL 1 DROP INTO BOTH EYES TWICE A DAY AS DIRECTED 4/11/18   Historical Provider, MD        Allergies   Cardizem [diltiazem hcl] and Morphine    Social History     Social History     Tobacco History     Smoking Status  Never Smoker    Smokeless Tobacco Use  Never Used          Alcohol History     Alcohol Use Status  No          Drug Use     Drug Use Status  No          Sexual Activity     Sexually Active  Not Currently                Family History     Family History   Problem Relation Age of Onset    Heart Attack Mother     Stroke Father     Heart Attack Father     Heart Disease Sister     Atrial Fibrillation Sister        Review of Systems   Review of Systems   Constitutional: Positive for activity change and fatigue. HENT: Negative. Eyes: Negative. Respiratory: Positive for cough, choking and shortness of breath. Cardiovascular: Positive for palpitations and leg swelling. Gastrointestinal: Negative. Endocrine: Negative. Genitourinary: Negative. Musculoskeletal: Positive for arthralgias. Allergic/Immunologic: Negative. Neurological: Positive for dizziness. Hematological: Negative.     Psychiatric/Behavioral: Negative. All other systems reviewed and are negative. Physical Exam   BP (!) 149/94   Pulse 88   Temp 98 °F (36.7 °C) (Oral)   Resp (!) 31   Ht 5' 6\" (1.676 m)   Wt 170 lb (77.1 kg)   SpO2 100%   BMI 27.44 kg/m²     Physical Exam  Vitals and nursing note reviewed. Constitutional:       Appearance: She is obese. She is ill-appearing and diaphoretic. HENT:      Head: Normocephalic and atraumatic. Nose: Nose normal.      Mouth/Throat:      Mouth: Mucous membranes are moist.      Pharynx: Oropharynx is clear. Eyes:      Conjunctiva/sclera: Conjunctivae normal.   Cardiovascular:      Rate and Rhythm: Tachycardia present. Rhythm irregular. Pulses: Normal pulses. Heart sounds: Normal heart sounds. Pulmonary:      Breath sounds: Wheezing present. Abdominal:      General: Abdomen is flat. Bowel sounds are normal.      Palpations: Abdomen is soft. Musculoskeletal:      Cervical back: Normal range of motion. Right lower leg: Edema present. Left lower leg: Edema present. Skin:     General: Skin is warm. Neurological:      General: No focal deficit present.          Labs      Recent Results (from the past 24 hour(s))   CBC Auto Differential    Collection Time: 12/09/21  8:05 PM   Result Value Ref Range    WBC 12.4 (H) 4.8 - 10.8 K/uL    RBC 3.47 (L) 4.20 - 5.40 M/uL    Hemoglobin 9.3 (L) 12.0 - 16.0 g/dL    Hematocrit 33.6 (L) 37.0 - 47.0 %    MCV 96.8 81.0 - 99.0 fL    MCH 26.8 (L) 27.0 - 31.0 pg    MCHC 27.7 (L) 33.0 - 37.0 g/dL    RDW 16.7 (H) 11.5 - 14.5 %    Platelets 951 (L) 873 - 400 K/uL    MPV 13.2 (H) 9.4 - 12.3 fL    PLATELET SLIDE REVIEW Decreased     Neutrophils % 74.7 (H) 50.0 - 65.0 %    Lymphocytes % 12.1 (L) 20.0 - 40.0 %    Monocytes % 10.2 (H) 0.0 - 10.0 %    Eosinophils % 1.7 0.0 - 5.0 %    Basophils % 0.9 0.0 - 1.0 %    Neutrophils Absolute 9.3 (H) 1.5 - 7.5 K/uL    Immature Granulocytes # 0.1 K/uL    Lymphocytes Absolute 1.5 1.1 - 4.5 K/uL    Monocytes Absolute 1.30 (H) 0.00 - 0.90 K/uL    Eosinophils Absolute 0.20 0.00 - 0.60 K/uL    Basophils Absolute 0.10 0.00 - 0.20 K/uL    Anisocytosis 1+ (A)     Polychromasia 1+ (A)     Hypochromia 1+ (A)    Comprehensive Metabolic Panel w/ Reflex to MG    Collection Time: 12/09/21  8:05 PM   Result Value Ref Range    Sodium 137 136 - 145 mmol/L    Potassium reflex Magnesium 3.8 3.5 - 5.0 mmol/L    Chloride 97 (L) 98 - 111 mmol/L    CO2 23 22 - 29 mmol/L    Anion Gap 17 7 - 19 mmol/L    Glucose 116 (H) 74 - 109 mg/dL    BUN 26 (H) 8 - 23 mg/dL    CREATININE 0.9 0.5 - 0.9 mg/dL    GFR Non-African American 60 (A) >60    GFR African American >59 >59    Calcium 9.5 8.8 - 10.2 mg/dL    Total Protein 6.8 6.6 - 8.7 g/dL    Albumin 3.6 3.5 - 5.2 g/dL    Total Bilirubin 1.7 (H) 0.2 - 1.2 mg/dL    Alkaline Phosphatase 171 (H) 35 - 104 U/L     (H) 5 - 33 U/L     (H) 5 - 32 U/L   PROTIME-INR    Collection Time: 12/09/21  8:05 PM   Result Value Ref Range    Protime 29.2 (H) 12.0 - 14.6 sec    INR 2.82 (H) 0.88 - 1.18   TROPONIN    Collection Time: 12/09/21  8:05 PM   Result Value Ref Range    Troponin <0.01 0.00 - 0.03 ng/mL   APTT    Collection Time: 12/09/21  8:05 PM   Result Value Ref Range    aPTT 37.1 (H) 26.0 - 36.2 sec   TSH without Reflex    Collection Time: 12/09/21  8:05 PM   Result Value Ref Range    TSH 5.230 (H) 0.270 - 4.200 uIU/mL   COVID-19, Rapid    Collection Time: 12/09/21  9:41 PM    Specimen: Nasopharyngeal Swab   Result Value Ref Range    SARS-CoV-2, NAAT Not Detected Not Detected        Imaging/Diagnostics Last 24 Hours   XR CHEST PORTABLE    Result Date: 12/9/2021  EXAMINATION: XR CHEST PORTABLE 12/9/2021 9:24 PM HISTORY: Shortness of breath. Report: A portable upright frontal view the chest was obtained. COMPARISON: Chest x-ray 9/8/2019. There is marked elevation of the left hemidiaphragm which appears stable. Lung volumes are low, this appears similar to the previous chest x-ray.  There is shift of the mediastinal structures to the right and mild cardiomegaly is present. There appears be moderate ectasia of the thoracic aorta. No pneumothorax is identified. A small left pleural effusion cannot be excluded. There is previous right shoulder arthroplasty. Stable chest x-ray with no acute findings. There is marked elevation of the left hemidiaphragm, no obvious lung consolidation. There is mediastinal shift to the right as before. No pneumothorax is identified. Moderate thoracic aortic ectasia and mild cardiomegaly. No evidence of CHF. Signed by Dr Gely Cunningham. [facility] - [facility] Problems           Last Modified POA    * (Principal) Atrial fibrillation with RVR ([address] 12/10/2021 Yes    Hypertension 12/10/2021 Yes    Hyperlipemia 12/10/2021 Yes    Shortness of breath 12/10/2021 Yes    Anxiety and depression 12/10/2021 Yes    Elevated brain natriuretic peptide (BNP) level 12/10/2021 Yes    Hypothyroidism 12/10/2021 Yes    Paroxysmal A-fib ([address] 12/10/2021 Yes    Pulmonary hypertension ([address] 12/10/2021 Yes    Lower extremity edema 12/10/2021 Yes    HFrEF (heart failure with reduced ejection fraction) ([address] 12/10/2021 Yes    Osteopenia of multiple sites 12/10/2021 Yes    Elevated liver function tests 12/10/2021 Yes          Plan   1. Being admitted for increased swelling all over and inability to treat this with oral meds. History of congestive heart failure systolic heart failure, acute on chronic exacerbation . Admit and diurese and monitor response to therapy. Will obtain echo and consult cardiology to help with management     2.  afib with rvr and rate controlled on amiodarone and she has gone back into sinus rhyhtm   And is on eliquis as well   And rate controlled with beta blockers     3. Poor functional status    4. Elevated liver tests ? Due to cardiac sx of chf leading to vascular liver congestion   Will get liver ultrasound   Check hepatitis panel and check follow up labs    5.   Code status discussed dnr cc     6. Supportive care     Discussed with patient and dtr.      Consultations Ordered:  IP CONSULT TO CARDIOLOGY    Electronically signed by Karine Garcia MD on 12/9/21 at 11:03 PM CST

## 2021-12-10 NOTE — PROGRESS NOTES
Progress Note  Date:12/10/2021       Room:0709/709-02  Patient Zully Garces     Date of Birth:[birth year]     Age:86 y.o. Subjective    Subjective: 51-year-old lady with a history of atrial fibrillation, GERD, hypertension, hyperlipidemia, arthritis, presented to the hospital  with concerns of respiratory distress. Patient stated she is been feeling increased palpitation lately as well as feeling overall weak. Also noted to be retaining fluids. In the emergency room was in rapid ventricular rhythm, initiated on amiodarone drip, and admitted for further evaluation. Review of Systems: 12 point system review negative suggested above. Objective         Vitals Last 24 Hours:  TEMPERATURE:  Temp  Av.8 °F (36.6 °C)  Min: 96.4 °F (35.8 °C)  Max: 98.8 °F (37.1 °C)  RESPIRATIONS RANGE: Resp  Av.9  Min: 16  Max: 31  PULSE OXIMETRY RANGE: SpO2  Av.3 %  Min: 94 %  Max: 100 %  PULSE RANGE: Pulse  Av.1  Min: 73  Max: 150  BLOOD PRESSURE RANGE: Systolic (54VEB), ARQ:305 , Min:93 , BRZ:249   ; Diastolic (20HCT), XGB:75, Min:61, Max:108    I/O (24Hr): Intake/Output Summary (Last 24 hours) at 12/10/2021 1725  Last data filed at 12/10/2021 1703  Gross per 24 hour   Intake    Output 500 ml   Net -500 ml       Physical Examination:  General: Ill-appearing, in no acute distress, sleepy but arousable. HEENT: Atraumatic normocephalic, range of motion normal, no tracheal deviation noted. Cardiac: Normal S1-S2 with irregular irregular reading. Respiratory: Adequate air entry bilaterally with some wheezing. Abdomen: Soft, positive bowel sounds in all quadrants, no distention, nontender to palpation.   Extremities: no tenderness, no edema, moves all extremities  Psych: Affect normal and good eye contact, behavioral normal.        Labs/Imaging/Diagnostics    Labs:  CBC:  Recent Labs     21   WBC 12.4*   RBC 3.47*   HGB 9.3*   HCT 33.6*   MCV 96.8   RDW 16.7*   * CHEMISTRIES:  Recent Labs     12/09/21  2005 12/10/21  0250 12/10/21  1417    141  --    K 3.8 4.3 3.9   CL 97* 102  --    CO2 23 24  --    BUN 26* 28*  --    CREATININE 0.9 0.8  --    GLUCOSE 116* 127*  --      PT/INR:  Recent Labs     12/09/21 2005   PROTIME 29.2*   INR 2.82*     APTT:  Recent Labs     12/09/21 2005   APTT 37.1*     LIVER PROFILE:  Recent Labs     12/09/21  2005 12/10/21  0250   * 794*   * 868*   BILITOT 1.7* 1.6*   ALKPHOS 171* 172*       Imaging Last 24 Hours:  US LIVER    Result Date: 12/10/2021  US LIVER 12/10/2021 8:20 AM History: Elevated liver function tests. The appearance of the liver is compatible with mild diffuse fatty infiltration. No focal liver lesion or bile duct dilation is seen. The gallbladder is absent. The common bile duct diameter is normal at 3 mm. The pancreas is obscured by bowel gas and cannot be visualized. The visualized portion of the upper aorta is of normal size. The right kidney shows no abnormality and measures 87 x 48 x 39 mm.    1. Fatty liver. 2. No acute abnormality is seen. Signed by Dr Niki Wade    XR CHEST PORTABLE    Result Date: 12/9/2021  EXAMINATION: XR CHEST PORTABLE 12/9/2021 9:24 PM HISTORY: Shortness of breath. Report: A portable upright frontal view the chest was obtained. COMPARISON: Chest x-ray 9/8/2019. There is marked elevation of the left hemidiaphragm which appears stable. Lung volumes are low, this appears similar to the previous chest x-ray. There is shift of the mediastinal structures to the right and mild cardiomegaly is present. There appears be moderate ectasia of the thoracic aorta. No pneumothorax is identified. A small left pleural effusion cannot be excluded. There is previous right shoulder arthroplasty. Stable chest x-ray with no acute findings. There is marked elevation of the left hemidiaphragm, no obvious lung consolidation. There is mediastinal shift to the right as before.  No pneumothorax is identified. Moderate thoracic aortic ectasia and mild cardiomegaly. No evidence of CHF. Signed by Dr Kings Hair. [place]    Assessment//Plan           Hospital Problems           Last Modified POA    * (Principal) Atrial fibrillation with RVR ([address] 12/10/2021 Yes    Hypertension 12/10/2021 Yes    Hyperlipemia 12/10/2021 Yes    Shortness of breath 12/10/2021 Yes    Anxiety and depression 12/10/2021 Yes    Elevated brain natriuretic peptide (BNP) level 12/10/2021 Yes    Hypothyroidism 12/10/2021 Yes    Paroxysmal A-fib ([address] 12/10/2021 Yes    Pulmonary hypertension ([address] 12/10/2021 Yes    Lower extremity edema 12/10/2021 Yes    HFrEF (heart failure with reduced ejection fraction) ([address] 12/10/2021 Yes    Osteopenia of multiple sites 12/10/2021 Yes    Elevated liver function tests 12/10/2021 Yes        Assessment & Plan    Atrial fibrillation with RVR  Acute on chronic heart failure exacerbation moderate to severe pulmonary hypertension  Dementia  Hypertension  Hyperlipidemia  Anxiety and depression  Hypothyroidism  Transaminitis      Plan:  Converted back to sinus rhythm on IV amiodarone, amiodarone discontinued at this time. Seen by cardiology and initiated on sotalol. Discontinue Lopressor if rates below 60. Echocardiogram pending  Continue Eliquis  Bumex 2 mg twice daily, monitor electrolytes while repeating. Continue other chronic medication as ordered.           Electronically signed by Aga Perez MD on 12/10/21 at 5:25 PM CST

## 2021-12-10 NOTE — ED PROVIDER NOTES
[facility]  EMERGENCY DEPARTMENT ENCOUNTER      Pt Name: Cresencio Carolina  MRN: [id number]  [place] 1935  Date of evaluation: 12/9/2021  Provider: Marium Chinchilla MD    CHIEF COMPLAINT       Chief Complaint   Patient presents with    Respiratory Distress     rapid heartbeat; hx of chf, A fib; sees Md Jolene Banuelos         HISTORY OF PRESENT ILLNESS   (Location/Symptom, Timing/Onset,Context/Setting, Quality, Duration, Modifying Factors, Severity)  Note limiting factors. Cresencio Carolina is a 82 y.o. female who presents to the emergency department with complaint of shortness of breath is gradually been worsening over the last 1 to 2 weeks. Patient has a history of CHF as well as atrial fibrillation. Denies any chest pain recently. Per medical records, patient is on Eliquis and metoprolol along with 20 mg daily Lasix. In route with EMS, heart rate noted to be significantly elevated around 150 with atrial fibrillation. Patient was on nasal cannula for hypoxia with no history of chronic oxygen requirement    HPI    NursingNotes were reviewed. REVIEW OF SYSTEMS    (2-9 systems for level 4, 10 or more for level 5)     Review of Systems   Constitutional: Positive for fatigue. Negative for fever. HENT: Negative for congestion, rhinorrhea and voice change. Eyes: Negative for pain and redness. Respiratory: Positive for shortness of breath. Negative for cough. Cardiovascular: Negative for chest pain. Gastrointestinal: Negative for abdominal pain, diarrhea and vomiting. Endocrine: Negative. Genitourinary: Negative. Musculoskeletal: Negative for arthralgias and gait problem. Skin: Negative for rash and wound. Neurological: Positive for weakness. Negative for headaches. Hematological: Negative. Psychiatric/Behavioral: Negative. All other systems reviewed and are negative. A complete review of systems was performed and is negative except as noted above in the HPI.        PAST MEDICAL HISTORY     Past Medical History:   Diagnosis Date    Arthritis     Atrial fibrillation ([facility] [address]     Elevated TSH 4/19/2019    GERD (gastroesophageal reflux disease)     Hyperlipidemia     Hypertension     Tremor     to right hand         SURGICAL HISTORY       Past Surgical History:   Procedure Laterality Date    CHOLECYSTECTOMY      HUMERUS FRACTURE SURGERY Right 7/9/2016    REVERSE TOTAL SHOULDER ARTHROPLASTY performed by Clinton Graff MD at [address] [facility]       Previous Medications    APIXABAN (ELIQUIS) 5 MG TABS TABLET    Take 5 mg by mouth 2 times daily    ASPIRIN 81 MG CHEWABLE TABLET    Take 1 tablet by mouth daily    ATORVASTATIN (LIPITOR) 40 MG TABLET    TAKE 1 TABLET DAILY    DORZOLAMIDE-TIMOLOL (COSOPT) 22.3-6.8 MG/ML OPHTHALMIC SOLUTION    INSTILL 1 DROP INTO BOTH EYES TWICE A DAY AS DIRECTED    ESCITALOPRAM (LEXAPRO) 20 MG TABLET    TAKE 1 TABLET BY MOUTH EVERY DAY    FUROSEMIDE (LASIX) 20 MG TABLET    Take 20 mg by mouth daily    LISINOPRIL (PRINIVIL;ZESTRIL) 20 MG TABLET    Take 20 mg by mouth 2 times daily    LUMIGAN 0.01 % SOLN OPHTHALMIC DROPS    INSTILL 1 DROP INTO BOTH EYES EVERY EVENING AS DIRECTED    MELATONIN 3 MG TABS TABLET    Take 3 mg by mouth daily    METOPROLOL TARTRATE (LOPRESSOR) 50 MG TABLET    Take 1 tablet by mouth 2 times daily    OMEPRAZOLE (PRILOSEC) 40 MG DELAYED RELEASE CAPSULE    TAKE 1 CAPSULE DAILY    VITAMIN D (ERGOCALCIFEROL) 1.25 MG (78520 UT) CAPS CAPSULE    TAKE 1 CAPSULE BY MOUTH ONE TIME PER WEEK       ALLERGIES     Cardizem [diltiazem hcl] and Morphine    FAMILY HISTORY       Family History   Problem Relation Age of Onset    Heart Attack Mother     Stroke Father     Heart Attack Father     Heart Disease Sister     Atrial Fibrillation Sister           SOCIAL HISTORY       Social History     Socioeconomic History    Marital status:       Spouse name: Not on file    Number of children: Not on file    Years of education: Not on file    Highest education level: Not on file   Occupational History    Not on file   Tobacco Use    Smoking status: Never Smoker    Smokeless tobacco: Never Used   Vaping Use    Vaping Use: Never used   Substance and Sexual Activity    Alcohol use: No    Drug use: No    Sexual activity: Not Currently   Other Topics Concern    Not on file   Social History Narrative    Not on file     Social Determinants of Health     Financial Resource Strain: Low Risk     Difficulty of Paying Living Expenses: Not hard at all   Food Insecurity: No Food Insecurity    Worried About [address] in the Last Year: Never true    [facility] in the Last Year: Never true   Transportation Needs:     Lack of Transportation (Medical): Not on file    Lack of Transportation (Non-Medical):  Not on file   Physical Activity:     Days of Exercise per Week: Not on file    Minutes of Exercise per Session: Not on file   Stress:     Feeling of Stress : Not on file   Social Connections:     Frequency of Communication with Friends and Family: Not on file    Frequency of Social Gatherings with Friends and Family: Not on file    Attends Sabianism Services: Not on file    Active Member of [address] [place] [address] or Organizations: Not on file    Attends Club or Organization Meetings: Not on file    Marital Status: Not on file   Intimate Partner Violence:     Fear of Current or Ex-Partner: Not on file    Emotionally Abused: Not on file    Physically Abused: Not on file    Sexually Abused: Not on file   Housing Stability:     Unable to Pay for Housing in the Last Year: Not on file    Number of [place] in the Last Year: Not on file    Unstable Housing in the Last Year: Not on file       SCREENINGS    Zac Coma Scale  Eye Opening: Spontaneous  Best Verbal Response: Oriented  Best Motor Response: Obeys commands  Muse Coma Scale Score: 15        PHYSICAL EXAM    (up to 7 for level 4, 8 or more for level 5)     ED Triage Vitals [12/09/21 1920]   BP Temp Temp Source Pulse Resp SpO2 Height Weight   129/88 98 °F (36.7 °C) Oral 150 22 94 % 5' 6\" (1.676 m) 170 lb (77.1 kg)       Physical Exam  Vitals and nursing note reviewed. Constitutional:       General: She is not in acute distress. Appearance: She is well-developed. She is not diaphoretic. HENT:      Head: Normocephalic and atraumatic. Eyes:      General: No scleral icterus. Neck:      Vascular: No JVD. Cardiovascular:      Rate and Rhythm: Tachycardia present. Rhythm irregular. Pulses:           Radial pulses are 2+ on the right side and 2+ on the left side. Dorsalis pedis pulses are 2+ on the right side and 2+ on the left side. Heart sounds: Normal heart sounds. No murmur heard. No friction rub. No gallop. Pulmonary:      Effort: Pulmonary effort is normal. No accessory muscle usage or respiratory distress. Breath sounds: Normal breath sounds. No stridor. No decreased breath sounds, wheezing, rhonchi or rales. Chest:      Chest wall: No tenderness. Abdominal:      General: There is no distension. Palpations: Abdomen is soft. Tenderness: There is no abdominal tenderness. There is no guarding or rebound. Musculoskeletal:         General: No deformity. Normal range of motion. Right lower leg: No edema. Left lower leg: No edema. Skin:     General: Skin is warm and dry. Coloration: Skin is not pale. Findings: No erythema. Neurological:      Mental Status: She is alert and oriented to person, place, and time. GCS: GCS eye subscore is 4. GCS verbal subscore is 5. GCS motor subscore is 6. Cranial Nerves: No cranial nerve deficit. Motor: No abnormal muscle tone.       Coordination: Coordination normal.   Psychiatric:         Behavior: Behavior normal.         Judgment: Judgment normal.         DIAGNOSTIC RESULTS     EKG: All EKG's are interpreted by the Emergency Department Physician who either signs or Co-signs this chart in the absence of a cardiologist.        RADIOLOGY:   Non-plain film images such as CT, Ultrasound and MRI are read by the radiologist. Plainradiographic images are visualized and preliminarily interpreted by the emergency physician with the below findings:      Interpretation per the Radiologist below, if available at the time of this note:    XR CHEST PORTABLE   Final Result   Stable chest x-ray with no acute findings. There is marked   elevation of the left hemidiaphragm, no obvious lung consolidation. There is mediastinal shift to the right as before. No pneumothorax is   identified. Moderate thoracic aortic ectasia and mild cardiomegaly. No   evidence of CHF. Signed by Dr Kristie Fontana.  [place]      US LIVER    (Results Pending)         ED BEDSIDE ULTRASOUND:   Performed by ED Physician - none    LABS:  Labs Reviewed   CBC WITH AUTO DIFFERENTIAL - Abnormal; Notable for the following components:       Result Value    WBC 12.4 (*)     RBC 3.47 (*)     Hemoglobin 9.3 (*)     Hematocrit 33.6 (*)     MCH 26.8 (*)     MCHC 27.7 (*)     RDW 16.7 (*)     Platelets 669 (*)     MPV 13.2 (*)     Neutrophils % 74.7 (*)     Lymphocytes % 12.1 (*)     Monocytes % 10.2 (*)     Neutrophils Absolute 9.3 (*)     Monocytes Absolute 1.30 (*)     All other components within normal limits   COMPREHENSIVE METABOLIC PANEL W/ REFLEX TO MG FOR LOW K - Abnormal; Notable for the following components:    Chloride 97 (*)     Glucose 116 (*)     BUN 26 (*)     GFR Non- 60 (*)     Total Bilirubin 1.7 (*)     Alkaline Phosphatase 171 (*)      (*)      (*)     All other components within normal limits   PROTIME-INR - Abnormal; Notable for the following components:    Protime 29.2 (*)     INR 2.82 (*)     All other components within normal limits   APTT - Abnormal; Notable for the following components:    aPTT 37.1 (*)     All other components within normal limits   COVID-19, RAPID TROPONIN   HEPATITIS PANEL, ACUTE       All other labs were within normal range or not returned as of this dictation. Medications   amiodarone (CORDARONE) 150 mg in dextrose 5 % 100 mL bolus (0 mg IntraVENous Stopped 12/9/21 2232)     Followed by   amiodarone (CORDARONE) 450 mg in dextrose 5 % 250 mL infusion (1 mg/min IntraVENous New Bag 12/9/21 2232)     Followed by   amiodarone (CORDARONE) 450 mg in dextrose 5 % 250 mL infusion (has no administration in time range)   metoprolol (LOPRESSOR) injection 5 mg (5 mg IntraVENous Given 12/9/21 2009)   metoprolol (LOPRESSOR) injection 5 mg (5 mg IntraVENous Given 12/9/21 2046)   digoxin (LANOXIN) injection 125 mcg (125 mcg IntraVENous Given 12/9/21 2144)       EMERGENCY DEPARTMENT COURSE and DIFFERENTIALDIAGNOSIS/MDM:   Vitals:    Vitals:    12/09/21 2131 12/09/21 2146 12/09/21 2231 12/09/21 2240   BP: 119/89  93/72    Pulse:  137 135 110   Resp:       Temp:       TempSrc:       SpO2:       Weight:       Height:           MDM    EKG shows atrial fibrillation with a ventricular rate of 150. Low voltage diffusely. Q waves present in lead III and aVF. Lateral T wave flattening. ED Course as of 12/09/21 2316   Thu Dec 09, 2021   2140 Heart rate still elevated in the 130s 150 after to push doses of IV metoprolol. Blood pressures with systolics in the 30H at this time. IV digoxin ordered given patient's borderline hemodynamic instability with continued atrial fibrillation with RVR. [SOL]      ED Course User Index  [SOL] Ashli Beasley MD     Amiodarone infusion ordered for rate and rhythm control. Patient has on appropriate anticoagulation. Amiodarone hopefully will be less likely to cause hypotension and esmolol or other beta-blocker infusion. Based on the evaluation and work-up here patient is felt to require further monitoring, work-up, or treatment that is available in the emergency department.   Case was discussed with hospitalist who agrees for observation or admission for further management. Treatment and stabilization as necessary were provided in the emergency department prior to transfer of care to the medicine service. CONSULTS:  IP CONSULT TO CARDIOLOGY    PROCEDURES:  Unless otherwise notedbelow, none     Procedures    CRITICAL CARE TIME   Total Critical Care time was 45 minutes, excluding separately reportable procedures. There was a high probability of clinically significant/life threatening deterioration in the patient's condition which required my urgent intervention. FINAL IMPRESSION     1. Atrial fibrillation with RVR ([facility] [address]    2. Acute respiratory failure with hypoxia (HCC)    3. Chronic combined systolic and diastolic CHF (congestive heart failure) ([facility] [address]    4. On continuous oral anticoagulation          DISPOSITION/PLAN   DISPOSITION Admitted 12/09/2021 11:02:58 PM      PATIENT REFERRED TO:  No follow-up provider specified.     DISCHARGE MEDICATIONS:  New Prescriptions    No medications on file          (Please note that portions of this note were completed with a voice recognition program.  Efforts were made to edit the dictations butoccasionally words are mis-transcribed.)    Louisa Fox MD (electronically signed)  AttendingEmerMercy Hospital Booneville Physician          Louisa Garcia MD  12/09/21 8417       Louisa Garcia MD  12/09/21 6860

## 2021-12-11 PROBLEM — I48.91 ATRIAL FIBRILLATION WITH RVR (HCC): Status: RESOLVED | Noted: 2021-01-01 | Resolved: 2021-01-01

## 2021-12-11 PROBLEM — E87.6 HYPOKALEMIA: Status: ACTIVE | Noted: 2021-01-01

## 2021-12-11 PROBLEM — R53.1 GENERALIZED WEAKNESS: Status: ACTIVE | Noted: 2021-01-01

## 2021-12-11 PROBLEM — Z91.81 AT HIGH RISK FOR FALLS: Status: ACTIVE | Noted: 2021-01-01

## 2021-12-11 PROBLEM — R26.2 IMPAIRED AMBULATION: Status: ACTIVE | Noted: 2021-01-01

## 2021-12-11 NOTE — PROGRESS NOTES
Progress Note  Date:2021       Room:0709/709-02  Patient Dima Dunn     Date of Birth:[birth year]     Age:86 y.o. Subjective    Patient seen and evaluated at bedside. She still feels weak and tired. Family report some issues with her swallowing. Cumulative Hospital course:      2021  Patient seen and evaluated by cardiologist and IV amiodarone discontinued as patient had elevated LFTs. Patient has been started on sotalol. Patient being followed closely by cardiology. PT/OT evaluation ordered. Speech therapy evaluation ordered as well.      12/10/2021  82year-old lady with a history of atrial fibrillation, GERD, hypertension, hyperlipidemia, arthritis, presented to the hospital  with concerns of respiratory distress. Patient stated she is been feeling increased palpitation lately as well as feeling overall weak. Also noted to be retaining fluids. In the emergency room was in rapid ventricular rhythm, initiated on amiodarone drip, and admitted for further evaluation. 2021  History of Present Illness   Patient has a long standing history of afib with rvr and on anticoagulation   She has had increased palpitations recently   Uncontrolled heart rate   She has had increased fluid retention   Feels weak all over   She cannot lie flat she feels like she is choking   No appetite  She does not want to leave her house and go anywhere. Review of Systems: 12 point system review negative except as suggested above. Objective         Vitals Last 24 Hours:  TEMPERATURE:  Temp  Av.6 °F (35.9 °C)  Min: 96.3 °F (35.7 °C)  Max: 97 °F (36.1 °C)  RESPIRATIONS RANGE: Resp  Av  Min: 20  Max: 20  PULSE OXIMETRY RANGE: SpO2  Av %  Min: 97 %  Max: 100 %  PULSE RANGE: Pulse  Av  Min: 73  Max: 113  BLOOD PRESSURE RANGE: Systolic (89PQY), SQO:949 , Min:101 , LRY:610   ; Diastolic (25IMS), MLH:46, Min:77, Max:108    I/O (24Hr):     Intake/Output Summary (Last 24 hours) at 12/11/2021 1615  Last data filed at 12/11/2021 0123  Gross per 24 hour   Intake 240 ml   Output 1975 ml   Net -1735 ml       PHYSICAL  EXAMINATION:    LYNNETTE:  Awake, alert, oriented x 3, patient appears tired and fatigued   Head/Eyes:  Normocephalic, atraumatic, EOMI and PERRLA bilaterally   Respiratory:   Bilateral decreased air entry in both lung fields, mild B/L crackles, symmetric expansion of chest   Cardiovascular:   Irregularly irregular, S1+S2+0   Abdomen:   Soft, non-tender, bowel sounds +ve, no organomegaly   Extremities: Moves all, decreased range of motion, no edema   Neurologic: Awake, alert, oriented x 3, cranial nerves II-XII intact, no focal neurological deficits, sensory system intact   Psychiatric: Normal mood, non-suicidal       Labs/Imaging/Diagnostics    Labs:  CBC:  Recent Labs     12/09/21 2005 12/11/21  1347   WBC 12.4* 9.3   RBC 3.47* 3.39*   HGB 9.3* 9.2*   HCT 33.6* 32.9*   MCV 96.8 97.1   RDW 16.7* 17.0*   * 98*     CHEMISTRIES:  Recent Labs     12/09/21  2005 12/10/21  0250 12/10/21  1417 12/11/21  1347    141  --  137   K 3.8 4.3 3.9 3.3*   CL 97* 102  --  94*   CO2 23 24  --  33*   BUN 26* 28*  --  27*   CREATININE 0.9 0.8  --  0.9   GLUCOSE 116* 127*  --  120*   PHOS  --   --   --  2.9   MG  --   --   --  1.9     PT/INR:  Recent Labs     12/09/21 2005   PROTIME 29.2*   INR 2.82*     APTT:  Recent Labs     12/09/21 2005   APTT 37.1*     LIVER PROFILE:  Recent Labs     12/09/21  2005 12/10/21  0250   * 794*   * 868*   BILITOT 1.7* 1.6*   ALKPHOS 171* 172*       Imaging Last 24 Hours:  US LIVER    Result Date: 12/10/2021  US LIVER 12/10/2021 8:20 AM History: Elevated liver function tests. The appearance of the liver is compatible with mild diffuse fatty infiltration. No focal liver lesion or bile duct dilation is seen. The gallbladder is absent. The common bile duct diameter is normal at 3 mm.  The pancreas is obscured by bowel gas and cannot be visualized. The visualized portion of the upper aorta is of normal size. The right kidney shows no abnormality and measures 87 x 48 x 39 mm.    1. Fatty liver. 2. No acute abnormality is seen. Signed by Dr Randy Cook    XR CHEST PORTABLE    Result Date: 12/9/2021  EXAMINATION: XR CHEST PORTABLE 12/9/2021 9:24 PM HISTORY: Shortness of breath. Report: A portable upright frontal view the chest was obtained. COMPARISON: Chest x-ray 9/8/2019. There is marked elevation of the left hemidiaphragm which appears stable. Lung volumes are low, this appears similar to the previous chest x-ray. There is shift of the mediastinal structures to the right and mild cardiomegaly is present. There appears be moderate ectasia of the thoracic aorta. No pneumothorax is identified. A small left pleural effusion cannot be excluded. There is previous right shoulder arthroplasty. Stable chest x-ray with no acute findings. There is marked elevation of the left hemidiaphragm, no obvious lung consolidation. There is mediastinal shift to the right as before. No pneumothorax is identified. Moderate thoracic aortic ectasia and mild cardiomegaly. No evidence of CHF. Signed by Dr Tomás Maria.  [place]    Assessment//Plan           Hospital Problems           Last Modified POA    Hypertension 12/10/2021 Yes    Hyperlipemia 12/10/2021 Yes    Shortness of breath 12/10/2021 Yes    Anxiety and depression 12/10/2021 Yes    Elevated brain natriuretic peptide (BNP) level 12/10/2021 Yes    Hypothyroidism 12/10/2021 Yes    Paroxysmal A-fib ([address] 12/10/2021 Yes    Pulmonary hypertension ([address] 12/10/2021 Yes    Lower extremity edema 12/10/2021 Yes    HFrEF (heart failure with reduced ejection fraction) ([address] 12/10/2021 Yes    Osteopenia of multiple sites 12/10/2021 Yes    Elevated liver function tests 12/10/2021 Yes    Generalized weakness 12/11/2021 Yes    Impaired ambulation 12/11/2021 Yes    At high risk for falls 12/11/2021 Yes    Hypokalemia 12/11/2021 Yes        Assessment & Plan    Principal Problem (Resolved):    Atrial fibrillation with RVR (HCC)  Active Problems:    Hypertension    Hyperlipemia    Shortness of breath    Anxiety and depression    Elevated brain natriuretic peptide (BNP) level    Hypothyroidism    Paroxysmal A-fib (HCC)    Pulmonary hypertension (HCC)    Lower extremity edema    HFrEF (heart failure with reduced ejection fraction) (HCC)    Osteopenia of multiple sites    Elevated liver function tests    Generalized weakness    Impaired ambulation    At high risk for falls    Hypokalemia    Plan:    Continue with current medications  Converted back to sinus rhythm on IV amiodarone, which was discontinued while the cardiology due to elevated liver enzymes  Monitor LFTs daily  Seen by cardiology and initiated on sotalol. Discontinue Lopressor if rates below 60.  2D echo done which showed preserved left ventricular function with EF 55 to 60%  Continue Eliquis for anticoagulation  Bumex 2 mg twice daily, monitor electrolytes while repeating  Patient has borderline elevated TSH hence free T4 ordered  Oral potassium replacement ordered  Strict I's and O's  Daily weights  Speech therapy and bedside was requested to evaluate patient for swallowing function  PT/OT evaluation ordered  Case management consult for DC planning    Chronic medical issues . .. Continue with home meds. Monitor patient closely while admitted. Advised very close f/u with patient's PCP as an outpatient to address chronic medical issues. Repeat labs in a.m. Electrolyte replacement as per protocol. Patient will be monitored very closely on the floor. Further recommendations as per the hospital course. DC planning :  To be determined as per the hospital course    Patient  is on chronic anticoagulation which serves as DVT prophylaxis  Current medications reviewed  Lab work reviewed  Radiology/Chest x-ray films reviewed  Treatment recommendations from suspecialities reviewed, appreciated and agreed with  Discussed with the nurse and addressed all questions/concerns  Discussed with Patient and/or Family at the bedside in detail . .. they understand and agree with the management plan.     Electronically signed by:  Edmar Srinivasan MD  12/11/21 4:15 PM

## 2021-12-11 NOTE — PROGRESS NOTES
Received call from telemetry stating that patient converted from Sinus rhythm to A-Fib at 22:50 with heart rate up to 120. Dr. Mumtaz Sánchez notified and orders received to give extra dose of Lopressor. Will continue to monitor.

## 2021-12-11 NOTE — PROGRESS NOTES
Physician Progress Note      PATIENT:               Sanket Elizabeth  CSN #:                  [id number]  :                       1935  ADMIT DATE:       2021 7:19 PM  [address] [place] DATE:  RESPONDING  PROVIDER #:        MARISOL HERNANDEZ MD          QUERY TEXT:    Patient admitted with AFIB RVR and Acute CHF. Documentation reflects Acute   Respiratory Failure with hypoxia by the ER provider at the time of admission. If possible, please document in the progress notes and discharge summary if    Acute Respiratory Failure with hypoxia was: The medical record reflects the following:  Risk Factors: CHF, AFIB w/ RVR  Clinical Indicators: ABG: ph: 7.330, PCO2: 66, PO2: 176, RR: 22 on arrival   with SPO2: 94% on O2 N/C in the ER, ER provider documents: Pulmonary effort is   normal, no accessory muscle usage or resp. distress. Treatment: Amiodarone, Digoxin, O2 Therapy, ABG    Thank you in advance,    Ashley Martinez, RN-BSN, [facility]  Clinical   [facility]  Carroll Moran  [email or web address]  Options provided:  -- Acute Respiratory Failure with hypoxia confirmed after study  -- Acute Respiratory Failure with hypoxia  treated and resolved  -- Acute Respiratory Failure with hypoxia  ruled out after study  -- Other - I will add my own diagnosis  -- Disagree - Not applicable / Not valid  -- Disagree - Clinically unable to determine / Unknown  -- Refer to Clinical Documentation Reviewer    PROVIDER RESPONSE TEXT:    Acute Respiratory Failure with hypoxia confirmed after study.     Query created by: Shara Cristobal on 2021 12:38 PM      Electronically signed by:  Joao Marie MD 2021 4:27 PM

## 2021-12-12 NOTE — PROGRESS NOTES
Progress Note  Date:2021       Room:0709/709-02  Patient Rima Gillespie     Date of Birth:[birth year]     Age:86 y.o. Subjective    Patient seen in the room during my morning rounds. She is feeling a little better but still tired and fatigued. She wants to go back to her [facility]. Cumulative Hospital course:      2021  Patient responding well to sotalol. A. fib is under control. LFTs are downtrending. Potassium was low which is being supplemented. 2021  Patient seen and evaluated by cardiologist and IV amiodarone discontinued as patient had elevated LFTs. Patient has been started on sotalol. Patient being followed closely by cardiology. PT/OT evaluation ordered. Speech therapy evaluation ordered as well.      12/10/2021  82year-old lady with a history of atrial fibrillation, GERD, hypertension, hyperlipidemia, arthritis, presented to the hospital  with concerns of respiratory distress. Patient stated she is been feeling increased palpitation lately as well as feeling overall weak. Also noted to be retaining fluids. In the emergency room was in rapid ventricular rhythm, initiated on amiodarone drip, and admitted for further evaluation. 2021  History of Present Illness   Patient has a long standing history of afib with rvr and on anticoagulation   She has had increased palpitations recently   Uncontrolled heart rate   She has had increased fluid retention   Feels weak all over   She cannot lie flat she feels like she is choking   No appetite  She does not want to leave her house and go anywhere. Review of Systems: 12 point system review negative except as suggested above.       Objective         Vitals Last 24 Hours:  TEMPERATURE:  Temp  Av.9 °F (36.1 °C)  Min: 96.5 °F (35.8 °C)  Max: 97.5 °F (36.4 °C)  RESPIRATIONS RANGE: Resp  Av.6  Min: 16  Max: 20  PULSE OXIMETRY RANGE: SpO2  Av.8 %  Min: 91 %  Max: 98 %  PULSE RANGE: Pulse  Av.2 Min: 80  Max: 116  BLOOD PRESSURE RANGE: Systolic (64VZL), PZP:445 , Min:106 , FZL:760   ; Diastolic (71ARV), SUSHMA:09, Min:68, Max:92    I/O (24Hr):     Intake/Output Summary (Last 24 hours) at 12/12/2021 1423  Last data filed at 12/12/2021 0524  Gross per 24 hour   Intake 340 ml   Output 1700 ml   Net -1360 ml       PHYSICAL  EXAMINATION:    LYNNETTE:  Awake, alert, oriented x 3, patient appears tired and fatigued   Head/Eyes:  Normocephalic, atraumatic, EOMI and PERRLA bilaterally   Respiratory:   Bilateral decreased air entry in both lung fields, mild B/L crackles, symmetric expansion of chest   Cardiovascular:   Irregularly irregular, S1+S2+0   Abdomen:   Soft, non-tender, bowel sounds +ve, no organomegaly   Extremities: Moves all, decreased range of motion, no edema   Neurologic: Awake, alert, oriented x 3, cranial nerves II-XII intact, no focal neurological deficits, sensory system intact   Psychiatric: Normal mood, non-suicidal       Labs/Imaging/Diagnostics    Labs:  CBC:  Recent Labs     12/09/21 2005 12/11/21 1347 12/12/21 0229   WBC 12.4* 9.3 8.9   RBC 3.47* 3.39* 3.49*   HGB 9.3* 9.2* 9.3*   HCT 33.6* 32.9* 33.0*   MCV 96.8 97.1 94.6   RDW 16.7* 17.0* 16.7*   * 98* 95*     CHEMISTRIES:  Recent Labs     12/10/21  0250 12/10/21  1417 12/11/21 1347 12/12/21 0229     --  137 143   K 4.3 3.9 3.3* 3.2*     --  94* 95*   CO2 24  --  33* 38*   BUN 28*  --  27* 27*   CREATININE 0.8  --  0.9 0.9   GLUCOSE 127*  --  120* 113*   PHOS  --   --  2.9  --    MG  --   --  1.9  --      PT/INR:  Recent Labs     12/09/21 2005   PROTIME 29.2*   INR 2.82*     APTT:  Recent Labs     12/09/21 2005   APTT 37.1*     LIVER PROFILE:  Recent Labs     12/09/21  2005 12/10/21  0250 12/12/21 0229   * 794* 230*   * 868* 486*   BILITOT 1.7* 1.6* 1.4*   ALKPHOS 171* 172* 149*       Imaging Last 24 Hours:  US LIVER    Result Date: 12/10/2021  US LIVER 12/10/2021 8:20 AM History: Elevated liver function tests. The appearance of the liver is compatible with mild diffuse fatty infiltration. No focal liver lesion or bile duct dilation is seen. The gallbladder is absent. The common bile duct diameter is normal at 3 mm. The pancreas is obscured by bowel gas and cannot be visualized. The visualized portion of the upper aorta is of normal size. The right kidney shows no abnormality and measures 87 x 48 x 39 mm.    1. Fatty liver. 2. No acute abnormality is seen. Signed by Dr Cruzito Guido    XR CHEST PORTABLE    Result Date: 12/9/2021  EXAMINATION: XR CHEST PORTABLE 12/9/2021 9:24 PM HISTORY: Shortness of breath. Report: A portable upright frontal view the chest was obtained. COMPARISON: Chest x-ray 9/8/2019. There is marked elevation of the left hemidiaphragm which appears stable. Lung volumes are low, this appears similar to the previous chest x-ray. There is shift of the mediastinal structures to the right and mild cardiomegaly is present. There appears be moderate ectasia of the thoracic aorta. No pneumothorax is identified. A small left pleural effusion cannot be excluded. There is previous right shoulder arthroplasty. Stable chest x-ray with no acute findings. There is marked elevation of the left hemidiaphragm, no obvious lung consolidation. There is mediastinal shift to the right as before. No pneumothorax is identified. Moderate thoracic aortic ectasia and mild cardiomegaly. No evidence of CHF. Signed by Dr Kings Hair.  [facility]    Assessment//Plan           Hospital Problems           Last Modified POA    * (Principal) Paroxysmal A-fib ([address] 12/12/2021 Yes    Hypertension 12/10/2021 Yes    Hyperlipemia 12/10/2021 Yes    Shortness of breath 12/10/2021 Yes    Anxiety and depression 12/10/2021 Yes    Elevated brain natriuretic peptide (BNP) level 12/10/2021 Yes    Hypothyroidism 12/10/2021 Yes    Pulmonary hypertension ([address] 12/10/2021 Yes    Lower extremity edema 12/10/2021 Yes    HFrEF (heart failure with reduced ejection fraction) ([facility] 12/10/2021 Yes    Osteopenia of multiple sites 12/10/2021 Yes    Elevated liver function tests 12/10/2021 Yes    Generalized weakness 12/11/2021 Yes    Impaired ambulation 12/11/2021 Yes    At high risk for falls 12/11/2021 Yes    Hypokalemia 12/11/2021 Yes        Assessment & Plan    Principal Problem:    Paroxysmal A-fib ([facility]  Active Problems:    Hypertension    Hyperlipemia    Shortness of breath    Anxiety and depression    Elevated brain natriuretic peptide (BNP) level    Hypothyroidism    Pulmonary hypertension (HCC)    Lower extremity edema    HFrEF (heart failure with reduced ejection fraction) (HCC)    Osteopenia of multiple sites    Elevated liver function tests    Generalized weakness    Impaired ambulation    At high risk for falls    Hypokalemia  Resolved Problems:    Atrial fibrillation with RVR ([facility]    Plan:    Continue with current medications  Converted back to sinus rhythm on IV amiodarone, which was discontinued while the cardiology due to elevated liver enzymes  Monitor LFTs daily  Seen by cardiology and initiated on sotalol. Discontinue Lopressor if rates below 60.  2D echo done which showed preserved left ventricular function with EF 55 to 60%  Continue Eliquis for anticoagulation  Bumex 2 mg twice daily, monitor electrolytes while repeating  Patient has borderline elevated TSH hence free T4 ordered  IV KCl replacement ordered  20 mEq p.o. KCl added daily given patient's persistent hypokalemia and chronic diuretics  Strict I's and O's  Daily weights  Speech therapy and bedside was requested to evaluate patient for swallowing function  PT/OT evaluation ordered  Case management consult for DC planning    Chronic medical issues . .. Continue with home meds. Monitor patient closely while admitted. Advised very close f/u with patient's PCP as an outpatient to address chronic medical issues. Repeat labs in a.m. Electrolyte replacement as per protocol.  Patient will be monitored very closely on the floor. Further recommendations as per the hospital course. DC planning : Back to [facility] versus skilled nursing facility in the next couple of days once cleared by cardiology, as determined by PT/OT evaluation       Patient  is on chronic anticoagulation which serves as DVT prophylaxis  Current medications reviewed  Lab work reviewed  Radiology/Chest x-ray films reviewed  Treatment recommendations from suspecialities reviewed, appreciated and agreed with  Discussed with the nurse and addressed all questions/concerns  Discussed with Patient and/or Family at the bedside in detail . .. they understand and agree with the management plan.     Electronically signed by:  Boogie Lau MD  12/12/21 2:23 PM

## 2021-12-13 NOTE — PROGRESS NOTES
Patient has redness under bilateral breast and redness L middle abd fold. Cleansed and interdry place.

## 2021-12-13 NOTE — PROGRESS NOTES
Speech Language Pathology  Facility/Department: [address] CARE   CLINICAL BEDSIDE SWALLOW EVALUATION  SPEECH PRODUCTION EVALUATION     NAME: Lamar Riley  : 1935  MRN: [id number]    ADMISSION DATE: 2021  ADMITTING DIAGNOSIS: has Hypertension; Hyperlipemia; GERD (gastroesophageal reflux disease); Nausea; Glaucoma; Shortness of breath; Anxiety and depression; Elevated brain natriuretic peptide (BNP) level; Hypothyroidism; Renal cyst; Chest pain; Paroxysmal A-fib ([address] Chest discomfort; Pulmonary hypertension ([address] Squamous cell carcinoma, leg; Poor historian; Lower extremity edema; Hyperparathyroidism ([address] HFrEF (heart failure with reduced ejection fraction) ([address] Osteopenia of multiple sites; Elevated liver function tests; Generalized weakness; Impaired ambulation; At high risk for falls; and Hypokalemia on their problem list.    Date of Eval: 2021  Evaluating Therapist: BERTO Owen    Current Diet level:  Soft and bite sized consistency with thin liquids    Reason for Referral  Lamar Riley was referred for a bedside swallow evaluation to assess the efficiency of her swallow function, identify signs and symptoms of aspiration and make recommendations regarding safe dietary consistencies, effective compensatory strategies, and safe eating environment. Impression  Assessed patient's swallowing function. Patient exhibited decreased oral prep and decreased oral transit of more solid consistencies, fast oral transit and suspected swallow delay with thin liquids, and sluggish, inconsistently mildly decreased laryngeal elevation for swallow airway protection. No outward S/S penetration/aspiration was noted with any ice chip trial, puree consistency trial, regular solid consistency trial, or mildly thick/nectar thick liquid trial presented during evaluation this date. Just mild delayed coughs were observed with thin H2O trials.      At this time, would recommend continuation soft and bite sized consistency but with mildly thick/nectar thick liquids. Recommend meds crushed in pudding/applesauce. If patient receives mouth care prior to intake, okay for ice chips and small sips thin H2O IN BETWEEN MEALS for comfort. Will continue to follow. Thank you for this consult. Treatment Plan  Requires SLP Intervention: Yes     Recommended Diet and Intervention  Diet Solids Recommendation: Soft and bite sized  Liquid Consistency Recommendation: Mildly thick (nectar thick)  Recommended Form of Meds: Meds crushed in puree as able  Therapeutic Interventions: Patient/Family education;Diet tolerance monitoring; Therapeutic PO trials with SLP     Compensatory Swallowing Strategies  Compensatory Swallowing Strategies: Upright as possible for all oral intake;Small bites/sips;Eat/Feed slowly; Alternate solids and liquids; Remain upright for 30-45 minutes after meals     Treatment/Goals  Timeframe for Short-term Goals: 1x/day for 3 days   Goal 1: Patient will tolerate soft and bite sized consistency and mildly thick/nectar thick liquids with min S/S penetration/aspiration during PO intake. Goal 2: Patient staff will follow swallow safety recommendations to decrease risk of penetration/aspiration during PO intake. Goal 3: Re-assessment of swallow function for potential diet upgrade. Goal 4: Monitor speech production. Goal 5: Cognitive-linguistic eval      General  Chart Reviewed: Yes  Behavior/Cognition: Patient appeared lethargic within short period of time. O2 Device: Nasal Cannula  Communication Observation: (Assessed patient's speech production. Patient exhibited decreased volume of speech and slow, decreased lingual movements during verbalizations.  SLP ranked functional intelligibility of speech for unfamiliar listeners at 70-80% in utterances with background noise present.)  Follows Directions: Simple   Dentition: Some missing dentition  Patient Positioning: Upright in bed  Consistencies Administered: Ice chips;Dysphagia Pureed (Dysphagia I); Regular solid; Nectar - straw; Thin - straw     Oral Motor Examination   Labial ROM: (Decreased, bilaterally, during labial retraction trials and labial protrusion trials.)  Labial Strength: (Adequate during labial compression trials.)  Labial Coordination: (Adequate movements were noted.)  Lingual ROM: (Adequate during lingual extension trials with full point achieved; decreased during lingual elevation trials without use of accessory jaw movement; adequate movements noted bilaterally.)  Lingual Symmetry: (Deviation was noted, to the left, during lingual extension trials.)  Lingual Strength: (Decreased with fasciculations noted during lingual extension trials.)  Lingual Coordination: (Slowed movements were noted.)     Assessed patient's swallowing function with the following observations noted:     Oral Phase  Mastication: Ice chips;Regular solid (Patient exhibited decreased rotary jaw movement during oral prep of ice chip trials and regular solid consistency trials all presented by SLP.)  Suspected Premature Bolus Loss: Thin - straw (Patient exhibited fast oral transit of thin H2O trials presented via straw by SLP.)  Decreased Oral Transit: Regular solid (Min-moderate oral cavity residue was noted post swallows; residue was slow to clear from the mouth with nectar thick liquid washes.)  Oral Phase - Comment: Oral transit of ice chip trials primarily measured 1-2 seconds in length. Oral transit of puree consistency trials, presented by SLP, primarily measured 1-2 seconds in length and no oral cavity residue was noted post swallows. Oral transit of regular solid consistency primarily measured 1-2 seconds in length. Oral transit of nectar thick liquid trials, presented via straw by SLP, primarily measured 1-2 seconds in length. Pharyngeal Phase  Suspected Swallow Delay:  Thin - straw (Suspect secondary to oral transit times.)  Laryngeal Elevation: (Patient exhibited sluggish, inconsistently mildly decreased laryngeal elevation for swallow airway protection.)  Delayed Cough: Thin - straw  Pharyngeal Phase - Comment: No outward S/S penetration/aspiration was noted with any ice chip trial, puree consistency trial, regular solid consistency trial, or mildly thick/nectar thick liquid trial presented during assessment this date. Just mild delayed coughs were observed with thin H2O trials. At this time, would recommend continuation soft and bite sized consistency but with mildly thick/nectar thick liquids. Recommend meds crushed in pudding/applesauce. If patient receives mouth care prior to intake, okay for ice chips and small sips thin H2O IN BETWEEN MEALS for comfort. Will continue to follow.     Electronically signed by BERTO Sanz on 12/13/2021 at 12:48 PM

## 2021-12-13 NOTE — CARE COORDINATION
Date / Time of Evaluation:   12/13/2021    2:10 PM  Assessment Completed by:   Madyson Soler      Patient Name:   Yousuf Mcclelland  MRN:   [id number]  YOB: 1935    Patient Admission Status:   Inpatient [101]    Patient Contact Information:    [address]  [address] [facility] [address]  [phone] (home)   Telephone Information:   Mobile [phone]     Above information verified? Pt was just transferred to [facility] in [facility]. [x]   Yes  []   No    (Best Practice:   Have patient/caregiver verify above address and phone number by stating out loud their current address and reachable phone number. Initial Assessment Completed at bedside with:      [x]   Patient  []   Family/Caregiver/Guardian   []   Other:      Current PCP:    Nolvia Jalloh MD    PCP verified? [x]   Yes  []   No    Emergency Contacts:    Extended Emergency Contact Information  Primary Emergency Contact: [address] [place] [address] Phone: [phone]  Work Phone: [phone]  Relation: Child    Advance Directives:    Does Ms. Nikki Gipson have an advance directive in her electronic medical record? [x]   Yes  []   No    Code Status:   DNR-CC      Have you been vaccinated for COVID-19 (SARS-CoV-2)? [x]   Yes  []   No                   If so, when?   Fully vaccinated April 2021  Which :         []   [employer]  [x]   Claudeen Fermo  []   Brionna Weaver  []   Other:       Do you have any of the following unmet social needs that would keep you from returning home safely:    [x]   Yes  []   No                    Unmet Social Needs:           []   Living Situation/Housing  []   Food  []   Stroke Education   []   Utilities  []   Personal Safety  []   Financial Strain  []   Employment  []   Mental Health  []   Substance Abuse  []   Transportation Barriers    Additional Unmet Social Needs Notes:   Need therapy rehab prior to return to [facility]        Financial:    Payor: MEDICARE / Plan: MEDICARE PART A AND B / Product Type: *No Product type* /     Pre-Cert required for SNF:     []   Yes  [x]   No    Have Long Term Care Insurance:      [x]   Yes  []   No      Pharmacy:    [address] [place] [address] Tony Gold [facility] [phone] Berta Waddell [phone]  [address] [address]  Phone: [phone] Fax: [phone]    Potential assistance purchasing medications? []   Yes  [x]   No      ADLS:       Support System:   Children      Current Home Environment:   none at [facility]  Steps:       []   Yes  []   No    If yes, how many? Plans to RETURN to current housing:     []   Yes  [x]   No    Barriers to RETURNING to current housing:  Referral for SNF therapy prior to return to [facility]    Currently ACTIVE with [address]:      []   Yes  [x]   No    [facility]:        DME Provider:   Used rollator pta at [address] [place] [address] prior to admission:      []   Yes  [x]   No    Oxygen Company:       Has a pulse oximetry unit at home:     []   Yes  [x]   No    []   Yes  []   No          Active with HD/PD prior to admission:             []   Yes  [x]   No    Nephrologist:     HD Center:         Transition Plan:  NEED PT EVAL 12/13;  From [facility] [employer] for Discharge:  Determine per [facility] disposition    Factors facilitating achievement of predicted outcomes:     Barriers to discharge:  Referral to [facility] for therapy rehab before return to [address]      Patient Deficits:    []   Yes   [x]   No    If yes:    []   Confusion/Memory  []   Visual  []   Motor/Sensory         []   Right arm         []   Right leg         []   Left arm         []   Left leg  []   Language/Speech         []   Aphasia         []   Dysarthria         []   Swallow         [place] Coma Scale  Eye Opening: Spontaneous  Best Verbal Response: Oriented  Best Motor Response: Localizes pain  Zac Coma Scale Score: 14    Patient Deficit Notes: Additional CM/SW Notes: Pt seated in bedside recliner with NC in place. Pt reports had moved in [place] half-way only 2 days pta and understands not able to return to [facility] without therapy rehab prior to return. Pt reports having a shoulder injury previously and completed therapy rehab at [facility] where she would be agreeable to therapy rehab again this [place]. SW made referral to [facility] and awaiting bed availability for services. Pt consented for SW to speak with teri King for referral. SW did and dtr in agreement and informed pt had been at [facility] prior to [place] and feel the pt has no further medicare paid days. To determine if will be a daily copay for services. SW made referral through [facility] with [facility] and awaiting response. Hussein Hendrickson and/or her family were provided with choice of provider:    []   Yes   [x]   No  Requested referral to [facility] as received services there previously    []   Stroke education booklet reviewed and given to patient/family/caregiver/guardian. All questions answered all questions answered appropriately and efficiently per family.       [address]  Care Management  Phone:   [phone]          Fax:   [phone]

## 2021-12-13 NOTE — PROGRESS NOTES
Occupational Therapy Initial Assessment  Date: 2021   Patient Name: Nano Paulson  MRN: [id number]     : 1935    Date of Service: 2021    Discharge Recommendations:  Patient would benefit from continued therapy after discharge       Assessment   Assessment: Evaluation completed and tx initiated. The patient will likely need a rehab program prior to returning to [facility]. Likely to make significant gains with further skilled therapy intervention  Treatment Diagnosis: A fib, weakness  REQUIRES OT FOLLOW UP: Yes  Activity Tolerance  Activity Tolerance: Patient Tolerated treatment well  Safety Devices  Safety Devices in place: Yes  Type of devices: Chair alarm in place; Call light within reach           Patient Diagnosis(es): The primary encounter diagnosis was Atrial fibrillation with RVR ([facility] [address] Diagnoses of Acute respiratory failure with hypoxia ([facility] [address] Chronic combined systolic and diastolic CHF (congestive heart failure) ([facility] [address] and On continuous oral anticoagulation were also pertinent to this visit. has a past medical history of Arthritis, Atrial fibrillation ([facility] [address] Elevated TSH, GERD (gastroesophageal reflux disease), Hyperlipidemia, Hypertension, and Tremor. has a past surgical history that includes Cholecystectomy and Humerus fracture surgery (Right, 2016).     Treatment Diagnosis: A fib, weakness      Restrictions  Restrictions/Precautions  Restrictions/Precautions: Fall Risk    Subjective   General  Chart Reviewed: Yes  Patient assessed for rehabilitation services?: Yes  Family / Caregiver Present: No  Vital Signs  Temp: 96.8 °F (36 °C)  Temp Source: Temporal  Pulse: 114  Heart Rate Source: Monitor  Resp: 18  BP: 128/72  BP Location: Left upper arm  Level of Consciousness: Alert (0)  MEWS Score: 3  Oxygen Therapy  SpO2: 94 %  O2 Device: Nasal cannula  O2 Flow Rate (L/min): 1 L/min    Social/Functional History  Social/Functional History  Type of Home: Assisted living  Home Equipment: 4 wheeled walker  ADL Assistance: Independent  Ambulation Assistance: Independent (with rollator)  Transfer Assistance: Independent       Objective           Observation/Palpation  Posture: Fair  Balance  Sitting Balance: Stand by assistance  Standing Balance: Contact guard assistance (to min A)  Toilet Transfers  Toilet Transfer: Moderate assistance  ADL  Feeding: Supervision  Grooming: Supervision; Minimal assistance  UE Bathing: Minimal assistance  LE Bathing: Moderate assistance; Maximum assistance  UE Dressing: Minimal assistance  LE Dressing: Moderate assistance  Toileting: Moderate assistance        Bed mobility  Supine to Sit: Moderate assistance  Transfers  Stand Step Transfers: Moderate assistance     Cognition  Cognition Comment: Vascillates between alert and engaging in humor to becomming groggy. Follows simplet directions                 LUE PROM (degrees)  LUE PROM: WFL  LUE AROM (degrees)  LUE AROM : WFL  RUE AROM (degrees)  RUE General AROM: Unable to raise arm off bed in supine, distally WFL                  Tx initiated:   Therapeutic activity training, safety training, movement strategy training . (15 min)    Plan   Plan  Times per week: 3-5    Goals  Short term goals  Time Frame for Short term goals: 1-2 weeks  Short term goal 1: The patient will demo ability to perform dressing with CGA  Short term goal 2: Perform toileting with CGA  Short term goal 3: Perform transfers with CGA  Short term goal 4: Perform light ambulatory ADL with CGA  Long term goals  Long term goal 1: Upgrade as tolerated               Ta Bryant OT/DIANA  Electronically signed by Ta DONNELLY on 12/13/2021 at 3:44 PM.

## 2021-12-13 NOTE — PROGRESS NOTES
Cardiology Progress Note Benito Brown MD      Patient:  Jeanette Moya  [id number]    Patient Active Problem List    Diagnosis Date Noted    Generalized weakness 12/11/2021     Priority: Low    Impaired ambulation 12/11/2021     Priority: Low    At high risk for falls 12/11/2021     Priority: Low    Hypokalemia 12/11/2021     Priority: Low    Elevated liver function tests 12/10/2021     Priority: Low    Osteopenia of multiple sites 07/29/2021     Priority: Low    Hyperparathyroidism ([facility] [address] 06/25/2021     Priority: Low    HFrEF (heart failure with reduced ejection fraction) ([facility] [address] 06/25/2021     Priority: Low    Lower extremity edema 06/14/2021     Priority: Low    Poor historian 03/12/2021     Priority: Low    Squamous cell carcinoma, leg 05/29/2020     Priority: Low    Pulmonary hypertension ([facility] [address] 01/24/2020     Priority: Low    Chest discomfort      Priority: Low    Chest pain 05/01/2019     Priority: Low    [facility] 05/01/2019     Priority: Low    Elevated brain natriuretic peptide (BNP) level 04/19/2019     Priority: Low    Hypothyroidism 04/19/2019     Priority: Low    Renal cyst 04/19/2019     Priority: Low    Shortness of breath 08/24/2018     Priority: Low    Anxiety and depression 08/24/2018     Priority: Low    Glaucoma 07/10/2016     Priority: Low    Hypertension 07/09/2016     Priority: Low    Hyperlipemia 07/09/2016     Priority: Low    GERD (gastroesophageal reflux disease) 07/09/2016     Priority: Low    Nausea 07/09/2016     Priority: Low       Admit Date:  12/9/2021    Admission Problem List: Present on Admission:   (Resolved) Atrial fibrillation with RVR ([facility] [address]   Hypertension   Hyperlipemia   Shortness of breath   Anxiety and depression   Elevated brain natriuretic peptide (BNP) level   Hypothyroidism   Paroxysmal A-fib (HCC)   Pulmonary hypertension (HCC)   Lower extremity edema   Osteopenia of multiple sites   HFrEF (heart failure with reduced ejection fraction) (HCC)   Elevated liver function tests   Generalized weakness   Impaired ambulation   At high risk for falls   Hypokalemia      Cardiac Specific Data:  Specialty Problems        Cardiology Problems    Hyperlipemia        Hypertension        Chest pain        * (Principal) Paroxysmal A-fib (HCC)        Pulmonary hypertension (HCC)        HFrEF (heart failure with reduced ejection fraction) ([facility] [address]            1. Paroxysmal atrial fibrillation, on Eliquis, ejection fraction 45 to 50%, moderate to severe pulmonary hypertension. 2.  Early dementia with memory loss. 3.  Abnormal liver function studies. Subjective:  Ms. Bryanna Torres appears more awake and alert. Converted back to atrial fibrillation over the weekend but rate controlled. Reports no shortness of breath. Reports she is mobile using a walker. Has not been out of bed. Objective:   /80   Pulse 104   Temp 97 °F (36.1 °C)   Resp 16   Ht 5' 6\" (1.676 m)   Wt 170 lb (77.1 kg)   SpO2 96%   BMI 27.44 kg/m²       Intake/Output Summary (Last 24 hours) at 12/13/2021 1048  Last data filed at 12/13/2021 8136  Gross per 24 hour   Intake 560 ml   Output 200 ml   Net 360 ml       Prior to Admission medications    Medication Sig Start Date End Date Taking?  Authorizing Provider   apixaban (ELIQUIS) 5 MG TABS tablet Take 5 mg by mouth 2 times daily   Yes Historical Provider, MD   furosemide (LASIX) 20 MG tablet Take 20 mg by mouth daily   Yes Historical Provider, MD   lisinopril (PRINIVIL;ZESTRIL) 20 MG tablet Take 20 mg by mouth 2 times daily   Yes Historical Provider, MD   metoprolol tartrate (LOPRESSOR) 50 MG tablet Take 1 tablet by mouth 2 times daily 12/1/21  Yes NURIS Parker NP   vitamin D (ERGOCALCIFEROL) 1.25 MG (28590 UT) CAPS capsule TAKE 1 CAPSULE BY MOUTH ONE TIME PER WEEK 11/24/21  Yes SOLEDAD Orellana   atorvastatin (LIPITOR) 40 MG tablet TAKE 1 TABLET DAILY 9/9/21  Yes MD TOD Ortiz 0.01 % SOLN ophthalmic drops INSTILL 1 DROP INTO BOTH EYES EVERY EVENING AS DIRECTED 5/30/21  Yes Historical Provider, MD   escitalopram (LEXAPRO) 20 MG tablet TAKE 1 TABLET BY MOUTH EVERY DAY 4/5/21  Yes Dorita Hendricks MD   omeprazole (PRILOSEC) 40 MG delayed release capsule TAKE 1 CAPSULE DAILY 2/1/21  Yes Dorita Hendricks MD   aspirin 81 MG chewable tablet Take 1 tablet by mouth daily 5/4/19  Yes Jenny Lam MD   dorzolamide-timolol (COSOPT) 22.3-6.8 MG/ML ophthalmic solution INSTILL 1 DROP INTO BOTH EYES TWICE A DAY AS DIRECTED 4/11/18  Yes Historical Provider, MD   melatonin 3 MG TABS tablet Take 3 mg by mouth daily    Historical Provider, MD        miconazole   Topical BID    sotalol  120 mg Oral BID    potassium chloride  20 mEq Oral Daily    bumetanide  2 mg IntraVENous BID    atorvastatin  40 mg Oral Nightly    pantoprazole  40 mg Oral QAM AC    lisinopril  20 mg Oral BID    latanoprost  1 drop Both Eyes Nightly    dorzolamide  1 drop Both Eyes BID    And    timolol  1 drop Both Eyes BID    apixaban  5 mg Oral BID    aspirin  81 mg Oral Daily    escitalopram  20 mg Oral Daily    melatonin  3 mg Oral Daily    metoprolol tartrate  50 mg Oral BID    sodium chloride flush  5-40 mL IntraVENous 2 times per day       TELEMETRY: Atrial fibrillation     Physical Exam:      Physical Exam  Constitutional:       General: She is not in acute distress. Appearance: She is not diaphoretic. HENT:      Mouth/Throat:      Pharynx: No oropharyngeal exudate. Eyes:      General: No scleral icterus. Right eye: No discharge. Left eye: No discharge. Neck:      Thyroid: No thyromegaly. Vascular: No JVD. Cardiovascular:      Rate and Rhythm: Normal rate. Rhythm irregular. No extrasystoles are present. Heart sounds: Normal heart sounds, S1 normal and S2 normal. No murmur heard. No systolic murmur is present. No diastolic murmur is present. No friction rub. No gallop. No S3 or S4 sounds. Comments: Mild jugular venous distention  No edema    Pulmonary:      Effort: Pulmonary effort is normal. No respiratory distress. Breath sounds: Normal breath sounds. No wheezing or rales. Chest:      Chest wall: No tenderness. Abdominal:      General: Bowel sounds are normal. There is no distension. Palpations: Abdomen is soft. There is no mass. Tenderness: There is no abdominal tenderness. There is no guarding or rebound. Hernia: No hernia is present. Musculoskeletal:         General: Normal range of motion. Skin:     General: Skin is warm. Coloration: Skin is not pale. Findings: No rash. Neurological:      Mental Status: She is alert and oriented to person, place, and time. Cranial Nerves: No cranial nerve deficit. Deep Tendon Reflexes: Reflexes normal.                 Lab Data:  CBC:   Recent Labs     12/11/21  1347 12/12/21 0229 12/13/21 0150   WBC 9.3 8.9 9.2   HGB 9.2* 9.3* 10.1*   HCT 32.9* 33.0* 35.4*   MCV 97.1 94.6 94.1   PLT 98* 95* 89*     BMP:   Recent Labs     12/11/21  1347 12/12/21 0229 12/13/21  0150    143 141   K 3.3* 3.2* 3.4*   CL 94* 95* 90*   CO2 33* 38* 40*   PHOS 2.9  --   --    BUN 27* 27* 22   CREATININE 0.9 0.9 0.8     LIVER PROFILE:   Recent Labs     12/12/21 0229 12/13/21 0150   * 128*   * 381*   BILITOT 1.4* 1.2   ALKPHOS 149* 150*     PT/INR: No results for input(s): PROTIME, INR in the last 72 hours. APTT: No results for input(s): APTT in the last 72 hours. BNP:  No results for input(s): BNP in the last 72 hours.   CK, CKMB, Troponin: @LABRCNT (CKTOTAL:3, CKMB:3, TROPONINI:3)@    IMAGING:  Echo Complete    Result Date: 12/11/2021  Transthoracic Echocardiography Report (TTE)  Demographics   Patient Name Tiago Mckeoner  Date of Study        12/10/2021               B   MRN          [id number]          Gender               Female   Date of      1935      Room Number          [id number]  Birth   Age 86 year(s)   Height:      66 inches       Referring Physician  Ashok Zapata MD   Weight:      170 pounds      Sonographer          Elisa Soto [facility]   BSA:         1.87 m^2        Interpreting         Anne Foster MD                               Physician   BMI:         27.44 kg/m^2  Procedure Type of Study   TTE procedure:ECHO NO CONTRAST WITH DOP/COLR. Study Location: Echo Lab Technical Quality: Poor visualization due to poor acoustical window. Patient Status: Inpatient Rhythm: Within normal limits BP: 153/105 mmHg Indications:Atrial fibrillation. Conclusions   Summary  Mitral valve leaflets are mildly thickened with preserved leaflet  mobility. Moderate mitral regurgitation is present. Mildly thickened aortic valve leaflets with preserved leaflet mobility. Tricuspid valve is structurally normal.  Moderately severe tricuspid regurgitation with estimated RVSP of 65 mm Hg. Normal left ventricular size with preserved LV function and an estimated  ejection fraction of approximately 55-60%. Normal left ventricular size and function. No regional wall motion abnormalities. Mildly enlarged right ventricle cavity with decreased function   Signature   ----------------------------------------------------------------  Electronically signed by Anne Foster MD(Interpreting  physician) on 12/11/2021 08:45 AM  ----------------------------------------------------------------   Findings   Mitral Valve  Mitral valve leaflets are mildly thickened with preserved leaflet  mobility. Moderate mitral regurgitation is present. Aortic Valve  Mildly thickened aortic valve leaflets with preserved leaflet mobility. Tricuspid Valve  Tricuspid valve is structurally normal.  Moderately severe tricuspid regurgitation with estimated RVSP of 65 mm Hg. Pulmonic Valve  The pulmonic valve was not well visualized . Left Atrium  Normal size left atrium.    Left Ventricle  Normal left ventricular size with preserved LV function and an estimated  ejection fraction of approximately 55-60%. Normal left ventricular size and function. No regional wall motion abnormalities. Right Atrium  Normal right atrial dimension with no evidence of thrombus or mass noted. Right Ventricle  Mildly enlarged right ventricle cavity with decreased function   Pericardial Effusion  No evidence of significant pericardial effusion is noted. Pleural Effusion  No evidence of pleural effusion. Doppler Measurements:                                 MV Peak E-Wave: 110 cm/s  TR Velocity:355 cm/s          MV Peak A-Wave: 68.1 cm/s  TR Gradient:50.41 mmHg        MV E/A Ratio: 1.62 %  Estimated RAP:15 mmHg         MV Peak Gradient: 4.84 mmHg  RVSP:65 mmHg      US LIVER    Result Date: 12/10/2021  US LIVER 12/10/2021 8:20 AM History: Elevated liver function tests. The appearance of the liver is compatible with mild diffuse fatty infiltration. No focal liver lesion or bile duct dilation is seen. The gallbladder is absent. The common bile duct diameter is normal at 3 mm. The pancreas is obscured by bowel gas and cannot be visualized. The visualized portion of the upper aorta is of normal size. The right kidney shows no abnormality and measures 87 x 48 x 39 mm.    1. Fatty liver. 2. No acute abnormality is seen. Signed by Dr Juanita Smith    XR CHEST PORTABLE    Result Date: 12/9/2021  EXAMINATION: XR CHEST PORTABLE 12/9/2021 9:24 PM HISTORY: Shortness of breath. Report: A portable upright frontal view the chest was obtained. COMPARISON: Chest x-ray 9/8/2019. There is marked elevation of the left hemidiaphragm which appears stable. Lung volumes are low, this appears similar to the previous chest x-ray. There is shift of the mediastinal structures to the right and mild cardiomegaly is present. There appears be moderate ectasia of the thoracic aorta. No pneumothorax is identified. A small left pleural effusion cannot be excluded.  There is previous right shoulder arthroplasty. Stable chest x-ray with no acute findings. There is marked elevation of the left hemidiaphragm, no obvious lung consolidation. There is mediastinal shift to the right as before. No pneumothorax is identified. Moderate thoracic aortic ectasia and mild cardiomegaly. No evidence of CHF. Signed by Dr Jason Friedman. [place]    Conclusions      Summary   Mitral valve leaflets are mildly thickened with preserved leaflet   mobility. Moderate mitral regurgitation is present. Mildly thickened aortic valve leaflets with preserved leaflet mobility. Tricuspid valve is structurally normal.   Moderately severe tricuspid regurgitation with estimated RVSP of 65 mm Hg. Normal left ventricular size with preserved LV function and an estimated   ejection fraction of approximately 55-60%. Normal left ventricular size and function. No regional wall motion abnormalities. Mildly enlarged right ventricle cavity with decreased function      Signature      ----------------------------------------------------------------   Electronically signed by Pepe Moncada MD(Interpreting   physician) on 12/11/2021 08:45 AM   ----------------------------------------------------------------      Assessment and Plan: This is a 82y.o. year old female with past medical history of paroxysmal atrial fibrillation on Eliquis, EF 55 %, moderate to severe pulmonary hypertension, moderate mitral regurgitation, moderate to severe tricuspid regurgitation with probable grade 2 diastolic dysfunction, early dementia with memory loss presenting with progressive leg swelling, shortness of breath, altered mental status and rapid atrial fibrillation.     1. Back in atrial fibrillation with controlled rate. QTC in normal range. Increase sotalol to 120 mg p.o. twice daily. Continue Eliquis 5 mg twice daily. Renal function stable.   We will manage with rate control strategy at this time with reevaluation for possible DCCV as an

## 2021-12-13 NOTE — PROGRESS NOTES
Physical Therapy    Facility/Department: [facility] PROGRESSIVE CARE  Initial Assessment    NAME: Vitor Marquez  : 1935  MRN: [id number]    Date of Service: 2021    Discharge Recommendations:  Continue to assess pending progress (Pt IS REQUIRING PHYSICAL ASSIST FOR TRANSFERS AT TIME OF EVAL)        Assessment   Body structures, Functions, Activity limitations: Decreased functional mobility ; Decreased ADL status; Decreased ROM; Decreased strength; Decreased safe awareness; Decreased cognition; Decreased balance; Decreased posture  Assessment: Pt REQUIRES ASSIST FOR ALL ASPECTS OF MOBILITY. APPEARS VERY DECONDITIONED. ABLE TO STAND BRIEFLY TO PIVOT TO RECLINER. Pt WOULD NOT BE SAFE TO RETURN HOME AT THIS LEVEL. PT Education: PT Role; Plan of Care  REQUIRES PT FOLLOW UP: Yes  Activity Tolerance  Activity Tolerance: Patient limited by fatigue; Patient limited by endurance       Patient Diagnosis(es): The primary encounter diagnosis was Atrial fibrillation with RVR ([address] Diagnoses of Acute respiratory failure with hypoxia ([address] Chronic combined systolic and diastolic CHF (congestive heart failure) ([address] and On continuous oral anticoagulation were also pertinent to this visit. has a past medical history of Arthritis, Atrial fibrillation ([address] Elevated TSH, GERD (gastroesophageal reflux disease), Hyperlipidemia, Hypertension, and Tremor. has a past surgical history that includes Cholecystectomy and Humerus fracture surgery (Right, 2016).     Restrictions  Restrictions/Precautions  Restrictions/Precautions: Fall Risk  Vision/Hearing        Subjective  General  Patient assessed for rehabilitation services?: Yes  Diagnosis: AFIB WITH RVR  Subjective  Subjective: WILLING TO PARTICIPATE BUT NOT EAGER DUE TO FATIGUE  Pain Screening  Patient Currently in Pain: No  Oxygen Therapy  O2 Device: Nasal cannula  O2 Flow Rate (L/min): 1 L/min       Orientation  Orientation  Overall Orientation Status: Within Functional Limits  Social/Functional History  Social/Functional History  Type of Home: Assisted living  Home Equipment: 4 wheeled walker  ADL Assistance: Independent  Ambulation Assistance: Independent (with rollator)  Transfer Assistance: Independent  Cognition   Cognition  Cognition Comment: GROGGY OVERALL, NEEDS CUEING AND ENCOURAGEMENT, DEC SAFETY AWARENESS    Objective     Observation/Palpation  Posture: Fair    PROM RLE (degrees)  RLE PROM: WFL  PROM LLE (degrees)  LLE PROM: WFL  Strength RLE  Comment: GROSSLY +3/5  Strength LLE  Comment: GROSSLY +3/5        Bed mobility  Supine to Sit: Moderate assistance  Transfers  Sit to Stand: Moderate Assistance  Stand to sit: Moderate Assistance  Bed to Chair: Moderate assistance  Stand Pivot Transfers:  Moderate Assistance (Pt FATIGUES AND SITS PREMATURELY)  Ambulation  Ambulation?: No     Balance  Sitting - Dynamic: Fair  Standing - Dynamic: Poor; +        Plan   Plan  Times per week: AT LEAST 5-6  Current Treatment Recommendations: Strengthening, Balance Training, ROM, Functional Mobility Training, Transfer Training, Gait Training, Safety Education & Training, Patient/Caregiver Education & Training, Endurance Training  Safety Devices  Type of devices: Call light within reach, Chair alarm in place    G-Code       OutComes Score                                                  AM-PAC Score             Goals  Short term goals  Time Frame for Short term goals: 14 DAYS  Short term goal 1: BED MOB SUPERVISION  Short term goal 2: TRANSFERS CGA  Short term goal 3: ' RW CGA       Therapy Time   Individual Concurrent Group Co-treatment   Time In           Time Out           Minutes                   Xiomy Carrington PT

## 2021-12-13 NOTE — PROGRESS NOTES
Progress Note  Date:2021       Room:0709/709-02  Patient Simon Montanez     Date of Birth:[birth year]     Age:86 y.o. Subjective    I saw the patient in my morning rounds. She is slowly improving and wants to get out of the hospital.    [facility] course:      2021  Patient is tolerating sotalol well. Sotalol dose increased as per cardiology. Potassium was borderline low which is being supplemented. Case management arranged for SNF bed in a.m. COVID-19 screening test ordered. 2021  Patient responding well to sotalol. A. fib is under control. LFTs are downtrending. Potassium was low which is being supplemented. 2021  Patient seen and evaluated by cardiologist and IV amiodarone discontinued as patient had elevated LFTs. Patient has been started on sotalol. Patient being followed closely by cardiology. PT/OT evaluation ordered. Speech therapy evaluation ordered as well.      12/10/2021  82year-old lady with a history of atrial fibrillation, GERD, hypertension, hyperlipidemia, arthritis, presented to the hospital  with concerns of respiratory distress. Patient stated she is been feeling increased palpitation lately as well as feeling overall weak. Also noted to be retaining fluids. In the emergency room was in rapid ventricular rhythm, initiated on amiodarone drip, and admitted for further evaluation. 2021  History of Present Illness   Patient has a long standing history of afib with rvr and on anticoagulation   She has had increased palpitations recently   Uncontrolled heart rate   She has had increased fluid retention   Feels weak all over   She cannot lie flat she feels like she is choking   No appetite  She does not want to leave her house and go anywhere. Review of Systems: 12 point system review negative except as suggested above.       Objective         Vitals Last 24 Hours:  TEMPERATURE:  Temp  Av.7 °F (35.9 °C)  Min: 96.3 °F (35.7 °C)  Max: 97 °F (36.1 °C)  RESPIRATIONS RANGE: Resp  Av.8  Min: 16  Max: 18  PULSE OXIMETRY RANGE: SpO2  Av.5 %  Min: 94 %  Max: 97 %  PULSE RANGE: Pulse  Av  Min: 104  Max: 114  BLOOD PRESSURE RANGE: Systolic (56IGW), CZM:278 , Min:122 , KBH:029   ; Diastolic (92DAN), FXV:90, Min:72, Max:80    I/O (24Hr): Intake/Output Summary (Last 24 hours) at 2021 1741  Last data filed at 2021 1300  Gross per 24 hour   Intake 440 ml   Output 200 ml   Net 240 ml       PHYSICAL  EXAMINATION:    LYNNETTE:  Awake, alert, oriented x 3, patient appears tired and fatigued   Head/Eyes:  Normocephalic, atraumatic, EOMI and PERRLA bilaterally   Respiratory:   Bilateral decreased air entry in both lung fields, mild B/L crackles, symmetric expansion of chest   Cardiovascular:   Irregularly irregular, S1+S2+0   Abdomen:   Soft, non-tender, bowel sounds +ve, no organomegaly   Extremities: Moves all, decreased range of motion, no edema   Neurologic: Awake, alert, oriented x 3, cranial nerves II-XII intact, no focal neurological deficits, sensory system intact   Psychiatric: Normal mood, non-suicidal       Labs/Imaging/Diagnostics    Labs:  CBC:  Recent Labs     21  1347 21  02221  0150   WBC 9.3 8.9 9.2   RBC 3.39* 3.49* 3.76*   HGB 9.2* 9.3* 10.1*   HCT 32.9* 33.0* 35.4*   MCV 97.1 94.6 94.1   RDW 17.0* 16.7* 16.5*   PLT 98* 95* 89*     CHEMISTRIES:  Recent Labs     21  1347 21  0229 21  0150    143 141   K 3.3* 3.2* 3.4*   CL 94* 95* 90*   CO2 33* 38* 40*   BUN 27* 27* 22   CREATININE 0.9 0.9 0.8   GLUCOSE 120* 113* 113*   PHOS 2.9  --   --    MG 1.9  --   --      PT/INR:  No results for input(s): PROTIME, INR in the last 72 hours. APTT:  No results for input(s): APTT in the last 72 hours.   LIVER PROFILE:  Recent Labs     21  0229 21  0150   * 128*   * 381*   BILITOT 1.4* 1.2   ALKPHOS 149* 150*       Imaging Last 24 Hours:  US LIVER    Result Date: 12/10/2021  US LIVER 12/10/2021 8:20 AM History: Elevated liver function tests. The appearance of the liver is compatible with mild diffuse fatty infiltration. No focal liver lesion or bile duct dilation is seen. The gallbladder is absent. The common bile duct diameter is normal at 3 mm. The pancreas is obscured by bowel gas and cannot be visualized. The visualized portion of the upper aorta is of normal size. The right kidney shows no abnormality and measures 87 x 48 x 39 mm.    1. Fatty liver. 2. No acute abnormality is seen. Signed by Dr Selena Mcgregor    XR CHEST PORTABLE    Result Date: 12/9/2021  EXAMINATION: XR CHEST PORTABLE 12/9/2021 9:24 PM HISTORY: Shortness of breath. Report: A portable upright frontal view the chest was obtained. COMPARISON: Chest x-ray 9/8/2019. There is marked elevation of the left hemidiaphragm which appears stable. Lung volumes are low, this appears similar to the previous chest x-ray. There is shift of the mediastinal structures to the right and mild cardiomegaly is present. There appears be moderate ectasia of the thoracic aorta. No pneumothorax is identified. A small left pleural effusion cannot be excluded. There is previous right shoulder arthroplasty. Stable chest x-ray with no acute findings. There is marked elevation of the left hemidiaphragm, no obvious lung consolidation. There is mediastinal shift to the right as before. No pneumothorax is identified. Moderate thoracic aortic ectasia and mild cardiomegaly. No evidence of CHF. Signed by Dr Kiara Noriega.  [facility]    Assessment//Plan           Hospital Problems           Last Modified POA    * (Principal) Paroxysmal A-fib ([address] 12/12/2021 Yes    Hypertension 12/10/2021 Yes    Hyperlipemia 12/10/2021 Yes    Shortness of breath 12/10/2021 Yes    Anxiety and depression 12/10/2021 Yes    Elevated brain natriuretic peptide (BNP) level 12/10/2021 Yes    Hypothyroidism 12/10/2021 Yes    Pulmonary hypertension ([facility] 12/10/2021 Yes    Lower extremity edema 12/10/2021 Yes    HFrEF (heart failure with reduced ejection fraction) ([facility] 12/10/2021 Yes    Osteopenia of multiple sites 12/10/2021 Yes    Elevated liver function tests 12/10/2021 Yes    Generalized weakness 12/11/2021 Yes    Impaired ambulation 12/11/2021 Yes    At high risk for falls 12/11/2021 Yes    Hypokalemia 12/11/2021 Yes        Assessment & Plan    Principal Problem:    Paroxysmal A-fib ([facility]  Active Problems:    Hypertension    Hyperlipemia    Shortness of breath    Anxiety and depression    Elevated brain natriuretic peptide (BNP) level    Hypothyroidism    Pulmonary hypertension (HCC)    Lower extremity edema    HFrEF (heart failure with reduced ejection fraction) (HCC)    Osteopenia of multiple sites    Elevated liver function tests    Generalized weakness    Impaired ambulation    At high risk for falls    Hypokalemia  Resolved Problems:    Atrial fibrillation with RVR ([facility]    Plan:    Continue with current medications  Converted back to sinus rhythm on IV amiodarone, which was discontinued while the cardiology due to elevated liver enzymes  LFTs are slowly improving after discontinuing amiodarone  Monitor LFTs daily  Seen by cardiology and initiated on sotalol  Patient tolerated sotalol well hence dose increased to 120 mg p.o. twice daily  Discontinue Lopressor if rates below 60  2D echo done which showed preserved left ventricular function with EF 55 to 60%  Continue Eliquis for anticoagulation  Bumex 2 mg twice daily, monitor electrolytes while repeating  Patient has borderline elevated TSH hence free T4 ordered which is within normal range at 1.31  Patient still has persistently low borderline potassium level  Additional IV potassium supplementation ordered  Continue with oral potassium supplements daily  Strict I's and O's  Daily weights  Speech therapy and bedside was requested to evaluate patient for swallowing function  PT/OT evaluation ordered  Case management consult for DC planning    Chronic medical issues . .. Continue with home meds. Monitor patient closely while admitted. Advised very close f/u with patient's PCP as an outpatient to address chronic medical issues. Repeat labs in a.m. Electrolyte replacement as per protocol. Patient will be monitored very closely on the floor. Further recommendations as per the hospital course. DC planning : DC planning to skilled nursing facility in a.m. as bed is arranged by case management. COVID-19 screening test ordered      Patient  is on chronic anticoagulation which serves as DVT prophylaxis  Current medications reviewed  Lab work reviewed  Radiology/Chest x-ray films reviewed  Treatment recommendations from suspecialities reviewed, appreciated and agreed with  Discussed with the nurse and addressed all questions/concerns  Discussed with Patient and/or Family at the bedside in detail . .. they understand and agree with the management plan.     Electronically signed by:  Mikhail Bergeron MD  12/13/21 5:41 PM

## 2021-12-14 PROBLEM — Z51.5 PALLIATIVE CARE PATIENT: Status: ACTIVE | Noted: 2021-01-01

## 2021-12-14 PROBLEM — Z79.01 ON CONTINUOUS ORAL ANTICOAGULATION: Status: ACTIVE | Noted: 2021-01-01

## 2021-12-14 PROBLEM — E87.6 HYPOKALEMIA: Status: RESOLVED | Noted: 2021-01-01 | Resolved: 2021-01-01

## 2021-12-14 NOTE — PLAN OF CARE
Problem: Falls - Risk of:  Goal: Will remain free from falls  Description: Will remain free from falls  Outcome: Ongoing  Goal: Absence of physical injury  Description: Absence of physical injury  Outcome: Ongoing     Problem: Skin Integrity:  Goal: Will show no infection signs and symptoms  Description: Will show no infection signs and symptoms  Outcome: Ongoing  Goal: Absence of new skin breakdown  Description: Absence of new skin breakdown  Outcome: Ongoing     Problem: SAFETY  Goal: Free from accidental physical injury  Outcome: Ongoing  Goal: Free from intentional harm  Outcome: Ongoing     Problem: DAILY CARE  Goal: Daily care needs are met  Outcome: Ongoing     Problem: PAIN  Goal: Patient's pain/discomfort is manageable  Outcome: Ongoing     Problem: SKIN INTEGRITY  Goal: Skin integrity is maintained or improved  Outcome: Ongoing     Problem: KNOWLEDGE DEFICIT  Goal: Patient/S.O. demonstrates understanding of disease process, treatment plan, medications, and discharge instructions. Outcome: Ongoing     Problem: DISCHARGE BARRIERS  Goal: Patient's continuum of care needs are met  Outcome: Ongoing     Problem:  Activity:  Goal: Ability to tolerate increased activity will improve  Description: Ability to tolerate increased activity will improve  Outcome: Ongoing  Goal: Expression of feelings of increased energy will increase  Description: Expression of feelings of increased energy will increase  Outcome: Ongoing     Problem: Cardiac:  Goal: Ability to maintain an adequate cardiac output will improve  Description: Ability to maintain an adequate cardiac output will improve  Outcome: Ongoing  Goal: Complications related to the disease process, condition or treatment will be avoided or minimized  Description: Complications related to the disease process, condition or treatment will be avoided or minimized  Outcome: Ongoing     Problem: Coping:  Goal: Level of anxiety will decrease  Description: Level of anxiety will decrease  Outcome: Ongoing  Goal: General experience of comfort will improve  Description: General experience of comfort will improve  Outcome: Ongoing     Problem: Health Behavior:  Goal: Ability to manage health-related needs will improve  Description: Ability to manage health-related needs will improve  Outcome: Ongoing     Problem: Safety:  Goal: Ability to remain free from injury will improve  Description: Ability to remain free from injury will improve  Outcome: Ongoing  Goal: Will show no signs and symptoms of excessive bleeding  Description: Will show no signs and symptoms of excessive bleeding  Outcome: Ongoing

## 2021-12-14 NOTE — PROGRESS NOTES
Consult noted. Will change to Palliative care evaluation and team will follow. Thank you for allowing us to participate in the care of your patient.     Olya Robbins PA-C

## 2021-12-14 NOTE — PROGRESS NOTES
Speech Language Pathology  Facility/Department: [facility] PROGRESSIVE CARE  Initial Speech/Language/Cognitive Assessment  Swallow Therapy     NAME: Steffi Jeffries  : 1935   MRN: [id number]  ADMISSION DATE: 2021  ADMITTING DIAGNOSIS: has Hypertension; Hyperlipemia; GERD (gastroesophageal reflux disease); Nausea; Glaucoma; Shortness of breath; Anxiety and depression; Elevated brain natriuretic peptide (BNP) level; Hypothyroidism; Renal cyst; Chest pain; Paroxysmal A-fib ([address] Chest discomfort; Pulmonary hypertension ([address] Squamous cell carcinoma, leg; Poor historian; Lower extremity edema; Hyperparathyroidism ([address] HFrEF (heart failure with reduced ejection fraction) ([facility] Osteopenia of multiple sites; Elevated liver function tests; Generalized weakness; Impaired ambulation; At high risk for falls; Palliative care patient; and On continuous oral anticoagulation on their problem list.    Date of Eval/Treat: 2021   Evaluating Therapist: Tomeka Finney SLP    Assessment:  Completed assessment. Patient exhibited decreased volume of speech, decreased labial movements, and slow, decreased lingual movements during verbalizations. Patient also exhibited decreased select and sustained attention, slow processing, delayed and decreased auditory comprehension (particularly as length and complexity of information increases), delayed verbalizations, decreased immediate/short-term memory, and decreased higher level reasoning/problem solving. It is noted that during evaluation, patient demonstrated ability to verbalize current PCP and pharmacy at independent level. Patient demonstrated difficulty verbalizing conditions in which she takes medications independently. Patient demonstrated difficulty verbalizing appropriate simple solutions to situations that could occur during activities of daily living at independent level. Also re-assessed patient's swallowing function.  Patient exhibited decreased oral prep and decreased oral transit of more solid consistencies, fast oral transit and suspected swallow delay with thin liquids, and sluggish, mild-moderately decreased laryngeal elevation for swallow airway protection. No outward S/S penetration/aspiration was observed with any puree consistency presentation or regular solid consistency trial presented during treatment session this date. Just mild delayed coughs were noted with mildly thick/nectar thick liquid presentations. It is noted that frequent delayed coughs were observed with thin H2O trials. At this time, would recommend continuation soft and bite sized consistency with mildly thick/nectar thick liquids. Recommend meds crushed in pudding/applesauce. If patient receives mouth care prior to intake, okay for ice chips and small sips thin H2O IN BETWEEN MEALS for comfort. Will continue to follow.     Treatment Plan  Requires SLP Intervention: Yes     Recommended Diet and Intervention  Diet Solids Recommendation: Soft and bite sized  Liquid Consistency Recommendation: Mildly thick (nectar thick)  Recommended Form of Meds: Meds crushed in puree as able     Compensatory Swallowing Strategies  Compensatory Swallowing Strategies: Upright as possible for all oral intake;Small bites/sips;Eat/Feed slowly; Alternate solids and liquids; Remain upright for 30-45 minutes after meals    Subjective:  Chart Reviewed: Yes  Patient assessed for rehabilitation services?: Yes  Family / Caregiver Present: No     Objective: Auditory Comprehension  Comprehension:  (While delayed, patient demonstrated ability to answer simple yes/no questions regarding immediate environment and current state of being at independent level. While delayed, patient demonstrated ability to follow simple 1 step commands independently.  Delays were noted and mild break down was observed during yes/no questions of increased complexity and during complex 1 and simple 2 step commands.)     Expression  Primary Mode of Expression:  (Confrontation naming of items in room, structured responsive speech, and responses in natural conversation were all considered to be frequently delayed but appropriate.)     Motor Speech: (Patient exhibited decreased volume of speech, decreased labial movements, and slow, decreased lingual movements during verbalizations.)     Overall Orientation Status:  (Patient demonstrated ability to verbalize name, birthday, age, address, phone number, city, state, hospital, month, and year at independent level. Patient did not verbalize SHASHA or date independently.)     Attention:  (Patient demonstrated decreased select and sustained attention during assessment.)     Memory:  (Patient demonstrated decreased immediate memory with sequences of unrelated numbers/words set up to 5 items without repetitions provided. Patient demonstrated decreased short-term memory with less than 10 minute delay+distractions present.)     Problem Solving:  (It is noted that during evaluation, patient demonstrated ability to verbalize current PCP and pharmacy at independent level. Patient demonstrated difficulty verbalizing conditions in which she takes medications independently. Patient demonstrated difficulty verbalizing appropriate simple solutions to situations that could occur during activities of daily living at independent level.)    Additional Assessment:   Re-assessed patient's swallowing function. Oral transit of puree consistency presentations, administered by SLP, primarily measured 1-2 seconds in length and no oral cavity residue was noted post swallows. With regular solid consistency trials presented by SLP, patient exhibited decreased rotary jaw movement during oral prep. Oral transit of regular solid consistency primarily measured 1-2 seconds in length and min-moderate oral cavity residue was observed post swallows; residue was slow to clear from the mouth with nectar thick liquid washes.  Oral transit of nectar thick liquid presentations, administered independently via cup, primarily measured 1-2 seconds in length. Patient exhibited fast oral transit of thin H2O trials presented independently via cup. Laryngeal elevation during swallow initiation was considered to be sluggish and mild-moderately decreased for swallow airway protection. No outward S/S penetration/aspiration was noted with any puree consistency presentation or regular solid consistency trial presented during treatment session this date. Just mild delayed coughs were observed with mildly thick/nectar thick liquid presentations. It is noted that frequent delayed coughs were observed with thin H2O trials. At this time, would recommend continuation soft and bite sized consistency but with mildly thick/nectar thick liquids. Recommend meds crushed in pudding/applesauce. If patient receives mouth care prior to intake, okay for ice chips and small sips thin H2O IN BETWEEN MEALS for comfort. Will continue to follow.     Electronically signed by BERTO Mckeon on 12/14/2021 at 2:40 PM

## 2021-12-14 NOTE — ACP (ADVANCE CARE PLANNING)
Advance Care Planning     Advance Care Planning Activator (Inpatient)  Conversation Note      Date of ACP Conversation: 12/14/2021     Conversation Conducted with: Swetha [place]    ACP Activator: Brando Tijerina RN        Health Care Decision Maker:     Current Designated Health Care Decision Maker:     Primary Decision Maker: Yvonne Waite - [place] - [phone]      Care Preferences    Ventilation: \"If you were in your present state of health and suddenly became very ill and were unable to breathe on your own, what would your preference be about the use of a ventilator (breathing machine) if it were available to you? \"      Would the patient desire the use of ventilator (breathing machine)?:NO    \"If your health worsens and it becomes clear that your chance of recovery is unlikely, what would your preference be about the use of a ventilator (breathing machine) if it were available to you? \"     Would the patient desire the use of ventilator (breathing machine)?: NO      Resuscitation  \"CPR works best to restart the heart when there is a sudden event, like a heart attack, in someone who is otherwise healthy. Unfortunately, CPR does not typically restart the heart for people who have serious health conditions or who are very sick. \"    \"In the event your heart stopped as a result of an underlying serious health condition, would you want attempts to be made to restart your heart (answer \"yes\" for attempt to resuscitate) or would you prefer a natural death (answer \"no\" for do not attempt to resuscitate)? \" NO

## 2021-12-14 NOTE — CONSULTS
Palliative Care:      Pt resting in bed when SN arrived. Alert, but drowsy, oriented to person/place/time/situation. Pt states she feels very weak and tired, but other than that she feels ok. Past Medical History:        Past Medical History:   Diagnosis Date    Arthritis     Atrial fibrillation ([facility]     Elevated TSH 4/19/2019    GERD (gastroesophageal reflux disease)     Hyperlipidemia     Hypertension     Tremor     to right hand       Advance Directives:    none     Pain/Other Symptoms:    none    Activity:  Up with assistance and walker           Psychological/Spiritual:      Good spiritual support          Plan:    Pt to discharge to SNF either today or tomorrow. Review of any needed services:  No family present at time of visit. Per previous notes, family is interested in hospice services.              Electronically signed by Vega Drew RN on 12/14/2021 at 1:13 PM

## 2021-12-14 NOTE — PROGRESS NOTES
Cardiology Progress Note Jesu Barron MD      Patient:  Naon Paulson  [id number]    Patient Active Problem List    Diagnosis Date Noted    Palliative care patient 12/14/2021     Priority: Low    Generalized weakness 12/11/2021     Priority: Low    Impaired ambulation 12/11/2021     Priority: Low    At high risk for falls 12/11/2021     Priority: Low    Hypokalemia 12/11/2021     Priority: Low    Elevated liver function tests 12/10/2021     Priority: Low    Osteopenia of multiple sites 07/29/2021     Priority: Low    Hyperparathyroidism ([address] 06/25/2021     Priority: Low    HFrEF (heart failure with reduced ejection fraction) ([address] 06/25/2021     Priority: Low    Lower extremity edema 06/14/2021     Priority: Low    Poor historian 03/12/2021     Priority: Low    Squamous cell carcinoma, leg 05/29/2020     Priority: Low    Pulmonary hypertension ([address] 01/24/2020     Priority: Low    Chest discomfort      Priority: Low    Chest pain 05/01/2019     Priority: Low    Paroxysmal A-fib ([address] 05/01/2019     Priority: Low    Elevated brain natriuretic peptide (BNP) level 04/19/2019     Priority: Low    Hypothyroidism 04/19/2019     Priority: Low    Renal cyst 04/19/2019     Priority: Low    Shortness of breath 08/24/2018     Priority: Low    Anxiety and depression 08/24/2018     Priority: Low    Glaucoma 07/10/2016     Priority: Low    Hypertension 07/09/2016     Priority: Low    Hyperlipemia 07/09/2016     Priority: Low    GERD (gastroesophageal reflux disease) 07/09/2016     Priority: Low    Nausea 07/09/2016     Priority: Low       Admit Date:  12/9/2021    Admission Problem List: Present on Admission:   (Resolved) Atrial fibrillation with RVR ([address]   Hypertension   Hyperlipemia   Shortness of breath   Anxiety and depression   Elevated brain natriuretic peptide (BNP) level   Hypothyroidism   Paroxysmal A-fib (HCC)   Pulmonary hypertension (HCC)   Lower extremity edema   Osteopenia of No diastolic murmur is present. No friction rub. No gallop. No S3 or S4 sounds. Pulmonary:      Effort: Pulmonary effort is normal. No respiratory distress. Breath sounds: Normal breath sounds. No wheezing or rales. Chest:      Chest wall: No tenderness. Abdominal:      General: Bowel sounds are normal. There is no distension. Palpations: Abdomen is soft. There is no mass. Tenderness: There is no abdominal tenderness. There is no guarding or rebound. Hernia: No hernia is present. Musculoskeletal:         General: Normal range of motion. Skin:     General: Skin is warm. Coloration: Skin is not pale. Findings: No rash. Neurological:      Mental Status: She is alert. Cranial Nerves: No cranial nerve deficit. Deep Tendon Reflexes: Reflexes normal.                 Lab Data:  CBC:   Recent Labs     12/12/21 0229 12/13/21 0150 12/14/21 0229   WBC 8.9 9.2 10.0   HGB 9.3* 10.1* 10.3*   HCT 33.0* 35.4* 35.8*   MCV 94.6 94.1 93.2   PLT 95* 89* 85*     BMP:   Recent Labs     12/11/21  1347 12/11/21  1347 12/12/21 0229 12/13/21 0150 12/14/21 0229      < > 143 141 144   K 3.3*   < > 3.2* 3.4* 4.0   CL 94*   < > 95* 90* 89*   CO2 33*   < > 38* 40* 39*   PHOS 2.9  --   --   --   --    BUN 27*   < > 27* 22 24*   CREATININE 0.9   < > 0.9 0.8 0.9    < > = values in this interval not displayed. LIVER PROFILE:   Recent Labs     12/12/21 0229 12/13/21 0150 12/14/21 0229   * 128* 76*   * 381* 288*   BILITOT 1.4* 1.2 1.3*   ALKPHOS 149* 150* 149*     PT/INR: No results for input(s): PROTIME, INR in the last 72 hours. APTT: No results for input(s): APTT in the last 72 hours. BNP:  No results for input(s): BNP in the last 72 hours.   CK, CKMB, Troponin: @LABRCNT (CKTOTAL:3, CKMB:3, TROPONINI:3)@    IMAGING:  Echo Complete    Result Date: 12/11/2021  Transthoracic Echocardiography Report (TTE)  Demographics   Patient Name Danika Coe  Date of Study There appears be moderate ectasia of the thoracic aorta. No pneumothorax is identified. A small left pleural effusion cannot be excluded. There is previous right shoulder arthroplasty. Stable chest x-ray with no acute findings. There is marked elevation of the left hemidiaphragm, no obvious lung consolidation. There is mediastinal shift to the right as before. No pneumothorax is identified. Moderate thoracic aortic ectasia and mild cardiomegaly. No evidence of CHF. Signed by Dr Juan J Walton. [facility]        Assessment and Plan: This is a 82 y. o. year old female with past medical history of paroxysmal atrial fibrillation on Eliquis, EF 55 %, moderate to severe pulmonary hypertension, moderate mitral regurgitation, moderate to severe tricuspid regurgitation with probable grade 2 diastolic dysfunction, early dementia with memory loss presenting with progressive leg swelling, shortness of breath, altered mental status and rapid atrial fibrillation. 1.  Remains in atrial fibrillation. Unsuccessful attempted cardioversion with IV amiodarone bolus. At this time will continue rate control strategy. Change Lopressor to Toprol-XL 75 mg twice daily. Maintain on Betapace 120 mg twice daily. Continue Bumex 2 mg twice daily. 2.  Can follow-up as an outpatient with cardiology. If remains in atrial fibrillation in 4 weeks, will plan for outpatient DC cardioversion.     Ame Subramanian MD, MD 12/14/2021 1:40 PM

## 2021-12-14 NOTE — CARE COORDINATION
Pt's dtr Prachi Mathias contacted SW requesting update on pt's status and request for P/C services rather than skilled therapy at the SNF. SW spoke with Dtr Vivek Guy and informed P/C consult is entered and will eval for eligibility of [facility] services and hospice services. SW explained with hospice services the pt will require 24/7 CG in the home and informed that is not possible and pt/family would still pursue SNF placement with hospice rather than skilled services. SW also discussed pt medicare will only cover hospice services at the SNF and not room and board costs so determination of payor source or self pay would be discussed with pt/family. Informed Camilla with admissions at [facility] of pt/family requests.    P[address]: [phone] K:[phone]

## 2021-12-14 NOTE — DISCHARGE SUMMARY
[place], [place], [address]    DEPARTMENT OF HOSPITALIST MEDICINE      DISCHARGE SUMMARY:      PATIENT NAME:  Aime Mcdonald  :  1935  MRN:  [id number]    Admission Date:   2021  7:19 PM Attending: Dinorah Givens MD   Discharge Date:   12/15/2021              PCP: Marky Nails MD  Length of Stay: 6 days     Chief Complaint on Admission:   Chief Complaint   Patient presents with    Respiratory Distress     rapid heartbeat; hx of chf, A fib; sees Md Suzie Hurley       Consultants:     IP CONSULT TO CARDIOLOGY  IP CONSULT TO SOCIAL WORK  IP CONSULT TO CASE MANAGEMENT  PALLIATIVE CARE EVAL  IP CONSULT TO HOSPICE       Discharge Problem List:   Principal Problem:    Paroxysmal A-fib ([address]  Active Problems:    Hypertension    Hyperlipemia    Shortness of breath    Anxiety and depression    Elevated brain natriuretic peptide (BNP) level    Hypothyroidism    Pulmonary hypertension ([address]    Lower extremity edema    HFrEF (heart failure with reduced ejection fraction) (HCC)    Osteopenia of multiple sites    Elevated liver function tests    Generalized weakness    Impaired ambulation    At high risk for falls    Palliative care patient    On continuous oral anticoagulation  Resolved Problems:    Atrial fibrillation with RVR ([address]    Hypokalemia    CUMULATIVE  HOSPITAL  COURSE  AND  TREATMENT:    12/15/2021  Patient is feeling better today. Her A. fib is under control. She has been cleared by cardiology to be discharged with meds adjustments as reflected in their note from today as below: Potassium level is in normal limits after aggressive potassium replacement. She is being discharged to skilled nursing facility in stable condition with instructions to follow-up closely with cardiology as an outpatient. I would request the attending physician in skilled nursing facility to monitor closely on her labs and electrolytes on an outpatient basis.       2021  Patient is stable from cardiology standpoint to be discharged. Patient had a bed at skilled nursing facility and patient was all set for transfer when the family requested transfer under hospice care which is being worked out by case management. Her discharge is being held today as nursing facility has withdrawn skilled nursing facility bed! 12/13/2021  Patient is tolerating sotalol well. Sotalol dose increased as per cardiology. Potassium was borderline low which is being supplemented. Case management arranged for SNF bed in a.m. COVID-19 screening test ordered.     12/12/2021  Patient responding well to sotalol. A. fib is under control. LFTs are downtrending. Potassium was low which is being supplemented.        12/11/2021  Patient seen and evaluated by cardiologist and IV amiodarone discontinued as patient had elevated LFTs. Patient has been started on sotalol. Patient being followed closely by cardiology. PT/OT evaluation ordered. Speech therapy evaluation ordered as well.        12/10/2021  82year-old lady with a history of atrial fibrillation, GERD, hypertension, hyperlipidemia, arthritis, presented to the hospital 12/9 with concerns of respiratory distress. Patient stated she is been feeling increased palpitation lately as well as feeling overall weak. Also noted to be retaining fluids. In the emergency room was in rapid ventricular rhythm, initiated on amiodarone drip, and admitted for further evaluation.     12/9/2021  History of Present Illness   Patient has a long standing history of afib with rvr and on anticoagulation   She has had increased palpitations recently   Uncontrolled heart rate   She has had increased fluid retention   Feels weak all over   She cannot lie flat she feels like she is choking   No appetite  She does not want to leave her house and go anywhere.         Review of Systems: 12 point system review negative except as suggested above.         OBJECTIVE:  /76   Pulse 75   Temp 96.7 °F (35.9 °C) (Temporal)   Resp 16   Ht 5' 6\" (1.676 m)   Wt 170 lb (77.1 kg)   SpO2 99%   BMI 27.44 kg/m²       Heart:  Irregularly irregular rhythm   Lungs: Bilateral decreased air entry, scattered bilateral rales   Abdomen: Soft, non-tender   Extremities: + trace bilateral pitting edema   Neurologic: Alert and oriented   Skin: Warm and dry          Laboratory Data:  Recent Labs     12/13/21  0150 12/14/21  0229 12/15/21  0454   WBC 9.2 10.0 8.0   HGB 10.1* 10.3* 9.6*   PLT 89* 85* 102*     Recent Labs     12/13/21  0150 12/14/21  0229 12/15/21  0454    144 144   K 3.4* 4.0 3.9   CL 90* 89* 88*   CO2 40* 39* 42*   BUN 22 24* 33*   CREATININE 0.8 0.9 1.1*   GLUCOSE 113* 128* 98     Recent Labs     12/13/21  0150 12/14/21  0229 12/15/21  0454   * 76* 59*   * 288* 221*   BILITOT 1.2 1.3* 1.3*   ALKPHOS 150* 149* 144*     Troponin T: No results for input(s): TROPONINI in the last 72 hours. Pro-BNP: No results for input(s): BNP in the last 72 hours. INR: No results for input(s): INR in the last 72 hours. UA:No results for input(s): NITRITE, COLORU, PHUR, LABCAST, WBCUA, RBCUA, MUCUS, TRICHOMONAS, YEAST, BACTERIA, CLARITYU, SPECGRAV, LEUKOCYTESUR, UROBILINOGEN, BILIRUBINUR, BLOODU, GLUCOSEU, AMORPHOUS in the last 72 hours. Invalid input(s): Azell Govern  A1C: No results for input(s): LABA1C in the last 72 hours. ABG:No results for input(s): PHART, MJB4WQY, PO2ART, JGS6ZWF, BEART, HGBAE, M2OHFEPH, CARBOXHGBART in the last 72 hours. Impressions of imaging performed in 48 hours before discharge:    No results found.      Current Discharge Medication List           Details   metoprolol succinate (TOPROL XL) 25 MG extended release tablet Take 1 tablet by mouth 2 times daily  Qty: 60 tablet, Refills: 0      bumetanide (BUMEX) 1 MG tablet Take 1 tablet by mouth 2 times daily  Qty: 60 tablet, Refills: 0      potassium chloride (KLOR-CON M) 20 MEQ extended release tablet Take 1 tablet by mouth daily  Qty: 30 tablet, Refills: 0      sotalol (BETAPACE) 120 MG tablet Take 1 tablet by mouth 2 times daily  Qty: 60 tablet, Refills: 0      miconazole (MICOTIN) 2 % powder Apply topically 2 times daily. Qty: 45 g, Refills: 0              Details   apixaban (ELIQUIS) 5 MG TABS tablet Take 5 mg by mouth 2 times daily      lisinopril (PRINIVIL;ZESTRIL) 20 MG tablet Take 20 mg by mouth 2 times daily      vitamin D (ERGOCALCIFEROL) 1.25 MG (16075 UT) CAPS capsule TAKE 1 CAPSULE BY MOUTH ONE TIME PER WEEK  Qty: 12 capsule, Refills: 3    Associated Diagnoses: Vitamin D deficiency      atorvastatin (LIPITOR) 40 MG tablet TAKE 1 TABLET DAILY  Qty: 90 tablet, Refills: 0    Associated Diagnoses: Mixed hyperlipidemia      LUMIGAN 0.01 % SOLN ophthalmic drops INSTILL 1 DROP INTO BOTH EYES EVERY EVENING AS DIRECTED      escitalopram (LEXAPRO) 20 MG tablet TAKE 1 TABLET BY MOUTH EVERY DAY  Qty: 90 tablet, Refills: 3    Associated Diagnoses: Anxiety and depression      omeprazole (PRILOSEC) 40 MG delayed release capsule TAKE 1 CAPSULE DAILY  Qty: 90 capsule, Refills: 3    Associated Diagnoses: Gastroesophageal reflux disease without esophagitis      aspirin 81 MG chewable tablet Take 1 tablet by mouth daily  Qty: 30 tablet, Refills: 3      dorzolamide-timolol (COSOPT) 22.3-6.8 MG/ML ophthalmic solution INSTILL 1 DROP INTO BOTH EYES TWICE A DAY AS DIRECTED  Refills: 3      melatonin 3 MG TABS tablet Take 3 mg by mouth daily             ISSUES TO BE ADDRESSED AT HOSPITAL FOLLOW-UP VISIT:    Advised patient to follow-up closely with PCP upon discharge for management of chronic medical issues  Please see the detailed discharge directions delivered from earlier today! Condition on Discharge: gradually improving  Discharge Disposition: Skilled Facility    Recommended Follow Up:   Joseph Garduno MD  [address] [facility] [address]  [phone]    On 12/29/2021  9:15am    Followup Appointments Scheduled at Time of Discharge:  Future Appointments   Date Time Provider Mary Burk   12/29/2021  9:15 AM MD MINH Ray [id number]   3/9/2022  1:15 PM MD MINH Ray [facility]        Discharge Instructions:   Please see the discharge paperwork. Patient was seen at bedside today, and the examination shows improvement since yesterday. Detailed discharge directions delivered to the patient by myself and our nursing staff, who verbalizes understanding and is very happy and satisfied with the plan. Patient has been advised to continue all medications as prescribed and advised, and f/u with PCP within 1 week. Patient is stable from medical standpoint to be discharged. Total time spent during patient evaluation and assessment, discussion with the nurse/family, addressing discharge medications/scripts and coordination of care for safe discharge was in excess of 35 minutes.       Signed Electronically:    Boogie Lau MD  11:59 AM 12/15/2021

## 2021-12-15 NOTE — PROGRESS NOTES
Report called to [facility] LPN.  Electronically signed by Lisa Hinton RN on 12/15/2021 at 2:20 PM

## 2021-12-15 NOTE — PROGRESS NOTES
Cardiology Progress Note Nathaniel Peters MD      Patient:  Yassine Bergeron  [id number]    Patient Active Problem List    Diagnosis Date Noted    Palliative care patient 12/14/2021     Priority: Low    On continuous oral anticoagulation 12/14/2021     Priority: Low    Generalized weakness 12/11/2021     Priority: Low    Impaired ambulation 12/11/2021     Priority: Low    At high risk for falls 12/11/2021     Priority: Low    Elevated liver function tests 12/10/2021     Priority: Low    Osteopenia of multiple sites 07/29/2021     Priority: Low    Hyperparathyroidism ([address] 06/25/2021     Priority: Low    HFrEF (heart failure with reduced ejection fraction) ([address] 06/25/2021     Priority: Low    Lower extremity edema 06/14/2021     Priority: Low    Poor historian 03/12/2021     Priority: Low    Squamous cell carcinoma, leg 05/29/2020     Priority: Low    Pulmonary hypertension ([address] 01/24/2020     Priority: Low    Chest discomfort      Priority: Low    Chest pain 05/01/2019     Priority: Low    Paroxysmal A-fib ([facility] 05/01/2019     Priority: Low    Elevated brain natriuretic peptide (BNP) level 04/19/2019     Priority: Low    Hypothyroidism 04/19/2019     Priority: Low    Renal cyst 04/19/2019     Priority: Low    Shortness of breath 08/24/2018     Priority: Low    Anxiety and depression 08/24/2018     Priority: Low    Glaucoma 07/10/2016     Priority: Low    Hypertension 07/09/2016     Priority: Low    Hyperlipemia 07/09/2016     Priority: Low    GERD (gastroesophageal reflux disease) 07/09/2016     Priority: Low    Nausea 07/09/2016     Priority: Low       Admit Date:  12/9/2021    Admission Problem List: Present on Admission:   (Resolved) Atrial fibrillation with RVR ([facility]   Hypertension   Hyperlipemia   Shortness of breath   Anxiety and depression   Elevated brain natriuretic peptide (BNP) level   Hypothyroidism   Paroxysmal A-fib (HCC)   Pulmonary hypertension (HCC)   Lower extremity edema   Osteopenia of multiple sites   HFrEF (heart failure with reduced ejection fraction) (HCC)   Elevated liver function tests   Generalized weakness   Impaired ambulation   At high risk for falls   (Resolved) Hypokalemia   Palliative care patient   On continuous oral anticoagulation      Cardiac Specific Data:  Specialty Problems        Cardiology Problems    Hyperlipemia        Hypertension        Chest pain        * (Principal) Paroxysmal A-fib (HCC)        Pulmonary hypertension (HCC)        HFrEF (heart failure with reduced ejection fraction) (HCC)            1.  Paroxysmal atrial fibrillation, on Eliquis, ejection fraction 45 to 50%, moderate to severe pulmonary hypertension. 2.  Early dementia with memory loss. 3.  Abnormal liver function studies. Subjective:  Ms. Jessica Navarro converted back to sinus rhythm overnight. No significant issues overnight. Remains mostly in bed. Objective:   BP (!) 151/93   Pulse 65   Temp 98 °F (36.7 °C) (Temporal)   Resp 16   Ht 5' 6\" (1.676 m)   Wt 170 lb (77.1 kg)   SpO2 98%   BMI 27.44 kg/m²       Intake/Output Summary (Last 24 hours) at 12/15/2021 1038  Last data filed at 12/15/2021 0600  Gross per 24 hour   Intake    Output 500 ml   Net -500 ml       Prior to Admission medications    Medication Sig Start Date End Date Taking? Authorizing Provider   metoprolol succinate (TOPROL XL) 25 MG extended release tablet Take 3 tablets by mouth 2 times daily 12/14/21 1/13/22 Yes Sánchez Alejandra MD   sotalol (BETAPACE) 120 MG tablet Take 1 tablet by mouth 2 times daily 12/14/21 1/13/22 Yes Sánchez Alejandra MD   miconazole (MICOTIN) 2 % powder Apply topically 2 times daily.  12/14/21  Yes Sánchez Alejandra MD   bumetanide (BUMEX) 2 MG tablet Take 1 tablet by mouth 2 times daily 12/14/21 1/13/22 Yes Sánchez Alejandra MD   potassium chloride (KLOR-CON M) 20 MEQ extended release tablet Take 1 tablet by mouth daily 12/15/21 1/14/22 Yes Sánchez Alejandra MD   apixaban (ELIQUIS) 5 MG TABS tablet Take 5 mg by mouth 2 times daily   Yes Historical Provider, MD   lisinopril (PRINIVIL;ZESTRIL) 20 MG tablet Take 20 mg by mouth 2 times daily   Yes Historical Provider, MD   vitamin D (ERGOCALCIFEROL) 1.25 MG (31680 UT) CAPS capsule TAKE 1 CAPSULE BY MOUTH ONE TIME PER WEEK 11/24/21  Yes Miquel Schlatter, PA   atorvastatin (LIPITOR) 40 MG tablet TAKE 1 TABLET DAILY 9/9/21  Yes Ethan Collado MD   LUMIGAN 0.01 % SOLN ophthalmic drops INSTILL 1 DROP INTO BOTH EYES EVERY EVENING AS DIRECTED 5/30/21  Yes Historical Provider, MD   escitalopram (LEXAPRO) 20 MG tablet TAKE 1 TABLET BY MOUTH EVERY DAY 4/5/21  Yes Marlin Rose MD   omeprazole (PRILOSEC) 40 MG delayed release capsule TAKE 1 CAPSULE DAILY 2/1/21  Yes Marlin Rose MD   aspirin 81 MG chewable tablet Take 1 tablet by mouth daily 5/4/19  Yes Will Scott MD   dorzolamide-timolol (COSOPT) 22.3-6.8 MG/ML ophthalmic solution INSTILL 1 DROP INTO BOTH EYES TWICE A DAY AS DIRECTED 4/11/18  Yes Historical Provider, MD   melatonin 3 MG TABS tablet Take 3 mg by mouth daily    Historical Provider, MD        bumetanide  1 mg Oral BID    potassium bicarb-citric acid  20 mEq Oral Daily    metoprolol succinate  75 mg Oral BID    miconazole   Topical BID    sotalol  120 mg Oral BID    atorvastatin  40 mg Oral Nightly    pantoprazole  40 mg Oral QAM AC    lisinopril  20 mg Oral BID    latanoprost  1 drop Both Eyes Nightly    dorzolamide  1 drop Both Eyes BID    And    timolol  1 drop Both Eyes BID    apixaban  5 mg Oral BID    aspirin  81 mg Oral Daily    escitalopram  20 mg Oral Daily    melatonin  3 mg Oral Daily    sodium chloride flush  5-40 mL IntraVENous 2 times per day       TELEMETRY: Sinus     Physical Exam:      Physical Exam  Constitutional:       Appearance: She is obese.    Cardiovascular:      Comments: No JVD  No edema  No significant systolic or diastolic murmurs noted  Abdominal:      Comments: No palpable organomegaly Lab Data:  CBC:   Recent Labs     12/13/21  0150 12/14/21  0229 12/15/21  0454   WBC 9.2 10.0 8.0   HGB 10.1* 10.3* 9.6*   HCT 35.4* 35.8* 33.1*   MCV 94.1 93.2 93.5   PLT 89* 85* 102*     BMP:   Recent Labs     12/13/21  0150 12/14/21  0229 12/15/21  0454    144 144   K 3.4* 4.0 3.9   CL 90* 89* 88*   CO2 40* 39* 42*   BUN 22 24* 33*   CREATININE 0.8 0.9 1.1*     LIVER PROFILE:   Recent Labs     12/13/21  0150 12/14/21  0229 12/15/21  0454   * 76* 59*   * 288* 221*   BILITOT 1.2 1.3* 1.3*   ALKPHOS 150* 149* 144*     PT/INR: No results for input(s): PROTIME, INR in the last 72 hours. APTT: No results for input(s): APTT in the last 72 hours. BNP:  No results for input(s): BNP in the last 72 hours. CK, CKMB, Troponin: @LABRCNT (CKTOTAL:3, CKMB:3, TROPONINI:3)@    IMAGING:  Echo Complete    Result Date: 12/11/2021  Transthoracic Echocardiography Report (TTE)  Demographics   Patient Name [facility]  Date of Study        12/10/2021               B   MRN          [id number]          Gender               Female   Date of      1935      Room Number          MHL-0709  Birth   Age          82 year(s)   Height:      66 inches       Referring Physician  Risa Linn MD   Weight:      170 pounds      Sonographer          Elisa Soto [facility]   BSA:         1.87 m^2        Interpreting         Kaylen Lara MD                               Physician   BMI:         27.44 kg/m^2  Procedure Type of Study   TTE procedure:ECHO NO CONTRAST WITH DOP/COLR. Study Location: Echo Lab Technical Quality: Poor visualization due to poor acoustical window. Patient Status: Inpatient Rhythm: Within normal limits BP: 153/105 mmHg Indications:Atrial fibrillation. Conclusions   Summary  Mitral valve leaflets are mildly thickened with preserved leaflet  mobility. Moderate mitral regurgitation is present. Mildly thickened aortic valve leaflets with preserved leaflet mobility.   Tricuspid valve is structurally normal.  Moderately severe tricuspid regurgitation with estimated RVSP of 65 mm Hg. Normal left ventricular size with preserved LV function and an estimated  ejection fraction of approximately 55-60%. Normal left ventricular size and function. No regional wall motion abnormalities. Mildly enlarged right ventricle cavity with decreased function   Signature   ----------------------------------------------------------------  Electronically signed by Queen Jorge L CHRISTINE(Interpreting  physician) on 12/11/2021 08:45 AM  ----------------------------------------------------------------   Findings   Mitral Valve  Mitral valve leaflets are mildly thickened with preserved leaflet  mobility. Moderate mitral regurgitation is present. Aortic Valve  Mildly thickened aortic valve leaflets with preserved leaflet mobility. Tricuspid Valve  Tricuspid valve is structurally normal.  Moderately severe tricuspid regurgitation with estimated RVSP of 65 mm Hg. Pulmonic Valve  The pulmonic valve was not well visualized . Left Atrium  Normal size left atrium. Left Ventricle  Normal left ventricular size with preserved LV function and an estimated  ejection fraction of approximately 55-60%. Normal left ventricular size and function. No regional wall motion abnormalities. Right Atrium  Normal right atrial dimension with no evidence of thrombus or mass noted. Right Ventricle  Mildly enlarged right ventricle cavity with decreased function   Pericardial Effusion  No evidence of significant pericardial effusion is noted. Pleural Effusion  No evidence of pleural effusion.   Doppler Measurements:                                 MV Peak E-Wave: 110 cm/s  TR Velocity:355 cm/s          MV Peak A-Wave: 68.1 cm/s  TR Gradient:50.41 mmHg        MV E/A Ratio: 1.62 %  Estimated RAP:15 mmHg         MV Peak Gradient: 4.84 mmHg  RVSP:65 mmHg      US LIVER    Result Date: 12/10/2021   LIVER 12/10/2021 8:20 AM History: Elevated liver function tests. The appearance of the liver is compatible with mild diffuse fatty infiltration. No focal liver lesion or bile duct dilation is seen. The gallbladder is absent. The common bile duct diameter is normal at 3 mm. The pancreas is obscured by bowel gas and cannot be visualized. The visualized portion of the upper aorta is of normal size. The right kidney shows no abnormality and measures 87 x 48 x 39 mm.    1. Fatty liver. 2. No acute abnormality is seen. Signed by Dr Marcia Spaulding    XR CHEST PORTABLE    Result Date: 12/9/2021  EXAMINATION: XR CHEST PORTABLE 12/9/2021 9:24 PM HISTORY: Shortness of breath. Report: A portable upright frontal view the chest was obtained. COMPARISON: Chest x-ray 9/8/2019. There is marked elevation of the left hemidiaphragm which appears stable. Lung volumes are low, this appears similar to the previous chest x-ray. There is shift of the mediastinal structures to the right and mild cardiomegaly is present. There appears be moderate ectasia of the thoracic aorta. No pneumothorax is identified. A small left pleural effusion cannot be excluded. There is previous right shoulder arthroplasty. Stable chest x-ray with no acute findings. There is marked elevation of the left hemidiaphragm, no obvious lung consolidation. There is mediastinal shift to the right as before. No pneumothorax is identified. Moderate thoracic aortic ectasia and mild cardiomegaly. No evidence of CHF. Signed by Dr Stacy Darnell. [facility]        Assessment and Plan: This is a 82 y. o. year old female with past medical history of paroxysmal atrial fibrillation on Eliquis, EF 55 %, moderate to severe pulmonary hypertension, moderate mitral regurgitation, moderate to severe tricuspid regurgitation with probable grade 2 diastolic dysfunction, early dementia with memory loss presenting with progressive leg swelling, shortness of breath, altered mental status and rapid atrial fibrillation.     1. Converted back to sinus rhythm. Currently on Betapace 120 mg twice daily. EKG reviewed. QTC less than 500 ms when checked manually. Would maintain on Betapace at current dose currently. Reduce Toprol-XL to 25 mg twice daily. 2.  Mild decrease in BUN noted. Reduce Bumex to 1 mg p.o. twice daily. 3.  Patient can follow-up as an outpatient with cardiology.     Andres Rader MD, MD 12/15/2021 10:38 AM

## 2021-12-15 NOTE — PROGRESS NOTES
Comprehensive Nutrition Assessment    Type and Reason for Visit:  Initial, RD Nutrition Re-Screen/LOS    Nutrition Recommendations/Plan: continue current POC    Nutrition Assessment:  Pt appears adequately nourished, but is at risk for nutritional compromise d/t variable po intake and SOA. Aware family discussing Hospice care    Malnutrition Assessment:  Malnutrition Status:  No malnutrition    Context:  Acute Illness     Findings of the 6 clinical characteristics of malnutrition:  Energy Intake:  Mild decrease in energy intake (Comment)  Weight Loss:  No significant weight loss     Body Fat Loss:  No significant body fat loss     Muscle Mass Loss:  No significant muscle mass loss    Fluid Accumulation:  1 - Mild Extremities   Strength:  Not Performed    Nutrition Related Findings:  adequately nourished      Wounds:  None       Current Nutrition Therapies:    ADULT DIET; Dysphagia - Soft and Bite Sized; Mildly Thick (Nectar)    Anthropometric Measures:  · Height: 5' 6\" (167.6 cm)  · Current Body Weight:  (NA)   · Admission Body Weight: 170 lb (77.1 kg)    · Usual Body Weight: 169 lb 8 oz (76.9 kg) (11/2021)     · Ideal Body Weight: 130 lbs; % Ideal Body Weight     · BMI:    · Adjusted Body Weight:  ; No Adjustment   · BMI Categories: Overweight (BMI 25.0-29. 9)       Nutrition Diagnosis:   · Inadequate oral intake related to acute injury/trauma, impaired respiratory function, swallowing difficulty, biting/chewing (masticatory) difficulty as evidenced by intake 0-25%, intake 26-50%, intake 51-75%, swallow study results      Nutrition Interventions:   Food and/or Nutrient Delivery:  Continue Current Diet  Nutrition Education/Counseling:  No recommendation at this time   Coordination of Nutrition Care:  Continue to monitor while inpatient, Speech Therapy    Goals:  po intake 50%.   Weight stable       Nutrition Monitoring and Evaluation:   Behavioral-Environmental Outcomes:  None Identified   Food/Nutrient Intake Outcomes:  Food and Nutrient Intake  Physical Signs/Symptoms Outcomes:  Biochemical Data, Chewing or Swallowing, Weight, Skin, Nutrition Focused Physical Findings, Fluid Status or Edema     Discharge Planning:     Too soon to determine     Electronically signed by Rosaline Matson MS, RD, LD on 12/15/21 at 10:40 AM CST    Contact: [phone]

## 2021-12-15 NOTE — PROGRESS NOTES
Progress Note  Date:12/14/2021       Room:0709/709-02  Patient Kika Kelly     Date of Birth:[birth year]     Age:86 y.o. Subjective. I saw the patient in my morning rounds. She is slowly improving and wants to get out of the hospital.    [facility] course:      12/14/2021  Patient is stable from cardiology standpoint to be discharged. Patient had a bed at skilled nursing facility and patient was all set for transfer when the family requested transfer under hospice care which is being worked out by case management. Her discharge is being held today as nursing facility has withdrawn skilled nursing facility bed! 12/13/2021  Patient is tolerating sotalol well. Sotalol dose increased as per cardiology. Potassium was borderline low which is being supplemented. Case management arranged for SNF bed in a.m. COVID-19 screening test ordered. 12/12/2021  Patient responding well to sotalol. A. fib is under control. LFTs are downtrending. Potassium was low which is being supplemented. 12/11/2021  Patient seen and evaluated by cardiologist and IV amiodarone discontinued as patient had elevated LFTs. Patient has been started on sotalol. Patient being followed closely by cardiology. PT/OT evaluation ordered. Speech therapy evaluation ordered as well.      12/10/2021  82year-old lady with a history of atrial fibrillation, GERD, hypertension, hyperlipidemia, arthritis, presented to the hospital 12/9 with concerns of respiratory distress. Patient stated she is been feeling increased palpitation lately as well as feeling overall weak. Also noted to be retaining fluids. In the emergency room was in rapid ventricular rhythm, initiated on amiodarone drip, and admitted for further evaluation.     12/9/2021  History of Present Illness   Patient has a long standing history of afib with rvr and on anticoagulation   She has had increased palpitations recently   Uncontrolled heart rate She has had increased fluid retention   Feels weak all over   She cannot lie flat she feels like she is choking   No appetite  She does not want to leave her house and go anywhere. Review of Systems: 12 point system review negative except as suggested above. Objective         Vitals Last 24 Hours:  TEMPERATURE:  Temp  Av.9 °F (36.6 °C)  Min: 97.8 °F (36.6 °C)  Max: 98 °F (36.7 °C)  RESPIRATIONS RANGE: Resp  Av.3  Min: 16  Max: 18  PULSE OXIMETRY RANGE: SpO2  Av %  Min: 94 %  Max: 98 %  PULSE RANGE: Pulse  Av  Min: 102  Max: 108  BLOOD PRESSURE RANGE: Systolic (12THP), NWC:677 , Min:99 , LWS:673   ; Diastolic (20VBW), CDJ:53, Min:76, Max:86    I/O (24Hr):     Intake/Output Summary (Last 24 hours) at 2021 1820  Last data filed at 2021 0545  Gross per 24 hour   Intake    Output 600 ml   Net -600 ml       PHYSICAL  EXAMINATION:    LYNNETTE:  Awake, alert, oriented x 3, patient appears tired and fatigued   Head/Eyes:  Normocephalic, atraumatic, EOMI and PERRLA bilaterally   Respiratory:   Bilateral decreased air entry in both lung fields, mild B/L crackles, symmetric expansion of chest   Cardiovascular:   Irregularly irregular, S1+S2+0   Abdomen:   Soft, non-tender, bowel sounds +ve, no organomegaly   Extremities: Moves all, decreased range of motion, no edema   Neurologic: Awake, alert, oriented x 3, cranial nerves II-XII intact, no focal neurological deficits, sensory system intact   Psychiatric: Normal mood, non-suicidal       Labs/Imaging/Diagnostics    Labs:  CBC:  Recent Labs     21  0150 21   WBC 8.9 9.2 10.0   RBC 3.49* 3.76* 3.84*   HGB 9.3* 10.1* 10.3*   HCT 33.0* 35.4* 35.8*   MCV 94.6 94.1 93.2   RDW 16.7* 16.5* 16.6*   PLT 95* 89* 85*     CHEMISTRIES:  Recent Labs     21    141 144   K 3.2* 3.4* 4.0   CL 95* 90* 89*   CO2 38* 40* 39*   BUN 27* 22 24*   CREATININE 0.9 0.8 0.9   GLUCOSE 113* 113* 128*     PT/INR:  No results for input(s): PROTIME, INR in the last 72 hours. APTT:  No results for input(s): APTT in the last 72 hours. LIVER PROFILE:  Recent Labs     12/12/21 0229 12/13/21  0150 12/14/21 0229   * 128* 76*   * 381* 288*   BILITOT 1.4* 1.2 1.3*   ALKPHOS 149* 150* 149*       Imaging Last 24 Hours:  US LIVER    Result Date: 12/10/2021  US LIVER 12/10/2021 8:20 AM History: Elevated liver function tests. The appearance of the liver is compatible with mild diffuse fatty infiltration. No focal liver lesion or bile duct dilation is seen. The gallbladder is absent. The common bile duct diameter is normal at 3 mm. The pancreas is obscured by bowel gas and cannot be visualized. The visualized portion of the upper aorta is of normal size. The right kidney shows no abnormality and measures 87 x 48 x 39 mm.    1. Fatty liver. 2. No acute abnormality is seen. Signed by Dr Frances Silva    XR CHEST PORTABLE    Result Date: 12/9/2021  EXAMINATION: XR CHEST PORTABLE 12/9/2021 9:24 PM HISTORY: Shortness of breath. Report: A portable upright frontal view the chest was obtained. COMPARISON: Chest x-ray 9/8/2019. There is marked elevation of the left hemidiaphragm which appears stable. Lung volumes are low, this appears similar to the previous chest x-ray. There is shift of the mediastinal structures to the right and mild cardiomegaly is present. There appears be moderate ectasia of the thoracic aorta. No pneumothorax is identified. A small left pleural effusion cannot be excluded. There is previous right shoulder arthroplasty. Stable chest x-ray with no acute findings. There is marked elevation of the left hemidiaphragm, no obvious lung consolidation. There is mediastinal shift to the right as before. No pneumothorax is identified. Moderate thoracic aortic ectasia and mild cardiomegaly. No evidence of CHF. Signed by Dr Mic Benjamin.  [facility]    Assessment//Plan           ROB MENDOZA DAVINA - [place]O Problems           Last Modified POA    * (Principal) Paroxysmal A-fib ([address] 12/12/2021 Yes    Hypertension 12/10/2021 Yes    Hyperlipemia 12/10/2021 Yes    Shortness of breath 12/10/2021 Yes    Anxiety and depression 12/10/2021 Yes    Elevated brain natriuretic peptide (BNP) level 12/10/2021 Yes    Hypothyroidism 12/10/2021 Yes    Pulmonary hypertension ([address] 12/10/2021 Yes    Lower extremity edema 12/10/2021 Yes    HFrEF (heart failure with reduced ejection fraction) ([address] 12/10/2021 Yes    Osteopenia of multiple sites 12/10/2021 Yes    Elevated liver function tests 12/10/2021 Yes    Generalized weakness 12/11/2021 Yes    Impaired ambulation 12/11/2021 Yes    At high risk for falls 12/11/2021 Yes    Palliative care patient 12/14/2021 Yes    On continuous oral anticoagulation 12/14/2021 Yes        Assessment & Plan    Principal Problem:    Paroxysmal A-fib (HCC)  Active Problems:    Hypertension    Hyperlipemia    Shortness of breath    Anxiety and depression    Elevated brain natriuretic peptide (BNP) level    Hypothyroidism    Pulmonary hypertension (HCC)    Lower extremity edema    HFrEF (heart failure with reduced ejection fraction) (HCC)    Osteopenia of multiple sites    Elevated liver function tests    Generalized weakness    Impaired ambulation    At high risk for falls    Palliative care patient    On continuous oral anticoagulation  Resolved Problems:    Atrial fibrillation with RVR (HCC)    Hypokalemia    Plan:    Continue with current medications  Converted back to sinus rhythm on IV amiodarone, which was discontinued while the cardiology due to elevated liver enzymes  LFTs are slowly improving after discontinuing amiodarone  Monitor LFTs daily  Seen by cardiology and initiated on sotalol  Patient tolerated sotalol well hence dose increased to 120 mg p.o. twice daily  Discontinue Lopressor if rates below 60  2D echo done which showed preserved left ventricular function with EF 55 to 60%  Continue

## 2021-12-15 NOTE — CARE COORDINATION
Pt's dtr Christiano Hernandez and granddtr Martin Albright contacted SW via telephone to get update on pt status and hospice services. SW had lengthy conversation with family about available services in this area and pertaining to the pt's current condition/status with request for updates from the physicians. Later discussed via telephone with Ajit Russell who is POA and is not agreeable to hospice services at the SNF for dc at this time as pt would be required to pay for the room and board which is approximately $7400/month. Dtr/POA requested pt go skilled services to [facility] for dc and determine if hospice services would be appropriate following. JAISON confirmed with Roslyn Hubbard in admission at [facility] who states able to admit the pt to a skilled bed today pending updated covid results for same day dc. [facility]: [phone] B:[phone]    JAISON notified CN Angelo Grimm and attending Dr Shakira Bailon who was also in agreement. JAISON confirmed with POA/Ajit Russell again via telephone pt would dc to facility today and requested POA relay the dc plan to the other family members as what was decided by her. POA agreed.

## 2021-12-15 NOTE — PROGRESS NOTES
Occupational Therapy  Pt leaving upon arrival for SNF.  Electronically signed by KRISTAN Onofre on 12/15/2021 at 4:19 PM

## 2021-12-16 NOTE — PROGRESS NOTES
Physician Progress Note      PATIENT:               Ponce Quiroga  CSN #:                  [id number]  :                       1935  ADMIT DATE:       2021 7:19 PM  Shayna Avitia DATE:        12/15/2021 4:16 PM  RESPONDING  PROVIDER #:        MARISOL HERNANDEZ MD          QUERY TEXT:    Pt admitted with AFIB and has HFrEF documented on the chart, along with Acute   on Chronic CHF documented. If possible, please document in progress notes and   discharge summary further specificity regarding the type and acuity of CHF:    The medical record reflects the following:  Risk Factors: HTN, AFIB, CHF  Clinical Indicators: Dr. Ben Blackwell ordered 40mg Lasix IV now on 12/10/21, BNP:   6477, BLE edema noted throughout the chart. Acute on chronic heart failure   exacerbation moderate to severe pulmonary hypertension:Progress Notes   12/10/2021    Thank you in advance,    Diego Quintanilla, RN-BSN, [facility]  Clinical   [facility]  Carroll Moran  [email or web address]  Treatment: Lasix 20mg PO, Bumex PO, One time dose of Lasix 40mg IV,  Options provided:  -- Acute on Chronic Systolic CHF/HFrEF  -- Acute on Chronic Diastolic CHF/HFpEF  -- Acute on Chronic Systolic and Diastolic CHF  -- Acute Systolic CHF/HFrEF  -- Acute Diastolic CHF/HFpEF  -- Acute Systolic and Diastolic CHF  -- Chronic Systolic CHF/HFrEF  -- Chronic Diastolic CHF/HFpEF  -- Chronic Systolic and Diastolic CHF  -- Other - I will add my own diagnosis  -- Disagree - Not applicable / Not valid  -- Disagree - Clinically unable to determine / Unknown  -- Refer to Clinical Documentation Reviewer    PROVIDER RESPONSE TEXT:    This patient is in acute on chronic systolic and diastolic CHF.     Query created by: Charisse Browne on 2021 9:44 AM      Electronically signed by:  Red Fregoso MD 2021 12:27 PM

## 2021-12-16 NOTE — TELEPHONE ENCOUNTER
SKILLED NURSING FACILITY:   INITIAL CALL POST-HOSPITAL DISCHARGE    SNF: [facility]     PHONE NUMBER: [phone]      / : Joe Akers     THERAPY: PT/OT    ANTICIPATED LENGTH OF STAY: unknown    Mrs. Yessi Farooq was admitted to  for short term rehab following discharge from [facility] for respiratory distress. She is admitted for generalized weakness and impaired ambulation. Per Joe Akers, therapy will be in today to evaluate her for services. Estimated length of stay is unknown.

## 2021-12-22 ENCOUNTER — TELEPHONE (OUTPATIENT)
Dept: PRIMARY CARE CLINIC | Age: 86
End: 2021-12-22

## (undated) DEVICE — PK TURNOVER RM ADV

## (undated) DEVICE — GLV SURG BIOGEL LTX PF 6 1/2

## (undated) DEVICE — PAD MINOR UNIVERSAL: Brand: MEDLINE INDUSTRIES, INC.

## (undated) DEVICE — SUT ETHLN 3/0 FS1 30IN 669H

## (undated) DEVICE — ELECTRD BLD EZ CLN MOD XLNG 2.75IN

## (undated) DEVICE — TRY PREP SCRB VAG PVP

## (undated) DEVICE — BNDG ELAS ECON W/CLIP 4IN 5YD LF STRL

## (undated) DEVICE — SPNG GZ WOVN 4X4IN 12PLY 10/BX STRL

## (undated) DEVICE — BANDAGE,GAUZE,BULKEE II,4.5"X4.1YD,STRL: Brand: MEDLINE

## (undated) DEVICE — VAGINAL PREP TRAY: Brand: MEDLINE INDUSTRIES, INC.

## (undated) DEVICE — ELECTRD BLD EDGE/INSUL1P 2.4X5.1MM STRL